# Patient Record
Sex: MALE | Race: WHITE | Employment: OTHER | URBAN - METROPOLITAN AREA
[De-identification: names, ages, dates, MRNs, and addresses within clinical notes are randomized per-mention and may not be internally consistent; named-entity substitution may affect disease eponyms.]

---

## 2017-08-07 ENCOUNTER — OFFICE VISIT (OUTPATIENT)
Dept: URGENT CARE | Age: 62
End: 2017-08-07
Payer: COMMERCIAL

## 2017-08-07 PROCEDURE — G0382 LEV 3 HOSP TYPE B ED VISIT: HCPCS | Performed by: FAMILY MEDICINE

## 2017-11-15 ENCOUNTER — GENERIC CONVERSION - ENCOUNTER (OUTPATIENT)
Dept: OTHER | Facility: OTHER | Age: 62
End: 2017-11-15

## 2018-01-12 NOTE — RESULT NOTES
Message   Abnormal labs, diabetes     Verified Results  (1) COMPREHENSIVE METABOLIC PANEL [id number] 51:39AL Jewell Pitts     Test Name Result Flag Reference   GLUCOSE 237 mg/dL H 65-99   Fasting reference interval   UREA NITROGEN (BUN) 21 mg/dL  7-25   CREATININE 0 81 mg/dL  0 70-1 33   For patients >52years of age, the reference limit  for Creatinine is approximately 13% higher for people  identified as -American  eGFR NON-AFR  AMERICAN 98 mL/min/1 73m2  > OR = 60   eGFR AFRICAN AMERICAN 114 mL/min/1 73m2  > OR = 60   BUN/CREATININE RATIO   0-22   NOT APPLICABLE (calc)   SODIUM 136 mmol/L  135-146   POTASSIUM 4 2 mmol/L  3 5-5 3   CHLORIDE 102 mmol/L     CARBON DIOXIDE 24 mmol/L  19-30   CALCIUM 9 2 mg/dL  8 6-10 3   PROTEIN, TOTAL 6 8 g/dL  6 1-8 1   ALBUMIN 4 3 g/dL  3 6-5 1   GLOBULIN 2 5 g/dL (calc)  1 9-3 7   ALBUMIN/GLOBULIN RATIO 1 7 (calc)  1 0-2 5   BILIRUBIN, TOTAL 1 0 mg/dL  0 2-1 2   ALKALINE PHOSPHATASE 51 U/L     AST 36 U/L H 10-35   ALT 31 U/L  9-46     (1) LIPID PANEL, FASTING [id number] 07:41AM Shirley Dwyer     Test Name Result Flag Reference   CHOLESTEROL, TOTAL 178 mg/dL  125-200   HDL CHOLESTEROL 37 mg/dL L > OR = 40   TRIGLICERIDES 369 mg/dL H <150   LDL-CHOLESTEROL 96 mg/dL (calc)  <130   Desirable range <100 mg/dL for patients with CHD or  diabetes and <70 mg/dL for diabetic patients with  known heart disease  CHOL/HDLC RATIO 4 8 (calc)  < OR = 5 0   NON HDL CHOLESTEROL 141 mg/dL (calc)     Target for non-HDL cholesterol is 30 mg/dL higher than   LDL cholesterol target       (Q) CBC (H/H, RBC, INDICES, WBC, PLT) [id number] 07:41AM Shirley Dwyer     Test Name Result Flag Reference   WHITE BLOOD CELL COUNT 6 5 Thousand/uL  3 8-10 8   RED BLOOD CELL COUNT 5 16 Million/uL  4 20-5 80   HEMOGLOBIN 16 5 g/dL  13 2-17 1   HEMATOCRIT 49 0 %  38 5-50 0   MCV 95 1 fL  80 0-100 0   MCH 31 9 pg  27 0-33 0   MCHC 33 6 g/dL  32 0-36 0   RDW 12 5 %  11 0-15 0   PLATELET COUNT 015 Thousand/uL  140-400   MPV 8 7 fL  7 5-11 5   WE RECEIVED YOUR HANDWRITTEN TEST ORDER AND  PERFORMED A HEMOGRAM WITH A PLATELET WITHOUT  A DIFFERENTIAL  IF THIS IS NOT WHAT YOU INTENDED  TO ORDER, PLEASE CONTACT YOUR LOCAL CLIENT SERVICE  REPRESENTATIVE IMMEDIATELY SO THAT WE CAN   ADJUST OUR BILLING APPROPRIATELY  YOU MAY ALSO   INQUIRE ABOUT ALTERNATIVE OR ADDITIONAL TESTING            (Q) HEMOGLOBIN A1c [id number] 07:41AM Shirley Dwyer   REPORT COMMENT:  FASTING:YES     Test Name Result Flag Reference   HEMOGLOBIN A1c 8 0 % of total Hgb H <5 7   According to ADA guidelines, hemoglobin A1c <7 0%  represents optimal control in non-pregnant diabetic  patients  Different metrics may apply to specific  patient populations  Standards of Medical Care in    Diabetes Care  2013;36:s11-s66     For the purpose of screening for the presence of  diabetes  <5 7%       Consistent with the absence of diabetes  5 7-6 4%    Consistent with increased risk for diabetes              (prediabetes)  >or=6 5%    Consistent with diabetes     This assay result is consistent with diabetes  mellitus  Currently, no consensus exists for use of hemoglobin  A1c for diagnosis of diabetes for children

## 2018-01-18 NOTE — PROGRESS NOTES
Assessment    1  Contact dermatitis due to other agent, unspecified contact dermatitis type (826 85)   (L25 8)    Plan  Contact dermatitis due to other agent, unspecified contact dermatitis type    · Triamcinolone Acetonide 0 5 % External Cream; APPLY SPARINGLY TO AFFECTED  AREA(S) 2 TO 3 TIMES DAILY    Discussion/Summary  Discussion Summary:   1  Triamcinolone cream 2-3 x per day for 7 days  2  keep right knee clean and dry  3  follow up with PCP in 5 days  Follow Up Instructions: Follow Up with your Primary Care Provider in 5 days  If your symptoms worsen, go to the nearest [place] [address] Emergency Department  Chief Complaint    1  Rash  Chief Complaint Free Text Note Form: Has had rash above L knee x 2 weeks  No pain, itch and denies knee injury  L knee sl swollen  Using OTC hydrocortisone cream       History of Present Illness  HPI: Right knee rash for 2 weeks  No itching or swelling   Hospital Based Practices Required Assessment:   Pain Assessment   the patient states they do not have pain  (on a scale of 0 to 10, the patient rates the pain at 0 )   Abuse And Domestic Violence Screen    Yes, the patient is safe at home  The patient states no one is hurting them  Depression And Suicide Screen  No, the patient has not had thoughts of hurting themself  No, the patient has not felt depressed in the past 7 days  Prefered Language is  [place]  Primary Language is  English  Readiness To Learn: Receptive  Barriers To Learning: none  Preferred Learning: written and verbal   Education Completed: disease/condition, medications and treatment/procedure   Teaching Method: verbal and written   Person Taught: patient   Evaluation Of Learning: verbalized/demonstrated understanding   Rash:   Catarino Alexander presents with complaints of mild right leg rash     Associated symptoms include skin bumps and skin redness, but no skin blistering, no cracking, no crusting, no draining, no skin dryness, no skin oiliness, no pain, no pruritus, no pustules and no purulent drainage  Review of Systems  Focused-Male:   Constitutional: no fever or chills, feels well, no tiredness, no recent weight loss or weight gain  ENT: no complaints of earache, no loss of hearing, no nosebleeds or nasal discharge, no sore throat or hoarseness  Cardiovascular: no complaints of slow or fast heart rate, no chest pain, no palpitations, no leg claudication or lower extremity edema  Respiratory: no complaints of shortness of breath, no wheezing or cough, no dyspnea on exertion, no orthopnea or PND  Gastrointestinal: no complaints of abdominal pain, no constipation, no nausea or vomiting, no diarrhea or bloody stools  ROS Reviewed:   ROS reviewed  Active Problems    1  Colon cancer screening (V76 51) (Z12 11)   2  Depression with anxiety (300 4) (F41 8)   3  Diabetes mellitus type 2, uncontrolled (250 02) (E11 65)   4  Diabetes mellitus with neurological manifestation (250 60) (E11 49)   5  DMII (diabetes mellitus, type 2) (250 00) (E11 9)   6  Enthesopathy Of The Left Knee (726 60)   7  Hypertension (401 9) (I10)   8  Insomnia (780 52) (G47 00)   9  Nail abnormalities (703 9) (L60 9)   10  Neuropathy of foot (355 8) (G57 90)   11  Nocturia (788 43) (R35 1)    Past Medical History    1  History of _   2  History of _   3  History of Acute upper respiratory infection (465 9) (J06 9)   4  History of Acute upper respiratory infection (465 9) (J06 9)   5  History of Bronchitis, asthmatic (493 90) (J45 909)   6  History of Callus (700) (L84)   7  History of Contact dermatitis due to plant (692 6) (L25 5)   8  Generalized anxiety disorder (300 02) (F41 1)   9  History of acute conjunctivitis (V12 49) (Z86 69)   10  History of essential hypertension (V12 59) (Z86 79)   11  History of insomnia (V13 89) (Z87 898)   12  History of obesity (V13 89) (Z86 39)   13  History of tinea corporis (V12 09) (Z86 19)   14   History of urinary frequency (V13 09) (Z87 898)   15  History of Hordeolum externum, unspecified laterality (373 11) (H00 019)   16  History of Injury Due To Undetermined Intent (E988 9)   17  History of Left knee pain (719 46) (M25 562)   18  History of Limb pain (729 5) (M79 609)   19  History of Limb pain (729 5) (M79 609)   20  History of Numbness and tingling of foot (782 0) (R20 2)   21  History of Old Disruption Of Knee Ligaments (717 85)   22  History of Pain of right heel (729 5) (M79 671)   23  History of Skin Abscess Of Hip (682 6)   24  Type 2 diabetes mellitus (250 00) (E11 9)  Active Problems And Past Medical History Reviewed: The active problems and past medical history were reviewed and updated today  Family History  Mother    1  No pertinent family history  Father    2  Family history of pancreatic cancer (V16 0) (Z80 0)  Sister    3  Family history of lung cancer (V16 1) (Z80 1)  Grandparent    4  Family history of colon cancer (V16 0) (Z80 0)  Family History Reviewed: The family history was reviewed and updated today  Social History    · Denied: History of Drug use   · Marital History - Currently    · Never A Smoker   · Occupation:   · Social alcohol use (Z78 9)  Social History Reviewed: The social history was reviewed and updated today  The social history was reviewed and is unchanged  Surgical History  Surgical History Reviewed: The surgical history was reviewed and updated today  Current Meds   1  Accu-Chek Comfort Curve STRP; Therapy: (Recorded:81Nna7668) to Recorded   2  Aspirin 81 MG TABS; TAKE 1 TABLET DAILY; Therapy: 99QML5653 to Recorded   3  FreeStyle Lancets Miscellaneous; to be used as directed; Therapy: 83RDU2766 to  Requested for: 59XDI9577 Recorded   4  FreeStyle Lite Test In Vitro Strip; TEST BLOOD SUGAR ONCE DAILY AS DIRECTED; Therapy: 99SME7389 to (Evaluate:51Fhm1276)  Requested for: 57Gew0263; Last   Rx:93Aqw4874 Ordered   5   FreeStyle Lite Test In Vitro Strip; USE 1 STRIP Twice daily; Therapy: 96VYI2928 to (Last RX:62ZNF4381)  Requested for: 27PJS5786 Ordered   6  FreeStyle Test In Vitro Strip; to be sued as directed to check diabetes; Therapy: 30KXB5371 to  Requested for: 23SMZ4236 Recorded   7  Metanx 3-90 314-2-35 MG Oral Capsule; 1 po daily; Therapy: 38TBM1020 to (Austin Baltazar)  Requested for: 06GGM6338; Last   Rx:66Mxr0740 Ordered   8  MetFORMIN HCl - 1000 MG Oral Tablet; Take 1 tablet twice daily; Therapy: 89SRH6132 to (Arty Electra)  Requested for: 77VSB8398; Last   Rx:29Mar2016 Ordered   9  Ramipril 10 MG Oral Capsule; TAKE 1 CAPSULE ONCE DAILY; Therapy: 78BRR8288 to (Arty Electra)  Requested for: 33JQH8536; Last   Rx:29Mar2016 Ordered   10  Ventolin  (90 Base) MCG/ACT Inhalation Aerosol Solution; INHALE 2 PUFFS    EVERY 4 HOURS AS NEEDED; Therapy: 09GEB9784 to (Last Rx:49Ivr8509)  Requested for: 61Fnf7880 Ordered  Medication List Reviewed: The medication list was reviewed and updated today  Allergies    1  Penicillins    Vitals  Signs   Recorded: 14Wdp9778 05:57PM   Temperature: 98 4 F, Oral  Heart Rate: 98  Pulse Quality: Regular  Respiration: 18  Systolic: 349, RUE, Sitting  Diastolic: 60, RUE, Sitting  Height: 6 ft 3 in  Weight: 257 lb 12 8 oz  BMI Calculated: 32 22  BSA Calculated: 2 44  O2 Saturation: 96  Pain Scale: 0    Physical Exam    Constitutional   General appearance: No acute distress, well appearing and well nourished  Eyes   Conjunctiva and lids: No swelling, erythema, or discharge  Pupils and irises: Equal, round and reactive to light  Ears, Nose, Mouth, and Throat   External inspection of ears and nose: Normal     Pulmonary   Respiratory effort: No increased work of breathing or signs of respiratory distress  Auscultation of lungs: Clear to auscultation  Cardiovascular   Auscultation of heart: Normal rate and rhythm, normal S1 and S2, without murmurs      Musculoskeletal   Gait and station: Normal     Digits and nails: Normal without clubbing or cyanosis  Inspection/palpation of joints, bones, and muscles: Normal     Skin   Examination of the skin for lesions: Abnormal   Red patch(es) in an irregular pattern, in a linear pattern, pt wears right knee brace  Rash appears to be under upper band of brace  Psychiatric   Orientation to person, place and time: Normal        Signatures   Electronically signed by : Neha Varghese;  Aug  7 2017  6:29PM EST                       (Author)    Electronically signed by : EUGENE Bland ; Aug 16 2017  8:16AM EST                       (Review)

## 2018-12-17 ENCOUNTER — OFFICE VISIT (OUTPATIENT)
Dept: URGENT CARE | Facility: CLINIC | Age: 63
End: 2018-12-17
Payer: COMMERCIAL

## 2018-12-17 VITALS
OXYGEN SATURATION: 100 % | HEART RATE: 100 BPM | HEIGHT: 75 IN | TEMPERATURE: 98.8 F | BODY MASS INDEX: 31.41 KG/M2 | SYSTOLIC BLOOD PRESSURE: 170 MMHG | WEIGHT: 252.6 LBS | DIASTOLIC BLOOD PRESSURE: 90 MMHG | RESPIRATION RATE: 18 BRPM

## 2018-12-17 DIAGNOSIS — J02.9 ACUTE PHARYNGITIS, UNSPECIFIED ETIOLOGY: Primary | ICD-10-CM

## 2018-12-17 PROCEDURE — 99213 OFFICE O/P EST LOW 20 MIN: CPT | Performed by: PHYSICIAN ASSISTANT

## 2018-12-17 PROCEDURE — 87070 CULTURE OTHR SPECIMN AEROBIC: CPT | Performed by: PHYSICIAN ASSISTANT

## 2018-12-17 NOTE — LETTER
December 17, 2018     Patient: Yareli De La Cruz   YOB: 1955   Date of Visit: 12/17/2018       To Whom it May Concern:    Marilee Case was seen in my clinic on 12/17/2018  He may return to work on 12/18/2018  If you have any questions or concerns, please don't hesitate to call           Sincerely,          Daniel Strickland PA-C        CC: No Recipients

## 2018-12-17 NOTE — PROGRESS NOTES
[facility]        NAME: Emilia Barnes is a 66 y o  male  : 1955    MRN: [id number]  DATE: 2018  TIME: 4:08 PM    Assessment and Plan   Acute pharyngitis, unspecified etiology [J02 9]  1  Acute pharyngitis, unspecified etiology  Throat culture         Patient Instructions     Discussed condition with pt  I suspect he has an acute viral pharyngitis but will send out throat culture to further evaluate and call with results once obtained  In the interim, I rec hydration, rest, soft diet, pain control, and observation  Follow up with PCP in 3-5 days  Proceed to  ER if symptoms worsen  Chief Complaint     Chief Complaint   Patient presents with    Sore Throat     Started with sore throat over WE (exposed to strep 2 weeks ago)  Has b/l ear pressure and occ  congested cough/tickle in throat and hoarse voice   Cold Like Symptoms    Cough         History of Present Illness       Pt presents with 3 day history of ST, odynophagia  Concerned about strep as his daughter had it a few weeks ago  Denies swollen glands, runny nose, nasal congestion, PND, cough, fever, chills, N/V/D  He tried drinking hot tea which made it worse  He has not taken or done anything else for the symptoms  Denies hx of recurrent strep pharyngitis  Denies hx of asthma/allergies  Does not smoke  He does not receive the flu shot  Review of Systems   Review of Systems   Constitutional: Negative  HENT: Positive for ear pain (Left), sore throat and trouble swallowing  Negative for congestion, postnasal drip and rhinorrhea  Respiratory: Negative  Cardiovascular: Negative  Gastrointestinal: Negative  Genitourinary: Negative  Neurological: Negative for headaches  Hematological: Negative for adenopathy           Current Medications       Current Outpatient Prescriptions:     aspirin 81 MG tablet, Take 1 tablet by mouth daily, Disp: , Rfl:     glucose blood (ACCU-CHEK COMFORT CURVE) test strip, by In Vitro route, Disp: , Rfl:     metFORMIN (GLUCOPHAGE) 1000 MG tablet, Take 1 tablet by mouth 2 (two) times a day, Disp: , Rfl:     ramipril (ALTACE) 10 MG capsule, Take 1 capsule by mouth daily, Disp: , Rfl:     Current Allergies     Allergies as of 12/17/2018 - Reviewed 12/17/2018   Allergen Reaction Noted    Sudafed [pseudoephedrine] Other (See Comments) 12/17/2018    Penicillins Rash 01/02/2008            The following portions of the patient's history were reviewed and updated as appropriate: allergies, current medications, past family history, past medical history, past social history, past surgical history and problem list      Past Medical History:   Diagnosis Date    Allergic rhinitis     Diabetes mellitus ([address] )        Past Surgical History:   Procedure Laterality Date    WISDOM TOOTH EXTRACTION         No family history on file  Medications have been verified  Objective   /90 (BP Location: Left arm, Patient Position: Sitting, Cuff Size: Standard)   Pulse 100   Temp 98 8 °F (37 1 °C) (Tympanic)   Resp 18   Ht 6' 3" (1 905 m)   Wt 115 kg (252 lb 9 6 oz)   SpO2 100%   BMI 31 57 kg/m²        Physical Exam     Physical Exam   Constitutional: He is oriented to person, place, and time  He appears well-developed and well-nourished  No distress  HENT:   Right Ear: Hearing and external ear normal    Left Ear: Hearing and external ear normal    Nose: Nose normal    Mouth/Throat: Mucous membranes are normal  Posterior oropharyngeal erythema present  No oropharyngeal exudate  Difficult to visualize TMs as B/L EACs with cerumen  No significant tonsillar hypertrophy  Neck: Neck supple  Cardiovascular: Normal rate, regular rhythm and normal heart sounds  Pulmonary/Chest: Effort normal and breath sounds normal    Lymphadenopathy:     He has no cervical adenopathy  Neurological: He is alert and oriented to person, place, and time     Psychiatric: He has a normal mood and affect  Vitals reviewed

## 2018-12-19 LAB — BACTERIA THROAT CULT: NORMAL

## 2020-06-16 ENCOUNTER — TELEPHONE (OUTPATIENT)
Dept: FAMILY MEDICINE CLINIC | Facility: CLINIC | Age: 65
End: 2020-06-16

## 2020-06-17 ENCOUNTER — OFFICE VISIT (OUTPATIENT)
Dept: FAMILY MEDICINE CLINIC | Facility: CLINIC | Age: 65
End: 2020-06-17
Payer: COMMERCIAL

## 2020-06-17 VITALS
DIASTOLIC BLOOD PRESSURE: 90 MMHG | TEMPERATURE: 97.8 F | HEIGHT: 75 IN | OXYGEN SATURATION: 98 % | HEART RATE: 99 BPM | WEIGHT: 232 LBS | SYSTOLIC BLOOD PRESSURE: 170 MMHG | BODY MASS INDEX: 28.85 KG/M2 | RESPIRATION RATE: 16 BRPM

## 2020-06-17 DIAGNOSIS — Z11.59 NEED FOR HEPATITIS C SCREENING TEST: ICD-10-CM

## 2020-06-17 DIAGNOSIS — Z28.21 TETANUS, DIPHTHERIA, AND ACELLULAR PERTUSSIS (TDAP) VACCINATION DECLINED: ICD-10-CM

## 2020-06-17 DIAGNOSIS — Z00.00 ANNUAL PHYSICAL EXAM: Primary | ICD-10-CM

## 2020-06-17 DIAGNOSIS — Z76.89 ENCOUNTER TO ESTABLISH CARE: ICD-10-CM

## 2020-06-17 DIAGNOSIS — Z12.5 PROSTATE CANCER SCREENING: ICD-10-CM

## 2020-06-17 DIAGNOSIS — E11.42 TYPE 2 DIABETES MELLITUS WITH DIABETIC POLYNEUROPATHY, WITHOUT LONG-TERM CURRENT USE OF INSULIN (HCC): ICD-10-CM

## 2020-06-17 DIAGNOSIS — Z28.21 PNEUMOCOCCAL VACCINATION DECLINED BY PATIENT: ICD-10-CM

## 2020-06-17 DIAGNOSIS — H61.23 BILATERAL IMPACTED CERUMEN: ICD-10-CM

## 2020-06-17 DIAGNOSIS — E78.5 HYPERLIPIDEMIA LDL GOAL <70: ICD-10-CM

## 2020-06-17 DIAGNOSIS — I10 ESSENTIAL HYPERTENSION: ICD-10-CM

## 2020-06-17 DIAGNOSIS — Z12.11 COLON CANCER SCREENING: ICD-10-CM

## 2020-06-17 DIAGNOSIS — E66.3 OVERWEIGHT WITH BODY MASS INDEX (BMI) OF 29 TO 29.9 IN ADULT: ICD-10-CM

## 2020-06-17 PROBLEM — L25.9 CONTACT DERMATITIS: Status: ACTIVE | Noted: 2017-08-07

## 2020-06-17 LAB — SL AMB POCT HEMOGLOBIN AIC: 6.5 (ref ?–6.5)

## 2020-06-17 PROCEDURE — 4010F ACE/ARB THERAPY RXD/TAKEN: CPT | Performed by: FAMILY MEDICINE

## 2020-06-17 PROCEDURE — 3044F HG A1C LEVEL LT 7.0%: CPT | Performed by: FAMILY MEDICINE

## 2020-06-17 PROCEDURE — 3077F SYST BP >= 140 MM HG: CPT | Performed by: FAMILY MEDICINE

## 2020-06-17 PROCEDURE — 1036F TOBACCO NON-USER: CPT | Performed by: FAMILY MEDICINE

## 2020-06-17 PROCEDURE — 99204 OFFICE O/P NEW MOD 45 MIN: CPT | Performed by: FAMILY MEDICINE

## 2020-06-17 PROCEDURE — 3080F DIAST BP >= 90 MM HG: CPT | Performed by: FAMILY MEDICINE

## 2020-06-17 PROCEDURE — 99386 PREV VISIT NEW AGE 40-64: CPT | Performed by: FAMILY MEDICINE

## 2020-06-17 PROCEDURE — 3008F BODY MASS INDEX DOCD: CPT | Performed by: FAMILY MEDICINE

## 2020-06-17 PROCEDURE — 83036 HEMOGLOBIN GLYCOSYLATED A1C: CPT | Performed by: FAMILY MEDICINE

## 2020-06-17 RX ORDER — LISINOPRIL 10 MG/1
10 TABLET ORAL DAILY
Qty: 30 TABLET | Refills: 0 | Status: SHIPPED | OUTPATIENT
Start: 2020-06-17 | End: 2020-07-01 | Stop reason: SDUPTHER

## 2020-06-17 RX ORDER — GABAPENTIN 100 MG/1
100 CAPSULE ORAL 3 TIMES DAILY
Qty: 90 CAPSULE | Refills: 0 | Status: SHIPPED | OUTPATIENT
Start: 2020-06-17 | End: 2020-07-08

## 2020-06-26 LAB
ALBUMIN SERPL-MCNC: 4.5 G/DL (ref 3.6–5.1)
ALBUMIN/CREAT UR: 81 MCG/MG CREAT
ALBUMIN/GLOB SERPL: 1.8 (CALC) (ref 1–2.5)
ALP SERPL-CCNC: 49 U/L (ref 35–144)
ALT SERPL-CCNC: 26 U/L (ref 9–46)
AST SERPL-CCNC: 30 U/L (ref 10–35)
BASOPHILS # BLD AUTO: 41 CELLS/UL (ref 0–200)
BASOPHILS NFR BLD AUTO: 0.7 %
BILIRUB SERPL-MCNC: 1 MG/DL (ref 0.2–1.2)
BUN SERPL-MCNC: 18 MG/DL (ref 7–25)
BUN/CREAT SERPL: ABNORMAL (CALC) (ref 6–22)
CALCIUM SERPL-MCNC: 9.4 MG/DL (ref 8.6–10.3)
CHLORIDE SERPL-SCNC: 102 MMOL/L (ref 98–110)
CHOLEST SERPL-MCNC: 165 MG/DL
CHOLEST/HDLC SERPL: 3.2 (CALC)
CO2 SERPL-SCNC: 24 MMOL/L (ref 20–32)
CREAT SERPL-MCNC: 0.84 MG/DL (ref 0.7–1.25)
CREAT UR-MCNC: 168 MG/DL (ref 20–320)
EOSINOPHIL # BLD AUTO: 118 CELLS/UL (ref 15–500)
EOSINOPHIL NFR BLD AUTO: 2 %
ERYTHROCYTE [DISTWIDTH] IN BLOOD BY AUTOMATED COUNT: 12.5 % (ref 11–15)
GLOBULIN SER CALC-MCNC: 2.5 G/DL (CALC) (ref 1.9–3.7)
GLUCOSE SERPL-MCNC: 155 MG/DL (ref 65–99)
HCT VFR BLD AUTO: 47.1 % (ref 38.5–50)
HCV AB S/CO SERPL IA: 0.01
HCV AB SERPL QL IA: NORMAL
HDLC SERPL-MCNC: 51 MG/DL
HGB BLD-MCNC: 16.4 G/DL (ref 13.2–17.1)
LDLC SERPL CALC-MCNC: 97 MG/DL (CALC)
LYMPHOCYTES # BLD AUTO: 1800 CELLS/UL (ref 850–3900)
LYMPHOCYTES NFR BLD AUTO: 30.5 %
MCH RBC QN AUTO: 33.6 PG (ref 27–33)
MCHC RBC AUTO-ENTMCNC: 34.8 G/DL (ref 32–36)
MCV RBC AUTO: 96.5 FL (ref 80–100)
MICROALBUMIN UR-MCNC: 13.6 MG/DL
MONOCYTES # BLD AUTO: 584 CELLS/UL (ref 200–950)
MONOCYTES NFR BLD AUTO: 9.9 %
NEUTROPHILS # BLD AUTO: 3357 CELLS/UL (ref 1500–7800)
NEUTROPHILS NFR BLD AUTO: 56.9 %
NONHDLC SERPL-MCNC: 114 MG/DL (CALC)
PLATELET # BLD AUTO: 219 THOUSAND/UL (ref 140–400)
PMV BLD REES-ECKER: 10.1 FL (ref 7.5–12.5)
POTASSIUM SERPL-SCNC: 4.2 MMOL/L (ref 3.5–5.3)
PROT SERPL-MCNC: 7 G/DL (ref 6.1–8.1)
PSA SERPL-MCNC: 4 NG/ML
RBC # BLD AUTO: 4.88 MILLION/UL (ref 4.2–5.8)
SL AMB EGFR AFRICAN AMERICAN: 109 ML/MIN/1.73M2
SL AMB EGFR NON AFRICAN AMERICAN: 94 ML/MIN/1.73M2
SODIUM SERPL-SCNC: 137 MMOL/L (ref 135–146)
TRIGL SERPL-MCNC: 84 MG/DL
WBC # BLD AUTO: 5.9 THOUSAND/UL (ref 3.8–10.8)

## 2020-07-01 ENCOUNTER — OFFICE VISIT (OUTPATIENT)
Dept: FAMILY MEDICINE CLINIC | Facility: CLINIC | Age: 65
End: 2020-07-01
Payer: COMMERCIAL

## 2020-07-01 VITALS
BODY MASS INDEX: 27.73 KG/M2 | WEIGHT: 223 LBS | OXYGEN SATURATION: 96 % | DIASTOLIC BLOOD PRESSURE: 90 MMHG | HEART RATE: 101 BPM | HEIGHT: 75 IN | SYSTOLIC BLOOD PRESSURE: 150 MMHG

## 2020-07-01 DIAGNOSIS — E78.5 HYPERLIPIDEMIA LDL GOAL <70: ICD-10-CM

## 2020-07-01 DIAGNOSIS — I10 ESSENTIAL HYPERTENSION: Primary | ICD-10-CM

## 2020-07-01 DIAGNOSIS — R80.9 MICROALBUMINURIA: ICD-10-CM

## 2020-07-01 DIAGNOSIS — Z12.5 PROSTATE CANCER SCREENING: ICD-10-CM

## 2020-07-01 DIAGNOSIS — E11.42 TYPE 2 DIABETES MELLITUS WITH DIABETIC POLYNEUROPATHY, WITHOUT LONG-TERM CURRENT USE OF INSULIN (HCC): ICD-10-CM

## 2020-07-01 PROCEDURE — 4010F ACE/ARB THERAPY RXD/TAKEN: CPT | Performed by: FAMILY MEDICINE

## 2020-07-01 PROCEDURE — 3066F NEPHROPATHY DOC TX: CPT | Performed by: FAMILY MEDICINE

## 2020-07-01 PROCEDURE — 3077F SYST BP >= 140 MM HG: CPT | Performed by: FAMILY MEDICINE

## 2020-07-01 PROCEDURE — 1036F TOBACCO NON-USER: CPT | Performed by: FAMILY MEDICINE

## 2020-07-01 PROCEDURE — 3008F BODY MASS INDEX DOCD: CPT | Performed by: FAMILY MEDICINE

## 2020-07-01 PROCEDURE — 3080F DIAST BP >= 90 MM HG: CPT | Performed by: FAMILY MEDICINE

## 2020-07-01 PROCEDURE — 3044F HG A1C LEVEL LT 7.0%: CPT | Performed by: FAMILY MEDICINE

## 2020-07-01 PROCEDURE — 99214 OFFICE O/P EST MOD 30 MIN: CPT | Performed by: FAMILY MEDICINE

## 2020-07-01 RX ORDER — LISINOPRIL 20 MG/1
20 TABLET ORAL DAILY
Qty: 30 TABLET | Refills: 2 | Status: SHIPPED | OUTPATIENT
Start: 2020-07-01 | End: 2020-08-05 | Stop reason: SDUPTHER

## 2020-07-01 RX ORDER — ROSUVASTATIN CALCIUM 5 MG/1
5 TABLET, COATED ORAL DAILY
Qty: 30 TABLET | Refills: 2 | Status: SHIPPED | OUTPATIENT
Start: 2020-07-01 | End: 2020-08-05 | Stop reason: SDUPTHER

## 2020-07-01 NOTE — PROGRESS NOTES
Assessment/Plan:   Diagnoses and all orders for this visit:    Essential hypertension  -     lisinopril (ZESTRIL) 20 mg tablet; Take 1 tablet (20 mg total) by mouth daily  - BP in the office 140/62 and 150/90 on my repeat   - was started on ACEI 10mg QD at last OV 2wks ago and tolerating it well   - also (+) microalbuminuria on recent labs   - no FHx of HTN per pt   - not a smoker   - avoid NSAIDs  - no more than 2g of Na/day   - will increase ACEI from 10mg to 20mg QD   - advised to get an arm BP cuff and bring to the office at next [address] [facility] in 1month for f/u - pt aware and agreeable   Microalbuminuria  -     Microalbumin / creatinine urine ratio; Future  - if with persistently microalbuminuria - will refer to Nephro - pt aware and agreeable     Type 2 diabetes mellitus with diabetic polyneuropathy, without long-term current use of insulin (HCC)  -     metFORMIN (GLUCOPHAGE) 500 mg tablet; Take 1 5 tablets (750 mg total) by mouth 2 (two) times a day  -     HEMOGLOBIN A1C W/ EAG ESTIMATION; Future  - A1c (6/17/2020): 6 5  - has intentionally lost 10lbs since last OV 2wks ago   - will decrease to 750mg BID (down from 1000mg BID)   - nml BUN/Crt   - neuropathy well controlled on Gabapentin 100mg TID - cont for now  - in the process of scheduling with Optho and Podiatry   - foot exam done in the office on 6/17/2020     Hyperlipidemia LDL goal <70  -     rosuvastatin (CRESTOR) 5 mg tablet; Take 1 tablet (5 mg total) by mouth daily  -     Lipid panel;  Future  - Lipids (6/25/2020): , TG 84, HDL 51, LDL 97   - will start on statin 5mg QHS and repeat labs in 3months   - 10yr ASCVD risk will decrease with addition of statin and better HTN control   - The 10-year ASCVD risk score (Stephanie Piper et al , 2013) is: 23 6%    Values used to calculate the score:      Age: 60 years      Sex: Male      Is Non- : No      Diabetic: Yes      Tobacco smoker: No      Systolic Blood Pressure: 487 mmHg      Is BP treated: Yes      HDL Cholesterol: 51 mg/dL      Total Cholesterol: 165 mg/dL    Overweight with body mass index (BMI) of 27 to 27 9 in adult  - BMI 27 9 <-- 29   - pt has intentionally lost 10lbs since last OV     Prostate cancer screening  -     PSA, Total Screen; Future  - if persistently elevated - will refer to Uro - pt aware and agreeable         Subjective:    Patient ID: Ebony Baldwin is a 60 y o  male    65yo M presents to the office for f/u of multiple medical conditions   1) HTN  - BP in the office 140/62 and 150/90 on my repeat   - was started on ACEI 10mg QD at last OV 2wks ago and tolerating it well   - denies F/C/N/V/HA/visual changes/CP/palpitations/SOB/wheezing/cough/abd pain/D/numbness or tingling in b/l UE+LE/LE edema or calf tenderness  - (+) facial flushing   - no FHx of HTN   - not a smoker   - used to be on Ramipril 10mg QD in the past   - diet/exercise: "trying to eat few starches", does not currently exercise - has lost 10lbs in 2wks   - social: denies tob/illicits, 4-0EDPYFF QHS  2) DM2 with polyneuropathy and microalbuminuria   - nml BUN/Crt,    - (+) microalbuminuria   - A1c: 6 5 (6/17/2020)   - currently on Metformin 1000mg BID   - (+) polyneuropathy but well controlled on Gabapentin 100mg TID   - of note, has intentionally lost 10lbs in 2wks   - in the process of trying to schedule with Optho and Podiatry - was referred at last OV   3) HL   - Lipids (6/25/2020): , TG 84, HDL 51, LDL 97   4) HM   - PSA exactly at 4   - denies FHx of Prostate Ca but strong FHx of various cancers          The following portions of the patient's history were reviewed and updated as appropriate: allergies, current medications, past family history, past medical history, past social history, past surgical history and problem list     Review of Systems  as per HPI    Objective:  /90 (BP Location: Left arm, Patient Position: Sitting, Cuff Size: Standard)   Pulse 101   Ht 6' 3" (1 905 m)   Wt 101 kg (223 lb)   SpO2 96%   BMI 27 87 kg/m²    Physical Exam   Constitutional: He is oriented to person, place, and time  He appears well-developed and well-nourished  No distress  HENT:   Head: Normocephalic and atraumatic  Right Ear: External ear normal    Left Ear: External ear normal    Eyes: Conjunctivae and EOM are normal  Right eye exhibits no discharge  Left eye exhibits no discharge  No scleral icterus  Neck: Normal range of motion  Neck supple  Cardiovascular: Normal rate, regular rhythm and normal heart sounds  Exam reveals no gallop and no friction rub  No murmur heard  Pulmonary/Chest: Effort normal and breath sounds normal  No stridor  No respiratory distress  He has no wheezes  He has no rales  Abdominal: Soft  Bowel sounds are normal  He exhibits no distension  There is no tenderness  Musculoskeletal: Normal range of motion  He exhibits no edema, tenderness or deformity  Neurological: He is alert and oriented to person, place, and time  Skin: Skin is warm  He is not diaphoretic  (+) facial flushing    Psychiatric: He has a normal mood and affect  His behavior is normal    Vitals reviewed  BMI Counseling: Body mass index is 27 87 kg/m²  The BMI is above normal  Nutrition recommendations include reducing portion sizes, consuming healthier snacks, moderation in carbohydrate intake and reducing intake of cholesterol  Exercise recommendations include moderate aerobic physical activity for 150 minutes/week and exercising 3-5 times per week

## 2020-07-02 DIAGNOSIS — E11.42 TYPE 2 DIABETES MELLITUS WITH DIABETIC POLYNEUROPATHY, WITHOUT LONG-TERM CURRENT USE OF INSULIN (HCC): ICD-10-CM

## 2020-07-06 DIAGNOSIS — E11.42 TYPE 2 DIABETES MELLITUS WITH DIABETIC POLYNEUROPATHY, WITHOUT LONG-TERM CURRENT USE OF INSULIN (HCC): ICD-10-CM

## 2020-07-08 RX ORDER — GABAPENTIN 100 MG/1
100 CAPSULE ORAL 3 TIMES DAILY
Qty: 90 CAPSULE | Refills: 0 | OUTPATIENT
Start: 2020-07-08

## 2020-07-08 RX ORDER — GABAPENTIN 100 MG/1
CAPSULE ORAL
Qty: 90 CAPSULE | Refills: 0 | Status: SHIPPED | OUTPATIENT
Start: 2020-07-08 | End: 2020-08-05 | Stop reason: SDUPTHER

## 2020-07-11 DIAGNOSIS — I10 ESSENTIAL HYPERTENSION: ICD-10-CM

## 2020-07-14 DIAGNOSIS — E11.42 TYPE 2 DIABETES MELLITUS WITH DIABETIC POLYNEUROPATHY, WITHOUT LONG-TERM CURRENT USE OF INSULIN (HCC): ICD-10-CM

## 2020-07-15 RX ORDER — LISINOPRIL 10 MG/1
TABLET ORAL
Qty: 30 TABLET | Refills: 0 | OUTPATIENT
Start: 2020-07-15

## 2020-07-15 RX ORDER — GABAPENTIN 100 MG/1
100 CAPSULE ORAL 3 TIMES DAILY
Qty: 90 CAPSULE | Refills: 0 | OUTPATIENT
Start: 2020-07-15

## 2020-07-30 LAB
ALBUMIN/CREAT UR: 21 MCG/MG CREAT
CREAT UR-MCNC: 57 MG/DL (ref 20–320)
MICROALBUMIN UR-MCNC: 1.2 MG/DL
PSA SERPL-MCNC: 3.7 NG/ML

## 2020-07-30 PROCEDURE — 3061F NEG MICROALBUMINURIA REV: CPT | Performed by: FAMILY MEDICINE

## 2020-08-05 ENCOUNTER — OFFICE VISIT (OUTPATIENT)
Dept: FAMILY MEDICINE CLINIC | Facility: CLINIC | Age: 65
End: 2020-08-05
Payer: COMMERCIAL

## 2020-08-05 VITALS
DIASTOLIC BLOOD PRESSURE: 78 MMHG | BODY MASS INDEX: 26.73 KG/M2 | WEIGHT: 215 LBS | OXYGEN SATURATION: 98 % | RESPIRATION RATE: 16 BRPM | HEIGHT: 75 IN | TEMPERATURE: 99.3 F | SYSTOLIC BLOOD PRESSURE: 128 MMHG | HEART RATE: 96 BPM

## 2020-08-05 DIAGNOSIS — I10 ESSENTIAL HYPERTENSION: Primary | ICD-10-CM

## 2020-08-05 DIAGNOSIS — R97.20 ELEVATED PSA: ICD-10-CM

## 2020-08-05 DIAGNOSIS — E11.42 TYPE 2 DIABETES MELLITUS WITH DIABETIC POLYNEUROPATHY, WITHOUT LONG-TERM CURRENT USE OF INSULIN (HCC): ICD-10-CM

## 2020-08-05 DIAGNOSIS — E78.5 HYPERLIPIDEMIA LDL GOAL <70: ICD-10-CM

## 2020-08-05 PROCEDURE — 1036F TOBACCO NON-USER: CPT | Performed by: FAMILY MEDICINE

## 2020-08-05 PROCEDURE — 3044F HG A1C LEVEL LT 7.0%: CPT | Performed by: FAMILY MEDICINE

## 2020-08-05 PROCEDURE — 99214 OFFICE O/P EST MOD 30 MIN: CPT | Performed by: FAMILY MEDICINE

## 2020-08-05 PROCEDURE — 3066F NEPHROPATHY DOC TX: CPT | Performed by: FAMILY MEDICINE

## 2020-08-05 PROCEDURE — 3008F BODY MASS INDEX DOCD: CPT | Performed by: FAMILY MEDICINE

## 2020-08-05 PROCEDURE — 4010F ACE/ARB THERAPY RXD/TAKEN: CPT | Performed by: FAMILY MEDICINE

## 2020-08-05 PROCEDURE — 3074F SYST BP LT 130 MM HG: CPT | Performed by: FAMILY MEDICINE

## 2020-08-05 PROCEDURE — 3078F DIAST BP <80 MM HG: CPT | Performed by: FAMILY MEDICINE

## 2020-08-05 RX ORDER — LISINOPRIL 20 MG/1
20 TABLET ORAL DAILY
Qty: 90 TABLET | Refills: 0 | Status: SHIPPED | OUTPATIENT
Start: 2020-08-05 | End: 2020-10-28

## 2020-08-05 RX ORDER — ROSUVASTATIN CALCIUM 5 MG/1
5 TABLET, COATED ORAL DAILY
Qty: 90 TABLET | Refills: 0 | Status: SHIPPED | OUTPATIENT
Start: 2020-08-05 | End: 2020-10-29 | Stop reason: SDUPTHER

## 2020-08-05 RX ORDER — POLYETHYLENE GLYCOL-3350, SODIUM CHLORIDE, POTASSIUM CHLORIDE AND SODIUM BICARBONATE 420; 11.2; 5.72; 1.48 G/438.4G; G/438.4G; G/438.4G; G/438.4G
POWDER, FOR SOLUTION ORAL
COMMUNITY
Start: 2020-07-20 | End: 2020-08-05 | Stop reason: ALTCHOICE

## 2020-08-05 RX ORDER — BISACODYL 5 MG
TABLET, DELAYED RELEASE (ENTERIC COATED) ORAL
COMMUNITY
Start: 2020-07-20 | End: 2020-08-05 | Stop reason: ALTCHOICE

## 2020-08-05 RX ORDER — GABAPENTIN 100 MG/1
100 CAPSULE ORAL 3 TIMES DAILY
Qty: 270 CAPSULE | Refills: 0 | Status: SHIPPED | OUTPATIENT
Start: 2020-08-05 | End: 2021-01-13 | Stop reason: SDUPTHER

## 2020-08-05 NOTE — PROGRESS NOTES
Assessment/Plan:   Diagnoses and all orders for this visit:    Essential hypertension  -     lisinopril (ZESTRIL) 20 mg tablet; Take 1 tablet (20 mg total) by mouth daily  - BP in the office 124/80 and on my repeat 128/78  - does have his BP cuff with him - range at home: 120s/70s  - BP with pt's cuff in office = 134/81   - ACEI was increased from 10 to 20mg QD at last OV on 7/1/2020 and pt tolerating it well - cont current regimen   - discussed diet and exercise - pt has lost an additional 8lbs since last OV (for a total of 17lbs since 6/17/2020)   - no FHx of HTN   - not a smoker   - has cut back on his EtOH intake   - avoid NSAIDs  - no more than 2g of Na/day   - microalbuminuria resolved     Hyperlipidemia LDL goal <70  -     rosuvastatin (CRESTOR) 5 mg tablet; Take 1 tablet (5 mg total) by mouth daily  - tolerating statin well - denies myalgias   - Lipids (6/25/2020): , TG 84, HDL 51, LDL 97   - risk score decreased with better BP control   - has script for repeat lipids  - f/u in 10/2020   - The 10-year ASCVD risk score (Johana Fisher et al , 2013) is: 18 3%    Values used to calculate the score:      Age: 60 years      Sex: Male      Is Non- : No      Diabetic: Yes      Tobacco smoker: No      Systolic Blood Pressure: 556 mmHg      Is BP treated: Yes      HDL Cholesterol: 51 mg/dL      Total Cholesterol: 165 mg/dL    Type 2 diabetes mellitus with diabetic polyneuropathy, without long-term current use of insulin (HCC)  -     gabapentin (NEURONTIN) 100 mg capsule; Take 1 capsule (100 mg total) by mouth 3 (three) times a day    Elevated PSA  -     Ambulatory referral to Urology; Future    Other orders  -     Cancel: Ambulatory referral for colonoscopy - pt has Cscope scheduled for next week with Dr Brown         Subjective:    Patient ID: Jaguar Hansen is a 60 y o  male    67yo M presents to the office for f/u of HTN   - BP in the office 124/80 and on my repeat 128/78  - does have his BP cuff with him - range at home: 120s/70s  - BP with pt's cuff in office = 134/81   - ACEI was increased from 10 to 20mg QD at last OV and pt tolerating it well  - has been exercising - walking QD   - has lost an additional 8lbs since last OV (for a total of 17lbs since 6/17/2020)   - (+) facial flushing   - no FHx of HTN   - not a smoker   - social: denies tob/illicits, 1 drink/week   - denies F/C/N/V/HA/visual changes/CP/palpitations/SOB/wheezing/cough/abd pain/D/numbness or tingling in b/l UE+LE/LE edema or calf tenderness/myalgias   - nml urine microalbumin on repeat labs  - PSA elevated at 3 7 <-- 4 0   - denies FHx of Prostate Ca but strong FHx of various cancers   - has Cscope scheduled next week with Dr Katie Dobbins      The following portions of the patient's history were reviewed and updated as appropriate: allergies, current medications, past family history, past medical history, past social history, past surgical history and problem list     Review of Systems  as per HPI    Objective:  /78 (BP Location: Left arm, Patient Position: Sitting, Cuff Size: Standard)   Pulse 96   Temp 99 3 °F (37 4 °C) (Tympanic)   Resp 16   Ht 6' 3" (1 905 m)   Wt 97 5 kg (215 lb)   SpO2 98%   BMI 26 87 kg/m²    Physical Exam   Constitutional: He is oriented to person, place, and time  Non-toxic appearance  He does not appear ill  No distress  HENT:   Head: Normocephalic and atraumatic  Right Ear: External ear normal    Left Ear: External ear normal    Eyes: Conjunctivae are normal  Right eye exhibits no discharge  Left eye exhibits no discharge  No scleral icterus  Neck: Normal range of motion  Cardiovascular: Normal rate, regular rhythm and normal heart sounds  Exam reveals no gallop and no friction rub  No murmur heard  Pulmonary/Chest: Effort normal and breath sounds normal  No stridor  No respiratory distress  He has no wheezes  He has no rhonchi  He has no rales  He exhibits no tenderness  Abdominal: Soft  Normal appearance and bowel sounds are normal    Musculoskeletal: Normal range of motion  General: No swelling, tenderness, deformity or signs of injury  Right lower leg: No edema  Left lower leg: No edema  Neurological: He is alert and oriented to person, place, and time  Skin: Skin is warm  He is not diaphoretic  (+) facial flushing    Psychiatric: His behavior is normal  Mood, judgment and thought content normal    Vitals reviewed  BMI Counseling: Body mass index is 26 87 kg/m²  The BMI is above normal  Nutrition recommendations include reducing portion sizes and decreasing overall calorie intake  Exercise recommendations include moderate aerobic physical activity for 150 minutes/week, exercising 3-5 times per week and joining a gym

## 2020-09-17 ENCOUNTER — OFFICE VISIT (OUTPATIENT)
Dept: FAMILY MEDICINE CLINIC | Facility: CLINIC | Age: 65
End: 2020-09-17
Payer: COMMERCIAL

## 2020-09-17 VITALS
BODY MASS INDEX: 27.85 KG/M2 | HEART RATE: 102 BPM | DIASTOLIC BLOOD PRESSURE: 72 MMHG | SYSTOLIC BLOOD PRESSURE: 150 MMHG | TEMPERATURE: 98.7 F | OXYGEN SATURATION: 98 % | WEIGHT: 224 LBS | HEIGHT: 75 IN | RESPIRATION RATE: 18 BRPM

## 2020-09-17 DIAGNOSIS — L97.511 SKIN ULCER OF TOE OF RIGHT FOOT, LIMITED TO BREAKDOWN OF SKIN (HCC): Primary | ICD-10-CM

## 2020-09-17 DIAGNOSIS — I10 ESSENTIAL HYPERTENSION: ICD-10-CM

## 2020-09-17 PROCEDURE — 99214 OFFICE O/P EST MOD 30 MIN: CPT | Performed by: FAMILY MEDICINE

## 2020-09-17 PROCEDURE — 3078F DIAST BP <80 MM HG: CPT | Performed by: FAMILY MEDICINE

## 2020-09-17 RX ORDER — CEPHALEXIN 500 MG/1
500 CAPSULE ORAL EVERY 12 HOURS SCHEDULED
Qty: 14 CAPSULE | Refills: 0 | Status: SHIPPED | OUTPATIENT
Start: 2020-09-17 | End: 2020-09-24

## 2020-09-17 NOTE — ASSESSMENT & PLAN NOTE
Lab Results   Component Value Date    HGBA1C 6 5 06/17/2020     A1c well controlled, continue management per PCP

## 2020-09-17 NOTE — PROGRESS NOTES
Subjective:      Patient ID: Alistair Cabezas is a 63 y o  male  Leg Pain    The incident occurred 5 to 7 days ago  The incident occurred at home  Injury mechanism: ruptured blister  Pain location: Right great toe  The quality of the pain is described as aching and burning  The pain is at a severity of 5/10  The pain is moderate  The symptoms are aggravated by weight bearing  He has tried rest for the symptoms  The treatment provided no relief  Patient states that recently he has started walking, developed a blister on the right great toe, which min she ruptured  Patient is experiencing pain and discomfort in the right great toe  Patient has history of type 2 diabetes with polyneuropathy, recent A1c 6 5  Patient has been advised to establish with podiatrist but was unable to do so  Patient does not monitor his blood sugar at home  His pressure elevated today  Patient has history of hypertension, on lisinopril 20 milligram   Patient states blood pressure is elevated because of pain  He has an to his PCP in 2 3 weeks      Past Medical History:   Diagnosis Date    Allergic rhinitis     Anxiety disorder     Last assessed 2/7/2006     Carpal tunnel syndrome     Diabetes mellitus ([address] )     Diabetes mellitus with neurological manifestation ([address] ) 04/06/2012    Last assessed 3/29/2016     Diabetes type 2, uncontrolled ([address] )     Last assessed 4/6/2016     DM II (diabetes mellitus, type II), controlled ([address] )     Last assessed 10/20/2014     Essential hypertension     Last assesssed 2/7/2006     Insomnia     Last assessed 1/13/2011     Numbness and tingling of foot     Resolved 3/29/2016     Obesity     Last assessed 2/7/2006     Old disruption of knee ligament     Last assessed 3/26/2008     Tarsal tunnel syndrome     Urinary frequency     Resolved 3/29/2016        Family History   Problem Relation Age of Onset    Heart attack Mother 79    Valvular heart disease Mother     Pancreatic cancer Father 76    Diabetes Father     Lung cancer Sister 46        not a smoker    Cancer Brother         bile duct     Colon cancer Maternal Grandmother 58    Diabetes Maternal Grandmother     Pancreatic cancer Paternal Aunt     Pancreatic cancer Paternal Uncle     Hypertension Neg Hx     Prostate cancer Neg Hx        Past Surgical History:   Procedure Laterality Date    WISDOM TOOTH EXTRACTION          reports that he has never smoked  He has never used smokeless tobacco  He reports current alcohol use of about 3 0 standard drinks of alcohol per week  He reports that he does not use drugs  Current Outpatient Medications:     aspirin 81 MG tablet, Take 1 tablet by mouth daily, Disp: , Rfl:     gabapentin (NEURONTIN) 100 mg capsule, Take 1 capsule (100 mg total) by mouth 3 (three) times a day, Disp: 270 capsule, Rfl: 0    glucose blood (ACCU-CHEK COMFORT CURVE) test strip, by In Vitro route, Disp: , Rfl:     lisinopril (ZESTRIL) 20 mg tablet, Take 1 tablet (20 mg total) by mouth daily, Disp: 90 tablet, Rfl: 0    metFORMIN (GLUCOPHAGE) 500 mg tablet, Take 1 5 tablets (750 mg total) by mouth 2 (two) times a day, Disp: 270 tablet, Rfl: 0    rosuvastatin (CRESTOR) 5 mg tablet, Take 1 tablet (5 mg total) by mouth daily, Disp: 90 tablet, Rfl: 0    The following portions of the patient's history were reviewed and updated as appropriate: allergies, current medications, past family history, past medical history, past social history, past surgical history and problem list     Review of Systems   Constitutional: Negative  Respiratory: Negative  Negative for shortness of breath  Cardiovascular: Negative  Negative for chest pain and palpitations  Gastrointestinal: Negative  Endocrine: Negative  Genitourinary: Negative  Musculoskeletal: Negative  Negative for myalgias  Rt great toe pain   Allergic/Immunologic: Negative  Neurological: Negative  Negative for headaches  Psychiatric/Behavioral: Negative  Objective:    /72   Pulse 102   Temp 98 7 °F (37 1 °C)   Resp 18   Ht 6' 3" (1 905 m)   Wt 102 kg (224 lb)   SpO2 98%   BMI 28 00 kg/m²      Physical Exam  Constitutional:       Appearance: He is well-developed  HENT:      Mouth/Throat:      Pharynx: No oropharyngeal exudate  Eyes:      Pupils: Pupils are equal, round, and reactive to light  Neck:      Musculoskeletal: Normal range of motion  Cardiovascular:      Rate and Rhythm: Normal rate and regular rhythm  Pulmonary:      Effort: Pulmonary effort is normal       Breath sounds: Normal breath sounds  Abdominal:      General: Bowel sounds are normal       Palpations: Abdomen is soft  Musculoskeletal: Normal range of motion  Skin:     Comments: Open ulcer on ventral aspect of Rt great toe  Neurological:      Mental Status: He is alert and oriented to person, place, and time  Psychiatric:         Behavior: Behavior normal          Judgment: Judgment normal            No results found for this or any previous visit (from the past 1008 hour(s))  Assessment/Plan:    No problem-specific Assessment & Plan notes found for this encounter  Problem List Items Addressed This Visit        Cardiovascular and Mediastinum    Essential hypertension       Musculoskeletal and Integument    Skin ulcer of toe of right foot, limited to breakdown of skin ([address] ) - Primary     Patient started on oral antibiotic  Advised wound care  Advised to establish with podiatrist ASAP  If unable to establish with podiatrist will need care wound care  Relevant Medications    cephalexin (KEFLEX) 500 mg capsule    Other Relevant Orders    Ambulatory referral to Podiatry        patient's blood pressure is elevated today, likely due to pain  Blood pressure was within normal limits at his most recent appointment on 08/05  Already has a follow-up scheduled with his PCP in 2 to 3 weeks

## 2020-09-17 NOTE — ASSESSMENT & PLAN NOTE
Patient started on oral antibiotic  Advised wound care  Advised to establish with podiatrist ASAP  If unable to establish with podiatrist will need care wound care

## 2020-09-23 DIAGNOSIS — E11.42 TYPE 2 DIABETES MELLITUS WITH DIABETIC POLYNEUROPATHY, WITHOUT LONG-TERM CURRENT USE OF INSULIN (HCC): ICD-10-CM

## 2020-10-01 LAB
CHOLEST SERPL-MCNC: 140 MG/DL
CHOLEST/HDLC SERPL: 2.4 (CALC)
EST. AVERAGE GLUCOSE BLD GHB EST-MCNC: 131 (CALC)
EST. AVERAGE GLUCOSE BLD GHB EST-SCNC: 7.3 (CALC)
HBA1C MFR BLD HPLC: 5.2 %
HBA1C MFR BLD: 6.2 % OF TOTAL HGB
HDLC SERPL-MCNC: 58 MG/DL
LDLC SERPL CALC-MCNC: 68 MG/DL (CALC)
NONHDLC SERPL-MCNC: 82 MG/DL (CALC)
TRIGL SERPL-MCNC: 59 MG/DL

## 2020-10-01 PROCEDURE — 3044F HG A1C LEVEL LT 7.0%: CPT | Performed by: FAMILY MEDICINE

## 2020-10-07 ENCOUNTER — TELEPHONE (OUTPATIENT)
Dept: FAMILY MEDICINE CLINIC | Facility: CLINIC | Age: 65
End: 2020-10-07

## 2020-10-07 ENCOUNTER — OFFICE VISIT (OUTPATIENT)
Dept: FAMILY MEDICINE CLINIC | Facility: CLINIC | Age: 65
End: 2020-10-07
Payer: COMMERCIAL

## 2020-10-07 VITALS
HEIGHT: 75 IN | BODY MASS INDEX: 27.48 KG/M2 | SYSTOLIC BLOOD PRESSURE: 168 MMHG | OXYGEN SATURATION: 98 % | RESPIRATION RATE: 16 BRPM | TEMPERATURE: 98 F | WEIGHT: 221 LBS | DIASTOLIC BLOOD PRESSURE: 80 MMHG | HEART RATE: 107 BPM

## 2020-10-07 DIAGNOSIS — L97.511 SKIN ULCER OF TOE OF RIGHT FOOT, LIMITED TO BREAKDOWN OF SKIN (HCC): ICD-10-CM

## 2020-10-07 DIAGNOSIS — E11.42 TYPE 2 DIABETES MELLITUS WITH DIABETIC POLYNEUROPATHY, WITHOUT LONG-TERM CURRENT USE OF INSULIN (HCC): Primary | ICD-10-CM

## 2020-10-07 DIAGNOSIS — E78.5 HYPERLIPIDEMIA LDL GOAL <70: ICD-10-CM

## 2020-10-07 DIAGNOSIS — I10 ESSENTIAL HYPERTENSION: ICD-10-CM

## 2020-10-07 PROCEDURE — 1036F TOBACCO NON-USER: CPT | Performed by: FAMILY MEDICINE

## 2020-10-07 PROCEDURE — 99214 OFFICE O/P EST MOD 30 MIN: CPT | Performed by: FAMILY MEDICINE

## 2020-10-07 RX ORDER — CEPHALEXIN 500 MG/1
CAPSULE ORAL
COMMUNITY
End: 2020-10-07 | Stop reason: ALTCHOICE

## 2020-10-07 RX ORDER — POLYETHYLENE GLYCOL 3350, SODIUM CHLORIDE, SODIUM BICARBONATE, POTASSIUM CHLORIDE 420; 11.2; 5.72; 1.48 G/4L; G/4L; G/4L; G/4L
POWDER, FOR SOLUTION ORAL
COMMUNITY
End: 2020-10-07 | Stop reason: ALTCHOICE

## 2020-10-07 RX ORDER — CHLORTHALIDONE 25 MG/1
25 TABLET ORAL DAILY
Qty: 90 TABLET | Refills: 0 | Status: SHIPPED | OUTPATIENT
Start: 2020-10-07 | End: 2020-11-20

## 2020-10-07 RX ORDER — CHLORTHALIDONE 25 MG/1
25 TABLET ORAL DAILY
Qty: 30 TABLET | Refills: 0 | Status: SHIPPED | OUTPATIENT
Start: 2020-10-07 | End: 2020-10-30

## 2020-10-27 DIAGNOSIS — I10 ESSENTIAL HYPERTENSION: ICD-10-CM

## 2020-10-28 RX ORDER — LISINOPRIL 20 MG/1
TABLET ORAL
Qty: 90 TABLET | Refills: 0 | Status: SHIPPED | OUTPATIENT
Start: 2020-10-28 | End: 2021-01-13 | Stop reason: SDUPTHER

## 2020-10-29 DIAGNOSIS — E78.5 HYPERLIPIDEMIA LDL GOAL <70: ICD-10-CM

## 2020-10-29 DIAGNOSIS — I10 ESSENTIAL HYPERTENSION: ICD-10-CM

## 2020-10-30 DIAGNOSIS — I10 ESSENTIAL HYPERTENSION: ICD-10-CM

## 2020-10-30 PROCEDURE — 4010F ACE/ARB THERAPY RXD/TAKEN: CPT | Performed by: FAMILY MEDICINE

## 2020-10-30 RX ORDER — CHLORTHALIDONE 25 MG/1
TABLET ORAL
Qty: 30 TABLET | Refills: 0 | Status: SHIPPED | OUTPATIENT
Start: 2020-10-30 | End: 2020-11-20

## 2020-10-30 RX ORDER — LISINOPRIL 20 MG/1
20 TABLET ORAL DAILY
Qty: 90 TABLET | Refills: 0 | OUTPATIENT
Start: 2020-10-30

## 2020-10-30 RX ORDER — ROSUVASTATIN CALCIUM 5 MG/1
5 TABLET, COATED ORAL DAILY
Qty: 90 TABLET | Refills: 0 | Status: SHIPPED | OUTPATIENT
Start: 2020-10-30 | End: 2020-11-18

## 2020-11-09 ENCOUNTER — TELEPHONE (OUTPATIENT)
Dept: FAMILY MEDICINE CLINIC | Facility: CLINIC | Age: 65
End: 2020-11-09

## 2020-11-12 DIAGNOSIS — E78.5 HYPERLIPIDEMIA LDL GOAL <70: ICD-10-CM

## 2020-11-18 RX ORDER — ROSUVASTATIN CALCIUM 5 MG/1
TABLET, COATED ORAL
Qty: 90 TABLET | Refills: 0 | Status: SHIPPED | OUTPATIENT
Start: 2020-11-18 | End: 2021-01-13

## 2020-11-20 ENCOUNTER — OFFICE VISIT (OUTPATIENT)
Dept: FAMILY MEDICINE CLINIC | Facility: CLINIC | Age: 65
End: 2020-11-20
Payer: COMMERCIAL

## 2020-11-20 VITALS
HEIGHT: 75 IN | DIASTOLIC BLOOD PRESSURE: 80 MMHG | BODY MASS INDEX: 28.85 KG/M2 | RESPIRATION RATE: 16 BRPM | HEART RATE: 98 BPM | WEIGHT: 232 LBS | SYSTOLIC BLOOD PRESSURE: 142 MMHG | OXYGEN SATURATION: 98 %

## 2020-11-20 DIAGNOSIS — L97.511 SKIN ULCER OF TOE OF RIGHT FOOT, LIMITED TO BREAKDOWN OF SKIN (HCC): ICD-10-CM

## 2020-11-20 DIAGNOSIS — E78.5 HYPERLIPIDEMIA LDL GOAL <70: ICD-10-CM

## 2020-11-20 DIAGNOSIS — I10 ESSENTIAL HYPERTENSION: Primary | ICD-10-CM

## 2020-11-20 DIAGNOSIS — E11.42 TYPE 2 DIABETES MELLITUS WITH DIABETIC POLYNEUROPATHY, WITHOUT LONG-TERM CURRENT USE OF INSULIN (HCC): ICD-10-CM

## 2020-11-20 PROCEDURE — 3077F SYST BP >= 140 MM HG: CPT | Performed by: FAMILY MEDICINE

## 2020-11-20 PROCEDURE — 99214 OFFICE O/P EST MOD 30 MIN: CPT | Performed by: FAMILY MEDICINE

## 2020-11-20 PROCEDURE — 3079F DIAST BP 80-89 MM HG: CPT | Performed by: FAMILY MEDICINE

## 2020-11-20 PROCEDURE — 1036F TOBACCO NON-USER: CPT | Performed by: FAMILY MEDICINE

## 2020-11-20 PROCEDURE — 3008F BODY MASS INDEX DOCD: CPT | Performed by: FAMILY MEDICINE

## 2020-11-22 DIAGNOSIS — I10 ESSENTIAL HYPERTENSION: ICD-10-CM

## 2020-11-25 RX ORDER — CHLORTHALIDONE 25 MG/1
TABLET ORAL
Qty: 30 TABLET | Refills: 0 | OUTPATIENT
Start: 2020-11-25

## 2020-12-27 ENCOUNTER — OFFICE VISIT (OUTPATIENT)
Dept: URGENT CARE | Facility: CLINIC | Age: 65
End: 2020-12-27
Payer: COMMERCIAL

## 2020-12-27 VITALS
OXYGEN SATURATION: 99 % | RESPIRATION RATE: 20 BRPM | BODY MASS INDEX: 28.47 KG/M2 | WEIGHT: 229 LBS | HEART RATE: 120 BPM | DIASTOLIC BLOOD PRESSURE: 78 MMHG | SYSTOLIC BLOOD PRESSURE: 155 MMHG | TEMPERATURE: 96.9 F | HEIGHT: 75 IN

## 2020-12-27 DIAGNOSIS — K52.9 NONINFECTIOUS GASTROENTERITIS, UNSPECIFIED TYPE: Primary | ICD-10-CM

## 2020-12-27 LAB
GLUCOSE SERPL-MCNC: 166 MG/DL (ref 65–140)
SL AMB POCT GLUCOSE BLD: 166

## 2020-12-27 PROCEDURE — G0382 LEV 3 HOSP TYPE B ED VISIT: HCPCS | Performed by: NURSE PRACTITIONER

## 2020-12-27 PROCEDURE — 82948 REAGENT STRIP/BLOOD GLUCOSE: CPT | Performed by: NURSE PRACTITIONER

## 2020-12-27 RX ORDER — ONDANSETRON 4 MG/1
4 TABLET, ORALLY DISINTEGRATING ORAL EVERY 6 HOURS PRN
Qty: 20 TABLET | Refills: 0 | Status: SHIPPED | OUTPATIENT
Start: 2020-12-27 | End: 2021-06-23 | Stop reason: ALTCHOICE

## 2020-12-28 ENCOUNTER — TELEMEDICINE (OUTPATIENT)
Dept: FAMILY MEDICINE CLINIC | Facility: CLINIC | Age: 65
End: 2020-12-28
Payer: COMMERCIAL

## 2020-12-28 VITALS
BODY MASS INDEX: 28.47 KG/M2 | HEIGHT: 75 IN | TEMPERATURE: 97 F | DIASTOLIC BLOOD PRESSURE: 68 MMHG | SYSTOLIC BLOOD PRESSURE: 155 MMHG | WEIGHT: 229 LBS

## 2020-12-28 DIAGNOSIS — R11.2 NON-INTRACTABLE VOMITING WITH NAUSEA, UNSPECIFIED VOMITING TYPE: Primary | ICD-10-CM

## 2020-12-28 DIAGNOSIS — E11.40 TYPE 2 DIABETES MELLITUS WITH DIABETIC NEUROPATHY, WITHOUT LONG-TERM CURRENT USE OF INSULIN (HCC): ICD-10-CM

## 2020-12-28 PROBLEM — R11.10 NON-INTRACTABLE VOMITING: Status: ACTIVE | Noted: 2020-12-28

## 2020-12-28 PROCEDURE — 3077F SYST BP >= 140 MM HG: CPT | Performed by: FAMILY MEDICINE

## 2020-12-28 PROCEDURE — 3008F BODY MASS INDEX DOCD: CPT | Performed by: FAMILY MEDICINE

## 2020-12-28 PROCEDURE — 99213 OFFICE O/P EST LOW 20 MIN: CPT | Performed by: FAMILY MEDICINE

## 2020-12-28 PROCEDURE — 1036F TOBACCO NON-USER: CPT | Performed by: FAMILY MEDICINE

## 2020-12-28 PROCEDURE — 3078F DIAST BP <80 MM HG: CPT | Performed by: FAMILY MEDICINE

## 2020-12-29 ENCOUNTER — TELEPHONE (OUTPATIENT)
Dept: FAMILY MEDICINE CLINIC | Facility: CLINIC | Age: 65
End: 2020-12-29

## 2021-01-10 DIAGNOSIS — E78.5 HYPERLIPIDEMIA LDL GOAL <70: ICD-10-CM

## 2021-01-13 ENCOUNTER — TELEMEDICINE (OUTPATIENT)
Dept: FAMILY MEDICINE CLINIC | Facility: CLINIC | Age: 66
End: 2021-01-13
Payer: COMMERCIAL

## 2021-01-13 VITALS
WEIGHT: 225 LBS | BODY MASS INDEX: 27.98 KG/M2 | DIASTOLIC BLOOD PRESSURE: 70 MMHG | HEIGHT: 75 IN | SYSTOLIC BLOOD PRESSURE: 134 MMHG

## 2021-01-13 DIAGNOSIS — I10 ESSENTIAL HYPERTENSION: ICD-10-CM

## 2021-01-13 DIAGNOSIS — Z13.0 SCREENING FOR DEFICIENCY ANEMIA: ICD-10-CM

## 2021-01-13 DIAGNOSIS — Z12.5 PROSTATE CANCER SCREENING: ICD-10-CM

## 2021-01-13 DIAGNOSIS — E11.42 TYPE 2 DIABETES MELLITUS WITH DIABETIC POLYNEUROPATHY, WITHOUT LONG-TERM CURRENT USE OF INSULIN (HCC): Primary | ICD-10-CM

## 2021-01-13 DIAGNOSIS — E78.5 HYPERLIPIDEMIA LDL GOAL <70: ICD-10-CM

## 2021-01-13 LAB
LEFT EYE DIABETIC RETINOPATHY: NORMAL
RIGHT EYE DIABETIC RETINOPATHY: NORMAL

## 2021-01-13 PROCEDURE — 99214 OFFICE O/P EST MOD 30 MIN: CPT | Performed by: FAMILY MEDICINE

## 2021-01-13 PROCEDURE — 3008F BODY MASS INDEX DOCD: CPT | Performed by: FAMILY MEDICINE

## 2021-01-13 PROCEDURE — 3075F SYST BP GE 130 - 139MM HG: CPT | Performed by: FAMILY MEDICINE

## 2021-01-13 PROCEDURE — 3078F DIAST BP <80 MM HG: CPT | Performed by: FAMILY MEDICINE

## 2021-01-13 PROCEDURE — 4010F ACE/ARB THERAPY RXD/TAKEN: CPT | Performed by: FAMILY MEDICINE

## 2021-01-13 PROCEDURE — 1036F TOBACCO NON-USER: CPT | Performed by: FAMILY MEDICINE

## 2021-01-13 RX ORDER — AMMONIUM LACTATE 12 G/100G
LOTION TOPICAL DAILY
COMMUNITY
End: 2021-05-24

## 2021-01-13 RX ORDER — LISINOPRIL 20 MG/1
20 TABLET ORAL DAILY
Qty: 90 TABLET | Refills: 1 | Status: SHIPPED | OUTPATIENT
Start: 2021-01-13 | End: 2021-06-25

## 2021-01-13 RX ORDER — GABAPENTIN 100 MG/1
100 CAPSULE ORAL 3 TIMES DAILY
Qty: 270 CAPSULE | Refills: 1 | Status: SHIPPED | OUTPATIENT
Start: 2021-01-13 | End: 2021-06-23 | Stop reason: SDUPTHER

## 2021-01-13 RX ORDER — ROSUVASTATIN CALCIUM 5 MG/1
TABLET, COATED ORAL
Qty: 90 TABLET | Refills: 1 | Status: SHIPPED | OUTPATIENT
Start: 2021-01-13 | End: 2021-07-14

## 2021-01-13 NOTE — PROGRESS NOTES
Virtual Regular Visit    Assessment/Plan:  Problem List Items Addressed This Visit        Endocrine    Diabetes mellitus with neurological manifestation ([facility] ) - Primary    Relevant Medications    gabapentin (NEURONTIN) 100 mg capsule    metFORMIN (GLUCOPHAGE) 500 mg tablet    Other Relevant Orders    HEMOGLOBIN A1C W/ EAG ESTIMATION    HEMOGLOBIN A1C W/ EAG ESTIMATION  - last A1c (10/2020): 6 2  - pt does not check his sugars  - advised to get repeat A1c done next week - will mail script and touch base over the phone re results - if within range, then RTO in 5months for annual exam - pt aware and agreeable   - for now, cont current regimen: Metformin 750mg BID        Cardiovascular and Mediastinum    Essential hypertension    Relevant Medications    lisinopril (ZESTRIL) 20 mg tablet    Other Relevant Orders    Microalbumin / creatinine urine ratio    Comprehensive metabolic panel  - BP range: 130s/70s  - had one isolated reading 147/82   - currently on ACEI 20mg QD and tolerating it well   - no FHx of HTN   - not a smoker   - has cut back on his EtOH intake   - avoid NSAIDs  - no more than 2g of Na/day   - microalbuminuria resolved        Other    Hyperlipidemia LDL goal <70    Relevant Orders    Lipid panel  - cont Crestor 5mg QHS   - LDL 68      Other Visit Diagnoses     Prostate cancer screening        Relevant Orders    PSA, Total Screen    Screening for deficiency anemia        Relevant Orders    CBC and differential           Reason for visit is f/u   Chief Complaint   Patient presents with    Follow-up     2 month follow up    Virtual Regular Visit        Encounter provider Thad Olszewski, DO  Provider located at [address] [place] [id number]      Recent Visits  No visits were found meeting these conditions     Showing recent visits within past 7 days and meeting all other requirements     Today's Visits  Date Type Provider Dept   01/13/21 Telemedicine Amira Vee DO [facility]   Showing today's visits and meeting all other requirements     Future Appointments  No visits were found meeting these conditions  Showing future appointments within next 150 days and meeting all other requirements        The patient was identified by name and date of birth  Boyd Thacker was informed that this is a telemedicine visit and that the visit is being conducted through [facility] and patient was informed that this is a secure, HIPAA-compliant platform  He agrees to proceed     My office door was closed  No one else was in the room  He acknowledged consent and understanding of privacy and security of the video platform  The patient has agreed to participate and understands they can discontinue the visit at any time  Patient is aware this is a billable service  Subjective  Boyd Thacker is a 63 y o  male who presents virtually for f/u      HPI   - checks his pressures sporadically (cuff was calibrated in the office) - BP range: 130s/70s  - had one isolated reading 147/82   - currently on ACEI 20mg QD and tolerating it well   - denies F/C/N/V/HA/change in vision/CP/palpitations/SOB/wheezing/abd pain/D/LE edema   - does not check his sugars   - last A1c (10/1/2020): 6 2 in the office   - LDL 68  - "I have not been eating bad stuff"  - follows with Podiatry - foot ulcer has healed up   - needs RFs     Past Medical History:   Diagnosis Date    Allergic rhinitis     Anxiety disorder     Last assessed 2/7/2006     Carpal tunnel syndrome     Diabetes mellitus ([address] )     Diabetes mellitus with neurological manifestation ([address] ) 04/06/2012    Last assessed 3/29/2016     Diabetes type 2, uncontrolled ([address] )     Last assessed 4/6/2016     DM II (diabetes mellitus, type II), controlled ([address] )     Last assessed 10/20/2014     Essential hypertension     Last assesssed 2/7/2006     Insomnia     Last assessed 1/13/2011     Numbness and tingling of foot Resolved 3/29/2016     Obesity     Last assessed 2/7/2006     Old disruption of knee ligament     Last assessed 3/26/2008     Tarsal tunnel syndrome     Urinary frequency     Resolved 3/29/2016        Past Surgical History:   Procedure Laterality Date    WISDOM TOOTH EXTRACTION         Current Outpatient Medications   Medication Sig Dispense Refill    ammonium lactate (LAC-HYDRIN) 12 % lotion Daily      aspirin 81 MG tablet Take 1 tablet by mouth daily      gabapentin (NEURONTIN) 100 mg capsule Take 1 capsule (100 mg total) by mouth 3 (three) times a day 270 capsule 1    glucose blood (ACCU-CHEK COMFORT CURVE) test strip by In Vitro route      lisinopril (ZESTRIL) 20 mg tablet Take 1 tablet (20 mg total) by mouth daily 90 tablet 1    metFORMIN (GLUCOPHAGE) 500 mg tablet Take 1 5 tablets (750 mg total) by mouth 2 (two) times a day 270 tablet 1    ondansetron (ZOFRAN-ODT) 4 mg disintegrating tablet Take 1 tablet (4 mg total) by mouth every 6 (six) hours as needed for nausea or vomiting 20 tablet 0    rosuvastatin (CRESTOR) 5 mg tablet TAKE 1 TABLET DAILY 90 tablet 1     No current facility-administered medications for this visit  Allergies   Allergen Reactions    Sudafed [Pseudoephedrine] Other (See Comments)     hyperactivity       Review of Systems as per HPI     Video Exam  Vitals:    01/13/21 0844   Weight: 102 kg (225 lb)   Height: 6' 3" (1 905 m)       Physical Exam   General: AAOx3, NAD  HEENT: NC/AT, wears glasses, EOMI, clear conjunctiva, nml external ear and nose   Cardio: deferred  Pulm: no acute respiratory distress, able to talk in complete sentences w/o getting short of breath   Abd: deferred   Psych: nml mood/affect/behavior     I spent 20 minutes directly with the patient during this visit      VIRTUAL VISIT [id number] Matthew Lee acknowledges that he has consented to an online visit or consultation   He understands that the online visit is based solely on information provided by him, and that, in the absence of a face-to-face physical evaluation by the physician, the diagnosis he receives is both limited and provisional in terms of accuracy and completeness  This is not intended to replace a full medical face-to-face evaluation by the physician  Hunter Squires understands and accepts these terms

## 2021-01-22 ENCOUNTER — TELEPHONE (OUTPATIENT)
Dept: FAMILY MEDICINE CLINIC | Facility: CLINIC | Age: 66
End: 2021-01-22

## 2021-01-22 LAB
EST. AVERAGE GLUCOSE BLD GHB EST-MCNC: 134 (CALC)
EST. AVERAGE GLUCOSE BLD GHB EST-SCNC: 7.4 (CALC)
HBA1C MFR BLD: 6.3 % OF TOTAL HGB

## 2021-01-22 PROCEDURE — 3044F HG A1C LEVEL LT 7.0%: CPT | Performed by: FAMILY MEDICINE

## 2021-01-22 NOTE — TELEPHONE ENCOUNTER
----- Message from Anjali Resendiz DO sent at 1/22/2021  2:07 PM EST -----  A1c = 6 3 - cont current regimen   RTO after 6/17/2021 for annual exam and routine f/u - please call to schedule

## 2021-03-10 DIAGNOSIS — Z23 ENCOUNTER FOR IMMUNIZATION: ICD-10-CM

## 2021-05-23 ENCOUNTER — HOSPITAL ENCOUNTER (OUTPATIENT)
Facility: HOSPITAL | Age: 66
Setting detail: OBSERVATION
Discharge: HOME | End: 2021-05-24
Attending: EMERGENCY MEDICINE | Admitting: GENERAL PRACTICE
Payer: COMMERCIAL

## 2021-05-23 DIAGNOSIS — N39.0 UTI (URINARY TRACT INFECTION): Primary | ICD-10-CM

## 2021-05-23 DIAGNOSIS — R39.9 LOWER URINARY TRACT SYMPTOMS (LUTS): ICD-10-CM

## 2021-05-23 LAB
ALBUMIN SERPL BCP-MCNC: 3.9 G/DL (ref 3.5–5)
ALP SERPL-CCNC: 71 U/L (ref 46–116)
ALT SERPL W P-5'-P-CCNC: 24 U/L (ref 12–78)
ANION GAP SERPL CALCULATED.3IONS-SCNC: 12 MMOL/L (ref 4–13)
APTT PPP: 26 SECONDS (ref 23–37)
AST SERPL W P-5'-P-CCNC: 26 U/L (ref 5–45)
BASOPHILS # BLD AUTO: 0.03 THOUSANDS/ΜL (ref 0–0.1)
BASOPHILS NFR BLD AUTO: 0 % (ref 0–1)
BILIRUB SERPL-MCNC: 0.57 MG/DL (ref 0.2–1)
BILIRUB UR QL STRIP: NEGATIVE
BUN SERPL-MCNC: 20 MG/DL (ref 5–25)
CALCIUM SERPL-MCNC: 8.9 MG/DL (ref 8.3–10.1)
CHLORIDE SERPL-SCNC: 103 MMOL/L (ref 100–108)
CLARITY UR: ABNORMAL
CO2 SERPL-SCNC: 23 MMOL/L (ref 21–32)
COLOR UR: ABNORMAL
CREAT SERPL-MCNC: 1.04 MG/DL (ref 0.6–1.3)
EOSINOPHIL # BLD AUTO: 0.1 THOUSAND/ΜL (ref 0–0.61)
EOSINOPHIL NFR BLD AUTO: 1 % (ref 0–6)
ERYTHROCYTE [DISTWIDTH] IN BLOOD BY AUTOMATED COUNT: 12.1 % (ref 11.6–15.1)
GFR SERPL CREATININE-BSD FRML MDRD: 76 ML/MIN/1.73SQ M
GLUCOSE SERPL-MCNC: 243 MG/DL (ref 65–140)
GLUCOSE UR STRIP-MCNC: ABNORMAL MG/DL
HCT VFR BLD AUTO: 42.3 % (ref 36.5–49.3)
HGB BLD-MCNC: 15.1 G/DL (ref 12–17)
HGB UR QL STRIP.AUTO: ABNORMAL
IMM GRANULOCYTES # BLD AUTO: 0.06 THOUSAND/UL (ref 0–0.2)
IMM GRANULOCYTES NFR BLD AUTO: 1 % (ref 0–2)
INR PPP: 1.08 (ref 0.84–1.19)
KETONES UR STRIP-MCNC: ABNORMAL MG/DL
LACTATE SERPL-SCNC: 2.1 MMOL/L (ref 0.5–2)
LEUKOCYTE ESTERASE UR QL STRIP: NEGATIVE
LYMPHOCYTES # BLD AUTO: 2.16 THOUSANDS/ΜL (ref 0.6–4.47)
LYMPHOCYTES NFR BLD AUTO: 25 % (ref 14–44)
MCH RBC QN AUTO: 32.9 PG (ref 26.8–34.3)
MCHC RBC AUTO-ENTMCNC: 35.7 G/DL (ref 31.4–37.4)
MCV RBC AUTO: 92 FL (ref 82–98)
MONOCYTES # BLD AUTO: 0.78 THOUSAND/ΜL (ref 0.17–1.22)
MONOCYTES NFR BLD AUTO: 9 % (ref 4–12)
NEUTROPHILS # BLD AUTO: 5.48 THOUSANDS/ΜL (ref 1.85–7.62)
NEUTS SEG NFR BLD AUTO: 64 % (ref 43–75)
NITRITE UR QL STRIP: POSITIVE
NRBC BLD AUTO-RTO: 0 /100 WBCS
NT-PROBNP SERPL-MCNC: 49 PG/ML
PH UR STRIP.AUTO: 6 [PH]
PLATELET # BLD AUTO: 186 THOUSANDS/UL (ref 149–390)
PMV BLD AUTO: 9.7 FL (ref 8.9–12.7)
POTASSIUM SERPL-SCNC: 4.2 MMOL/L (ref 3.5–5.3)
PROT SERPL-MCNC: 7.3 G/DL (ref 6.4–8.2)
PROT UR STRIP-MCNC: >=300 MG/DL
PROTHROMBIN TIME: 14.1 SECONDS (ref 11.6–14.5)
RBC # BLD AUTO: 4.59 MILLION/UL (ref 3.88–5.62)
SARS-COV-2 RNA RESP QL NAA+PROBE: NEGATIVE
SODIUM SERPL-SCNC: 138 MMOL/L (ref 136–145)
SP GR UR STRIP.AUTO: 1.02 (ref 1–1.03)
TROPONIN I SERPL-MCNC: <0.02 NG/ML
UROBILINOGEN UR QL STRIP.AUTO: 0.2 E.U./DL
WBC # BLD AUTO: 8.61 THOUSAND/UL (ref 4.31–10.16)

## 2021-05-23 PROCEDURE — 84484 ASSAY OF TROPONIN QUANT: CPT | Performed by: EMERGENCY MEDICINE

## 2021-05-23 PROCEDURE — 83880 ASSAY OF NATRIURETIC PEPTIDE: CPT | Performed by: EMERGENCY MEDICINE

## 2021-05-23 PROCEDURE — U0005 INFEC AGEN DETEC AMPLI PROBE: HCPCS | Performed by: EMERGENCY MEDICINE

## 2021-05-23 PROCEDURE — 87040 BLOOD CULTURE FOR BACTERIA: CPT | Performed by: EMERGENCY MEDICINE

## 2021-05-23 PROCEDURE — 85730 THROMBOPLASTIN TIME PARTIAL: CPT | Performed by: EMERGENCY MEDICINE

## 2021-05-23 PROCEDURE — 83605 ASSAY OF LACTIC ACID: CPT | Performed by: EMERGENCY MEDICINE

## 2021-05-23 PROCEDURE — U0003 INFECTIOUS AGENT DETECTION BY NUCLEIC ACID (DNA OR RNA); SEVERE ACUTE RESPIRATORY SYNDROME CORONAVIRUS 2 (SARS-COV-2) (CORONAVIRUS DISEASE [COVID-19]), AMPLIFIED PROBE TECHNIQUE, MAKING USE OF HIGH THROUGHPUT TECHNOLOGIES AS DESCRIBED BY CMS-2020-01-R: HCPCS | Performed by: EMERGENCY MEDICINE

## 2021-05-23 PROCEDURE — 80053 COMPREHEN METABOLIC PANEL: CPT | Performed by: EMERGENCY MEDICINE

## 2021-05-23 PROCEDURE — 81001 URINALYSIS AUTO W/SCOPE: CPT | Performed by: EMERGENCY MEDICINE

## 2021-05-23 PROCEDURE — 93005 ELECTROCARDIOGRAM TRACING: CPT

## 2021-05-23 PROCEDURE — 85025 COMPLETE CBC W/AUTO DIFF WBC: CPT | Performed by: EMERGENCY MEDICINE

## 2021-05-23 PROCEDURE — 85610 PROTHROMBIN TIME: CPT | Performed by: EMERGENCY MEDICINE

## 2021-05-23 PROCEDURE — 96361 HYDRATE IV INFUSION ADD-ON: CPT

## 2021-05-23 PROCEDURE — 87086 URINE CULTURE/COLONY COUNT: CPT | Performed by: EMERGENCY MEDICINE

## 2021-05-23 PROCEDURE — 36415 COLL VENOUS BLD VENIPUNCTURE: CPT | Performed by: EMERGENCY MEDICINE

## 2021-05-23 PROCEDURE — 96365 THER/PROPH/DIAG IV INF INIT: CPT

## 2021-05-23 PROCEDURE — 99285 EMERGENCY DEPT VISIT HI MDM: CPT | Performed by: EMERGENCY MEDICINE

## 2021-05-23 PROCEDURE — 99284 EMERGENCY DEPT VISIT MOD MDM: CPT

## 2021-05-23 RX ADMIN — CEFEPIME HYDROCHLORIDE 2000 MG: 2 INJECTION, POWDER, FOR SOLUTION INTRAVENOUS at 22:50

## 2021-05-23 RX ADMIN — SODIUM CHLORIDE 1000 ML: 0.9 INJECTION, SOLUTION INTRAVENOUS at 22:51

## 2021-05-24 VITALS
SYSTOLIC BLOOD PRESSURE: 169 MMHG | OXYGEN SATURATION: 99 % | DIASTOLIC BLOOD PRESSURE: 95 MMHG | HEART RATE: 94 BPM | BODY MASS INDEX: 30.62 KG/M2 | TEMPERATURE: 97.9 F | HEIGHT: 75 IN | RESPIRATION RATE: 15 BRPM | WEIGHT: 246.25 LBS

## 2021-05-24 PROBLEM — N30.01 ACUTE CYSTITIS WITH HEMATURIA: Status: ACTIVE | Noted: 2021-05-24

## 2021-05-24 PROBLEM — R79.89 ELEVATED LACTIC ACID LEVEL: Status: ACTIVE | Noted: 2021-05-24

## 2021-05-24 PROBLEM — R35.0 URINARY FREQUENCY: Status: ACTIVE | Noted: 2021-05-24

## 2021-05-24 LAB
ATRIAL RATE: 119 BPM
BACTERIA UR QL AUTO: ABNORMAL /HPF
GLUCOSE SERPL-MCNC: 199 MG/DL (ref 65–140)
GLUCOSE SERPL-MCNC: 213 MG/DL (ref 65–140)
GLUCOSE SERPL-MCNC: 252 MG/DL (ref 65–140)
LACTATE SERPL-SCNC: 1.7 MMOL/L (ref 0.5–2)
NON-SQ EPI CELLS URNS QL MICRO: ABNORMAL /HPF
P AXIS: 66 DEGREES
PR INTERVAL: 154 MS
QRS AXIS: -57 DEGREES
QRSD INTERVAL: 100 MS
QT INTERVAL: 312 MS
QTC INTERVAL: 438 MS
RBC #/AREA URNS AUTO: ABNORMAL /HPF
T WAVE AXIS: 66 DEGREES
VENTRICULAR RATE: 119 BPM
WBC #/AREA URNS AUTO: ABNORMAL /HPF

## 2021-05-24 PROCEDURE — 36415 COLL VENOUS BLD VENIPUNCTURE: CPT | Performed by: EMERGENCY MEDICINE

## 2021-05-24 PROCEDURE — 99217 PR OBSERVATION CARE DISCHARGE MANAGEMENT: CPT | Performed by: HOSPITALIST

## 2021-05-24 PROCEDURE — 99220 PR INITIAL OBSERVATION CARE/DAY 70 MINUTES: CPT | Performed by: PHYSICIAN ASSISTANT

## 2021-05-24 PROCEDURE — 83605 ASSAY OF LACTIC ACID: CPT | Performed by: EMERGENCY MEDICINE

## 2021-05-24 PROCEDURE — 82948 REAGENT STRIP/BLOOD GLUCOSE: CPT

## 2021-05-24 PROCEDURE — 93010 ELECTROCARDIOGRAM REPORT: CPT | Performed by: INTERNAL MEDICINE

## 2021-05-24 RX ORDER — GABAPENTIN 100 MG/1
100 CAPSULE ORAL 3 TIMES DAILY
Status: DISCONTINUED | OUTPATIENT
Start: 2021-05-24 | End: 2021-05-24 | Stop reason: HOSPADM

## 2021-05-24 RX ORDER — PRAVASTATIN SODIUM 40 MG
40 TABLET ORAL
Status: DISCONTINUED | OUTPATIENT
Start: 2021-05-24 | End: 2021-05-24 | Stop reason: HOSPADM

## 2021-05-24 RX ORDER — LISINOPRIL 20 MG/1
20 TABLET ORAL DAILY
Status: DISCONTINUED | OUTPATIENT
Start: 2021-05-24 | End: 2021-05-24 | Stop reason: HOSPADM

## 2021-05-24 RX ORDER — CEFDINIR 300 MG/1
300 CAPSULE ORAL EVERY 12 HOURS SCHEDULED
Qty: 12 CAPSULE | Refills: 0 | Status: SHIPPED | OUTPATIENT
Start: 2021-05-25 | End: 2021-05-31

## 2021-05-24 RX ADMIN — INSULIN LISPRO 2 UNITS: 100 INJECTION, SOLUTION INTRAVENOUS; SUBCUTANEOUS at 11:38

## 2021-05-24 RX ADMIN — INSULIN LISPRO 3 UNITS: 100 INJECTION, SOLUTION INTRAVENOUS; SUBCUTANEOUS at 09:18

## 2021-05-24 RX ADMIN — LISINOPRIL 20 MG: 20 TABLET ORAL at 09:17

## 2021-05-24 RX ADMIN — METFORMIN HYDROCHLORIDE 750 MG: 500 TABLET ORAL at 09:17

## 2021-05-24 RX ADMIN — CEFTRIAXONE SODIUM 1000 MG: 10 INJECTION, POWDER, FOR SOLUTION INTRAVENOUS at 11:31

## 2021-05-24 RX ADMIN — GABAPENTIN 100 MG: 100 CAPSULE ORAL at 09:17

## 2021-05-24 NOTE — H&P
[facility]  H&P- Isauro Anchors 1955, 63 y o  male MRN: [id number]  Unit/Bed#: ED 24 Encounter: [id number]  Primary Care Provider: Rafael Berman DO   Date and time admitted to hospital: 5/23/2021  9:46 PM    * Acute cystitis with hematuria  Assessment & Plan  · Presented with hematuria, dysuria and urinary frequency  · Urine: nitrite positive with innumerable WBCs  · Received 1 dose of IV cefepime in the ED  · Continue IV antibiotics with ceftriaxone  · Follow-up on urine culture  · Monitor urine output  · Patient tachycardic on presentation but does not meet sepsis criteria  Follow-up on blood cultures  Elevated lactic acid level  Assessment & Plan  · Initial lactic acid mildly at 2 1   · Received 1L bolus of NS in the ED with repeat lactic acid of 1 7  · Does not meet SIRS/ sepsis criteria as he is afebrile and without leukocytosis or tachypnea  · Antibiotics as per above  Hyperlipidemia LDL goal <70  Assessment & Plan  · Continue statin  Essential hypertension  Assessment & Plan  · BP elevated on presentation with most recent SBP improved to 134  · Continue to monitor  · Continue lisinopril  Diabetes mellitus with neurological manifestation [facility])  Assessment & Plan  Lab Results   Component Value Date    HGBA1C 6 3 (H) 01/21/2021       No results for input(s): POCGLU in the last 72 hours  Blood Sugar Average: Last 72 hrs:       · Hold metformin while inpatient  · Monitor accu-checks AC and HS  · SSI for coverage  · Hypoglycemia protocol  · Continue gabapentin  VTE Prophylaxis: Pharmacologic VTE Prophylaxis contraindicated due to hematuria  / sequential compression device   Code Status: level 1- full code  POLST: POLST form is not discussed and not completed at this time  Anticipated Length of Stay:  Patient will be admitted on an Observation basis with an anticipated length of stay of less than 2 midnights     Justification for Hospital Stay: UTI    Total Time for Visit, including Counseling / Coordination of Care: 70 minutes  Greater than 50% of this total time spent on direct patient counseling and coordination of care  Chief Complaint:   urinary frequency, dysuria and hematuria    History of Present Illness:    Yareli De La Cruz is a 63 y o  male with a history of DM with neuropathy, HTN and HLD who presents with urinary frequency, dysuria and hematuria  Patient reports that about 2 nights ago he had urinary frequency and some dysuria overnight  He reports that he was otherwise in his usual state of health until last evening around 6pm when he started having the dysuria and urinary frequnecy again as well as hematuria which prompted him to come to the hospital for evaluation  He denies fevers/ chills, abdominal pain, n/v/d, back pain or lightheadedness/ dizziness  He reports having similar symptoms when he had a prior UTIs at the age of 23  Review of Systems:    Review of Systems   Constitutional: Negative for appetite change, chills and fever  Respiratory: Negative for shortness of breath  Cardiovascular: Negative for chest pain  Gastrointestinal: Negative for abdominal pain, nausea and vomiting  Genitourinary: Positive for dysuria, frequency and hematuria  Neurological: Negative for dizziness  All other systems reviewed and are negative        Past Medical and Surgical History:     Past Medical History:   Diagnosis Date    Allergic rhinitis     Anxiety disorder     Last assessed 2/7/2006     Carpal tunnel syndrome     Diabetes mellitus [facility] )     Diabetes mellitus with neurological manifestation ([facility] ) 04/06/2012    Last assessed 3/29/2016     Diabetes type 2, uncontrolled ([facility] )     Last assessed 4/6/2016     DM II (diabetes mellitus, type II), controlled ([facility] [address] )     Last assessed 10/20/2014     Essential hypertension     Last assesssed 2/7/2006     Insomnia     Last assessed 1/13/2011     Numbness and tingling of foot     Resolved 3/29/2016     Obesity     Last assessed 2/7/2006     Old disruption of knee ligament     Last assessed 3/26/2008     Tarsal tunnel syndrome     Urinary frequency     Resolved 3/29/2016        Past Surgical History:   Procedure Laterality Date    WISDOM TOOTH EXTRACTION         Meds/Allergies:    Prior to Admission medications    Medication Sig Start Date End Date Taking? Authorizing Provider   aspirin 81 MG tablet Take 1 tablet by mouth daily 1/7/13  Yes Historical Provider, MD   gabapentin (NEURONTIN) 100 mg capsule Take 1 capsule (100 mg total) by mouth 3 (three) times a day 1/13/21  Yes Amira Vee DO   lisinopril (ZESTRIL) 20 mg tablet Take 1 tablet (20 mg total) by mouth daily 1/13/21  Yes Amira Vee DO   metFORMIN (GLUCOPHAGE) 500 mg tablet Take 1 5 tablets (750 mg total) by mouth 2 (two) times a day 1/13/21  Yes Amira Vee DO   Misc Natural Products (Lutein 20) CAPS Take 20 capsules by mouth daily Eye health   Yes Historical Provider, MD   rosuvastatin (CRESTOR) 5 mg tablet TAKE 1 TABLET DAILY 1/13/21  Yes Amira Vee DO   glucose blood (ACCU-CHEK COMFORT CURVE) test strip by In Vitro route    Historical Provider, MD   ondansetron (ZOFRAN-ODT) 4 mg disintegrating tablet Take 1 tablet (4 mg total) by mouth every 6 (six) hours as needed for nausea or vomiting  Patient not taking: Reported on 5/24/2021 12/27/20   MIKE Sousa   ammonium lactate (LAC-HYDRIN) 12 % lotion Daily  5/24/21  Historical Provider, MD     I have reviewed home medications with a medical source (PCP, Pharmacy, other)  Allergies:    Allergies   Allergen Reactions    Sudafed [Pseudoephedrine] Other (See Comments)     hyperactivity       Social History:     Marital Status: /Civil Union   Patient Pre-hospital Living Situation: home  Patient Pre-hospital Level of Mobility: independent  Patient Pre-hospital Diet Restrictions: low carb  Substance Use History:   Social History Substance and Sexual Activity   Alcohol Use Yes    Alcohol/week: 3 0 standard drinks    Types: 2 Cans of beer, 1 Standard drinks or equivalent per week    Frequency: 4 or more times a week    Drinks per session: 3 or 4     Social History     Tobacco Use   Smoking Status Never Smoker   Smokeless Tobacco Never Used     Social History     Substance and Sexual Activity   Drug Use Never       Family History:    Family History   Problem Relation Age of Onset    Heart attack Mother 79    Valvular heart disease Mother     Pancreatic cancer Father 76    Diabetes Father     Lung cancer Sister 46        not a smoker    Cancer Brother         bile duct     Colon cancer Maternal Grandmother 58    Diabetes Maternal Grandmother     Pancreatic cancer Paternal Aunt     Pancreatic cancer Paternal Uncle     Hypertension Neg Hx     Prostate cancer Neg Hx        Physical Exam:     Vitals:   Blood Pressure: 134/71 (05/24/21 0126)  Pulse: 94 (05/24/21 0126)  Temperature: 98 1 °F (36 7 °C) (05/23/21 2154)  Temp Source: Oral (05/23/21 2154)  Respirations: 16 (05/24/21 0126)  Height: 6' 3" (190 5 cm) (05/23/21 2150)  Weight - Scale: 111 kg (244 lb 11 4 oz) (05/23/21 2150)  SpO2: 95 % (05/24/21 0126)    Physical Exam  Vitals signs and nursing note reviewed  Constitutional:       General: He is not in acute distress  Appearance: He is not ill-appearing or diaphoretic  HENT:      Head: Normocephalic and atraumatic  Eyes:      Conjunctiva/sclera: Conjunctivae normal    Cardiovascular:      Rate and Rhythm: Normal rate and regular rhythm  Pulmonary:      Effort: Pulmonary effort is normal  No respiratory distress  Breath sounds: Normal breath sounds  Abdominal:      General: Bowel sounds are normal       Palpations: Abdomen is soft  Tenderness: There is no abdominal tenderness  Musculoskeletal:      Right lower leg: No edema  Left lower leg: No edema  Skin:     General: Skin is warm and dry  Coloration: Skin is not pale  Neurological:      Mental Status: He is alert and oriented to person, place, and time  Psychiatric:         Mood and Affect: Mood normal          Behavior: Behavior normal            Additional Data:     Lab Results: I have personally reviewed pertinent reports  Results from last 7 days   Lab Units 05/23/21  2234   WBC Thousand/uL 8 61   HEMOGLOBIN g/dL 15 1   HEMATOCRIT % 42 3   PLATELETS Thousands/uL 186   NEUTROS PCT % 64   LYMPHS PCT % 25   MONOS PCT % 9   EOS PCT % 1     Results from last 7 days   Lab Units 05/23/21  2234   SODIUM mmol/L 138   POTASSIUM mmol/L 4 2   CHLORIDE mmol/L 103   CO2 mmol/L 23   BUN mg/dL 20   CREATININE mg/dL 1 04   ANION GAP mmol/L 12   CALCIUM mg/dL 8 9   ALBUMIN g/dL 3 9   TOTAL BILIRUBIN mg/dL 0 57   ALK PHOS U/L 71   ALT U/L 24   AST U/L 26   GLUCOSE RANDOM mg/dL 243*     Results from last 7 days   Lab Units 05/23/21  2234   INR  1 08             Results from last 7 days   Lab Units 05/24/21  0147 05/23/21  2234   LACTIC ACID mmol/L 1 7 2 1*       Imaging: n/a    No orders to display     Allscripts / Epic Records Reviewed: Yes     ** Please Note: This note has been constructed using a voice recognition system   ** CALF PAIN/chronic calf pain, LLE swelling

## 2021-05-24 NOTE — ASSESSMENT & PLAN NOTE
· Presented with hematuria, dysuria and urinary frequency  · Urine: nitrite positive with innumerable WBCs    · Received 1 day of IV abx   · With rapid improvement in symptoms, appears stable and appropriate for dc  · Will continue oral omnicef 300mg BID to complete a 7d course  · Referral placed to urology as pt's reported some LUTS

## 2021-05-24 NOTE — ED PROVIDER NOTES
History  Chief Complaint   Patient presents with    Urinary Frequency     Pt c/o urinary frequency and burning with hematuria since today  Pt has hx of bladder infections, states these are similar symptoms  HPI     Patient is a 63 yom who presents with hematuria/fatigue  No vomiting or diarrhea  Hx diabetes  Hr initially in the 120s      + dysuria  No abdominal pain only suprapubic discomfort  Notes a history of UTI in the past      MDM 63 yom, given initial tachycardia in the 120s, age, diabetes and elevated lactic acid will admit for complex uti  Prior to Admission Medications   Prescriptions Last Dose Informant Patient Reported? Taking?    Misc Natural Products (Lutein 20) CAPS 5/23/2021 at Unknown time Self Yes Yes   Sig: Take 20 capsules by mouth daily Eye health   aspirin 81 MG tablet 5/23/2021 at Unknown time  Yes Yes   Sig: Take 1 tablet by mouth daily   gabapentin (NEURONTIN) 100 mg capsule 5/23/2021 at Unknown time  No Yes   Sig: Take 1 capsule (100 mg total) by mouth 3 (three) times a day   glucose blood (ACCU-CHEK COMFORT CURVE) test strip Not Taking at Unknown time  Yes No   Sig: by In Vitro route   lisinopril (ZESTRIL) 20 mg tablet 5/23/2021 at Unknown time  No Yes   Sig: Take 1 tablet (20 mg total) by mouth daily   metFORMIN (GLUCOPHAGE) 500 mg tablet 5/23/2021 at Unknown time  No Yes   Sig: Take 1 5 tablets (750 mg total) by mouth 2 (two) times a day   ondansetron (ZOFRAN-ODT) 4 mg disintegrating tablet Not Taking at Unknown time  No No   Sig: Take 1 tablet (4 mg total) by mouth every 6 (six) hours as needed for nausea or vomiting   Patient not taking: Reported on 5/24/2021   rosuvastatin (CRESTOR) 5 mg tablet 5/23/2021 at Unknown time  No Yes   Sig: TAKE 1 TABLET DAILY      Facility-Administered Medications: None       Past Medical History:   Diagnosis Date    Allergic rhinitis     Anxiety disorder     Last assessed 2/7/2006     Carpal tunnel syndrome     Diabetes mellitus ([facility] )     Diabetes mellitus with neurological manifestation ([place] [address] ) 04/06/2012    Last assessed 3/29/2016     Diabetes type 2, uncontrolled ([place] [address] )     Last assessed 4/6/2016     DM II (diabetes mellitus, type II), controlled ([place] [address] )     Last assessed 10/20/2014     Essential hypertension     Last assesssed 2/7/2006     Insomnia     Last assessed 1/13/2011     Numbness and tingling of foot     Resolved 3/29/2016     Obesity     Last assessed 2/7/2006     Old disruption of knee ligament     Last assessed 3/26/2008     Tarsal tunnel syndrome     Urinary frequency     Resolved 3/29/2016        Past Surgical History:   Procedure Laterality Date    WISDOM TOOTH EXTRACTION         Family History   Problem Relation Age of Onset    Heart attack Mother 79    Valvular heart disease Mother     Pancreatic cancer Father 76    Diabetes Father     Lung cancer Sister 46        not a smoker    Cancer Brother         bile duct     Colon cancer Maternal Grandmother 58    Diabetes Maternal Grandmother     Pancreatic cancer Paternal Aunt     Pancreatic cancer Paternal Uncle     Hypertension Neg Hx     Prostate cancer Neg Hx      I have reviewed and agree with the history as documented  E-Cigarette/Vaping     E-Cigarette/Vaping Substances     Social History     Tobacco Use    Smoking status: Never Smoker    Smokeless tobacco: Never Used   Substance Use Topics    Alcohol use: Yes     Alcohol/week: 3 0 standard drinks     Types: 2 Cans of beer, 1 Standard drinks or equivalent per week     Frequency: 4 or more times a week     Drinks per session: 3 or 4    Drug use: Never       Review of Systems   Genitourinary: Positive for dysuria and hematuria  All other systems reviewed and are negative  Physical Exam  Physical Exam  Vitals signs and nursing note reviewed  Constitutional:       Appearance: He is well-developed  He is not diaphoretic  HENT:      Head: Normocephalic and atraumatic        Right Ear: External ear normal       Left Ear: External ear normal       Nose: No congestion  Eyes:      General:         Right eye: No discharge  Left eye: No discharge  Extraocular Movements: Extraocular movements intact  Neck:      Musculoskeletal: Normal range of motion and neck supple  Cardiovascular:      Rate and Rhythm: Normal rate and regular rhythm  Heart sounds: Normal heart sounds  No murmur  Pulmonary:      Effort: Pulmonary effort is normal  No respiratory distress  Breath sounds: Normal breath sounds  No wheezing  Abdominal:      General: There is no distension  Palpations: Abdomen is soft  Tenderness: There is no abdominal tenderness  Musculoskeletal:         General: No tenderness or signs of injury  Skin:     General: Skin is warm and dry  Findings: No erythema  Neurological:      General: No focal deficit present  Mental Status: He is alert and oriented to person, place, and time  Motor: No weakness     Psychiatric:         Mood and Affect: Mood normal          Behavior: Behavior normal          Vital Signs  ED Triage Vitals   Temperature Pulse Respirations Blood Pressure SpO2   05/23/21 2154 05/23/21 2150 05/23/21 2150 05/23/21 2150 05/23/21 2150   98 1 °F (36 7 °C) (!) 121 18 (!) 185/97 98 %      Temp Source Heart Rate Source Patient Position - Orthostatic VS BP Location FiO2 (%)   05/23/21 2154 05/23/21 2150 05/23/21 2150 05/23/21 2150 --   Oral Monitor Lying Right arm       Pain Score       --                  Vitals:    05/23/21 2150 05/23/21 2322 05/24/21 0030 05/24/21 0126   BP: (!) 185/97 156/81 141/67 134/71   Pulse: (!) 121 (!) 112 102 94   Patient Position - Orthostatic VS: Lying Lying Lying Lying         Visual Acuity      ED Medications  Medications   sodium chloride 0 9 % bolus 1,000 mL (0 mL Intravenous Stopped 5/23/21 2351)   cefepime (MAXIPIME) 2 g/50 mL dextrose IVPB (0 mg Intravenous Stopped 5/23/21 2320)       Diagnostic Studies  Results Reviewed     Procedure Component Value Units Date/Time    Lactic acid 2 Hours [[id number]]  (Normal) Collected: 05/24/21 0147    Lab Status: Final result Specimen: Blood from Arm, Left Updated: 05/24/21 0210     LACTIC ACID 1 7 mmol/L     Narrative:      Result may be elevated if tourniquet was used during collection  Urine Microscopic [[id number]]  (Abnormal) Collected: 05/23/21 2244    Lab Status: Final result Specimen: Urine, Clean Catch Updated: 05/24/21 0016     RBC, UA Innumerable /hpf      WBC, UA       Field obscured, unable to enumerate     /hpf     Epithelial Cells       Field obscured, unable to enumerate     /hpf     Bacteria, UA       Field obscured, unable to enumerate     /hpf    Urine culture [[id number]] Collected: 05/23/21 2244    Lab Status: In process Specimen: Urine, Clean Catch Updated: 05/24/21 0016    Novel Coronavirus (Covid-19),PCR [facility] - 2 Hour Stat [[id number]]  (Normal) Collected: 05/23/21 2234    Lab Status: Final result Specimen: Nares from Nasopharyngeal Swab Updated: 05/23/21 2342     SARS-CoV-2 Negative    Narrative: The specimen collection materials, transport medium, and/or testing methodology utilized in the production of these test results have been proven to be reliable in a limited validation with an abbreviated program under the Emergency Utilization Authorization provided by the FDA  Testing reported as "Presumptive positive" will be confirmed with secondary testing to ensure result accuracy  Clinical caution and judgement should be used with the interpretation of these results with consideration of the clinical impression and other laboratory testing  Testing reported as "Positive" or "Negative" has been proven to be accurate according to standard laboratory validation requirements  All testing is performed with control materials showing appropriate reactivity at standard intervals        Lactic acid [[id number]]  (Abnormal) Collected: 05/23/21 2234 Lab Status: Final result Specimen: Blood from Arm, Left Updated: 05/23/21 2338     LACTIC ACID 2 1 mmol/L     Narrative:      Result may be elevated if tourniquet was used during collection      NT-BNP PRO [[id number]]  (Normal) Collected: 05/23/21 2234    Lab Status: Final result Specimen: Blood from Arm, Left Updated: 05/23/21 2317     NT-proBNP 49 pg/mL     Comprehensive metabolic panel [[id number]]  (Abnormal) Collected: 05/23/21 2234    Lab Status: Final result Specimen: Blood from Arm, Left Updated: 05/23/21 2311     Sodium 138 mmol/L      Potassium 4 2 mmol/L      Chloride 103 mmol/L      CO2 23 mmol/L      ANION GAP 12 mmol/L      BUN 20 mg/dL      Creatinine 1 04 mg/dL      Glucose 243 mg/dL      Calcium 8 9 mg/dL      AST 26 U/L      ALT 24 U/L      Alkaline Phosphatase 71 U/L      Total Protein 7 3 g/dL      Albumin 3 9 g/dL      Total Bilirubin 0 57 mg/dL      eGFR 76 ml/min/1 73sq m     Narrative:      [place] guidelines for Chronic Kidney Disease (CKD):     Stage 1 with normal or high GFR (GFR > 90 mL/min/1 73 square meters)    Stage 2 Mild CKD (GFR = 60-89 mL/min/1 73 square meters)    Stage 3A Moderate CKD (GFR = 45-59 mL/min/1 73 square meters)    Stage 3B Moderate CKD (GFR = 30-44 mL/min/1 73 square meters)    Stage 4 Severe CKD (GFR = 15-29 mL/min/1 73 square meters)    Stage 5 End Stage CKD (GFR <15 mL/min/1 73 square meters)  Note: GFR calculation is accurate only with a steady state creatinine    Troponin I [[id number]]  (Normal) Collected: 05/23/21 2234    Lab Status: Final result Specimen: Blood from Arm, Left Updated: 05/23/21 2311     Troponin I <0 02 ng/mL     UA w Reflex to Microscopic w Reflex to Culture [[id number]]  (Abnormal) Collected: 05/23/21 2244    Lab Status: Final result Specimen: Urine, Clean Catch Updated: 05/23/21 2309     Color, UA Red     Clarity, UA Turbid     Specific Gravity, UA 1 025     pH, UA 6 0     Leukocytes, UA Negative     Nitrite, UA Positive     Protein, UA >=300 mg/dl      Glucose,  (1/2%) mg/dl      Ketones, UA Trace mg/dl      Urobilinogen, UA 0 2 E U /dl      Bilirubin, UA Negative     Blood, UA Large    Protime-INR [[id number]]  (Normal) Collected: 05/23/21 2234    Lab Status: Final result Specimen: Blood from Arm, Left Updated: 05/23/21 2306     Protime 14 1 seconds      INR 1 08    APTT [[id number]]  (Normal) Collected: 05/23/21 2234    Lab Status: Final result Specimen: Blood from Arm, Left Updated: 05/23/21 2306     PTT 26 seconds     CBC and differential [[id number]] Collected: 05/23/21 2234    Lab Status: Final result Specimen: Blood from Arm, Left Updated: 05/23/21 2254     WBC 8 61 Thousand/uL      RBC 4 59 Million/uL      Hemoglobin 15 1 g/dL      Hematocrit 42 3 %      MCV 92 fL      MCH 32 9 pg      MCHC 35 7 g/dL      RDW 12 1 %      MPV 9 7 fL      Platelets 704 Thousands/uL      nRBC 0 /100 WBCs      Neutrophils Relative 64 %      Immat GRANS % 1 %      Lymphocytes Relative 25 %      Monocytes Relative 9 %      Eosinophils Relative 1 %      Basophils Relative 0 %      Neutrophils Absolute 5 48 Thousands/µL      Immature Grans Absolute 0 06 Thousand/uL      Lymphocytes Absolute 2 16 Thousands/µL      Monocytes Absolute 0 78 Thousand/µL      Eosinophils Absolute 0 10 Thousand/µL      Basophils Absolute 0 03 Thousands/µL     Blood culture #1 [[id number]] Collected: 05/23/21 2242    Lab Status: In process Specimen: Blood from Arm, Right Updated: 05/23/21 2248    Blood culture #2 [[id number]] Collected: 05/23/21 2234    Lab Status:  In process Specimen: Blood from Arm, Left Updated: 05/23/21 2248                 No orders to display              Procedures  Procedures         ED Course                                           MDM    Disposition  Final diagnoses:   UTI (urinary tract infection)     Time reflects when diagnosis was documented in both MDM as applicable and the Disposition within this note     Time User Action Codes Description Comment    5/24/2021  1:49 AM Tejinder Kessler Add [N39 0] UTI (urinary tract infection)       ED Disposition     ED Disposition Condition Date/Time Comment    Admit Stable Mon May 24, 2021  1:49 AM Case was discussed with slim ap and the patient's admission status was agreed to be Admission Status: observation status to the service of Dr Jose Liz   Follow-up Information    None         Patient's Medications   Discharge Prescriptions    No medications on file     No discharge procedures on file      PDMP Review     None          ED Provider  Electronically Signed by           Hina Rodrigues MD  05/24/21 0174

## 2021-05-24 NOTE — ASSESSMENT & PLAN NOTE
· Presented with hematuria, dysuria and urinary frequency  · Urine: nitrite positive with innumerable WBCs  · Received 1 dose of IV cefepime in the ED  · Continue IV antibiotics with ceftriaxone  · Follow-up on urine culture  · Monitor urine output  · Patient tachycardic on presentation but does not meet sepsis criteria  Follow-up on blood cultures

## 2021-05-24 NOTE — ASSESSMENT & PLAN NOTE
· Initial lactic acid mildly at 2 1   · Received 1L bolus of NS in the ED with repeat lactic acid of 1 7  · Does not meet SIRS/ sepsis criteria as he is afebrile and without leukocytosis or tachypnea  · Antibiotics as per above

## 2021-05-24 NOTE — ASSESSMENT & PLAN NOTE
Lab Results   Component Value Date    HGBA1C 6 3 (H) 01/21/2021       Recent Labs     05/24/21  0659 05/24/21  1125   POCGLU 252* 213*       Blood Sugar Average: Last 72 hrs:  (P) 232 5     · Hold metformin while inpatient  · Monitor accu-checks AC and HS  · SSI for coverage  · Hypoglycemia protocol  · Continue gabapentin

## 2021-05-24 NOTE — DISCHARGE INSTR - AVS FIRST PAGE
Dear Dustin Osborn,     It was our pleasure to care for you here at [facility]  It is our hope that we were always able to exceed the expected standards for your care during your stay  You were hospitalized due to urinary tract infection  You were cared for on the 4th floor under the service of Cristiane Moran MD with the [address] [place] [address] [facility] who covers for your primary care physician (PCP), Sukhi Luque, DO, while you were hospitalized  If you have any questions or concerns related to this hospitalization, you may contact us at [phone]  For follow up as well as medication refills, we recommend that you follow up with your primary care physician  A registered nurse will reach out to you by phone within a few days after your discharge to answer any additional questions that you may have after going home  However, at this time we provide for you here, the most important instructions / recommendations at discharge:     · Notable Medication Adjustments -   · omnicef 300mg BID   · Testing Required after Discharge -   · None   · Important follow up information -   · A referral was made to the urology department    · Other Instructions -   · none  · Please review this entire after visit summary as additional general instructions including medication list, appointments, activity, diet, any pertinent wound care, and other additional recommendations from your care team that may be provided for you        Sincerely,     Cristiane Moran MD

## 2021-05-24 NOTE — ASSESSMENT & PLAN NOTE
· BP elevated on presentation with most recent SBP improved to 134  · Continue to monitor  · Continue lisinopril

## 2021-05-24 NOTE — ASSESSMENT & PLAN NOTE
Lab Results   Component Value Date    HGBA1C 6 3 (H) 01/21/2021       No results for input(s): POCGLU in the last 72 hours  Blood Sugar Average: Last 72 hrs:       · Hold metformin while inpatient  · Monitor accu-checks AC and HS  · SSI for coverage  · Hypoglycemia protocol  · Continue gabapentin

## 2021-05-24 NOTE — DISCHARGE SUMMARY
[facility]  Discharge- Naina [place] 1955, 63 y o  male MRN: [id number]  Unit/Bed#: W -01 Encounter: [id number]  Primary Care Provider: Bharat Barragan DO   Date and time admitted to hospital: 5/23/2021  9:46 PM    * Acute cystitis with hematuria  Assessment & Plan  · Presented with hematuria, dysuria and urinary frequency  · Urine: nitrite positive with innumerable WBCs  · Received 1 day of IV abx   · With rapid improvement in symptoms, appears stable and appropriate for dc  · Will continue oral omnicef 300mg BID to complete a 7d course  · Referral placed to urology as pt's reported some LUTS     Essential hypertension  Assessment & Plan  · BP elevated on presentation with most recent SBP improved to 134  · Continue to monitor  · Continue lisinopril  Elevated lactic acid level  Assessment & Plan  · Initial lactic acid mildly at 2 1   · Received 1L bolus of NS in the ED with repeat lactic acid of 1 7  · Does not meet SIRS/ sepsis criteria as he is afebrile and without leukocytosis or tachypnea  · Antibiotics as per above  Diabetes mellitus with neurological manifestation [facility])  Assessment & Plan  Lab Results   Component Value Date    HGBA1C 6 3 (H) 01/21/2021       Recent Labs     05/24/21  0659 05/24/21  1125   POCGLU 252* 213*       Blood Sugar Average: Last 72 hrs:  (P) 232 5     · Hold metformin while inpatient  · Monitor accu-checks AC and HS  · SSI for coverage  · Hypoglycemia protocol  · Continue gabapentin  Hyperlipidemia LDL goal <70  Assessment & Plan  · Continue statin        Discharging Physician / Practitioner: Gualberto Kim MD  PCP: Bharat Barragan DO  Admission Date:   Admission Orders (From admission, onward)     Ordered        05/24/21 0149  Place in Observation  Once                   Discharge Date: 05/24/21    Resolved Problems  Date Reviewed: 5/24/2021    None          Consultations During Hospital Stay:  · None     Procedures Performed:   · None     Significant Findings / Test Results:     Results for Orly Fermin (MRN [id number]) as of 5/24/2021 14:56   Ref  Range 5/23/2021 22:44   Color, UA Unknown Red   Clarity, UA Unknown Turbid   SL AMB SPECIFIC GRAVITY_URINE Latest Ref Range: 1 003 - 1 030  1 025   Glucose, UA Latest Ref Range: Negative mg/dl 500 (1/2%) (A)   Ketones, UA Latest Ref Range: Negative mg/dl Trace (A)   Blood, UA Latest Ref Range: Negative  Large (A)   Nitrite, UA Latest Ref Range: Negative  Positive (A)   Leukocytes, UA Latest Ref Range: Negative  Negative   pH, UA Latest Ref Range: 4 5, 5 0, 5 5, 6 0, 6 5, 7 0, 7 5, 8 0  6 0   POCT URINE PROTEIN Latest Ref Range: Negative mg/dl >=300 (A)   Bilirubin, UA Latest Ref Range: Negative  Negative   SL AMB POCT UROBILINOGEN Latest Ref Range: 0 2, 1 0 E U /dl E U /dl 0 2   RBC, UA Latest Ref Range: None Seen, 0-1, 1-2, 2-4, 0-5 /hpf Innumerable (A)   WBC, UA Latest Ref Range: None Seen, 0-1, 1-2, 0-5, 2-4 /hpf Field obscured, unable to enumerate (A)   Bacteria, UA Latest Ref Range: None Seen, Occasional /hpf Field obscured, unable to enumerate (A)       Incidental Findings:   · None      Test Results Pending at Discharge (will require follow up):   · Ucx      Outpatient Tests Requested:  · None     Complications:  None     Reason for Admission: dysuria and hematuria     Hospital Course:     Alistair Cabezas is a 63 y o  male patient history of type 2 diabetes who originally presented to the hospital on 5/23/2021 due to an episode of dysuria with hematuria  Mild lactic on admission, but otherwise no SIRS criteria  Patient noted some difficulty urinating in the days prior to admission  No f/c  After receiving IVF and IV abx, pt's symptoms rapidly improved  Given this improvement and lack of other concerning symptoms, pt was discharged with plan to complete 6 additional days of oral abx (omnicef)  Pt agreeable to plan    Will refer to urology on dc given report of new LUTS      Please see above list of diagnoses and related plan for additional information  Condition at Discharge: fair     Discharge Day Visit / Exam:     Subjective:  Feels well  No new complaints  Denies f/c  Vitals: Blood Pressure: 169/95 (05/24/21 0751)  Pulse: 94 (05/24/21 0751)  Temperature: 97 9 °F (36 6 °C) (05/24/21 0751)  Temp Source: Oral (05/24/21 0751)  Respirations: 15 (05/24/21 0751)  Height: 6' 3" (190 5 cm) (05/23/21 2150)  Weight - Scale: 112 kg (246 lb 4 1 oz) (05/24/21 0736)  SpO2: 99 % (05/24/21 0751)  Exam:   Physical Exam  Vitals signs and nursing note reviewed  Constitutional:       General: He is not in acute distress  Appearance: Normal appearance  He is not ill-appearing or toxic-appearing  HENT:      Head: Normocephalic and atraumatic  Cardiovascular:      Rate and Rhythm: Normal rate  Pulmonary:      Effort: Pulmonary effort is normal  No respiratory distress  Breath sounds: No wheezing  Abdominal:      General: Abdomen is flat  There is no distension  Palpations: Abdomen is soft  Tenderness: There is no right CVA tenderness or left CVA tenderness  Skin:     General: Skin is warm and dry  Coloration: Skin is not pale  Findings: No erythema  Neurological:      General: No focal deficit present  Mental Status: He is alert and oriented to person, place, and time  Discharge instructions/Information to patient and family:   See after visit summary for information provided to patient and family  Provisions for Follow-Up Care:  See after visit summary for information related to follow-up care and any pertinent home health orders  Disposition:     Home    Planned Readmission: none      Discharge Statement:  I spent 35 minutes discharging the patient  This time was spent on the day of discharge  I had direct contact with the patient on the day of discharge   Greater than 50% of the total time was spent examining patient, answering all patient questions, arranging and discussing plan of care with patient as well as directly providing post-discharge instructions  Additional time then spent on discharge activities  Discharge Medications:  See after visit summary for reconciled discharge medications provided to patient and family        ** Please Note: This note has been constructed using a voice recognition system **

## 2021-05-24 NOTE — PLAN OF CARE
Problem: Potential for Falls  Goal: Patient will remain free of falls  Description: INTERVENTIONS:  - Assess patient frequently for physical needs  -  Identify cognitive and physical deficits and behaviors that affect risk of falls    -  [place] fall precautions as indicated by assessment   - Educate patient/family on patient safety including physical limitations  - Instruct patient to call for assistance with activity based on assessment  - Modify environment to reduce risk of injury  - Consider OT/PT consult to assist with strengthening/mobility  Outcome: Progressing     Problem: PAIN - ADULT  Goal: Verbalizes/displays adequate comfort level or baseline comfort level  Description: Interventions:  - Encourage patient to monitor pain and request assistance  - Assess pain using appropriate pain scale  - Administer analgesics based on type and severity of pain and evaluate response  - Implement non-pharmacological measures as appropriate and evaluate response  - Consider cultural and social influences on pain and pain management  - Notify physician/advanced practitioner if interventions unsuccessful or patient reports new pain  Outcome: Progressing     Problem: INFECTION - ADULT  Goal: Absence or prevention of progression during hospitalization  Description: INTERVENTIONS:  - Assess and monitor for signs and symptoms of infection  - Monitor lab/diagnostic results  - Monitor all insertion sites, i e  indwelling lines, tubes, and drains  - Monitor endotracheal if appropriate and nasal secretions for changes in amount and color  - Jones appropriate cooling/warming therapies per order  - Administer medications as ordered  - Instruct and encourage patient and family to use good hand hygiene technique  - Identify and instruct in appropriate isolation precautions for identified infection/condition  Outcome: Progressing  Goal: Absence of fever/infection during neutropenic period  Description: INTERVENTIONS:  - Monitor WBC    Outcome: Progressing     Problem: SAFETY ADULT  Goal: Patient will remain free of falls  Description: INTERVENTIONS:  - Assess patient frequently for physical needs  -  Identify cognitive and physical deficits and behaviors that affect risk of falls    -  [place] fall precautions as indicated by assessment   - Educate patient/family on patient safety including physical limitations  - Instruct patient to call for assistance with activity based on assessment  - Modify environment to reduce risk of injury  - Consider OT/PT consult to assist with strengthening/mobility  Outcome: Progressing  Goal: Maintain or return to baseline ADL function  Description: INTERVENTIONS:  -  Assess patient's ability to carry out ADLs; assess patient's baseline for ADL function and identify physical deficits which impact ability to perform ADLs (bathing, care of mouth/teeth, toileting, grooming, dressing, etc )  - Assess/evaluate cause of self-care deficits   - Assess range of motion  - Assess patient's mobility; develop plan if impaired  - Assess patient's need for assistive devices and provide as appropriate  - Encourage maximum independence but intervene and supervise when necessary  - Involve family in performance of ADLs  - Assess for home care needs following discharge   - Consider OT consult to assist with ADL evaluation and planning for discharge  - Provide patient education as appropriate  Outcome: Progressing  Goal: Maintain or return mobility status to optimal level  Description: INTERVENTIONS:  - Assess patient's baseline mobility status (ambulation, transfers, stairs, etc )    - Identify cognitive and physical deficits and behaviors that affect mobility  - Identify mobility aids required to assist with transfers and/or ambulation (gait belt, sit-to-stand, lift, walker, cane, etc )  - [place] fall precautions as indicated by assessment  - Record patient progress and toleration of activity level on Mobility SBAR; progress patient to next Phase/Stage  - Instruct patient to call for assistance with activity based on assessment  - Consider rehabilitation consult to assist with strengthening/weightbearing, etc   Outcome: Progressing     Problem: DISCHARGE PLANNING  Goal: Discharge to home or other facility with appropriate resources  Description: INTERVENTIONS:  - Identify barriers to discharge w/patient and caregiver  - Arrange for needed discharge resources and transportation as appropriate  - Identify discharge learning needs (meds, wound care, etc )  - Arrange for interpretive services to assist at discharge as needed  - Refer to Case Management Department for coordinating discharge planning if the patient needs post-hospital services based on physician/advanced practitioner order or complex needs related to functional status, cognitive ability, or social support system  Outcome: Progressing     Problem: Knowledge Deficit  Goal: Patient/family/caregiver demonstrates understanding of disease process, treatment plan, medications, and discharge instructions  Description: Complete learning assessment and assess knowledge base    Interventions:  - Provide teaching at level of understanding  - Provide teaching via preferred learning methods  Outcome: Progressing     Problem: GENITOURINARY - ADULT  Goal: Maintains or returns to baseline urinary function  Description: INTERVENTIONS:  - Assess urinary function  - Encourage oral fluids to ensure adequate hydration if ordered  - Administer IV fluids as ordered to ensure adequate hydration  - Administer ordered medications as needed  - Offer frequent toileting  - Follow urinary retention protocol if ordered  Outcome: Progressing

## 2021-05-25 ENCOUNTER — TRANSITIONAL CARE MANAGEMENT (OUTPATIENT)
Dept: FAMILY MEDICINE CLINIC | Facility: CLINIC | Age: 66
End: 2021-05-25

## 2021-05-25 LAB — BACTERIA UR CULT: NORMAL

## 2021-05-29 LAB
BACTERIA BLD CULT: NORMAL
BACTERIA BLD CULT: NORMAL

## 2021-06-15 LAB
ALBUMIN SERPL-MCNC: 4.1 G/DL (ref 3.6–5.1)
ALBUMIN/GLOB SERPL: 1.8 (CALC) (ref 1–2.5)
ALP SERPL-CCNC: 48 U/L (ref 35–144)
ALT SERPL-CCNC: 17 U/L (ref 9–46)
AST SERPL-CCNC: 21 U/L (ref 10–35)
BASOPHILS # BLD AUTO: 29 CELLS/UL (ref 0–200)
BASOPHILS NFR BLD AUTO: 0.5 %
BILIRUB SERPL-MCNC: 0.6 MG/DL (ref 0.2–1.2)
BUN SERPL-MCNC: 15 MG/DL (ref 7–25)
BUN/CREAT SERPL: ABNORMAL (CALC) (ref 6–22)
CALCIUM SERPL-MCNC: 9.2 MG/DL (ref 8.6–10.3)
CHLORIDE SERPL-SCNC: 105 MMOL/L (ref 98–110)
CHOLEST SERPL-MCNC: 110 MG/DL
CHOLEST/HDLC SERPL: 2.2 (CALC)
CO2 SERPL-SCNC: 24 MMOL/L (ref 20–32)
CREAT SERPL-MCNC: 0.91 MG/DL (ref 0.7–1.25)
EOSINOPHIL # BLD AUTO: 91 CELLS/UL (ref 15–500)
EOSINOPHIL NFR BLD AUTO: 1.6 %
ERYTHROCYTE [DISTWIDTH] IN BLOOD BY AUTOMATED COUNT: 12.4 % (ref 11–15)
EST. AVERAGE GLUCOSE BLD GHB EST-MCNC: 177 (CALC)
EST. AVERAGE GLUCOSE BLD GHB EST-SCNC: 9.8 (CALC)
GLOBULIN SER CALC-MCNC: 2.3 G/DL (CALC) (ref 1.9–3.7)
GLUCOSE SERPL-MCNC: 226 MG/DL (ref 65–99)
HBA1C MFR BLD: 7.8 % OF TOTAL HGB
HCT VFR BLD AUTO: 42.5 % (ref 38.5–50)
HDLC SERPL-MCNC: 50 MG/DL
HGB BLD-MCNC: 14.7 G/DL (ref 13.2–17.1)
LDLC SERPL CALC-MCNC: 44 MG/DL (CALC)
LYMPHOCYTES # BLD AUTO: 1676 CELLS/UL (ref 850–3900)
LYMPHOCYTES NFR BLD AUTO: 29.4 %
MCH RBC QN AUTO: 32.9 PG (ref 27–33)
MCHC RBC AUTO-ENTMCNC: 34.6 G/DL (ref 32–36)
MCV RBC AUTO: 95.1 FL (ref 80–100)
MONOCYTES # BLD AUTO: 513 CELLS/UL (ref 200–950)
MONOCYTES NFR BLD AUTO: 9 %
NEUTROPHILS # BLD AUTO: 3392 CELLS/UL (ref 1500–7800)
NEUTROPHILS NFR BLD AUTO: 59.5 %
NONHDLC SERPL-MCNC: 60 MG/DL (CALC)
PLATELET # BLD AUTO: 182 THOUSAND/UL (ref 140–400)
PMV BLD REES-ECKER: 10 FL (ref 7.5–12.5)
POTASSIUM SERPL-SCNC: 3.8 MMOL/L (ref 3.5–5.3)
PROT SERPL-MCNC: 6.4 G/DL (ref 6.1–8.1)
PSA SERPL-MCNC: 4.1 NG/ML
RBC # BLD AUTO: 4.47 MILLION/UL (ref 4.2–5.8)
SL AMB EGFR AFRICAN AMERICAN: 104 ML/MIN/1.73M2
SL AMB EGFR NON AFRICAN AMERICAN: 89 ML/MIN/1.73M2
SODIUM SERPL-SCNC: 138 MMOL/L (ref 135–146)
TRIGL SERPL-MCNC: 77 MG/DL
WBC # BLD AUTO: 5.7 THOUSAND/UL (ref 3.8–10.8)

## 2021-06-18 DIAGNOSIS — R97.20 ELEVATED PSA: Primary | ICD-10-CM

## 2021-06-23 ENCOUNTER — TELEPHONE (OUTPATIENT)
Dept: UROLOGY | Facility: AMBULATORY SURGERY CENTER | Age: 66
End: 2021-06-23

## 2021-06-23 ENCOUNTER — OFFICE VISIT (OUTPATIENT)
Dept: FAMILY MEDICINE CLINIC | Facility: CLINIC | Age: 66
End: 2021-06-23
Payer: COMMERCIAL

## 2021-06-23 VITALS
OXYGEN SATURATION: 97 % | BODY MASS INDEX: 30.04 KG/M2 | DIASTOLIC BLOOD PRESSURE: 86 MMHG | HEART RATE: 70 BPM | RESPIRATION RATE: 18 BRPM | SYSTOLIC BLOOD PRESSURE: 130 MMHG | WEIGHT: 241.6 LBS | HEIGHT: 75 IN

## 2021-06-23 DIAGNOSIS — Z28.21 PNEUMOCOCCAL VACCINATION DECLINED BY PATIENT: ICD-10-CM

## 2021-06-23 DIAGNOSIS — Z00.00 ANNUAL PHYSICAL EXAM: Primary | ICD-10-CM

## 2021-06-23 DIAGNOSIS — Z28.21 TETANUS, DIPHTHERIA, AND ACELLULAR PERTUSSIS (TDAP) VACCINATION DECLINED: ICD-10-CM

## 2021-06-23 DIAGNOSIS — E78.5 HYPERLIPIDEMIA LDL GOAL <70: ICD-10-CM

## 2021-06-23 DIAGNOSIS — R97.20 ELEVATED PSA: ICD-10-CM

## 2021-06-23 DIAGNOSIS — I10 ESSENTIAL HYPERTENSION: ICD-10-CM

## 2021-06-23 DIAGNOSIS — E11.42 TYPE 2 DIABETES MELLITUS WITH DIABETIC POLYNEUROPATHY, WITHOUT LONG-TERM CURRENT USE OF INSULIN (HCC): ICD-10-CM

## 2021-06-23 DIAGNOSIS — E66.09 CLASS 1 OBESITY DUE TO EXCESS CALORIES WITH SERIOUS COMORBIDITY AND BODY MASS INDEX (BMI) OF 30.0 TO 30.9 IN ADULT: ICD-10-CM

## 2021-06-23 PROCEDURE — 1036F TOBACCO NON-USER: CPT | Performed by: FAMILY MEDICINE

## 2021-06-23 PROCEDURE — 99214 OFFICE O/P EST MOD 30 MIN: CPT | Performed by: FAMILY MEDICINE

## 2021-06-23 PROCEDURE — 1111F DSCHRG MED/CURRENT MED MERGE: CPT | Performed by: FAMILY MEDICINE

## 2021-06-23 PROCEDURE — 3725F SCREEN DEPRESSION PERFORMED: CPT | Performed by: FAMILY MEDICINE

## 2021-06-23 PROCEDURE — 3079F DIAST BP 80-89 MM HG: CPT | Performed by: FAMILY MEDICINE

## 2021-06-23 PROCEDURE — 3008F BODY MASS INDEX DOCD: CPT | Performed by: FAMILY MEDICINE

## 2021-06-23 PROCEDURE — 99396 PREV VISIT EST AGE 40-64: CPT | Performed by: FAMILY MEDICINE

## 2021-06-23 PROCEDURE — 3075F SYST BP GE 130 - 139MM HG: CPT | Performed by: FAMILY MEDICINE

## 2021-06-23 RX ORDER — GABAPENTIN 100 MG/1
200 CAPSULE ORAL 3 TIMES DAILY
Qty: 180 CAPSULE | Refills: 0 | Status: SHIPPED | OUTPATIENT
Start: 2021-06-23 | End: 2021-09-17 | Stop reason: SDUPTHER

## 2021-06-23 NOTE — PATIENT INSTRUCTIONS

## 2021-06-23 NOTE — TELEPHONE ENCOUNTER
Please Triage - [address]or [address] Patient-     What is the reason for the patients appointment? Elevated PSA 4 1       Imaging/Lab Results: labs (6/15)      Do we accept the patient's insurance or is the patient Self-Pay?   Provider & Plan: Sabina Kehr [employer]   Member ID#: [id number]     Has the patient had any previous urologist(s)? no       Have patient records been requested? epic       Has the patient had any outside testing done? epic      Does the patient have a personal history of cancer? no      Patient can be reached at :[phone]

## 2021-06-23 NOTE — PROGRESS NOTES
Assessment/Plan:   Diagnoses and all orders for this visit:    Annual physical exam  - reviewed PMHx, PSHx, meds, allergies, FHx, Soc Hx and Sexual Hx   - reviewed labs from 6/15/2021 - see below   - per pt, had a repeat Cscope within the past year at Dr Rody Yeboah office - records pending   - noted to have elevated PSA and was referred to Uro for eval and treatment - in the process of scheduling   - discussed diet and encouraged exercise - see below  - UTD with Optho - last exam was 1/13/2021   - overdue for Dental   - declined STD screening in the office today   - UTD with COVID IMMs - received both [address] at [facility]  - declined Tdap and Pneumovax in the office today   Tetanus, diphtheria, and acellular pertussis (Tdap) vaccination declined  Pneumococcal vaccination declined by patient    Class 1 obesity due to excess calories with serious comorbidity and body mass index (BMI) of 30 0 to 30 9 in adult  - BMI 30 2  - discussed diet and encouraged exercise  - educated that it takes 3500 shivani to lose 1lb  - advised to cut down on carbs, 5 servings of fruits/veggies/day, increase water intake (65-80oz/water/day)   - appropriate weight loss goal for men = 1-2lb/week  - per the Heart Association - 150mins/exercise/week, but to lose and maintain weight 200-300mins/exercise/week   - pt declined referral to [facility] at [facility]      Type 2 diabetes mellitus with diabetic polyneuropathy, without long-term current use of insulin ([address] )  -     metFORMIN (GLUCOPHAGE) 1000 MG tablet; Take 1 tablet (1,000 mg total) by mouth 2 (two) times a day  -     HEMOGLOBIN A1C W/ EAG ESTIMATION; Future  -     gabapentin (NEURONTIN) 100 mg capsule;  Take 2 capsules (200 mg total) by mouth 3 (three) times a day  - A1c (6/15/2021): 7 8 <-- 6 2 (1/21/2021)   - nml renal function on CMP 6/15/2021   - pt does not check his sugars  - discussed diet and encouraged exercise  - pt declined referral to Nutritionist - will increase Metformin to 1000mg BID   - has noted no relief with Gabapentin 100mg TID - will increase to 200mg TID and advised to RTO in 1month for f/u - if still no improvement, then to increase to 300mg TID   - UTD with annual Optho   - follows with Podiatry   - UTD with COVID IMMs but declined Pneumovax in the office today     Hyperlipidemia LDL goal <70  - LDL 44   - cont Crestor 5mg QHS  - The ASCVD Risk score (Amy Elise et al , 2013) failed to calculate for the following reasons: The valid total cholesterol range is 130 to 320 mg/dL    Essential hypertension  -     Microalbumin / creatinine urine ratio  - BP in the office 130/86 on my repeat  - currently on ACEI 20mg QD and tolerating it well - cont for now   - no FHx of HTN   - not a smoker   - has gained 20lbs since 1/2021 - discussed diet and encouraged exercise  - advised to cut back on his EtOH intake   - avoid NSAIDs  - no more than 2g of Na/day   - check urine microalbumin ratio  - RTO in 1month for f/u - pt aware and agreeable     Elevated PSA  - noted to have elevated PSA and was referred to Uro for eval and treatment - in the process of scheduling   - no FHx of Prostate Ca     Other orders  -     Cancel: TDAP VACCINE GREATER THAN OR EQUAL TO 8YO IM          Subjective:    Patient ID: Tiara Lee is a 63 y o  male    Tiara Lee is a 63 y o  male who presents to the office for an annual exam and f/u of chronic conditions   1) Annual   - PMHx: DM2 with neuropathy, HTN, HL, CTS/TTS, obesity (BMI 30), h/o colonic polyps and internal hemorrhoids   - allergies: NKDA  - Meds: Metformin 750mg BID, ACEI 20mg QD, Gabapentin 100mg TID, Crestor 5mg QHS, ASA 81mg QD   - PSHx: wisdom tooth extraction   - FHx: M (MI at 68yo, hypotension, valvular heart disease), F (DM, pancreatic ca - dx at 77yo), Sister (lung ca - dx at 62yo), B (bile duct ca - dx at 63yo), denies FHx of HTN/prostate ca   - Immunizations: UTD with COVID vaccines - received both [place] at [facility]; declined Tdap and Pneumovax in the office today    - Hm: Cscope in 2015 (multiple polyps and internal hemorrhoid) with Dr Anthony Toro (due to repeat in 2018) -- per pt, had repeat scope within the past year at Dr Subhash Johnson office   - diet/exercise: currently not exercising, not watching his diet   - social: denies tob/illicits, [id number] Qnight, 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   - sexual Hx: not currently sexually active, declined STD screening   - last vision: wears glasses, last OV was 1/13/2021   - last dental: not in >1yr   2) DM2 with polyneuropathy/HL   - A1c = 7 8 <-- 6 2   - LDL 44   - currently on Metformin 750mg BID, Gabapentin 100mg TID and Crestor 5mg QHS   - does not feel any different with Gabapentin 100mg TID   - diet/exercise: currently not exercising, not watching his diet   - social: denies tob/illicits, [id number] Qnight, 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   3) HTN   - BP in the office 144/82 and on my repeat 130/86   - currently on ACEI 20mg QD and tolerating it well - denies F/C/N/V/HA/visual changes/CP/palpitations/SOB/wheezing/cough/abd pain/D/numbness or tingling in b/l UE+LE/LE edema or calf tenderness  - (+) facial flushing   - no FHx of HTN   - not a smoker       The following portions of the patient's history were reviewed and updated as appropriate: allergies, current medications, past family history, past medical history, past social history, past surgical history and problem list     Review of Systems  as per HPI    Objective:  /86 (BP Location: Left arm, Patient Position: Sitting, Cuff Size: Large)   Pulse 70   Resp 18   Ht 6' 3" (1 905 m)   Wt 110 kg (241 lb 9 6 oz)   SpO2 97%   BMI 30 20 kg/m²    Physical Exam  Vitals reviewed  Constitutional:       General: He is not in acute distress  Appearance: He is obese  He is not ill-appearing, toxic-appearing or diaphoretic  HENT:      Head: Normocephalic and atraumatic        Right Ear: External ear normal       Left Ear: External ear normal    Eyes:      General: No scleral icterus  Right eye: No discharge  Left eye: No discharge  Extraocular Movements: Extraocular movements intact  Conjunctiva/sclera: Conjunctivae normal       Comments: Wears glasses    Cardiovascular:      Rate and Rhythm: Normal rate and regular rhythm  Heart sounds: Normal heart sounds  No murmur heard  No friction rub  No gallop  Pulmonary:      Effort: Pulmonary effort is normal  No respiratory distress  Breath sounds: Normal breath sounds  No stridor  No wheezing, rhonchi or rales  Abdominal:      General: Bowel sounds are normal       Palpations: Abdomen is soft  Musculoskeletal:      Cervical back: Normal range of motion and neck supple  Right lower leg: No edema  Left lower leg: No edema  Skin:     General: Skin is warm  Comments: Flushed face   Neurological:      General: No focal deficit present  Mental Status: He is alert and oriented to person, place, and time  Psychiatric:         Mood and Affect: Mood normal          Behavior: Behavior normal          Thought Content: Thought content normal          Judgment: Judgment normal        BMI Counseling: Body mass index is 30 2 kg/m²  The BMI is above normal  Nutrition recommendations include reducing portion sizes and decreasing overall calorie intake  Exercise recommendations include moderate aerobic physical activity for 150 minutes/week, exercising 3-5 times per week, joining a gym and strength training exercises  Depression Screening Follow-up Plan: Patient's depression screening was positive with a PHQ-2 score of 2  Their PHQ-9 score was 3  Patient assessed for underlying major depression  They have no active suicidal ideations  Brief counseling provided and recommend additional follow-up/re-evaluation next office visit

## 2021-06-23 NOTE — PROGRESS NOTES
Assessment/Plan:   Diagnoses and all orders for this visit:      Class 1 obesity due to excess calories with serious comorbidity and body mass index (BMI) of 30 0 to 30 9 in adult  - BMI 30 2  - has gained 20lbs since 1/2021   - discussed diet and encouraged exercise  - educated that it takes 3500 shivani to lose 1lb  - advised to cut down on carbs, 5 servings of fruits/veggies/day, increase water intake (65-80oz/water/day)   - appropriate weight loss goal for men = 1-2lb/week  - per the Heart Association - 150mins/exercise/week, but to lose and maintain weight 200-300mins/exercise/week   - pt declined referral to [facility]      Type 2 diabetes mellitus with diabetic polyneuropathy, without long-term current use of insulin (HCC)  -     metFORMIN (GLUCOPHAGE) 1000 MG tablet; Take 1 tablet (1,000 mg total) by mouth 2 (two) times a day  -     HEMOGLOBIN A1C W/ EAG ESTIMATION; Future  -     gabapentin (NEURONTIN) 100 mg capsule; Take 2 capsules (200 mg total) by mouth 3 (three) times a day  - A1c (6/15/2021): 7 8 <-- 6 2 (1/21/2021)   - nml renal function on CMP 6/15/2021   - has gained 20lbs since 1/2021   - pt does not check his sugars  - discussed diet and encouraged exercise  - pt declined referral to Nutritionist   - will increase Metformin to 1000mg BID   - has noted no relief with Gabapentin 100mg TID - will increase to 200mg TID and advised to RTO in 1month for f/u - if still no improvement, then to increase to 300mg TID   - UTD with annual Optho   - follows with Podiatry   - UTD with COVID IMMs but declined Pneumovax in the office today     Hyperlipidemia LDL goal <70  - LDL 44   - cont Crestor 5mg QHS  - The ASCVD Risk score (Atul Szymanski et al , 2013) failed to calculate for the following reasons:     The valid total cholesterol range is 130 to 320 mg/dL    Essential hypertension  -     Microalbumin / creatinine urine ratio  - BP in the office 130/86 on my repeat  - currently on ACEI 20mg QD and tolerating it well - cont for now   - no FHx of HTN   - not a smoker   - has gained 20lbs since 1/2021 - discussed diet and encouraged exercise  - advised to cut back on his EtOH intake   - avoid NSAIDs  - no more than 2g of Na/day   - check urine microalbumin ratio  - RTO in 1month for f/u - pt aware and agreeable     Elevated PSA  - noted to have elevated PSA and was referred to Uro for eval and treatment - in the process of scheduling   - no FHx of Prostate Ca     Other orders  -     Cancel: TDAP VACCINE GREATER THAN OR EQUAL TO 6YO IM          Subjective:    Patient ID: Arie Mantilla is a 63 y o  male    Arie Mantilla is a 63 y o  male who presents to the office for an annual exam and f/u of chronic conditions   1) Annual   - PMHx: DM2 with neuropathy, HTN, HL, CTS/TTS, obesity (BMI 30), h/o colonic polyps and internal hemorrhoids   - allergies: NKDA  - Meds: Metformin 750mg BID, ACEI 20mg QD, Gabapentin 100mg TID, Crestor 5mg QHS, ASA 81mg QD   - PSHx: wisdom tooth extraction   - FHx: M (MI at 68yo, hypotension, valvular heart disease), F (DM, pancreatic ca - dx at 75yo), Sister (lung ca - dx at 62yo), B (bile duct ca - dx at 65yo), denies FHx of HTN/prostate ca   - Immunizations: UTD with COVID vaccines - received both [facility] Lowry at [facility]; declined Tdap and Pneumovax in the office today    - Hm: Cscope in 2015 (multiple polyps and internal hemorrhoid) with Dr Aileen Yang (due to repeat in 2018) -- per pt, had repeat scope within the past year at Dr Brisa Johnson office   - diet/exercise: currently not exercising, not watching his diet   - social: denies tob/illicits, 1-1SNLMAR Qnight, 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   - sexual Hx: not currently sexually active, declined STD screening   - last vision: wears glasses, last OV was 1/13/2021   - last dental: not in >1yr   2) DM2 with polyneuropathy/HL   - A1c = 7 8 <-- 6 2   - LDL 44   - currently on Metformin 750mg BID, Gabapentin 100mg TID and Crestor 5mg QHS   - does not feel any different with Gabapentin 100mg TID   - diet/exercise: currently not exercising, not watching his diet   - social: denies tob/illicits, 5-1VIJCVH Qnight, 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   3) HTN   - BP in the office 144/82 and on my repeat 130/86   - currently on ACEI 20mg QD and tolerating it well - denies F/C/N/V/HA/visual changes/CP/palpitations/SOB/wheezing/cough/abd pain/D/numbness or tingling in b/l UE+LE/LE edema or calf tenderness  - (+) facial flushing   - no FHx of HTN   - not a smoker       The following portions of the patient's history were reviewed and updated as appropriate: allergies, current medications, past family history, past medical history, past social history, past surgical history and problem list     Review of Systems  as per HPI    Objective:  /86 (BP Location: Left arm, Patient Position: Sitting, Cuff Size: Large)   Pulse 70   Resp 18   Ht 6' 3" (1 905 m)   Wt 110 kg (241 lb 9 6 oz)   SpO2 97%   BMI 30 20 kg/m²    Physical Exam  Vitals reviewed  Constitutional:       General: He is not in acute distress  Appearance: He is obese  He is not ill-appearing, toxic-appearing or diaphoretic  HENT:      Head: Normocephalic and atraumatic  Right Ear: External ear normal       Left Ear: External ear normal    Eyes:      General: No scleral icterus  Right eye: No discharge  Left eye: No discharge  Extraocular Movements: Extraocular movements intact  Conjunctiva/sclera: Conjunctivae normal       Comments: Wears glasses    Cardiovascular:      Rate and Rhythm: Normal rate and regular rhythm  Heart sounds: Normal heart sounds  No murmur heard  No friction rub  No gallop  Pulmonary:      Effort: Pulmonary effort is normal  No respiratory distress  Breath sounds: Normal breath sounds  No stridor  No wheezing, rhonchi or rales  Abdominal:      General: Bowel sounds are normal       Palpations: Abdomen is soft  Musculoskeletal:      Cervical back: Normal range of motion and neck supple  Right lower leg: No edema  Left lower leg: No edema  Skin:     General: Skin is warm  Comments: Flushed face   Neurological:      General: No focal deficit present  Mental Status: He is alert and oriented to person, place, and time  Psychiatric:         Mood and Affect: Mood normal          Behavior: Behavior normal          Thought Content: Thought content normal          Judgment: Judgment normal        BMI Counseling: Body mass index is 30 2 kg/m²  The BMI is above normal  Nutrition recommendations include reducing portion sizes and decreasing overall calorie intake  Exercise recommendations include moderate aerobic physical activity for 150 minutes/week, exercising 3-5 times per week, joining a gym and strength training exercises  Depression Screening Follow-up Plan: Patient's depression screening was positive with a PHQ-2 score of 2  Their PHQ-9 score was 3  Patient assessed for underlying major depression  They have no active suicidal ideations  Brief counseling provided and recommend additional follow-up/re-evaluation next office visit

## 2021-06-23 NOTE — TELEPHONE ENCOUNTER
Patient referred by PCP for elevated PSA     6/15/2021- PSA 4 1    Previous PSA on 7/29/2020 3 7      Per PCP notes no FHx of prostate cancer    Will route to provider on appropriate new patient appt time frame

## 2021-06-24 NOTE — TELEPHONE ENCOUNTER
Lm for pt to c/b and schedule 4-6 weeks with a repeat diagnostic PSA prior  Have pt call pcp for new psa order

## 2021-06-24 NOTE — TELEPHONE ENCOUNTER
FYI:  Patient returned call, patient stated his last psa test was 6/15/21 and if he get another too soon his insurance will not pay  Patient scheduled with [facility] on 7/31/21, patient can be reached at [phone] for further questions

## 2021-06-25 PROCEDURE — 4010F ACE/ARB THERAPY RXD/TAKEN: CPT | Performed by: FAMILY MEDICINE

## 2021-07-12 DIAGNOSIS — E78.5 HYPERLIPIDEMIA LDL GOAL <70: ICD-10-CM

## 2021-07-14 RX ORDER — ROSUVASTATIN CALCIUM 5 MG/1
TABLET, COATED ORAL
Qty: 90 TABLET | Refills: 3 | Status: SHIPPED | OUTPATIENT
Start: 2021-07-14 | End: 2021-09-17 | Stop reason: SDUPTHER

## 2021-07-21 ENCOUNTER — OFFICE VISIT (OUTPATIENT)
Dept: FAMILY MEDICINE CLINIC | Facility: CLINIC | Age: 66
End: 2021-07-21
Payer: COMMERCIAL

## 2021-07-21 VITALS
WEIGHT: 234 LBS | RESPIRATION RATE: 16 BRPM | SYSTOLIC BLOOD PRESSURE: 124 MMHG | BODY MASS INDEX: 29.09 KG/M2 | OXYGEN SATURATION: 98 % | DIASTOLIC BLOOD PRESSURE: 80 MMHG | HEART RATE: 104 BPM | HEIGHT: 75 IN

## 2021-07-21 DIAGNOSIS — I10 ESSENTIAL HYPERTENSION: ICD-10-CM

## 2021-07-21 DIAGNOSIS — E78.5 HYPERLIPIDEMIA LDL GOAL <70: ICD-10-CM

## 2021-07-21 DIAGNOSIS — R97.20 ELEVATED PSA: ICD-10-CM

## 2021-07-21 DIAGNOSIS — E11.42 TYPE 2 DIABETES MELLITUS WITH DIABETIC POLYNEUROPATHY, WITHOUT LONG-TERM CURRENT USE OF INSULIN (HCC): Primary | ICD-10-CM

## 2021-07-21 DIAGNOSIS — E66.3 OVERWEIGHT (BMI 25.0-29.9): ICD-10-CM

## 2021-07-21 PROCEDURE — 99214 OFFICE O/P EST MOD 30 MIN: CPT | Performed by: FAMILY MEDICINE

## 2021-07-21 RX ORDER — CEFDINIR 300 MG/1
CAPSULE ORAL
COMMUNITY
End: 2021-07-21

## 2021-07-21 NOTE — PROGRESS NOTES
Assessment/Plan:   Diagnoses and all orders for this visit:    Type 2 diabetes mellitus with diabetic polyneuropathy, without long-term current use of insulin (HCC)  - A1c (6/15/2021): 7 8 <-- 6 2 (1/21/2021)   - nml renal function on CMP 6/15/2021   - has lost 5lbs since last OV - lifestyle modifications with diet/exercise   - pt does not check his sugars  - pt declined referral to Nutritionist   - cont Metformin 1000mg BID   - UTD with annual Optho   - follows with Podiatry - last OV was last week   - UTD with COVID IMMs   - RTO in 2months with repeat labs for f/u - pt aware and agreeable     Essential hypertension  - BP in the office 124/80 on my repeat   - currently on ACEI 20mg QD and tolerating it well - cont for now   - no FHx of HTN   - not a smoker   - has lost 5lbs since last OV - lifestyle modifications with diet/exercise   - advised to cut back on his EtOH intake   - avoid NSAIDs  - no more than 2g of Na/day   - check urine microalbumin ratio    Hyperlipidemia LDL goal <70  - LDL 44   - cont Crestor 5mg QHS    Overweight (BMI 25 0-29 9)  - BMI 29 3 <-- 30 2  - has lost 5lbs since last OV - lifestyle modifications with diet/exercise     Elevated PSA  - has an appt with Uro scheduled for 7/30/2021   - no FHx of Prostate Ca     Other orders  -     Discontinue: cefdinir (OMNICEF) 300 mg capsule; cefdinir 300 mg capsule   TAKE 1 CAPSULE (300 MG TOTAL) BY MOUTH EVERY 12 (TWELVE) HOURS FOR 6 DAYS          Subjective:    Patient ID: Eric Pugh is a 63 y o  male    HPI   63yo M presents to the office for f/u of chronic conditions   - A1c (6/15/2021): 7 8 <-- 6 3 -- was advised to increase Metformin to 1000mg BID (from 750mg BID)   - has lost weight since last OV   - started walking more - now with blister on toe - follows with Podiatry Q9wks - last appt was last week  - has watched intake of junk food - eating more tomatoes and cucumbers   - BP in the office 124/80 on my repeat   - has an appt with Uro scheduled for 7/30/2021   - today in the office pt denies F/C/N/V/CP/palpitations/SOB/wheezing/abd pain/D/LE edema       The following portions of the patient's history were reviewed and updated as appropriate: allergies, current medications, past family history, past medical history, past social history, past surgical history and problem list     Review of Systems  as per HPI    Objective:  /80 (BP Location: Left arm, Patient Position: Sitting, Cuff Size: Standard)   Pulse 104   Resp 16   Ht 6' 3" (1 905 m)   Wt 106 kg (234 lb)   SpO2 98%   BMI 29 25 kg/m²    Physical Exam  Constitutional:       General: He is not in acute distress  Appearance: He is obese  He is not ill-appearing, toxic-appearing or diaphoretic  HENT:      Head: Normocephalic and atraumatic  Right Ear: External ear normal       Left Ear: External ear normal    Eyes:      General: No scleral icterus  Right eye: No discharge  Left eye: No discharge  Extraocular Movements: Extraocular movements intact  Conjunctiva/sclera: Conjunctivae normal    Cardiovascular:      Rate and Rhythm: Normal rate and regular rhythm  Heart sounds: Normal heart sounds  No murmur heard  No friction rub  No gallop  Pulmonary:      Effort: Pulmonary effort is normal  No respiratory distress  Breath sounds: Normal breath sounds  No stridor  No wheezing, rhonchi or rales  Abdominal:      General: Bowel sounds are normal       Palpations: Abdomen is soft  Musculoskeletal:         General: Normal range of motion  Cervical back: Normal range of motion and neck supple  Right lower leg: No edema  Left lower leg: No edema  Skin:     Comments: Flushed face   Neurological:      General: No focal deficit present  Mental Status: He is alert and oriented to person, place, and time  Psychiatric:         Mood and Affect: Mood normal          Behavior: Behavior normal          BMI Counseling:  Body mass index is 29 25 kg/m²  The BMI is above normal  Nutrition recommendations include reducing portion sizes and decreasing overall calorie intake  Exercise recommendations include moderate aerobic physical activity for 150 minutes/week, exercising 3-5 times per week, joining a gym and strength training exercises

## 2021-07-30 ENCOUNTER — CONSULT (OUTPATIENT)
Dept: UROLOGY | Facility: CLINIC | Age: 66
End: 2021-07-30
Payer: COMMERCIAL

## 2021-07-30 VITALS
BODY MASS INDEX: 29.47 KG/M2 | HEART RATE: 73 BPM | DIASTOLIC BLOOD PRESSURE: 54 MMHG | SYSTOLIC BLOOD PRESSURE: 118 MMHG | WEIGHT: 237 LBS | HEIGHT: 75 IN

## 2021-07-30 DIAGNOSIS — R97.20 ELEVATED PSA: ICD-10-CM

## 2021-07-30 PROCEDURE — 3008F BODY MASS INDEX DOCD: CPT | Performed by: PHYSICIAN ASSISTANT

## 2021-07-30 PROCEDURE — 99203 OFFICE O/P NEW LOW 30 MIN: CPT | Performed by: PHYSICIAN ASSISTANT

## 2021-07-30 PROCEDURE — 1036F TOBACCO NON-USER: CPT | Performed by: PHYSICIAN ASSISTANT

## 2021-07-30 PROCEDURE — 3074F SYST BP LT 130 MM HG: CPT | Performed by: PHYSICIAN ASSISTANT

## 2021-07-30 PROCEDURE — 3078F DIAST BP <80 MM HG: CPT | Performed by: PHYSICIAN ASSISTANT

## 2021-07-30 NOTE — PROGRESS NOTES
7/30/2021      Chief Complaint   Patient presents with    Elevated PSA     Assessment and Plan    63 y o  male new patient    1  Elevated PSA  - Most recent PSA from 6/15/21 was 4 1  Previously PSA was 3 7 on 7/29/20 and 4 0 on 6/25/20    - ONELIA unremarkable  - Negative family history for prostate cancer    - Discussed options including PSA surveillance vs proceeding with prostate MRI vs prostate biopsy  Patient opts for observation of PSA    - Given slight PSA elevation and stability over the past year I feel this is reasonable, I would recommend repeating PSA in 3-months  If progressive elevation, would recommend proceeding with prostate MRI or biopsy  History of Present Illness  Barbara Nguyen is a 63 y o  male here for new patient evaluation of elevated PSA  Most recent PSA from 06/15/2021 shows slight elevation of 4 1  Previously PSA was 3 7 in July 2020 and 4 0 in June 2020  He denies any family history of prostate cancer or  malignancy  He denies any urinary symptoms  He denies any urologic history of prior  surgical manipulation  He did have a urinary tract infection with hematuria in mid May  This resolved with antibiotics  He denies any history of recurrent urinary tract infections  He denies ever taking medications for his prostate or bladder  Medical history includes type 2 diabetes, hypertension, hyperlipidemia  Review of Systems   Constitutional: Negative for chills and fever  Respiratory: Negative for shortness of breath  Cardiovascular: Negative for chest pain  Gastrointestinal: Negative for abdominal pain, constipation, diarrhea, nausea and vomiting  Genitourinary: Negative for difficulty urinating, dysuria, flank pain, frequency, hematuria and urgency  Neurological: Negative for dizziness                    Past Medical History  Past Medical History:   Diagnosis Date    Allergic rhinitis     Anxiety disorder     Last assessed 2/7/2006     Carpal tunnel syndrome     Diabetes mellitus ([place] [id number] )     Diabetes mellitus with neurological manifestation ([place] [id number] ) 04/06/2012    Last assessed 3/29/2016     Diabetes type 2, uncontrolled ([place] [id number] )     Last assessed 4/6/2016     DM II (diabetes mellitus, type II), controlled ([place] [id number] )     Last assessed 10/20/2014     Essential hypertension     Last assesssed 2/7/2006     Insomnia     Last assessed 1/13/2011     Numbness and tingling of foot     Resolved 3/29/2016     Obesity     Old disruption of knee ligament     Last assessed 3/26/2008     Tarsal tunnel syndrome     Urinary frequency     Resolved 3/29/2016     Visual impairment        Past Social History  Past Surgical History:   Procedure Laterality Date    WISDOM TOOTH EXTRACTION       Social History     Tobacco Use   Smoking Status Never Smoker   Smokeless Tobacco Never Used       Past Family History  Family History   Problem Relation Age of Onset    Heart attack Mother 79    Valvular heart disease Mother     Hypotension Mother     Pancreatic cancer Father 76    Diabetes Father     Lung cancer Sister 64        not a smoker    Cancer Brother         bile duct     Colon cancer Maternal Grandmother 58    Diabetes Maternal Grandmother     Pancreatic cancer Paternal Aunt     Pancreatic cancer Paternal Uncle     Hypertension Neg Hx     Prostate cancer Neg Hx        Past Social history  Social History     Socioeconomic History    Marital status: /Civil Union     Spouse name: Not on file    Number of children: Not on file    Years of education: Not on file    Highest education level: Not on file   Occupational History    Occupation: Research tech    Tobacco Use    Smoking status: Never Smoker    Smokeless tobacco: Never Used   Substance and Sexual Activity    Alcohol use:  Yes     Alcohol/week: 3 0 standard drinks     Types: 2 Cans of beer, 1 Standard drinks or equivalent per week    Drug use: Never    Sexual activity: Not Currently     Comment: declined STD screening in the office today    Other Topics Concern    Not on file   Social History Narrative    Worked at [employer] - retired 3/2021      Social Determinants of Health     Financial Resource Strain:     Difficulty of Paying Living Expenses:    Food Insecurity:     Worried About Running Out of Food in the Last Year:     [address] in the Last Year:    Transportation Needs:     Lack of Transportation (Medical):  Lack of Transportation (Non-Medical):    Physical Activity:     Days of Exercise per Week:     Minutes of Exercise per Session:    Stress:     Feeling of Stress :    Social Connections:     Frequency of Communication with Friends and Family:     Frequency of Social Gatherings with Friends and Family:     Attends Latter day Services:     Active Member of Clubs or Organizations:     Attends Club or Organization Meetings:     Marital Status:    Intimate Partner Violence:     Fear of Current or Ex-Partner:     Emotionally Abused:     Physically Abused:     Sexually Abused:        Current Medications  Current Outpatient Medications   Medication Sig Dispense Refill    aspirin 81 MG tablet Take 1 tablet by mouth daily      gabapentin (NEURONTIN) 100 mg capsule Take 2 capsules (200 mg total) by mouth 3 (three) times a day 180 capsule 0    glucose blood (ACCU-CHEK COMFORT CURVE) test strip by In Vitro route      lisinopril (ZESTRIL) 20 mg tablet TAKE 1 TABLET DAILY 90 tablet 0    metFORMIN (GLUCOPHAGE) 1000 MG tablet Take 1 tablet (1,000 mg total) by mouth 2 (two) times a day 180 tablet 0    Misc Natural Products (Lutein 20) CAPS Take 20 capsules by mouth daily Eye health      rosuvastatin (CRESTOR) 5 mg tablet TAKE 1 TABLET DAILY 90 tablet 3     No current facility-administered medications for this visit         Allergies  Allergies   Allergen Reactions    Sudafed [Pseudoephedrine] Other (See Comments)     hyperactivity         The following portions of the patient's history were reviewed and updated as appropriate: allergies, current medications, past medical history, past social history, past surgical history and problem list       Vitals  Vitals:    07/30/21 1501   BP: 118/54   BP Location: Left arm   Patient Position: Sitting   Cuff Size: Adult   Pulse: 73   Weight: 108 kg (237 lb)   Height: 6' 3" (1 905 m)           Physical Exam  Physical Exam  Constitutional:       Appearance: Normal appearance  HENT:      Head: Normocephalic and atraumatic  Right Ear: External ear normal       Left Ear: External ear normal    Eyes:      General: No scleral icterus  Conjunctiva/sclera: Conjunctivae normal    Cardiovascular:      Pulses: Normal pulses  Pulmonary:      Effort: Pulmonary effort is normal    Genitourinary:     Comments: Prostate approximately 40-45 g without nodules or tenderness  Musculoskeletal:         General: Normal range of motion  Cervical back: Normal range of motion  Skin:     General: Skin is warm and dry  Neurological:      General: No focal deficit present  Mental Status: He is alert and oriented to person, place, and time  Psychiatric:         Mood and Affect: Mood normal          Behavior: Behavior normal          Thought Content:  Thought content normal          Judgment: Judgment normal            Results  No results found for this or any previous visit (from the past 1 hour(s)) ]  Lab Results   Component Value Date    PSA 4 1 (H) 06/15/2021    PSA 3 7 07/29/2020    PSA 4 0 06/25/2020     Lab Results   Component Value Date    CALCIUM 9 2 06/15/2021     03/15/2016    K 3 8 06/15/2021    CO2 24 06/15/2021     06/15/2021    BUN 15 06/15/2021    CREATININE 0 91 06/15/2021     Lab Results   Component Value Date    WBC 5 7 06/15/2021    HGB 14 7 06/15/2021    HCT 42 5 06/15/2021    MCV 95 1 06/15/2021     06/15/2021           Orders  Orders Placed This Encounter   Procedures    PSA Total, Diagnostic     Standing Status:   Future Standing Expiration Date:   7/30/2022       Jaiden Barron PA-C

## 2021-09-11 LAB
ALBUMIN/CREAT UR: 39 MCG/MG CREAT
CREAT UR-MCNC: 116 MG/DL (ref 20–320)
EST. AVERAGE GLUCOSE BLD GHB EST-MCNC: 163 (CALC)
EST. AVERAGE GLUCOSE BLD GHB EST-SCNC: 9 (CALC)
HBA1C MFR BLD: 7.3 % OF TOTAL HGB
MICROALBUMIN UR-MCNC: 4.5 MG/DL

## 2021-09-11 PROCEDURE — 3051F HG A1C>EQUAL 7.0%<8.0%: CPT | Performed by: FAMILY MEDICINE

## 2021-09-11 PROCEDURE — 3061F NEG MICROALBUMINURIA REV: CPT | Performed by: FAMILY MEDICINE

## 2021-09-13 ENCOUNTER — RA CDI HCC (OUTPATIENT)
Dept: OTHER | Facility: HOSPITAL | Age: 66
End: 2021-09-13

## 2021-09-13 NOTE — PROGRESS NOTES
[facility] 75  coding opportunities       Chart reviewed, no opportunity found: CHART REVIEWED, NO OPPORTUNITY FOUND                        Patients insurance company: [facility] [address]  (Medicare Advantage and Commercial)

## 2021-09-17 ENCOUNTER — OFFICE VISIT (OUTPATIENT)
Dept: FAMILY MEDICINE CLINIC | Facility: CLINIC | Age: 66
End: 2021-09-17
Payer: COMMERCIAL

## 2021-09-17 VITALS
BODY MASS INDEX: 29.54 KG/M2 | OXYGEN SATURATION: 98 % | HEART RATE: 105 BPM | HEIGHT: 75 IN | WEIGHT: 237.6 LBS | RESPIRATION RATE: 18 BRPM | SYSTOLIC BLOOD PRESSURE: 124 MMHG | DIASTOLIC BLOOD PRESSURE: 78 MMHG

## 2021-09-17 DIAGNOSIS — I10 ESSENTIAL HYPERTENSION: ICD-10-CM

## 2021-09-17 DIAGNOSIS — Z28.21 PNEUMOCOCCAL VACCINATION DECLINED BY PATIENT: ICD-10-CM

## 2021-09-17 DIAGNOSIS — E11.42 TYPE 2 DIABETES MELLITUS WITH DIABETIC POLYNEUROPATHY, WITHOUT LONG-TERM CURRENT USE OF INSULIN (HCC): Primary | ICD-10-CM

## 2021-09-17 DIAGNOSIS — E66.3 OVERWEIGHT (BMI 25.0-29.9): ICD-10-CM

## 2021-09-17 DIAGNOSIS — Z28.21 INFLUENZA VACCINATION DECLINED BY PATIENT: ICD-10-CM

## 2021-09-17 DIAGNOSIS — E78.5 HYPERLIPIDEMIA LDL GOAL <70: ICD-10-CM

## 2021-09-17 DIAGNOSIS — R80.9 MICROALBUMINURIA: ICD-10-CM

## 2021-09-17 PROCEDURE — 1036F TOBACCO NON-USER: CPT | Performed by: FAMILY MEDICINE

## 2021-09-17 PROCEDURE — 3008F BODY MASS INDEX DOCD: CPT | Performed by: FAMILY MEDICINE

## 2021-09-17 PROCEDURE — 3078F DIAST BP <80 MM HG: CPT | Performed by: FAMILY MEDICINE

## 2021-09-17 PROCEDURE — 3074F SYST BP LT 130 MM HG: CPT | Performed by: FAMILY MEDICINE

## 2021-09-17 PROCEDURE — 3725F SCREEN DEPRESSION PERFORMED: CPT | Performed by: FAMILY MEDICINE

## 2021-09-17 PROCEDURE — 99214 OFFICE O/P EST MOD 30 MIN: CPT | Performed by: FAMILY MEDICINE

## 2021-09-17 PROCEDURE — 3066F NEPHROPATHY DOC TX: CPT | Performed by: FAMILY MEDICINE

## 2021-09-17 PROCEDURE — 4010F ACE/ARB THERAPY RXD/TAKEN: CPT | Performed by: FAMILY MEDICINE

## 2021-09-17 RX ORDER — LISINOPRIL 20 MG/1
20 TABLET ORAL DAILY
Qty: 90 TABLET | Refills: 0 | Status: SHIPPED | OUTPATIENT
Start: 2021-09-17 | End: 2021-12-16

## 2021-09-17 RX ORDER — ROSUVASTATIN CALCIUM 5 MG/1
5 TABLET, COATED ORAL DAILY
Qty: 90 TABLET | Refills: 3 | Status: SHIPPED | OUTPATIENT
Start: 2021-09-17 | End: 2021-12-22 | Stop reason: SDUPTHER

## 2021-09-17 RX ORDER — GABAPENTIN 100 MG/1
200 CAPSULE ORAL 3 TIMES DAILY
Qty: 180 CAPSULE | Refills: 2 | Status: SHIPPED | OUTPATIENT
Start: 2021-09-17 | End: 2021-09-22 | Stop reason: SDUPTHER

## 2021-09-17 NOTE — PROGRESS NOTES
Assessment/Plan:   Diagnoses and all orders for this visit:    Type 2 diabetes mellitus with diabetic polyneuropathy, without long-term current use of insulin (HCC)  -     HEMOGLOBIN A1C W/ EAG ESTIMATION; Future  -     metFORMIN (GLUCOPHAGE) 1000 MG tablet; Take 1 tablet (1,000 mg total) by mouth 2 (two) times a day with meals  -     gabapentin (NEURONTIN) 100 mg capsule; Take 2 capsules (200 mg total) by mouth 3 (three) times a day  - A1c (9/10/2021): 7 3 <-- 7 8 <-- 6 3  - (+) microalbuminuria   - nml renal function on [facility] 6/15/2021   - has been taking Metformin 1000mg BID - cont for now  - has been exercising more and putting on weight   - pt declined referral to Nutritionist   - follows with Podiatry Q9wks - last appt was yesterday   - UTD with annual Optho   - UTD with COVID IMMs   - declined Pneumovax and Flu vaccine in the office today   - RTO in 3months, with labs, for f/u - pt aware and agreeable   Microalbuminuria  -     Microalbumin / creatinine urine ratio; Future    Essential hypertension  -     lisinopril (ZESTRIL) 20 mg tablet; Take 1 tablet (20 mg total) by mouth daily  - BP in the office 124/78 on my repeat   - currently on ACEI 20mg QD and tolerating it well - cont for now   - no FHx of HTN   - not a smoker   - advised to cut back on his EtOH intake   - avoid NSAIDs  - no more than 2g of Na/day   - recheck urine microalbumin ratio    Hyperlipidemia LDL goal <70  -     rosuvastatin (CRESTOR) 5 mg tablet; Take 1 tablet (5 mg total) by mouth daily  - cont Crestor 5mg QHS   - The ASCVD Risk score (Kwadwo Wells et al , 2013) failed to calculate for the following reasons: The valid total cholesterol range is 130 to 320 mg/dL    Overweight (BMI 25 0-29 9)  - BMI 29 7  - lifestyle modifications with diet/exercise     Pneumococcal vaccination declined by patient  Influenza vaccination declined by patient        Subjective:    Patient ID: Hannah Haque is a 61 y o  male    HPI  58yo M presents to the office for f/u of chronic conditions   - A1c (9/10/2021): 7 3 <-- 7 8 <-- 6 3  - (+) microalbuminuria   - has been taking Metformin 1000mg BID   - has been exercising more and putting on weight   - follows with Podiatry Q9wks - last appt was yesterday   - saw Jacy Theodore (7/30/2021): continued observation   - BP in the office 124/78 on my repeat   - today in the office pt denies F/C/N/V/CP/palpitations/SOB/wheezing/abd pain/D/LE edema   - needs RF  - declined Pneumovax and Flu vaccine       The following portions of the patient's history were reviewed and updated as appropriate: allergies, current medications, past family history, past medical history, past social history, past surgical history and problem list     Review of Systems  as per HPI    Objective:  /78 (BP Location: Left arm, Patient Position: Sitting, Cuff Size: Large)   Pulse 105   Resp 18   Ht 6' 3" (1 905 m)   Wt 108 kg (237 lb 9 6 oz)   SpO2 98%   BMI 29 70 kg/m²    Physical Exam  Constitutional:       General: He is not in acute distress  Appearance: He is not ill-appearing, toxic-appearing or diaphoretic  HENT:      Head: Normocephalic and atraumatic  Right Ear: External ear normal       Left Ear: External ear normal    Eyes:      General: No scleral icterus  Right eye: No discharge  Left eye: No discharge  Extraocular Movements: Extraocular movements intact  Conjunctiva/sclera: Conjunctivae normal    Cardiovascular:      Rate and Rhythm: Normal rate and regular rhythm  Heart sounds: Normal heart sounds  No murmur heard  No friction rub  No gallop  Pulmonary:      Effort: Pulmonary effort is normal  No respiratory distress  Breath sounds: Normal breath sounds  No stridor  No wheezing, rhonchi or rales  Abdominal:      General: Bowel sounds are normal  There is no distension  Palpations: Abdomen is soft  Tenderness: There is no abdominal tenderness     Musculoskeletal: General: No swelling, tenderness, deformity or signs of injury  Normal range of motion  Cervical back: Normal range of motion and neck supple  Right lower leg: No edema  Left lower leg: No edema  Skin:     General: Skin is warm  Neurological:      General: No focal deficit present  Mental Status: He is alert and oriented to person, place, and time  Psychiatric:         Mood and Affect: Mood normal          Behavior: Behavior normal          BMI Counseling: Body mass index is 29 7 kg/m²  The BMI is above normal  Nutrition recommendations include reducing portion sizes and decreasing overall calorie intake  Exercise recommendations include moderate aerobic physical activity for 150 minutes/week, exercising 3-5 times per week, joining a gym and strength training exercises

## 2021-09-22 DIAGNOSIS — E11.42 TYPE 2 DIABETES MELLITUS WITH DIABETIC POLYNEUROPATHY, WITHOUT LONG-TERM CURRENT USE OF INSULIN (HCC): ICD-10-CM

## 2021-09-22 RX ORDER — GABAPENTIN 100 MG/1
200 CAPSULE ORAL 3 TIMES DAILY
Qty: 540 CAPSULE | Refills: 2 | Status: SHIPPED | OUTPATIENT
Start: 2021-09-22 | End: 2022-01-26 | Stop reason: SDUPTHER

## 2021-10-26 LAB — PSA SERPL-MCNC: 6.6 NG/ML

## 2021-11-01 ENCOUNTER — OFFICE VISIT (OUTPATIENT)
Dept: UROLOGY | Facility: CLINIC | Age: 66
End: 2021-11-01
Payer: COMMERCIAL

## 2021-11-01 VITALS
DIASTOLIC BLOOD PRESSURE: 82 MMHG | SYSTOLIC BLOOD PRESSURE: 148 MMHG | BODY MASS INDEX: 30.09 KG/M2 | HEIGHT: 75 IN | WEIGHT: 242 LBS

## 2021-11-01 DIAGNOSIS — R97.20 ELEVATED PSA: Primary | ICD-10-CM

## 2021-11-01 PROCEDURE — 99213 OFFICE O/P EST LOW 20 MIN: CPT | Performed by: PHYSICIAN ASSISTANT

## 2021-11-01 RX ORDER — CIPROFLOXACIN 500 MG/1
500 TABLET, FILM COATED ORAL EVERY 12 HOURS SCHEDULED
Qty: 2 TABLET | Refills: 0 | Status: SHIPPED | OUTPATIENT
Start: 2021-11-01 | End: 2021-11-03 | Stop reason: SDUPTHER

## 2021-11-03 ENCOUNTER — TELEPHONE (OUTPATIENT)
Dept: UROLOGY | Facility: CLINIC | Age: 66
End: 2021-11-03

## 2021-11-03 DIAGNOSIS — R97.20 ELEVATED PSA: ICD-10-CM

## 2021-11-03 RX ORDER — CIPROFLOXACIN 500 MG/1
500 TABLET, FILM COATED ORAL EVERY 12 HOURS SCHEDULED
Qty: 2 TABLET | Refills: 0 | Status: SHIPPED | OUTPATIENT
Start: 2021-11-03 | End: 2021-11-04

## 2021-11-10 ENCOUNTER — PROCEDURE VISIT (OUTPATIENT)
Dept: UROLOGY | Facility: CLINIC | Age: 66
End: 2021-11-10
Payer: COMMERCIAL

## 2021-11-10 VITALS
DIASTOLIC BLOOD PRESSURE: 80 MMHG | BODY MASS INDEX: 29.59 KG/M2 | WEIGHT: 238 LBS | SYSTOLIC BLOOD PRESSURE: 152 MMHG | HEIGHT: 75 IN

## 2021-11-10 DIAGNOSIS — R97.20 ELEVATED PSA: Primary | ICD-10-CM

## 2021-11-10 PROCEDURE — 76942 ECHO GUIDE FOR BIOPSY: CPT | Performed by: UROLOGY

## 2021-11-10 PROCEDURE — 88344 IMHCHEM/IMCYTCHM EA MLT ANTB: CPT | Performed by: PATHOLOGY

## 2021-11-10 PROCEDURE — G0416 PROSTATE BIOPSY, ANY MTHD: HCPCS | Performed by: PATHOLOGY

## 2021-11-10 PROCEDURE — 96372 THER/PROPH/DIAG INJ SC/IM: CPT

## 2021-11-10 PROCEDURE — 55700 PR BIOPSY OF PROSTATE,NEEDLE/PUNCH: CPT | Performed by: UROLOGY

## 2021-11-10 RX ORDER — CEFTRIAXONE 1 G/1
1000 INJECTION, POWDER, FOR SOLUTION INTRAMUSCULAR; INTRAVENOUS ONCE
Status: COMPLETED | OUTPATIENT
Start: 2021-11-10 | End: 2021-11-10

## 2021-11-10 RX ADMIN — CEFTRIAXONE 1000 MG: 1 INJECTION, POWDER, FOR SOLUTION INTRAMUSCULAR; INTRAVENOUS at 13:47

## 2021-11-29 ENCOUNTER — OFFICE VISIT (OUTPATIENT)
Dept: UROLOGY | Facility: CLINIC | Age: 66
End: 2021-11-29
Payer: COMMERCIAL

## 2021-11-29 VITALS — BODY MASS INDEX: 29.22 KG/M2 | WEIGHT: 235 LBS | HEIGHT: 75 IN | HEART RATE: 102 BPM

## 2021-11-29 DIAGNOSIS — C61 PROSTATE CANCER (HCC): Primary | ICD-10-CM

## 2021-11-29 PROCEDURE — 99214 OFFICE O/P EST MOD 30 MIN: CPT | Performed by: UROLOGY

## 2021-11-29 PROCEDURE — 1036F TOBACCO NON-USER: CPT | Performed by: UROLOGY

## 2021-11-29 PROCEDURE — 3008F BODY MASS INDEX DOCD: CPT | Performed by: UROLOGY

## 2021-12-17 LAB
ALBUMIN/CREAT UR: 138 MCG/MG CREAT
CREAT UR-MCNC: 103 MG/DL (ref 20–320)
EST. AVERAGE GLUCOSE BLD GHB EST-MCNC: 160 MG/DL
EST. AVERAGE GLUCOSE BLD GHB EST-SCNC: 8.9 MMOL/L
HBA1C MFR BLD: 7.2 % OF TOTAL HGB
MICROALBUMIN UR-MCNC: 14.2 MG/DL

## 2021-12-17 PROCEDURE — 3051F HG A1C>EQUAL 7.0%<8.0%: CPT | Performed by: FAMILY MEDICINE

## 2021-12-17 PROCEDURE — 3060F POS MICROALBUMINURIA REV: CPT | Performed by: FAMILY MEDICINE

## 2021-12-22 ENCOUNTER — OFFICE VISIT (OUTPATIENT)
Dept: FAMILY MEDICINE CLINIC | Facility: CLINIC | Age: 66
End: 2021-12-22
Payer: COMMERCIAL

## 2021-12-22 VITALS
WEIGHT: 243 LBS | DIASTOLIC BLOOD PRESSURE: 80 MMHG | RESPIRATION RATE: 16 BRPM | HEIGHT: 75 IN | BODY MASS INDEX: 30.21 KG/M2 | SYSTOLIC BLOOD PRESSURE: 146 MMHG | HEART RATE: 101 BPM | OXYGEN SATURATION: 99 %

## 2021-12-22 DIAGNOSIS — R80.9 MICROALBUMINURIA: ICD-10-CM

## 2021-12-22 DIAGNOSIS — Z28.21 PNEUMOCOCCAL VACCINATION DECLINED BY PATIENT: ICD-10-CM

## 2021-12-22 DIAGNOSIS — I10 ELEVATED BLOOD PRESSURE READING IN OFFICE WITH DIAGNOSIS OF HYPERTENSION: Primary | ICD-10-CM

## 2021-12-22 DIAGNOSIS — C61 PROSTATE CANCER (HCC): ICD-10-CM

## 2021-12-22 DIAGNOSIS — Z28.21 INFLUENZA VACCINATION DECLINED BY PATIENT: ICD-10-CM

## 2021-12-22 DIAGNOSIS — Z13.29 SCREENING FOR THYROID DISORDER: ICD-10-CM

## 2021-12-22 DIAGNOSIS — E11.42 TYPE 2 DIABETES MELLITUS WITH DIABETIC POLYNEUROPATHY, WITHOUT LONG-TERM CURRENT USE OF INSULIN (HCC): ICD-10-CM

## 2021-12-22 DIAGNOSIS — Z13.0 SCREENING FOR DEFICIENCY ANEMIA: ICD-10-CM

## 2021-12-22 DIAGNOSIS — E78.5 HYPERLIPIDEMIA LDL GOAL <70: ICD-10-CM

## 2021-12-22 PROCEDURE — 1036F TOBACCO NON-USER: CPT | Performed by: FAMILY MEDICINE

## 2021-12-22 PROCEDURE — 3008F BODY MASS INDEX DOCD: CPT | Performed by: FAMILY MEDICINE

## 2021-12-22 PROCEDURE — 3066F NEPHROPATHY DOC TX: CPT | Performed by: FAMILY MEDICINE

## 2021-12-22 PROCEDURE — 99214 OFFICE O/P EST MOD 30 MIN: CPT | Performed by: FAMILY MEDICINE

## 2021-12-22 PROCEDURE — 4010F ACE/ARB THERAPY RXD/TAKEN: CPT | Performed by: FAMILY MEDICINE

## 2021-12-22 PROCEDURE — 3079F DIAST BP 80-89 MM HG: CPT | Performed by: FAMILY MEDICINE

## 2021-12-22 PROCEDURE — 3077F SYST BP >= 140 MM HG: CPT | Performed by: FAMILY MEDICINE

## 2021-12-22 PROCEDURE — 3725F SCREEN DEPRESSION PERFORMED: CPT | Performed by: FAMILY MEDICINE

## 2021-12-22 RX ORDER — LISINOPRIL 20 MG/1
20 TABLET ORAL DAILY
Qty: 90 TABLET | Refills: 1 | Status: SHIPPED | OUTPATIENT
Start: 2021-12-22

## 2021-12-22 RX ORDER — CHLORTHALIDONE 25 MG/1
12.5 TABLET ORAL DAILY
Qty: 30 TABLET | Refills: 0 | Status: SHIPPED | OUTPATIENT
Start: 2021-12-22 | End: 2022-01-13

## 2021-12-22 RX ORDER — ROSUVASTATIN CALCIUM 5 MG/1
5 TABLET, COATED ORAL DAILY
Qty: 90 TABLET | Refills: 3 | Status: SHIPPED | OUTPATIENT
Start: 2021-12-22

## 2022-01-11 ENCOUNTER — TELEPHONE (OUTPATIENT)
Dept: NEPHROLOGY | Facility: CLINIC | Age: 67
End: 2022-01-11

## 2022-01-13 DIAGNOSIS — I10 ELEVATED BLOOD PRESSURE READING IN OFFICE WITH DIAGNOSIS OF HYPERTENSION: ICD-10-CM

## 2022-01-13 RX ORDER — CHLORTHALIDONE 25 MG/1
TABLET ORAL
Qty: 15 TABLET | Refills: 1 | Status: SHIPPED | OUTPATIENT
Start: 2022-01-13 | End: 2022-01-26 | Stop reason: SDUPTHER

## 2022-01-21 LAB
ALBUMIN/CREAT UR: 20 MCG/MG CREAT
BUN SERPL-MCNC: 26 MG/DL (ref 7–25)
BUN/CREAT SERPL: 20 (CALC) (ref 6–22)
CALCIUM SERPL-MCNC: 8.9 MG/DL (ref 8.6–10.3)
CHLORIDE SERPL-SCNC: 101 MMOL/L (ref 98–110)
CO2 SERPL-SCNC: 27 MMOL/L (ref 20–32)
CREAT SERPL-MCNC: 1.28 MG/DL (ref 0.7–1.25)
CREAT UR-MCNC: 105 MG/DL (ref 20–320)
GLUCOSE SERPL-MCNC: 146 MG/DL (ref 65–99)
MICROALBUMIN UR-MCNC: 2.1 MG/DL
POTASSIUM SERPL-SCNC: 4.5 MMOL/L (ref 3.5–5.3)
SL AMB EGFR AFRICAN AMERICAN: 68 ML/MIN/1.73M2
SL AMB EGFR NON AFRICAN AMERICAN: 59 ML/MIN/1.73M2
SODIUM SERPL-SCNC: 136 MMOL/L (ref 135–146)

## 2022-01-21 PROCEDURE — 3061F NEG MICROALBUMINURIA REV: CPT | Performed by: STUDENT IN AN ORGANIZED HEALTH CARE EDUCATION/TRAINING PROGRAM

## 2022-01-26 ENCOUNTER — CONSULT (OUTPATIENT)
Dept: NEPHROLOGY | Facility: CLINIC | Age: 67
End: 2022-01-26
Payer: COMMERCIAL

## 2022-01-26 ENCOUNTER — OFFICE VISIT (OUTPATIENT)
Dept: FAMILY MEDICINE CLINIC | Facility: CLINIC | Age: 67
End: 2022-01-26
Payer: COMMERCIAL

## 2022-01-26 VITALS
SYSTOLIC BLOOD PRESSURE: 128 MMHG | WEIGHT: 237 LBS | HEIGHT: 75 IN | HEART RATE: 102 BPM | DIASTOLIC BLOOD PRESSURE: 70 MMHG | OXYGEN SATURATION: 98 % | BODY MASS INDEX: 29.47 KG/M2 | RESPIRATION RATE: 16 BRPM

## 2022-01-26 VITALS
HEIGHT: 75 IN | DIASTOLIC BLOOD PRESSURE: 80 MMHG | WEIGHT: 236 LBS | BODY MASS INDEX: 29.34 KG/M2 | SYSTOLIC BLOOD PRESSURE: 130 MMHG

## 2022-01-26 DIAGNOSIS — R80.9 MICROALBUMINURIA: ICD-10-CM

## 2022-01-26 DIAGNOSIS — D22.9 MULTIPLE ATYPICAL NEVI: ICD-10-CM

## 2022-01-26 DIAGNOSIS — Z28.21 INFLUENZA VACCINATION DECLINED BY PATIENT: ICD-10-CM

## 2022-01-26 DIAGNOSIS — I10 PRIMARY HYPERTENSION: Primary | ICD-10-CM

## 2022-01-26 DIAGNOSIS — C61 PROSTATE CANCER (HCC): ICD-10-CM

## 2022-01-26 DIAGNOSIS — Z28.21 PNEUMOCOCCAL VACCINATION DECLINED BY PATIENT: ICD-10-CM

## 2022-01-26 DIAGNOSIS — E78.5 HYPERLIPIDEMIA LDL GOAL <70: ICD-10-CM

## 2022-01-26 DIAGNOSIS — E66.3 OVERWEIGHT (BMI 25.0-29.9): ICD-10-CM

## 2022-01-26 DIAGNOSIS — E11.42 TYPE 2 DIABETES MELLITUS WITH DIABETIC POLYNEUROPATHY, WITHOUT LONG-TERM CURRENT USE OF INSULIN (HCC): ICD-10-CM

## 2022-01-26 PROCEDURE — 3075F SYST BP GE 130 - 139MM HG: CPT | Performed by: STUDENT IN AN ORGANIZED HEALTH CARE EDUCATION/TRAINING PROGRAM

## 2022-01-26 PROCEDURE — 3079F DIAST BP 80-89 MM HG: CPT | Performed by: STUDENT IN AN ORGANIZED HEALTH CARE EDUCATION/TRAINING PROGRAM

## 2022-01-26 PROCEDURE — 99244 OFF/OP CNSLTJ NEW/EST MOD 40: CPT | Performed by: STUDENT IN AN ORGANIZED HEALTH CARE EDUCATION/TRAINING PROGRAM

## 2022-01-26 PROCEDURE — 3066F NEPHROPATHY DOC TX: CPT | Performed by: STUDENT IN AN ORGANIZED HEALTH CARE EDUCATION/TRAINING PROGRAM

## 2022-01-26 PROCEDURE — 1036F TOBACCO NON-USER: CPT | Performed by: STUDENT IN AN ORGANIZED HEALTH CARE EDUCATION/TRAINING PROGRAM

## 2022-01-26 PROCEDURE — 3008F BODY MASS INDEX DOCD: CPT | Performed by: STUDENT IN AN ORGANIZED HEALTH CARE EDUCATION/TRAINING PROGRAM

## 2022-01-26 PROCEDURE — 99214 OFFICE O/P EST MOD 30 MIN: CPT | Performed by: FAMILY MEDICINE

## 2022-01-26 RX ORDER — CHLORTHALIDONE 25 MG/1
12.5 TABLET ORAL DAILY
Qty: 45 TABLET | Refills: 0 | Status: SHIPPED | OUTPATIENT
Start: 2022-01-26 | End: 2022-04-14

## 2022-01-26 RX ORDER — GABAPENTIN 100 MG/1
200 CAPSULE ORAL 3 TIMES DAILY
Qty: 540 CAPSULE | Refills: 0 | Status: SHIPPED | OUTPATIENT
Start: 2022-01-26

## 2022-01-26 NOTE — PATIENT INSTRUCTIONS
Thank you for coming to your visit today  As we discussed you kidney function is above your baseline levels, your creatinine is 1 2mg/dL  Your electrolytes are normal  Please follow the recommendations below        Recommend low sodium (salt) food     Avoid nonsteroidal anti-inflammatory drugs such as Naprosyn, ibuprofen, Aleve, Advil, Celebrex, Meloxicam (Mobic) etc   You can use Tylenol as needed if you do not have any liver condition to be concerned about     Try to avoid medications such as pantoprazole or  Protonix/Nexium or Esomeprazole)/Prilosec or omeprazole/Prevacid or lansoprazole/AcipHex or Rabeprazole  If you are able to, use Pepcid as this is safer for your kidneys       Try to exercise at least 30 minutes 3 days a week to begin with with an ultimate goal of 5 days a week for at least 30 minutes    Please repeat your labs in 2-3 weeks     Next Visit in 4 months with results   If you need to see us earlier we can change the appointment for you      Marisa Brooks MD  Nephrology Attending

## 2022-01-26 NOTE — PROGRESS NOTES
NEPHROLOGY OUTPATIENT CONSULTATION   Ryley Kumari 61 y o  male MRN: [id number]  Date: 1/26/2022  Reason for consultation:   Chief Complaint   Patient presents with    Consult       ASSESSMENT AND PLAN:  60 yo man with PMH of DM with albuminuria, HTN, HLD, obesity  Patient presents for initial consultation for albuminuria  PLAN:    #Non-Oliguric KDIGO MACI stage 1     Etiology: likely secondary to hemodynamic changes in the settings of new start of chlorthalidone    Baseline creatinine: 0 9-1mg/dL    Current creatinine:1 28mg/dL    Peak creatinine:will repeat BMP   UA: microhematuria, proteinuria, no WBC    Unable to give urine for sediment    Renal imaging :ordered:   Avoid nephrotoxic agents such as NSAIDs   Repeat labs in 2 weeks if bacl to baseline will not make changes in medication if remains elevate would dc diuretics and add Nifedipine for BP control     #Albuminuria  · UACr 39>138>20  · 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   · On Lisinopril 20mg   · Will consider SGLT2i if no improvement       #Acid-base Disorder   serum HCO3 [id number]    Metabolic alkalosis likely secondary to vascular contraction in the settings of diuretics and intense exercise    Will continue diuretics for now    #Volume status/hypertension:   Volume:euvolemic    Blood pressure: 138/80mmhg  Normotensive, goal <140/90mmHg    Recommend:   Chlorthalidone started on 12/14    #Anemia:   Current hemoglobin:15 1mg/dL   At goal    No indication of SANJIV at this time      #DM  · hbA1c 7 2   · Advised to maintain a good DM control to prevent progression of kidney disease    HISTORY OF PRESENT ILLNESS:  Ryley Kumari is a 61 y o  male with PMH of DM with albuminuria, HTN, HLD, obesity  Patient presents for initial consultation for albuminuria  Patient feels well, he started doing intense exercise and he is concern of becoming dehydrates  Denies SOB, no CP, no recent hospitalizations       REVIEW OF SYSTEMS:    More than 10 point review of systems were obtained and discussed in length with the patient  Complete review of systems were negative / unremarkable except mentioned in the HPI section  Review of Systems - Psychological ROS: negative  Ophthalmic ROS: negative  ENT ROS: negative  Hematological and Lymphatic ROS: negative  Endocrine ROS: negative  Respiratory ROS: no cough, shortness of breath, or wheezing  Cardiovascular ROS: no chest pain or dyspnea on exertion  Gastrointestinal ROS: no abdominal pain, change in bowel habits, or black or bloody stools  Genito-Urinary ROS: no dysuria, trouble voiding, or hematuria  Musculoskeletal ROS: negative  Neurological ROS: no TIA or stroke symptoms  Dermatological ROS: negative     PHYSICAL EXAM:  Vitals:    01/26/22 1252   BP: 130/80   Weight: 107 kg (236 lb)   Height: 6' 3" (1 905 m)     Body mass index is 29 5 kg/m²      Physical Exam   General:  no acute distress at this time  Skin:  No acute rash  Eyes:  No scleral icterus and noninjected  Neck:  Supple, no jugular venous distention, no carotid bruits  Chest:  Clear to auscultation percussion, good respiratory effort, no use of accessory respiratory muscles  CVS:  Regular rate and rhythm without a murmur rub   Abdomen:  soft and nontender normal  Extremities:  No LE edema   Neuro:  No gross focality  Psych:  Alert , coopertive      PAST MEDICAL HISTORY:  Past Medical History:   Diagnosis Date    Allergic rhinitis     Anxiety disorder     Last assessed 2/7/2006     Carpal tunnel syndrome     Diabetes mellitus ([facility] [address] )     Diabetes mellitus with neurological manifestation ([facility] [address] ) 04/06/2012    Last assessed 3/29/2016     Diabetes type 2, uncontrolled ([address] )     Last assessed 4/6/2016     DM II (diabetes mellitus, type II), controlled ([facility] [address] )     Last assessed 10/20/2014     Essential hypertension     Last assesssed 2/7/2006     Insomnia     Last assessed 1/13/2011     Numbness and tingling of foot Resolved 3/29/2016     Obesity     Old disruption of knee ligament     Last assessed 3/26/2008     Tarsal tunnel syndrome     Urinary frequency     Resolved 3/29/2016     Visual impairment        PAST SURGICAL HISTORY:  Past Surgical History:   Procedure Laterality Date    WISDOM TOOTH EXTRACTION         ALLERGIES:  Allergies   Allergen Reactions    Sudafed [Pseudoephedrine] Other (See Comments)     hyperactivity       SOCIAL HISTORY:  Social History     Substance and Sexual Activity   Alcohol Use Yes    Alcohol/week: 3 0 standard drinks    Types: 2 Cans of beer, 1 Standard drinks or equivalent per week     Social History     Substance and Sexual Activity   Drug Use Never     Social History     Tobacco Use   Smoking Status Never Smoker   Smokeless Tobacco Never Used       FAMILY HISTORY:  Family History   Problem Relation Age of Onset    Heart attack Mother 79    Valvular heart disease Mother     Hypotension Mother     Pancreatic cancer Father 76    Diabetes Father     Lung cancer Sister 64        not a smoker    Cancer Brother         bile duct     Colon cancer Maternal Grandmother 58    Diabetes Maternal Grandmother     Pancreatic cancer Paternal Aunt     Pancreatic cancer Paternal Uncle     Hypertension Neg Hx     Prostate cancer Neg Hx        MEDICATIONS:    Current Outpatient Medications:     aspirin 81 MG tablet, Take 1 tablet by mouth daily, Disp: , Rfl:     chlorthalidone 25 mg tablet, Take 0 5 tablets (12 5 mg total) by mouth daily, Disp: 45 tablet, Rfl: 0    gabapentin (NEURONTIN) 100 mg capsule, Take 2 capsules (200 mg total) by mouth 3 (three) times a day, Disp: 540 capsule, Rfl: 0    lisinopril (ZESTRIL) 20 mg tablet, Take 1 tablet (20 mg total) by mouth daily, Disp: 90 tablet, Rfl: 1    metFORMIN (GLUCOPHAGE) 1000 MG tablet, Take 1 tablet (1,000 mg total) by mouth 2 (two) times a day with meals, Disp: 180 tablet, Rfl: 1    Misc Natural Products (Lutein 20) CAPS, Take 20 capsules by mouth daily Eye health, Disp: , Rfl:     rosuvastatin (CRESTOR) 5 mg tablet, Take 1 tablet (5 mg total) by mouth daily, Disp: 90 tablet, Rfl: 3    bisacodyl (FLEET) 10 MG/30ML ENEM, Insert 30 mL (10 mg total) into the rectum once for 1 dose, Disp: 30 mL, Rfl: 0    glucose blood (ACCU-CHEK COMFORT CURVE) test strip, by In Vitro route, Disp: , Rfl:     Lab Results:   Results for orders placed or performed in visit on 90/46/33   Basic metabolic panel   Result Value Ref Range    Glucose, Random 146 (H) 65 - 99 mg/dL    BUN 26 (H) 7 - 25 mg/dL    Creatinine 1 28 (H) 0 70 - 1 25 mg/dL    eGFR Non  59 (L) > OR = 60 mL/min/1 73m2    eGFR  68 > OR = 60 mL/min/1 73m2    SL AMB BUN/CREATININE RATIO 20 6 - 22 (calc)    Sodium 136 135 - 146 mmol/L    Potassium 4 5 3 5 - 5 3 mmol/L    Chloride 101 98 - 110 mmol/L    CO2 27 20 - 32 mmol/L    Calcium 8 9 8 6 - 10 3 mg/dL   Microalbumin, Random Urine (W/Creatinine)   Result Value Ref Range    Creatinine, Urine 105 20 - 320 mg/dL    Microalbum  ,U,Random 2 1 See Note: mg/dL    Microalb/Creat Ratio 20 <30 mcg/mg creat       Portions of the record may have been created with voice recognition software  Occasional wrong word or "sound a like" substitutions may have occurred due to the inherent limitations of voice recognition software  Read the chart carefully and recognize, using context, where substitutions have occurred

## 2022-01-26 NOTE — PROGRESS NOTES
Assessment/Plan:   Diagnoses and all orders for this visit:    Primary hypertension  -     chlorthalidone 25 mg tablet; Take 0 5 tablets (12 5 mg total) by mouth daily  - BP in the office on my repeat = 128/70   - pt has been checking at home - 128-138/72-78  - nml urine microalbumin on repeat labs  - repeat BMP with slight bump in BUN and Crt   - cont current regimen: ACEI 20mg QD and Chlorthalidone 12 5mg QD   - has an appt with Nephro later this afternoon  - RTO in 3-6months for f/u - pt aware and agreeable   Microalbuminuria    Type 2 diabetes mellitus with diabetic polyneuropathy, without long-term current use of insulin (HCC)  -     gabapentin (NEURONTIN) 100 mg capsule; Take 2 capsules (200 mg total) by mouth 3 (three) times a day  - A1c stable at 7 2 (12/17/2021)   - cont current regimen  - follows Q9wks with Podiatry - [facility] - Dr Fab Johnson - has an appt scheduled for 1/27/2022  - discussed diet and exercise  - UTD with COVID IMMs and Booster  - declined Pneumovax and Flu vaccine   - RTO in 6months with labs for f/u and annual - pt aware and agreeable     Hyperlipidemia LDL goal <70  - LDL (6/2021): 44  - cont current regimen     Pneumococcal vaccination declined by patient  Influenza vaccination declined by patient    Prostate cancer ([facility] [address] )  - was seen by [facility] for elevated PSA and recently d/w Prostate Ca on 11/10/2021   - per [facility] note from 11/29/2021: "We had a productive discussion today regarding the patient's biopsy results which are positive for NCCN criteria very low risk prostate cancer  He will return in 4 months with advanced practitioner team for rectal exam and a PSA prior to the visit  4 months after that he will have his 2nd office visit of the year with an additional PSA and a multiparametric MRI    at that visit we will plan his subsequent biopsy   If the MRI is positive for radiographically detectable disease we will plan for a fusion approach "     Overweight (BMI 25 0-29  9)    Multiple atypical nevi  -     Ambulatory Referral to Dermatology; Future          Subjective:    Patient ID: Joy Nieves is a 61 y o  male  HPI   58yo M presents to the office for f/u   - has an appt scheduled with Nephro later this afternoon  - nml urine microalbumin on repeat  - repeat BMP with slight bump in BUN and Crt   - follows with Podiatry Q9wks ([facility] - Dr Reynaldo Sanchez) - has an appt scheduled for 1/27/2022  - has lost weight since last OV   - declined Pneumovax and Flu vaccine   - UTD with COVID IMMs and Booster   - BP in the office on my repeat 128/70   - at home = 128-138/72-78  - today in the office pt denies F/C/N/V/CP/palpitations/SOB/wheezing/cough/abd pain/D/LE edema       The following portions of the patient's history were reviewed and updated as appropriate: allergies, current medications, past family history, past medical history, past social history, past surgical history and problem list     Review of Systems  as per HPI    Objective:  /70 (BP Location: Left arm, Patient Position: Sitting, Cuff Size: Standard)   Pulse 102   Resp 16   Ht 6' 3" (1 905 m)   Wt 108 kg (237 lb)   SpO2 98%   BMI 29 62 kg/m²    Physical Exam  Vitals reviewed  Constitutional:       General: He is not in acute distress  Appearance: Normal appearance  He is not ill-appearing, toxic-appearing or diaphoretic  HENT:      Head: Normocephalic and atraumatic  Right Ear: External ear normal       Left Ear: External ear normal    Eyes:      General: No scleral icterus  Right eye: No discharge  Left eye: No discharge  Extraocular Movements: Extraocular movements intact  Conjunctiva/sclera: Conjunctivae normal    Cardiovascular:      Rate and Rhythm: Normal rate and regular rhythm  Heart sounds: Normal heart sounds  No murmur heard  No friction rub  No gallop  Pulmonary:      Effort: Pulmonary effort is normal  No respiratory distress  Breath sounds: Normal breath sounds  No stridor  No wheezing, rhonchi or rales  Abdominal:      General: Bowel sounds are normal       Palpations: Abdomen is soft  Musculoskeletal:         General: Normal range of motion  Cervical back: Normal range of motion and neck supple  Right lower leg: No edema  Left lower leg: No edema  Skin:     General: Skin is warm  Comments: Multiple seborrheic keratosis noted on pt's back    Neurological:      General: No focal deficit present  Mental Status: He is alert and oriented to person, place, and time  Psychiatric:         Mood and Affect: Mood normal          Behavior: Behavior normal        BMI Counseling: Body mass index is 29 62 kg/m²  The BMI is above normal  Nutrition recommendations include decreasing overall calorie intake  Exercise recommendations include exercising 3-5 times per week and strength training exercises

## 2022-02-17 LAB
APPEARANCE UR: CLEAR
BACTERIA UR QL AUTO: ABNORMAL /HPF
BILIRUB UR QL STRIP: NEGATIVE
BUN SERPL-MCNC: 24 MG/DL (ref 7–25)
BUN/CREAT SERPL: ABNORMAL (CALC) (ref 6–22)
CALCIUM SERPL-MCNC: 9.5 MG/DL (ref 8.6–10.3)
CHLORIDE SERPL-SCNC: 101 MMOL/L (ref 98–110)
CO2 SERPL-SCNC: 26 MMOL/L (ref 20–32)
COLOR UR: YELLOW
CREAT SERPL-MCNC: 1 MG/DL (ref 0.7–1.25)
CREAT UR-MCNC: 116 MG/DL (ref 20–320)
GLUCOSE SERPL-MCNC: 205 MG/DL (ref 65–99)
GLUCOSE UR QL STRIP: ABNORMAL
HGB UR QL STRIP: NEGATIVE
HYALINE CASTS #/AREA URNS LPF: ABNORMAL /LPF
KETONES UR QL STRIP: NEGATIVE
LEUKOCYTE ESTERASE UR QL STRIP: NEGATIVE
NITRITE UR QL STRIP: NEGATIVE
PH UR STRIP: 6 [PH] (ref 5–8)
POTASSIUM SERPL-SCNC: 4.2 MMOL/L (ref 3.5–5.3)
PROT UR QL STRIP: ABNORMAL
PROT UR-MCNC: 32 MG/DL (ref 5–25)
PROT/CREAT UR: 0.28 MG/MG CREAT (ref 0.02–0.13)
PROT/CREAT UR: 276 MG/G CREAT (ref 22–128)
RBC #/AREA URNS HPF: ABNORMAL /HPF
SL AMB EGFR AFRICAN AMERICAN: 92 ML/MIN/1.73M2
SL AMB EGFR NON AFRICAN AMERICAN: 79 ML/MIN/1.73M2
SODIUM SERPL-SCNC: 135 MMOL/L (ref 135–146)
SP GR UR STRIP: 1.02 (ref 1–1.03)
SQUAMOUS #/AREA URNS HPF: ABNORMAL /HPF
WBC #/AREA URNS HPF: ABNORMAL /HPF

## 2022-02-17 PROCEDURE — 3060F POS MICROALBUMINURIA REV: CPT | Performed by: FAMILY MEDICINE

## 2022-02-25 ENCOUNTER — APPOINTMENT (OUTPATIENT)
Dept: LAB | Facility: IMAGING CENTER | Age: 67
End: 2022-02-25
Payer: COMMERCIAL

## 2022-02-25 DIAGNOSIS — L03.031 CELLULITIS OF FIFTH TOE OF RIGHT FOOT: ICD-10-CM

## 2022-02-25 PROCEDURE — 87070 CULTURE OTHR SPECIMN AEROBIC: CPT

## 2022-02-25 PROCEDURE — 87205 SMEAR GRAM STAIN: CPT

## 2022-02-25 PROCEDURE — 87186 SC STD MICRODIL/AGAR DIL: CPT

## 2022-02-27 LAB
BACTERIA WND AEROBE CULT: ABNORMAL
GRAM STN SPEC: ABNORMAL
GRAM STN SPEC: ABNORMAL

## 2022-03-02 ENCOUNTER — TELEPHONE (OUTPATIENT)
Dept: OTHER | Facility: HOSPITAL | Age: 67
End: 2022-03-02

## 2022-03-02 NOTE — TELEPHONE ENCOUNTER
I called patient to discuss most recent labs results  I could not reach patient and left a voicemail, also sent a message through [address]    Kidney function back to baseline      Bogdan Mahmood MD  Nephrology Attending

## 2022-03-23 LAB — PSA SERPL-MCNC: 6.7 NG/ML

## 2022-03-29 ENCOUNTER — OFFICE VISIT (OUTPATIENT)
Dept: UROLOGY | Facility: CLINIC | Age: 67
End: 2022-03-29
Payer: COMMERCIAL

## 2022-03-29 VITALS
BODY MASS INDEX: 28.6 KG/M2 | HEART RATE: 98 BPM | HEIGHT: 75 IN | WEIGHT: 230 LBS | SYSTOLIC BLOOD PRESSURE: 124 MMHG | DIASTOLIC BLOOD PRESSURE: 80 MMHG

## 2022-03-29 DIAGNOSIS — C61 PROSTATE CANCER (HCC): Primary | ICD-10-CM

## 2022-03-29 PROCEDURE — 3008F BODY MASS INDEX DOCD: CPT | Performed by: PHYSICIAN ASSISTANT

## 2022-03-29 PROCEDURE — 1036F TOBACCO NON-USER: CPT | Performed by: PHYSICIAN ASSISTANT

## 2022-03-29 PROCEDURE — 3074F SYST BP LT 130 MM HG: CPT | Performed by: PHYSICIAN ASSISTANT

## 2022-03-29 PROCEDURE — 3079F DIAST BP 80-89 MM HG: CPT | Performed by: PHYSICIAN ASSISTANT

## 2022-03-29 PROCEDURE — 99213 OFFICE O/P EST LOW 20 MIN: CPT | Performed by: PHYSICIAN ASSISTANT

## 2022-03-29 NOTE — PROGRESS NOTES
1  Prostate cancer ([facility] [address] )  PSA Total, Diagnostic         Assessment and plan:       1  White 6 prostate cancer on active surveillance  -cT1c, Charu 3+3=6, 1 of 12 cores, 15 % core volume  -pretreatment PSA:  6 6  - date of diagnosis: November 10/2021  - prostate volume on ultrasound: 68 g  - PSA is stable with normal ONELIA  - f/u 4 months for next PSA  Will then need to be scheduled for PSA, BMP, mpMRI and f/u biopsy    2  Extensive familial history of fatal pancreatic cancer      Lamine Reich PA-C      Chief Complaint     Active surveillance    History of Present Illness     Andrew Fraser is a 61 y o  male returns in follow-up  This is a very pleasant 68-year-old male with an extensive familial history of fatal pancreatic cancer  He was found to have a progressive rise in his PSA to 6 6 from a baseline of 3 7 over the past 1 year  Prostate biopsy revealed GL 6 in 1/12 cores  On active surveillance  PSA 6 7 (3/23/22), 6 6 (10/26/21), 4 1 (6/15/21), 3 7 (7/29/2020)  Review of Systems     Review of Systems   Constitutional: Negative for activity change and fatigue  HENT: Negative for congestion  Eyes: Negative for visual disturbance  Respiratory: Negative for shortness of breath and wheezing  Cardiovascular: Negative for chest pain and leg swelling  Gastrointestinal: Negative for abdominal pain  Genitourinary: Negative for dysuria, flank pain, hematuria and urgency  Musculoskeletal: Negative for back pain  Allergic/Immunologic: Negative for immunocompromised state  Neurological: Negative for dizziness and numbness  Psychiatric/Behavioral: Negative for dysphoric mood  All other systems reviewed and are negative  Allergies     Allergies   Allergen Reactions    Sudafed [Pseudoephedrine] Other (See Comments)     hyperactivity       Physical Exam     Physical Exam  Constitutional:       General: He is not in acute distress       Appearance: He is well-developed  HENT:      Head: Normocephalic and atraumatic  Cardiovascular:      Comments: Negative lower extremity edema  Pulmonary:      Effort: Pulmonary effort is normal       Breath sounds: Normal breath sounds  Abdominal:      Palpations: Abdomen is soft  Genitourinary:     Comments: Good rectal tone  Prostate 50g smooth, symmetric, no palpable nodules  Musculoskeletal:         General: Normal range of motion  Cervical back: Normal range of motion  Skin:     General: Skin is warm  Neurological:      Mental Status: He is alert and oriented to person, place, and time     Psychiatric:         Behavior: Behavior normal              Vital Signs  Vitals:    03/29/22 1303   BP: 124/80   Pulse: 98   Weight: 104 kg (230 lb)   Height: 6' 3" (1 905 m)         Current Medications       Current Outpatient Medications:     aspirin 81 MG tablet, Take 1 tablet by mouth daily, Disp: , Rfl:     chlorthalidone 25 mg tablet, Take 0 5 tablets (12 5 mg total) by mouth daily, Disp: 45 tablet, Rfl: 0    gabapentin (NEURONTIN) 100 mg capsule, Take 2 capsules (200 mg total) by mouth 3 (three) times a day, Disp: 540 capsule, Rfl: 0    glucose blood (ACCU-CHEK COMFORT CURVE) test strip, by In Vitro route, Disp: , Rfl:     lisinopril (ZESTRIL) 20 mg tablet, Take 1 tablet (20 mg total) by mouth daily, Disp: 90 tablet, Rfl: 1    metFORMIN (GLUCOPHAGE) 1000 MG tablet, Take 1 tablet (1,000 mg total) by mouth 2 (two) times a day with meals, Disp: 180 tablet, Rfl: 1    Misc Natural Products (Lutein 20) CAPS, Take 20 capsules by mouth daily Eye health, Disp: , Rfl:     rosuvastatin (CRESTOR) 5 mg tablet, Take 1 tablet (5 mg total) by mouth daily, Disp: 90 tablet, Rfl: 3    bisacodyl (FLEET) 10 MG/30ML ENEM, Insert 30 mL (10 mg total) into the rectum once for 1 dose, Disp: 30 mL, Rfl: 0      Active Problems     Patient Active Problem List   Diagnosis    Contact dermatitis    Depression with anxiety    [facility]    Diabetic polyneuropathy ([facility] )    Enthesopathy of knee    Essential hypertension    Insomnia    Nail abnormalities    Neuropathy of foot    Obesity, unspecified    Type 2 diabetes mellitus ([facility] [address] )    Type II or unspecified type diabetes mellitus without mention of complication, uncontrolled    Microalbuminuria    Hyperlipidemia LDL goal <70    Skin ulcer of toe of right foot, limited to breakdown of skin ([facility] [address] )    Non-intractable vomiting    Acute cystitis with hematuria    Elevated lactic acid level         Past Medical History     Past Medical History:   Diagnosis Date    Allergic rhinitis     Anxiety disorder     Last assessed 2/7/2006     Carpal tunnel syndrome     Diabetes mellitus ([facility] [address] )     Diabetes mellitus with neurological manifestation ([facility] ) 04/06/2012    Last assessed 3/29/2016     Diabetes type 2, uncontrolled ([facility] )     Last assessed 4/6/2016     DM II (diabetes mellitus, type II), controlled ([facility] )     Last assessed 10/20/2014     Essential hypertension     Last assesssed 2/7/2006     Insomnia     Last assessed 1/13/2011     Numbness and tingling of foot     Resolved 3/29/2016     Obesity     Old disruption of knee ligament     Last assessed 3/26/2008     Tarsal tunnel syndrome     Urinary frequency     Resolved 3/29/2016     Visual impairment          Surgical History     Past Surgical History:   Procedure Laterality Date    WISDOM TOOTH EXTRACTION           Family History     Family History   Problem Relation Age of Onset    Heart attack Mother 79    Valvular heart disease Mother     Hypotension Mother     Pancreatic cancer Father 76    Diabetes Father     Lung cancer Sister 64        not a smoker    Cancer Brother         bile duct     Colon cancer Maternal Grandmother 58    Diabetes Maternal Grandmother     Pancreatic cancer Paternal Aunt     Pancreatic cancer Paternal Uncle     Hypertension Neg Hx     Prostate cancer Neg Hx          Social History     Social History     Social History     Tobacco Use   Smoking Status Never Smoker   Smokeless Tobacco Never Used         Pertinent Lab Values     Lab Results   Component Value Date    CREATININE 1 00 02/17/2022       Lab Results   Component Value Date    PSA 6 70 (H) 03/23/2022    PSA 6 60 (H) 10/26/2021    PSA 4 1 (H) 06/15/2021       @RESULTRCNT(1H])@      Pertinent Imaging      - n/a    Portions of the record may have been created with voice recognition software   Occasional wrong word or "sound a like" substitutions may have occurred due to the inherent limitations of voice recognition software   Read the chart carefully and recognize, using context, where substitutions have occurred

## 2022-04-13 ENCOUNTER — TELEPHONE (OUTPATIENT)
Dept: NEPHROLOGY | Facility: CLINIC | Age: 67
End: 2022-04-13

## 2022-04-14 DIAGNOSIS — I10 PRIMARY HYPERTENSION: ICD-10-CM

## 2022-04-14 RX ORDER — CHLORTHALIDONE 25 MG/1
TABLET ORAL
Qty: 45 TABLET | Refills: 3 | Status: SHIPPED | OUTPATIENT
Start: 2022-04-14

## 2022-04-27 ENCOUNTER — RA CDI HCC (OUTPATIENT)
Dept: OTHER | Facility: HOSPITAL | Age: 67
End: 2022-04-27

## 2022-04-27 NOTE — PROGRESS NOTES
[place] [id number]  coding opportunities     Suggestion not used     Chart Reviewed number of suggestions sent to Provider: 1     Based on clinical documentation indicated in your record, it appears that the patient may have the following condition;    E11 621, L97 512 T2DM with foot ulcer, non pressure chronic ulcer of other part of right foot with fat layer exposed ([facility] )    * see 4/20/22 podiatry exam     If this is correct, please document and assess at your next visit 5/4/22      Patients Insurance        Commercial Insurance: [employer]

## 2022-05-03 RX ORDER — SULFAMETHOXAZOLE AND TRIMETHOPRIM 800; 160 MG/1; MG/1
TABLET ORAL
COMMUNITY
Start: 2022-04-08

## 2022-05-04 ENCOUNTER — OFFICE VISIT (OUTPATIENT)
Dept: FAMILY MEDICINE CLINIC | Facility: CLINIC | Age: 67
End: 2022-05-04
Payer: COMMERCIAL

## 2022-05-04 VITALS
HEART RATE: 106 BPM | RESPIRATION RATE: 16 BRPM | OXYGEN SATURATION: 97 % | WEIGHT: 234.2 LBS | DIASTOLIC BLOOD PRESSURE: 78 MMHG | BODY MASS INDEX: 29.12 KG/M2 | SYSTOLIC BLOOD PRESSURE: 128 MMHG | HEIGHT: 75 IN

## 2022-05-04 DIAGNOSIS — I10 PRIMARY HYPERTENSION: ICD-10-CM

## 2022-05-04 DIAGNOSIS — R80.9 MICROALBUMINURIA: ICD-10-CM

## 2022-05-04 DIAGNOSIS — Z28.21 PNEUMOCOCCAL VACCINATION DECLINED BY PATIENT: ICD-10-CM

## 2022-05-04 DIAGNOSIS — E78.5 HYPERLIPIDEMIA LDL GOAL <70: ICD-10-CM

## 2022-05-04 DIAGNOSIS — E11.42 TYPE 2 DIABETES MELLITUS WITH DIABETIC POLYNEUROPATHY, WITHOUT LONG-TERM CURRENT USE OF INSULIN (HCC): Primary | ICD-10-CM

## 2022-05-04 PROCEDURE — 1036F TOBACCO NON-USER: CPT | Performed by: FAMILY MEDICINE

## 2022-05-04 PROCEDURE — 3008F BODY MASS INDEX DOCD: CPT | Performed by: FAMILY MEDICINE

## 2022-05-04 PROCEDURE — 99214 OFFICE O/P EST MOD 30 MIN: CPT | Performed by: FAMILY MEDICINE

## 2022-05-04 PROCEDURE — 3066F NEPHROPATHY DOC TX: CPT | Performed by: FAMILY MEDICINE

## 2022-05-04 PROCEDURE — 3074F SYST BP LT 130 MM HG: CPT | Performed by: FAMILY MEDICINE

## 2022-05-04 PROCEDURE — 3078F DIAST BP <80 MM HG: CPT | Performed by: FAMILY MEDICINE

## 2022-05-04 NOTE — PROGRESS NOTES
Assessment/Plan:   Diagnoses and all orders for this visit:    Type 2 diabetes mellitus with diabetic polyneuropathy, without long-term current use of insulin ([address] )  -     Diabetic foot exam; Future  - A1c stable at 7 2 (12/17/2021) - cont current regimen  - follows routinely with Podiatry - was recently treated for MRSA of R-big toe   - DM foot exam as outlined below  - discussed diet and exercise  - UTD with COVID IMMs and Booster  - declined Pneumovax   - RTO in 3months with labs for f/u and annual - pt aware and agreeable   Pneumococcal vaccination declined by patient    Primary hypertension  - BP stable in the office  - cont ACEI 20mg QD and Chlorthalidone 12 5mg QD for now  - did see Nephro and has f/u scheduled for 6/6/2022  Microalbuminuria    Hyperlipidemia LDL goal <70  - LDL 44  - cont statin   - The ASCVD Risk score (Aaron Rocha et al , 2013) failed to calculate for the following reasons: The valid total cholesterol range is 130 to 320 mg/dL    Other orders  -     mupirocin (BACTROBAN) 2 % ointment; APPLY TO THE AFFECTED AREA BY TOPICAL ROUTE ONCE DAILY  -     sulfamethoxazole-trimethoprim (BACTRIM DS) 800-160 mg per tablet; TAKE 1 TABLET BY MOUTH EVERY 12 HOURS FOR 7 DAYS (Patient not taking: Reported on 5/4/2022)          Subjective:    Patient ID: Magalis Hill is a 61 y o  male    HPI   60yo M presents to the office for f/u   - saw Dr Misa Howe (Podiatry) in Feb for treatment of MRSA infection R-big toe - wore out his water shoes   - unable to exercise as has been advised by Podiatry to keep weight off of R-foot   - UTD with COVID IMMs and Booster  - declined Pneumovax   - saw Nephro and Uro   - BP stable in the office today   - check at home = 135/73 at home  - today in the office pt denies F/C/N/V/CP/palpitations/SOB/wheezing/cough/abd pain/D/LE edema         The following portions of the patient's history were reviewed and updated as appropriate: allergies, current medications, past family history, past medical history, past social history, past surgical history and problem list     Review of Systems  as per HPI    Objective:  /78 (BP Location: Right arm, Patient Position: Sitting, Cuff Size: Standard)   Pulse (!) 106   Resp 16   Ht 6' 3" (1 905 m)   Wt 106 kg (234 lb 3 2 oz)   SpO2 97%   BMI 29 27 kg/m²    Physical Exam  Vitals reviewed  Constitutional:       General: He is not in acute distress  Appearance: Normal appearance  He is not ill-appearing, toxic-appearing or diaphoretic  HENT:      Head: Normocephalic and atraumatic  Right Ear: External ear normal       Left Ear: External ear normal       Nose: Nose normal    Eyes:      General: No scleral icterus  Right eye: No discharge  Left eye: No discharge  Extraocular Movements: Extraocular movements intact  Conjunctiva/sclera: Conjunctivae normal    Cardiovascular:      Rate and Rhythm: Normal rate and regular rhythm  Pulses: no weak pulses          Dorsalis pedis pulses are 2+ on the right side and 2+ on the left side  Heart sounds: Normal heart sounds  Pulmonary:      Effort: Pulmonary effort is normal  No respiratory distress  Breath sounds: Normal breath sounds  No stridor  No wheezing, rhonchi or rales  Abdominal:      Palpations: Abdomen is soft  Musculoskeletal:      Cervical back: Normal range of motion and neck supple  Right lower leg: No edema  Left lower leg: No edema  Feet:      Right foot:      Skin integrity: Dry skin present  Left foot:      Skin integrity: Dry skin present  Skin:     General: Skin is warm  Neurological:      General: No focal deficit present  Mental Status: He is alert and oriented to person, place, and time  Psychiatric:         Mood and Affect: Mood normal          Behavior: Behavior normal            Patient's shoes and socks removed  Right Foot/Ankle   Right Foot Inspection  Skin Exam: dry skin and pre-ulcer  Skin not intact  Toe Exam: ROM and strength within normal limits  Sensory   Monofilament testing: diminished    Vascular  The right DP pulse is 2+  Left Foot/Ankle  Left Foot Inspection  Skin Exam: dry skin  Toe Exam: ROM and strength within normal limits  Sensory   Monofilament testing: diminished    Vascular  The left DP pulse is 2+  Assign Risk Category  No deformity present  Loss of protective sensation  No weak pulses  Risk: 1    BMI Counseling: Body mass index is 29 27 kg/m²  The BMI is above normal  Nutrition recommendations include 3-5 servings of fruits/vegetables daily  Exercise recommendations include exercising 3-5 times per week

## 2022-05-31 LAB
APPEARANCE UR: CLEAR
BACTERIA UR QL AUTO: ABNORMAL /HPF
BILIRUB UR QL STRIP: NEGATIVE
BUN SERPL-MCNC: 18 MG/DL (ref 7–25)
BUN/CREAT SERPL: ABNORMAL (CALC) (ref 6–22)
CALCIUM SERPL-MCNC: 9 MG/DL (ref 8.6–10.3)
CHLORIDE SERPL-SCNC: 100 MMOL/L (ref 98–110)
CO2 SERPL-SCNC: 28 MMOL/L (ref 20–32)
COLOR UR: YELLOW
CREAT SERPL-MCNC: 1.11 MG/DL (ref 0.7–1.25)
CREAT UR-MCNC: 104 MG/DL (ref 20–320)
GLUCOSE SERPL-MCNC: 141 MG/DL (ref 65–99)
GLUCOSE UR QL STRIP: ABNORMAL
HGB UR QL STRIP: NEGATIVE
HYALINE CASTS #/AREA URNS LPF: ABNORMAL /LPF
KETONES UR QL STRIP: NEGATIVE
LEUKOCYTE ESTERASE UR QL STRIP: NEGATIVE
NITRITE UR QL STRIP: NEGATIVE
PH UR STRIP: 5.5 [PH] (ref 5–8)
POTASSIUM SERPL-SCNC: 4 MMOL/L (ref 3.5–5.3)
PROT UR QL STRIP: NEGATIVE
PROT UR-MCNC: 15 MG/DL (ref 5–25)
PROT/CREAT UR: 0.14 MG/MG CREAT (ref 0.02–0.13)
PROT/CREAT UR: 144 MG/G CREAT (ref 22–128)
RBC #/AREA URNS HPF: ABNORMAL /HPF
SL AMB EGFR AFRICAN AMERICAN: 81 ML/MIN/1.73M2
SL AMB EGFR NON AFRICAN AMERICAN: 70 ML/MIN/1.73M2
SODIUM SERPL-SCNC: 137 MMOL/L (ref 135–146)
SP GR UR STRIP: 1.01 (ref 1–1.03)
SQUAMOUS #/AREA URNS HPF: ABNORMAL /HPF
WBC #/AREA URNS HPF: ABNORMAL /HPF

## 2022-05-31 PROCEDURE — 3061F NEG MICROALBUMINURIA REV: CPT | Performed by: FAMILY MEDICINE

## 2022-06-06 ENCOUNTER — OFFICE VISIT (OUTPATIENT)
Dept: NEPHROLOGY | Facility: CLINIC | Age: 67
End: 2022-06-06
Payer: COMMERCIAL

## 2022-06-06 VITALS
DIASTOLIC BLOOD PRESSURE: 80 MMHG | BODY MASS INDEX: 30.21 KG/M2 | WEIGHT: 243 LBS | HEIGHT: 75 IN | SYSTOLIC BLOOD PRESSURE: 140 MMHG

## 2022-06-06 DIAGNOSIS — R80.9 MICROALBUMINURIA: Primary | ICD-10-CM

## 2022-06-06 PROCEDURE — 3008F BODY MASS INDEX DOCD: CPT | Performed by: STUDENT IN AN ORGANIZED HEALTH CARE EDUCATION/TRAINING PROGRAM

## 2022-06-06 PROCEDURE — 99213 OFFICE O/P EST LOW 20 MIN: CPT | Performed by: STUDENT IN AN ORGANIZED HEALTH CARE EDUCATION/TRAINING PROGRAM

## 2022-06-06 PROCEDURE — 3066F NEPHROPATHY DOC TX: CPT | Performed by: STUDENT IN AN ORGANIZED HEALTH CARE EDUCATION/TRAINING PROGRAM

## 2022-06-06 PROCEDURE — 3077F SYST BP >= 140 MM HG: CPT | Performed by: STUDENT IN AN ORGANIZED HEALTH CARE EDUCATION/TRAINING PROGRAM

## 2022-06-06 PROCEDURE — 3079F DIAST BP 80-89 MM HG: CPT | Performed by: STUDENT IN AN ORGANIZED HEALTH CARE EDUCATION/TRAINING PROGRAM

## 2022-06-06 NOTE — PATIENT INSTRUCTIONS
Thank you for coming to your visit today  As we discussed you kidney function is back to your baseline, your creatinine is 1 1mg/dL   Your electrolytes are nromal  Please follow the recommendations below       Recommend low sodium (salt) food    Avoid nonsteroidal anti-inflammatory drugs such as Naprosyn, ibuprofen, Aleve, Advil, Celebrex, Meloxicam (Mobic) etc   You can use Tylenol as needed if you do not have any liver condition to be concerned about    Try to exercise at least 30 minutes 3 days a week to begin with with an ultimate goal of 5 days a week for at least 30 minutes    You can continue follow up with your PCP      Lia Love MD  Nephrology Attending

## 2022-06-06 NOTE — PROGRESS NOTES
NEPHROLOGY OUTPATIENT PROGRESS NOTE   Mushtaq Infante 61 y o  male MRN: [id number]  DATE: 6/6/2022    Reason for visit:   Chief Complaint   Patient presents with    Follow-up    Hypertension        Patient Instructions   Thank you for coming to your visit today  As we discussed you kidney function is back to your baseline, your creatinine is 1 1mg/dL  Your electrolytes are nromal  Please follow the recommendations below        Recommend low sodium (salt) food     Avoid nonsteroidal anti-inflammatory drugs such as Naprosyn, ibuprofen, Aleve, Advil, Celebrex, Meloxicam (Mobic) etc   You can use Tylenol as needed if you do not have any liver condition to be concerned about     Try to exercise at least 30 minutes 3 days a week to begin with with an ultimate goal of 5 days a week for at least 30 minutes    You can continue follow up with your PCP      Griselda Tavarez MD  Nephrology Attending           There are no diagnoses linked to this encounter  Assessment/Plan:  60 yo man with PMH of DM with albuminuria, HTN, HLD, obesity   Patient presents for initial consultation for albuminuria       PLAN:     #Non-Oliguric KDIGO MACI stage 1 , resolved  · Etiology: likely secondary to hemodynamic changes in the settings of new start of chlorthalidone   · Baseline creatinine: 0 9-1mg/dL   · Current creatinine:1 1mg/dL at baseline  · UA: microhematuria, proteinuria, no WBC   · Unable to give urine for sediment   · Renal imaging :not indicated at this time  · Avoid nephrotoxic agents such as NSAIDs  · Can follow up with PCP     #Albuminuria  · UACr 26>936>33  · 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   · On Lisinopril 20mg         #Acid-base Disorder  · serum HCO3 49XIBP/P   · Metabolic alkalosis likely secondary to vascular contraction in the settings of diuretics    · Will continue diuretics for now     #Volume status/hypertension:  · Volume:euvolemic   · Blood pressure: borderline hypertensive 140/80mmhg  , goal <140/90mmHg   · Recommend:  · Chlorthalidone reduced to 12 5mg   · Lisinopril 20mg   · Low sodium diet  ·      #Anemia:  · Current hemoglobin:15 1mg/dL  · At goal   · No indication of SANJIV at this time       #DM  · hbA1c 7 2   · Advised to maintain a good DM control to prevent progression of kidney disease        SUBJECTIVE / INTERVAL HISTORY:  61 y o  male presents in follow up of elevated creatinine  MACI resolved, creatinine back to normal  Patient had a wound on left foot and haven't been able to do exercised in the last 3 months     Freda Jordan denies any recent illness/hospitalizations/medication changes since last office visit  Review of Systems   Constitutional: Negative for activity change  HENT: Negative for congestion  Eyes: Negative for discharge  Respiratory: Negative for shortness of breath  Cardiovascular: Negative for leg swelling  Gastrointestinal: Negative for abdominal pain  Endocrine: Negative for cold intolerance  Genitourinary: Negative for dysuria  Musculoskeletal: Negative for arthralgias  Skin: Negative for color change and rash  Neurological: Negative for dizziness  Psychiatric/Behavioral: Negative for agitation  OBJECTIVE:  /80   Ht 6' 3" (1 905 m)   Wt 110 kg (243 lb)   BMI 30 37 kg/m²  Body mass index is 30 37 kg/m²      Physical exam:  Physical Exam  General:   no acute distress at this time  Skin:  No acute rash  Eyes:  No scleral icterus and noninjected  ENT:  mucous membranes moist  Neck:  no carotid bruits  Chest:  Clear to auscultation percussion, good respiratory effort, no use of accessory respiratory muscles  CVS:  Regular rate and rhythm without rub   Abdomen:  soft and nontender   Extremities:   No LE edema   Neuro:  No gross focality  Psych:  Alert , cooperative     Medications:    Current Outpatient Medications:     aspirin 81 MG tablet, Take 1 tablet by mouth daily, Disp: , Rfl:     chlorthalidone 25 mg tablet, TAKE ONE-HALF (1/2) TABLET DAILY (Patient taking differently: 12 5 mg), Disp: 45 tablet, Rfl: 3    gabapentin (NEURONTIN) 100 mg capsule, Take 2 capsules (200 mg total) by mouth 3 (three) times a day, Disp: 540 capsule, Rfl: 0    glucose blood test strip, by In Vitro route, Disp: , Rfl:     lisinopril (ZESTRIL) 20 mg tablet, Take 1 tablet (20 mg total) by mouth daily, Disp: 90 tablet, Rfl: 1    metFORMIN (GLUCOPHAGE) 1000 MG tablet, Take 1 tablet (1,000 mg total) by mouth 2 (two) times a day with meals, Disp: 180 tablet, Rfl: 1    Misc Natural Products (Lutein 20) CAPS, Take 20 capsules by mouth daily Eye health, Disp: , Rfl:     rosuvastatin (CRESTOR) 5 mg tablet, Take 1 tablet (5 mg total) by mouth daily, Disp: 90 tablet, Rfl: 3    bisacodyl (FLEET) 10 MG/30ML ENEM, Insert 30 mL (10 mg total) into the rectum once for 1 dose, Disp: 30 mL, Rfl: 0    mupirocin (BACTROBAN) 2 % ointment, APPLY TO THE AFFECTED AREA BY TOPICAL ROUTE ONCE DAILY, Disp: , Rfl:     sulfamethoxazole-trimethoprim (BACTRIM DS) 800-160 mg per tablet, TAKE 1 TABLET BY MOUTH EVERY 12 HOURS FOR 7 DAYS (Patient not taking: No sig reported), Disp: , Rfl:     Allergies: Allergies as of 06/06/2022 - Reviewed 06/06/2022   Allergen Reaction Noted    Sudafed [pseudoephedrine] Other (See Comments) 12/17/2018       The following portions of the patient's history were reviewed and updated as appropriate: past family history, past surgical history and problem list     Laboratory Results:  Lab Results   Component Value Date    SODIUM 137 05/31/2022    K 4 0 05/31/2022     05/31/2022    CO2 28 05/31/2022    BUN 18 05/31/2022    CREATININE 1 11 05/31/2022    GLUC 141 (H) 05/31/2022    CALCIUM 9 0 05/31/2022        Lab Results   Component Value Date    CALCIUM 9 0 05/31/2022       Portions of the record may have been created with voice recognition software    Occasional wrong word or "sound a like" substitutions may have occurred due to the inherent limitations of voice recognition software  Read the chart carefully and recognize, using context, where substitutions have occurred

## 2022-06-27 LAB
ALBUMIN SERPL-MCNC: 4.1 G/DL (ref 3.6–5.1)
ALBUMIN/GLOB SERPL: 1.5 (CALC) (ref 1–2.5)
ALP SERPL-CCNC: 55 U/L (ref 35–144)
ALT SERPL-CCNC: 18 U/L (ref 9–46)
AST SERPL-CCNC: 27 U/L (ref 10–35)
BASOPHILS # BLD AUTO: 42 CELLS/UL (ref 0–200)
BASOPHILS NFR BLD AUTO: 0.6 %
BILIRUB SERPL-MCNC: 0.8 MG/DL (ref 0.2–1.2)
BUN SERPL-MCNC: 22 MG/DL (ref 7–25)
BUN/CREAT SERPL: ABNORMAL (CALC) (ref 6–22)
CALCIUM SERPL-MCNC: 9.5 MG/DL (ref 8.6–10.3)
CHLORIDE SERPL-SCNC: 97 MMOL/L (ref 98–110)
CHOLEST SERPL-MCNC: 116 MG/DL
CHOLEST/HDLC SERPL: 2.5 (CALC)
CO2 SERPL-SCNC: 27 MMOL/L (ref 20–32)
CREAT SERPL-MCNC: 1.08 MG/DL (ref 0.7–1.25)
EOSINOPHIL # BLD AUTO: 112 CELLS/UL (ref 15–500)
EOSINOPHIL NFR BLD AUTO: 1.6 %
ERYTHROCYTE [DISTWIDTH] IN BLOOD BY AUTOMATED COUNT: 11.9 % (ref 11–15)
EST. AVERAGE GLUCOSE BLD GHB EST-MCNC: 157 MG/DL
EST. AVERAGE GLUCOSE BLD GHB EST-SCNC: 8.7 MMOL/L
GLOBULIN SER CALC-MCNC: 2.7 G/DL (CALC) (ref 1.9–3.7)
GLUCOSE SERPL-MCNC: 211 MG/DL (ref 65–99)
HBA1C MFR BLD: 7.1 % OF TOTAL HGB
HCT VFR BLD AUTO: 40.1 % (ref 38.5–50)
HDLC SERPL-MCNC: 47 MG/DL
HGB BLD-MCNC: 14.3 G/DL (ref 13.2–17.1)
LDLC SERPL CALC-MCNC: 48 MG/DL (CALC)
LYMPHOCYTES # BLD AUTO: 2191 CELLS/UL (ref 850–3900)
LYMPHOCYTES NFR BLD AUTO: 31.3 %
MCH RBC QN AUTO: 34.5 PG (ref 27–33)
MCHC RBC AUTO-ENTMCNC: 35.7 G/DL (ref 32–36)
MCV RBC AUTO: 96.9 FL (ref 80–100)
MONOCYTES # BLD AUTO: 686 CELLS/UL (ref 200–950)
MONOCYTES NFR BLD AUTO: 9.8 %
NEUTROPHILS # BLD AUTO: 3969 CELLS/UL (ref 1500–7800)
NEUTROPHILS NFR BLD AUTO: 56.7 %
NONHDLC SERPL-MCNC: 69 MG/DL (CALC)
PLATELET # BLD AUTO: 200 THOUSAND/UL (ref 140–400)
PMV BLD REES-ECKER: 10.3 FL (ref 7.5–12.5)
POTASSIUM SERPL-SCNC: 4 MMOL/L (ref 3.5–5.3)
PROT SERPL-MCNC: 6.8 G/DL (ref 6.1–8.1)
PSA SERPL-MCNC: 8.86 NG/ML
RBC # BLD AUTO: 4.14 MILLION/UL (ref 4.2–5.8)
SL AMB EGFR AFRICAN AMERICAN: 84 ML/MIN/1.73M2
SL AMB EGFR NON AFRICAN AMERICAN: 72 ML/MIN/1.73M2
SODIUM SERPL-SCNC: 134 MMOL/L (ref 135–146)
T4 FREE SERPL-MCNC: 1.3 NG/DL (ref 0.8–1.8)
TRIGL SERPL-MCNC: 130 MG/DL
TSH SERPL-ACNC: 4.66 MIU/L (ref 0.4–4.5)
WBC # BLD AUTO: 7 THOUSAND/UL (ref 3.8–10.8)

## 2022-06-27 PROCEDURE — 3051F HG A1C>EQUAL 7.0%<8.0%: CPT | Performed by: STUDENT IN AN ORGANIZED HEALTH CARE EDUCATION/TRAINING PROGRAM

## 2022-06-29 DIAGNOSIS — R79.89 ELEVATED TSH: Primary | ICD-10-CM

## 2022-07-29 ENCOUNTER — OFFICE VISIT (OUTPATIENT)
Dept: UROLOGY | Facility: CLINIC | Age: 67
End: 2022-07-29
Payer: COMMERCIAL

## 2022-07-29 VITALS — WEIGHT: 240 LBS | OXYGEN SATURATION: 98 % | BODY MASS INDEX: 29.84 KG/M2 | HEIGHT: 75 IN | HEART RATE: 101 BPM

## 2022-07-29 DIAGNOSIS — R97.20 ELEVATED PSA: ICD-10-CM

## 2022-07-29 DIAGNOSIS — C61 PROSTATE CANCER (HCC): Primary | ICD-10-CM

## 2022-07-29 DIAGNOSIS — N52.9 ERECTILE DYSFUNCTION, UNSPECIFIED ERECTILE DYSFUNCTION TYPE: ICD-10-CM

## 2022-07-29 PROCEDURE — 99213 OFFICE O/P EST LOW 20 MIN: CPT | Performed by: PHYSICIAN ASSISTANT

## 2022-07-29 RX ORDER — TADALAFIL 20 MG/1
20 TABLET ORAL DAILY PRN
Qty: 10 TABLET | Refills: 1 | Status: SHIPPED | OUTPATIENT
Start: 2022-07-29

## 2022-07-29 RX ORDER — DIAZEPAM 5 MG/1
5 TABLET ORAL ONCE
Qty: 1 TABLET | Refills: 0 | Status: SHIPPED | OUTPATIENT
Start: 2022-07-29 | End: 2022-09-01 | Stop reason: ALTCHOICE

## 2022-07-29 NOTE — PROGRESS NOTES
1  Prostate cancer ([facility] [address] )  PSA Total, Diagnostic    Basic metabolic panel    MRI prostate multiparametric wo w contrast    diazepam (VALIUM) 5 mg tablet   2  Elevated PSA     3  Erectile dysfunction, unspecified erectile dysfunction type  tadalafil (CIALIS) 20 MG tablet         Assessment and plan:       1  Charu 6 prostate cancer on active surveillance  -cT1c, [place] 3+3=6, 1 of 12 cores, 15 % core volume  - pretreatment PSA:  6 6  - date of diagnosis: November 10/2021  - prostate volume on ultrasound: 68 g  - PSA rising slightly  - coordinate bmp, psa, mpMRI, and f/u office biopsy  If mpMRI showing any concerning lesions this will need to be transitioned to a fusion guided biopsy    2  Extensive familial history of fatal pancreatic cancer      Nilson Muir PA-C      Chief Complaint     Active surveillance    History of Present Illness     Naina Hilario is a 61 y o  male returns in follow-up  This is a very pleasant 62-year-old male with an extensive familial history of fatal pancreatic cancer  He was found to have a progressive rise in his PSA to 6 6 from a baseline of 3 7 over the past 1 year  Prostate biopsy revealed GL 6 in 1/12 cores  On active surveillance  PSA 8 86 (6/27/22), 6 7 (3/23/22), 6 6 (10/26/21), 4 1 (6/15/21), 3 7 (7/29/2020)  Review of Systems     Review of Systems   Constitutional: Negative for activity change and fatigue  HENT: Negative for congestion  Eyes: Negative for visual disturbance  Respiratory: Negative for shortness of breath and wheezing  Cardiovascular: Negative for chest pain and leg swelling  Gastrointestinal: Negative for abdominal pain  Genitourinary: Negative for dysuria, flank pain, hematuria and urgency  Musculoskeletal: Negative for back pain  Allergic/Immunologic: Negative for immunocompromised state  Neurological: Negative for dizziness and numbness  Psychiatric/Behavioral: Negative for dysphoric mood     All other systems reviewed and are negative  Allergies     Allergies   Allergen Reactions    Sudafed [Pseudoephedrine] Other (See Comments)     hyperactivity       Physical Exam     Physical Exam  Constitutional:       General: He is not in acute distress  Appearance: Normal appearance  He is well-developed and normal weight  He is not ill-appearing, toxic-appearing or diaphoretic  HENT:      Head: Normocephalic and atraumatic  Cardiovascular:      Comments: Negative lower extremity edema  Pulmonary:      Effort: Pulmonary effort is normal  No respiratory distress  Abdominal:      Palpations: Abdomen is soft  Musculoskeletal:         General: Normal range of motion  Cervical back: Normal range of motion  Skin:     General: Skin is warm  Neurological:      General: No focal deficit present  Mental Status: He is alert and oriented to person, place, and time     Psychiatric:         Behavior: Behavior normal              Vital Signs  Vitals:    07/29/22 0730   Pulse: 101   SpO2: 98%   Weight: 109 kg (240 lb)   Height: 6' 3" (1 905 m)         Current Medications       Current Outpatient Medications:     aspirin 81 MG tablet, Take 1 tablet by mouth daily, Disp: , Rfl:     chlorthalidone 25 mg tablet, TAKE ONE-HALF (1/2) TABLET DAILY (Patient taking differently: 12 5 mg), Disp: 45 tablet, Rfl: 3    diazepam (VALIUM) 5 mg tablet, Take 1 tablet (5 mg total) by mouth 1 (one) time for 1 dose, Disp: 1 tablet, Rfl: 0    gabapentin (NEURONTIN) 100 mg capsule, Take 2 capsules (200 mg total) by mouth 3 (three) times a day, Disp: 540 capsule, Rfl: 0    glucose blood test strip, by In Vitro route, Disp: , Rfl:     lisinopril (ZESTRIL) 20 mg tablet, Take 1 tablet (20 mg total) by mouth daily, Disp: 90 tablet, Rfl: 1    metFORMIN (GLUCOPHAGE) 1000 MG tablet, TAKE 1 TABLET TWICE A DAY WITH MEALS, Disp: 180 tablet, Rfl: 0    Misc Natural Products (Lutein 20) CAPS, Take 20 capsules by mouth daily Eye health, Disp: , Rfl:     mupirocin (BACTROBAN) 2 % ointment, APPLY TO THE AFFECTED AREA BY TOPICAL ROUTE ONCE DAILY, Disp: , Rfl:     rosuvastatin (CRESTOR) 5 mg tablet, Take 1 tablet (5 mg total) by mouth daily, Disp: 90 tablet, Rfl: 3    tadalafil (CIALIS) 20 MG tablet, Take 1 tablet (20 mg total) by mouth daily as needed for erectile dysfunction, Disp: 10 tablet, Rfl: 1    bisacodyl (FLEET) 10 MG/30ML ENEM, Insert 30 mL (10 mg total) into the rectum once for 1 dose, Disp: 30 mL, Rfl: 0    sulfamethoxazole-trimethoprim (BACTRIM DS) 800-160 mg per tablet, TAKE 1 TABLET BY MOUTH EVERY 12 HOURS FOR 7 DAYS (Patient not taking: No sig reported), Disp: , Rfl:       Active Problems     Patient Active Problem List   Diagnosis    Contact dermatitis    Depression with anxiety    Diabetes mellitus with neurological manifestation ([facility] )    [facility]uropathy [facility]    Enthesopathy of knee    Essential hypertension    Insomnia    Nail abnormalities    Neuropathy of foot    Obesity, unspecified    Type 2 diabetes mellitus (HCC)    Type II or unspecified type diabetes mellitus without mention of complication, uncontrolled    Microalbuminuria    Hyperlipidemia LDL goal <70    Skin ulcer of toe of right foot, limited to breakdown of skin (HCC)    Non-intractable vomiting    Acute cystitis with hematuria    Elevated lactic acid level         Past Medical History     Past Medical History:   Diagnosis Date    Allergic rhinitis     Anxiety disorder     Last assessed 2/7/2006     Carpal tunnel syndrome     Diabetes mellitus ([facility] [address] )     Diabetes mellitus with neurological manifestation ([facility] ) 04/06/2012    Last assessed 3/29/2016     Diabetes type 2, uncontrolled     Last assessed 4/6/2016     DM II (diabetes mellitus, type II), controlled ([facility] [address] )     Last assessed 10/20/2014     Essential hypertension     Last assesssed 2/7/2006     Insomnia     Last assessed 1/13/2011     Numbness and tingling of foot Resolved 3/29/2016     Obesity     Old disruption of knee ligament     Last assessed 3/26/2008     Tarsal tunnel syndrome     Urinary frequency     Resolved 3/29/2016     Visual impairment          Surgical History     Past Surgical History:   Procedure Laterality Date    WISDOM TOOTH EXTRACTION           Family History     Family History   Problem Relation Age of Onset    Heart attack Mother 79    Valvular heart disease Mother     Hypotension Mother     Pancreatic cancer Father 76    Diabetes Father     Lung cancer Sister 64        not a smoker    Cancer Brother         bile duct     Colon cancer Maternal Grandmother 58    Diabetes Maternal Grandmother     Pancreatic cancer Paternal Aunt     Pancreatic cancer Paternal Uncle     Hypertension Neg Hx     Prostate cancer Neg Hx          Social History     Social History     Social History     Tobacco Use   Smoking Status Never Smoker   Smokeless Tobacco Never Used         Pertinent Lab Values     Lab Results   Component Value Date    CREATININE 1 08 06/27/2022       Lab Results   Component Value Date    PSA 8 86 (H) 06/27/2022    PSA 6 70 (H) 03/23/2022    PSA 6 60 (H) 10/26/2021       @RESULTRCNT(1H])@      Pertinent Imaging      - n/a    Portions of the record may have been created with voice recognition software   Occasional wrong word or "sound a like" substitutions may have occurred due to the inherent limitations of voice recognition software   Read the chart carefully and recognize, using context, where substitutions have occurred

## 2022-08-25 ENCOUNTER — RA CDI HCC (OUTPATIENT)
Dept: OTHER | Facility: HOSPITAL | Age: 67
End: 2022-08-25

## 2022-08-25 NOTE — PROGRESS NOTES
[facility] [address]  coding opportunities          Chart Reviewed number of suggestions sent to Provider: 1   E11 51 see 6/27/22 podiatry exam note    Patients Insurance        Commercial Insurance: [employer]

## 2022-08-26 PROCEDURE — 4010F ACE/ARB THERAPY RXD/TAKEN: CPT | Performed by: PHYSICIAN ASSISTANT

## 2022-09-01 ENCOUNTER — OFFICE VISIT (OUTPATIENT)
Dept: FAMILY MEDICINE CLINIC | Facility: CLINIC | Age: 67
End: 2022-09-01
Payer: MEDICARE

## 2022-09-01 VITALS
SYSTOLIC BLOOD PRESSURE: 128 MMHG | DIASTOLIC BLOOD PRESSURE: 70 MMHG | HEIGHT: 75 IN | WEIGHT: 239 LBS | HEART RATE: 104 BPM | OXYGEN SATURATION: 95 % | BODY MASS INDEX: 29.72 KG/M2 | RESPIRATION RATE: 16 BRPM

## 2022-09-01 DIAGNOSIS — E03.8 SUBCLINICAL HYPOTHYROIDISM: ICD-10-CM

## 2022-09-01 DIAGNOSIS — E11.42 TYPE 2 DIABETES MELLITUS WITH DIABETIC POLYNEUROPATHY, WITHOUT LONG-TERM CURRENT USE OF INSULIN (HCC): ICD-10-CM

## 2022-09-01 DIAGNOSIS — I10 PRIMARY HYPERTENSION: ICD-10-CM

## 2022-09-01 DIAGNOSIS — R97.20 ELEVATED PSA: ICD-10-CM

## 2022-09-01 DIAGNOSIS — Z00.00 WELCOME TO MEDICARE PREVENTIVE VISIT: Primary | ICD-10-CM

## 2022-09-01 DIAGNOSIS — Z28.21 PNEUMOCOCCAL VACCINATION DECLINED BY PATIENT: ICD-10-CM

## 2022-09-01 DIAGNOSIS — E78.5 HYPERLIPIDEMIA LDL GOAL <70: ICD-10-CM

## 2022-09-01 DIAGNOSIS — Z28.21 TETANUS, DIPHTHERIA, AND ACELLULAR PERTUSSIS (TDAP) VACCINATION DECLINED: ICD-10-CM

## 2022-09-01 PROCEDURE — G0402 INITIAL PREVENTIVE EXAM: HCPCS | Performed by: FAMILY MEDICINE

## 2022-09-01 PROCEDURE — 99214 OFFICE O/P EST MOD 30 MIN: CPT | Performed by: FAMILY MEDICINE

## 2022-09-01 RX ORDER — GABAPENTIN 100 MG/1
200 CAPSULE ORAL 3 TIMES DAILY
Qty: 540 CAPSULE | Refills: 1 | Status: SHIPPED | OUTPATIENT
Start: 2022-09-01

## 2022-09-01 RX ORDER — CHLORTHALIDONE 25 MG/1
12.5 TABLET ORAL DAILY
Qty: 45 TABLET | Refills: 1 | Status: SHIPPED | OUTPATIENT
Start: 2022-09-01

## 2022-09-01 NOTE — PROGRESS NOTES
Assessment and Plan:     Problem List Items Addressed This Visit        Endocrine    Diabetes mellitus with neurological manifestation ([address] )    Relevant Medications    metFORMIN (GLUCOPHAGE) 1000 MG tablet    gabapentin (NEURONTIN) 100 mg capsule    Other Relevant Orders    Microalbumin / creatinine urine ratio  - A1c (6/27/2022): 7 1  - cont Metformin 1000mg BID   - Gabapentin 200mg TID for Neuropathic pain   - DM foot exam done in the office on 5/4/2022  - follows with Podiatry Q9wks   - follows annually with Optho   - declined PCV20 in the office today   - RTO in 3months for f/u - pt aware and agreeable        Other    Hyperlipidemia LDL goal <70  - LDL 48  - cont statin   - The ASCVD Risk score (Ousmane Kennedy et al , 2013) failed to calculate for the following reasons: The valid total cholesterol range is 130 to 320 mg/dL      Other Visit Diagnoses     Welcome to Medicare preventive visit    -  Primary  - reviewed PMHx, PSHx, meds, allergies, FHx, Soc Hx   - UTD with COVID IMMs and Booster x2  - declined Tdap and PCV20 in the office today  - follows with Uro for eval and treatment of Prostate Ca  - UTD with screening Cscope (2020) - 3yr recall  - reviewed labs done 6/27/2022  - RTO in 1yr for AWV - pt aware and agreeable      Tetanus, diphtheria, and acellular pertussis (Tdap) vaccination declined        Pneumococcal vaccination declined by patient          Primary hypertension        Relevant Medications    chlorthalidone 25 mg tablet  - BP stable in the office  - will check urine microalbumin   - cont current regimen   - was discharged from Nephro       Elevated PSA      - follows with Uro for eval and treatment of Prostate Ca      Subclinical hypothyroidism        Relevant Orders    TSH, 3rd generation with Free T4 reflex           Preventive health issues were discussed with patient, and age appropriate screening tests were ordered as noted in patient's After Visit Summary    Personalized health advice and appropriate referrals for health education or preventive services given if needed, as noted in patient's After Visit Summary       History of Present Illness:     Patient presents for a Medicare Wellness Visit    HPI   Patient Care Team:  Winter Olvera DO as PCP - General (Family Medicine)  Kale Gutierrez MD     Review of Systems:     Review of Systems as per HPI     Problem List:     Patient Active Problem List   Diagnosis    Contact dermatitis    Depression with anxiety    Diabetes mellitus with neurological manifestation ([address] )    Diabetic polyneuropathy ([address] )    Enthesopathy of knee    Essential hypertension    Insomnia    Nail abnormalities    Neuropathy of foot    Obesity, unspecified    Type 2 diabetes mellitus ([address] )    Type II or unspecified type diabetes mellitus without mention of complication, uncontrolled    Microalbuminuria    Hyperlipidemia LDL goal <70    Skin ulcer of toe of right foot, limited to breakdown of skin ([address] )    Non-intractable vomiting    Acute cystitis with hematuria    Elevated lactic acid level      Past Medical and Surgical History:     Past Medical History:   Diagnosis Date    Allergic rhinitis     Anxiety disorder     Last assessed 2/7/2006     Carpal tunnel syndrome     Diabetes mellitus ([address] )     Diabetes mellitus with neurological manifestation ([address] ) 04/06/2012    Last assessed 3/29/2016     Diabetes type 2, uncontrolled     Last assessed 4/6/2016     DM II (diabetes mellitus, type II), controlled ([address] )     Last assessed 10/20/2014     Essential hypertension     Last assesssed 2/7/2006     Insomnia     Last assessed 1/13/2011     Numbness and tingling of foot     Resolved 3/29/2016     Obesity     Old disruption of knee ligament     Last assessed 3/26/2008     Tarsal tunnel syndrome     Urinary frequency     Resolved 3/29/2016     Visual impairment      Past Surgical History:   Procedure Laterality Date    WISDOM TOOTH EXTRACTION        Family History:     Family History   Problem Relation Age of Onset    Heart attack Mother 79    Valvular heart disease Mother     Hypotension Mother     Pancreatic cancer Father 76    Diabetes Father     Lung cancer Sister 64        not a smoker    Cancer Brother         bile duct     Colon cancer Maternal Grandmother 58    Diabetes Maternal Grandmother     Pancreatic cancer Paternal Aunt     Pancreatic cancer Paternal Uncle     Hypertension Neg Hx     Prostate cancer Neg Hx       Social History:     Social History     Socioeconomic History    Marital status: /Civil Union     Spouse name: None    Number of children: None    Years of education: None    Highest education level: None   Occupational History    Occupation: Research tech    Tobacco Use    Smoking status: Never Smoker    Smokeless tobacco: Never Used   Substance and Sexual Activity    Alcohol use: Yes     Alcohol/week: 3 0 standard drinks     Types: 2 Cans of beer, 1 Standard drinks or equivalent per week    Drug use: Never    Sexual activity: Not Currently     Comment: declined STD screening in the office today    Other Topics Concern    None   Social History Narrative    Worked at [employer] 3/2021      Social Determinants of Health     Financial Resource Strain: Low Risk     Difficulty of Paying Living Expenses: Not hard at all   Food Insecurity: Not on file   Transportation Needs: No Transportation Needs    Lack of Transportation (Medical): No    Lack of Transportation (Non-Medical):  No   Physical Activity: Not on file   Stress: Not on file   Social Connections: Not on file   Intimate Partner Violence: Not on file   Housing Stability: Not on file      Medications and Allergies:     Current Outpatient Medications   Medication Sig Dispense Refill    aspirin 81 MG tablet Take 1 tablet by mouth daily      chlorthalidone 25 mg tablet Take 0 5 tablets (12 5 mg total) by mouth daily 45 tablet 1    gabapentin (NEURONTIN) 100 mg capsule Take 2 capsules (200 mg total) by mouth 3 (three) times a day 540 capsule 1    glucose blood test strip by In Vitro route      lisinopril (ZESTRIL) 20 mg tablet TAKE 1 TABLET DAILY 90 tablet 1    metFORMIN (GLUCOPHAGE) 1000 MG tablet Take 1 tablet (1,000 mg total) by mouth 2 (two) times a day with meals 180 tablet 1    Misc Natural Products (Lutein 20) CAPS Take 20 capsules by mouth daily Eye health      rosuvastatin (CRESTOR) 5 mg tablet Take 1 tablet (5 mg total) by mouth daily 90 tablet 3    tadalafil (CIALIS) 20 MG tablet Take 1 tablet (20 mg total) by mouth daily as needed for erectile dysfunction 10 tablet 1     No current facility-administered medications for this visit  Allergies   Allergen Reactions    Sudafed [Pseudoephedrine] Other (See Comments)     hyperactivity      Immunizations:     Immunization History   Administered Date(s) Administered    COVID-19 MODERNA VACC 0 25 ML IM BOOSTER 10/27/2021    COVID-19 MODERNA VACC 0 5 ML IM 03/08/2021, 04/05/2021      Health Maintenance:         Topic Date Due    HIV Screening  09/17/2022 (Originally 9/12/1970)    Colorectal Cancer Screening  08/11/2023    Hepatitis C Screening  Completed         Topic Date Due    COVID-19 Vaccine (4 - Booster for Moderna series) 02/27/2022    Influenza Vaccine (1) 09/01/2022      Medicare Screening Tests and Risk Assessments:     Marisol Snow is here for his Welcome to Medicare visit  Health Risk Assessment:   Patient rates overall health as good  Patient feels that their physical health rating is same  Patient is very satisfied with their life  Eyesight was rated as same  Hearing was rated as same  Patient feels that their emotional and mental health rating is same  Patients states they are never, rarely angry  Patient states they are never, rarely unusually tired/fatigued  Pain experienced in the last 7 days has been none  Patient states that he has experienced no weight loss or gain in last 6 months  Depression Screening:   PHQ-9 Score: 0      Fall Risk Screening: In the past year, patient has experienced: no history of falling in past year      Home Safety:  Patient does not have trouble with stairs inside or outside of their home  Patient has working smoke alarms and has working carbon monoxide detector  Home safety hazards include: none  Nutrition:   Current diet is Regular  Medications:   Patient is currently taking over-the-counter supplements  OTC medications include: see medication list  Patient is able to manage medications  Activities of Daily Living (ADLs)/Instrumental Activities of Daily Living (IADLs):   Walk and transfer into and out of bed and chair?: Yes  Dress and groom yourself?: Yes    Bathe or shower yourself?: Yes    Feed yourself?  Yes  Do your laundry/housekeeping?: Yes  Manage your money, pay your bills and track your expenses?: Yes  Make your own meals?: Yes    Do your own shopping?: Yes    Previous Hospitalizations:   Any hospitalizations or ED visits within the last 12 months?: No      Advance Care Planning:   Living will: No    Durable POA for healthcare: No    Advanced directive: No      Cognitive Screening:   Provider or family/friend/caregiver concerned regarding cognition?: No    PREVENTIVE SCREENINGS      Cardiovascular Screening:    General: History Lipid Disorder, Risks and Benefits Discussed and Screening Current      Diabetes Screening:     General: History Diabetes, Risks and Benefits Discussed and Screening Current      Colorectal Cancer Screening:     General: Screening Current      Prostate Cancer Screening:    General: History Prostate Cancer      Osteoporosis Screening:    General: Screening Not Indicated      Abdominal Aortic Aneurysm (AAA) Screening:        General: Screening Not Indicated      Lung Cancer Screening:     General: Screening Not Indicated      Hepatitis C Screening:    General: Screening Current      Preventive Screening Comments: Kim Gonzalez Sander John is a 61 y o  male who presents to the office for an annual exam and f/u of chronic conditions   1) Annual   - PMHx: DM2 with neuropathy, HTN, HL, CTS/TTS, obesity (BMI 29 9), h/o colonic polyps and internal hemorrhoids   - Specialists: Uro, Podiatry (Q9wks)   - allergies: NKDA  - Meds: see med rec  - PSHx: wisdom tooth extraction   - FHx: M (MI at 68yo, hypotension, valvular heart disease), F (DM, pancreatic ca - dx at 75yo), Sister (lung ca - dx at 62yo), B (bile duct ca - dx at 65yo), denies FHx of HTN/prostate ca   - Immunizations: UTD with COVID vaccines and Boosters x2; declined Tdap and Pneumovax in the office today    - Hm: Cscope in 8/2020 - goes Q3yrs   - diet/exercise: currently not exercising, not watching his diet   - social: denies tob/illicits, 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 [employer], 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   - sexual Hx: not currently sexually active, declined STD screening   - last vision: wears glasses, last OV was 1/2022 - goes annually   - last dental: goes Q6months  2) DM2 with polyneuropathy/HL/HTN  - A1c (6/2022): 7 1   - LDL 48  - BP in the office 128/70   - follows with Podiatry Q9wks   - UTD with COVID IMMs and Boosters x2  - declined Pneumovax and Tdap in the office today   - BP stable in the office today - 128/70   - was d/c'd from Nephro   - follows with Uro re: Prostate Ca  - today in the office pt denies F/C/N/V/CP/palpitations/SOB/wheezing/cough/abd pain/D/LE edema     Screening, Brief Intervention, and Referral to Treatment (SBIRT)    Screening  Typical number of drinks in a day: 0  Typical number of drinks in a week: 0  Interpretation: Low risk drinking behavior      Single Item Drug Screening:  How often have you used an illegal drug (including marijuana) or a prescription medication for non-medical reasons in the past year? never    Single Item Drug Screen Score: 0  Interpretation: Negative screen for possible drug use disorder     Hearing Screening    125Hz 250Hz 500Hz 1000Hz 2000Hz 3000Hz 4000Hz 6000Hz 8000Hz   Right ear:            Left ear:               Visual Acuity Screening    Right eye Left eye Both eyes   Without correction:      With correction: 20/20 20/25 20/20        Physical Exam:     /70 (BP Location: Left arm, Patient Position: Sitting, Cuff Size: Large)   Pulse 104   Resp 16   Ht 6' 3" (1 905 m)   Wt 108 kg (239 lb)   SpO2 95%   BMI 29 87 kg/m²     Physical Exam  Vitals reviewed  Constitutional:       General: He is not in acute distress  Appearance: Normal appearance  He is not ill-appearing, toxic-appearing or diaphoretic  HENT:      Head: Normocephalic and atraumatic  Right Ear: External ear normal       Left Ear: External ear normal       Nose: Nose normal    Eyes:      General: No scleral icterus  Right eye: No discharge  Left eye: No discharge  Extraocular Movements: Extraocular movements intact  Conjunctiva/sclera: Conjunctivae normal    Cardiovascular:      Rate and Rhythm: Normal rate and regular rhythm  Heart sounds: Normal heart sounds  No murmur heard  No friction rub  No gallop  Pulmonary:      Effort: Pulmonary effort is normal  No respiratory distress  Breath sounds: Normal breath sounds  No stridor  No wheezing, rhonchi or rales  Abdominal:      Palpations: Abdomen is soft  Musculoskeletal:         General: Normal range of motion  Cervical back: Normal range of motion  Right lower leg: No edema  Left lower leg: No edema  Skin:     General: Skin is warm  Neurological:      General: No focal deficit present  Mental Status: He is alert and oriented to person, place, and time  Psychiatric:         Mood and Affect: Mood normal          Behavior: Behavior normal         BMI Counseling: Body mass index is 29 87 kg/m²  The BMI is above normal  Nutrition recommendations include 3-5 servings of fruits/vegetables daily  Exercise recommendations include exercising 3-5 times per week  Depression Screening Follow-up Plan: Patient's depression screening was positive with a PHQ-2 score of   Their PHQ-9 score was 0  Clinically patient does not have depression  No treatment is required        Winter Olvera DO

## 2022-09-01 NOTE — PATIENT INSTRUCTIONS
Medicare Preventive Visit Patient Instructions  Thank you for completing your Welcome to Medicare Visit or Medicare Annual Wellness Visit today  Your next wellness visit will be due in one year (9/2/2023)  The screening/preventive services that you may require over the next 5-10 years are detailed below  Some tests may not apply to you based off risk factors and/or age  Screening tests ordered at today's visit but not completed yet may show as past due  Also, please note that scanned in results may not display below  Preventive Screenings:  Service Recommendations Previous Testing/Comments   Colorectal Cancer Screening  · Colonoscopy    · Fecal Occult Blood Test (FOBT)/Fecal Immunochemical Test (FIT)  · Fecal DNA/Cologuard Test  · Flexible Sigmoidoscopy Age: 39-70 years old   Colonoscopy: every 10 years (May be performed more frequently if at higher risk)  OR  FOBT/FIT: every 1 year  OR  Cologuard: every 3 years  OR  Sigmoidoscopy: every 5 years  Screening may be recommended earlier than age 39 if at higher risk for colorectal cancer  Also, an individualized decision between you and your healthcare provider will decide whether screening between the ages of 74-80 would be appropriate   Colonoscopy: 08/11/2020  FOBT/FIT: Not on file  Cologuard: Not on file  Sigmoidoscopy: Not on file    Screening Current     Prostate Cancer Screening Individualized decision between patient and health care provider in men between ages of 53-78   Medicare will cover every 12 months beginning on the day after your 50th birthday PSA: 8 86 ng/mL     History Prostate Cancer     Hepatitis C Screening Once for adults born between 1945 and 1965  More frequently in patients at high risk for Hepatitis C Hep C Antibody: 06/25/2020    Screening Current   Diabetes Screening 1-2 times per year if you're at risk for diabetes or have pre-diabetes Fasting glucose: No results in last 5 years (No results in last 5 years)  A1C: 7 1 % of total Hgb (6/27/2022)  Screening Not Indicated  History Diabetes   Cholesterol Screening Once every 5 years if you don't have a lipid disorder  May order more often based on risk factors  Lipid panel: 06/27/2022  Screening Not Indicated  History Lipid Disorder      Other Preventive Screenings Covered by Medicare:  1  Abdominal Aortic Aneurysm (AAA) Screening: covered once if your at risk  You're considered to be at risk if you have a family history of AAA or a male between the age of 73-68 who smoking at least 100 cigarettes in your lifetime  2  Lung Cancer Screening: covers low dose CT scan once per year if you meet all of the following conditions: (1) Age 50-69; (2) No signs or symptoms of lung cancer; (3) Current smoker or have quit smoking within the last 15 years; (4) You have a tobacco smoking history of at least 20 pack years (packs per day x number of years you smoked); (5) You get a written order from a healthcare provider  3  Glaucoma Screening: covered annually if you're considered high risk: (1) You have diabetes OR (2) Family history of glaucoma OR (3)  aged 48 and older OR (3)  American aged 72 and older  3  Osteoporosis Screening: covered every 2 years if you meet one of the following conditions: (1) Have a vertebral abnormality; (2) On glucocorticoid therapy for more than 3 months; (3) Have primary hyperparathyroidism; (4) On osteoporosis medications and need to assess response to drug therapy  5  HIV Screening: covered annually if you're between the age of 12-76  Also covered annually if you are younger than 13 and older than 72 with risk factors for HIV infection  For pregnant patients, it is covered up to 3 times per pregnancy      Immunizations:  Immunization Recommendations   Influenza Vaccine Annual influenza vaccination during flu season is recommended for all persons aged >= 6 months who do not have contraindications   Pneumococcal Vaccine   * Pneumococcal conjugate vaccine = PCV13 (Prevnar 13), PCV15 (Vaxneuvance), PCV20 (Prevnar 20)  * Pneumococcal polysaccharide vaccine = PPSV23 (Pneumovax) Adults 2364 years old: 1-3 doses may be recommended based on certain risk factors  Adults 72 years old: 1-2 doses may be recommended based off what pneumonia vaccine you previously received   Hepatitis B Vaccine 3 dose series if at intermediate or high risk (ex: diabetes, end stage renal disease, liver disease)   Tetanus (Td) Vaccine - COST NOT COVERED BY MEDICARE PART B Following completion of primary series, a booster dose should be given every 10 years to maintain immunity against tetanus  Td may also be given as tetanus wound prophylaxis  Tdap Vaccine - COST NOT COVERED BY MEDICARE PART B Recommended at least once for all adults  For pregnant patients, recommended with each pregnancy  Shingles Vaccine (Shingrix) - COST NOT COVERED BY MEDICARE PART B  2 shot series recommended in those aged 48 and above     Health Maintenance Due:      Topic Date Due    HIV Screening  09/17/2022 (Originally 9/12/1970)    Colorectal Cancer Screening  08/11/2023    Hepatitis C Screening  Completed     Immunizations Due:      Topic Date Due    COVID-19 Vaccine (4 - Booster for Moderna series) 02/27/2022    Influenza Vaccine (1) 09/01/2022     Advance Directives   What are advance directives? Advance directives are legal documents that state your wishes and plans for medical care  These plans are made ahead of time in case you lose your ability to make decisions for yourself  Advance directives can apply to any medical decision, such as the treatments you want, and if you want to donate organs  What are the types of advance directives? There are many types of advance directives, and each state has rules about how to use them  You may choose a combination of any of the following:  · Living will: This is a written record of the treatment you want   You can also choose which treatments you do not want, which to limit, and which to stop at a certain time  This includes surgery, medicine, IV fluid, and tube feedings  · Durable power of  for healthcare [facility]): This is a written record that states who you want to make healthcare choices for you when you are unable to make them for yourself  This person, called a proxy, is usually a family member or a friend  You may choose more than 1 proxy  · Do not resuscitate (DNR) order:  A DNR order is used in case your heart stops beating or you stop breathing  It is a request not to have certain forms of treatment, such as CPR  A DNR order may be included in other types of advance directives  · Medical directive: This covers the care that you want if you are in a coma, near death, or unable to make decisions for yourself  You can list the treatments you want for each condition  Treatment may include pain medicine, surgery, blood transfusions, dialysis, IV or tube feedings, and a ventilator (breathing machine)  · Values history: This document has questions about your views, beliefs, and how you feel and think about life  This information can help others choose the care that you would choose  Why are advance directives important? An advance directive helps you control your care  Although spoken wishes may be used, it is better to have your wishes written down  Spoken wishes can be misunderstood, or not followed  Treatments may be given even if you do not want them  An advance directive may make it easier for your family to make difficult choices about your care  Weight Management   Why it is important to manage your weight:  Being overweight increases your risk of health conditions such as heart disease, high blood pressure, type 2 diabetes, and certain types of cancer  It can also increase your risk for osteoarthritis, sleep apnea, and other respiratory problems  Aim for a slow, steady weight loss   Even a small amount of weight loss can lower your risk of health problems  How to lose weight safely:  A safe and healthy way to lose weight is to eat fewer calories and get regular exercise  You can lose up about 1 pound a week by decreasing the number of calories you eat by 500 calories each day  Healthy meal plan for weight management:  A healthy meal plan includes a variety of foods, contains fewer calories, and helps you stay healthy  A healthy meal plan includes the following:  · Eat whole-grain foods more often  A healthy meal plan should contain fiber  Fiber is the part of grains, fruits, and vegetables that is not broken down by your body  Whole-grain foods are healthy and provide extra fiber in your diet  Some examples of whole-grain foods are whole-wheat breads and pastas, oatmeal, brown rice, and bulgur  · Eat a variety of vegetables every day  Include dark, leafy greens such as spinach, kale, jerrica greens, and mustard greens  Eat yellow and orange vegetables such as carrots, sweet potatoes, and winter squash  · Eat a variety of fruits every day  Choose fresh or canned fruit (canned in its own juice or light syrup) instead of juice  Fruit juice has very little or no fiber  · Eat low-fat dairy foods  Drink fat-free (skim) milk or 1% milk  Eat fat-free yogurt and low-fat cottage cheese  Try low-fat cheeses such as mozzarella and other reduced-fat cheeses  · Choose meat and other protein foods that are low in fat  Choose beans or other legumes such as split peas or lentils  Choose fish, skinless poultry (chicken or turkey), or lean cuts of red meat (beef or pork)  Before you cook meat or poultry, cut off any visible fat  · Use less fat and oil  Try baking foods instead of frying them  Add less fat, such as margarine, sour cream, regular salad dressing and mayonnaise to foods  Eat fewer high-fat foods  Some examples of high-fat foods include french fries, doughnuts, ice cream, and cakes  · Eat fewer sweets    Limit foods and drinks that are high in sugar  This includes candy, cookies, regular soda, and sweetened drinks  Exercise:  Exercise at least 30 minutes per day on most days of the week  Some examples of exercise include walking, biking, dancing, and swimming  You can also fit in more physical activity by taking the stairs instead of the elevator or parking farther away from stores  Ask your healthcare provider about the best exercise plan for you  © Copyright [employer] 2018 Information is for End User's use only and may not be sold, redistributed or otherwise used for commercial purposes   All illustrations and images included in CareNotes® are the copyrighted property of A D [employer] M [employer]  or [address] [place], [place] [address]

## 2022-09-01 NOTE — PROGRESS NOTES
Assessment/Plan:   Diagnoses and all orders for this visit:    Annual physical exam    Type 2 diabetes mellitus with diabetic polyneuropathy, without long-term current use of insulin ([address] )  -     metFORMIN (GLUCOPHAGE) 1000 MG tablet; Take 1 tablet (1,000 mg total) by mouth 2 (two) times a day with meals  -     gabapentin (NEURONTIN) 100 mg capsule; Take 2 capsules (200 mg total) by mouth 3 (three) times a day  -     Microalbumin / creatinine urine ratio    Primary hypertension  -     chlorthalidone 25 mg tablet; Take 0 5 tablets (12 5 mg total) by mouth daily    Elevated PSA    Subclinical hypothyroidism  -     TSH, 3rd generation with Free T4 reflex    Pneumococcal vaccination declined by patient    Tetanus, diphtheria, and acellular pertussis (Tdap) vaccination declined    Hyperlipidemia LDL goal <70          Subjective:    Patient ID: Caden Kim is a 61 y o  male    Caden Kim is a 61 y o  male who presents to the office for an annual exam and f/u of chronic conditions   1) Annual   - PMHx: DM2 with neuropathy, HTN, HL, CTS/TTS, obesity (BMI 29 9), h/o colonic polyps and internal hemorrhoids   - Specialists: Uro, Podiatry (Q9wks)   - allergies: NKDA  - Meds: see med rec  - PSHx: wisdom tooth extraction   - FHx: M (MI at 70yo, hypotension, valvular heart disease), F (DM, pancreatic ca - dx at 77yo), Sister (lung ca - dx at 62yo), B (bile duct ca - dx at 65yo), denies FHx of HTN/prostate ca   - Immunizations: UTD with COVID vaccines and Boosters x2; declined Tdap and Pneumovax in the office today    - Hm: Cscope in 8/2020 - goes Q3yrs   - diet/exercise: currently not exercising, not watching his diet   - social: denies tob/illicits, [id number], 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   - sexual Hx: not currently sexually active, declined STD screening   - last vision: wears glasses, last OV was 1/2022 - goes annually   - last dental: goes Q6months  2) DM2 with polyneuropathy/HL/HTN  - A1c (6/2022): 7 1   - LDL 48  - BP in the office 128/70   - follows with Podiatry Q9wks   - UTD with COVID IMMs and Boosters x2  - declined Pneumovax and Tdap in the office today   - saw Nephro and Uro   - BP stable in the office today   - today in the office pt denies F/C/N/V/CP/palpitations/SOB/wheezing/cough/abd pain/D/LE edema       The following portions of the patient's history were reviewed and updated as appropriate: allergies, current medications, past family history, past medical history, past social history, past surgical history and problem list     Review of Systems  as per HPI    Objective:  /70 (BP Location: Left arm, Patient Position: Sitting, Cuff Size: Large)   Pulse 104   Resp 16   Ht 6' 3" (1 905 m)   Wt 108 kg (239 lb)   SpO2 95%   BMI 29 87 kg/m²    Physical Exam

## 2022-09-14 ENCOUNTER — HOSPITAL ENCOUNTER (OUTPATIENT)
Dept: RADIOLOGY | Age: 67
Discharge: HOME | End: 2022-09-14
Payer: MEDICARE

## 2022-09-14 DIAGNOSIS — C61 PROSTATE CANCER (HCC): ICD-10-CM

## 2022-09-14 PROCEDURE — G1004 CDSM NDSC: HCPCS

## 2022-09-14 PROCEDURE — 72197 MRI PELVIS W/O & W/DYE: CPT

## 2022-09-14 PROCEDURE — 76377 3D RENDER W/INTRP POSTPROCES: CPT

## 2022-09-14 PROCEDURE — A9585 GADOBUTROL INJECTION: HCPCS | Performed by: PHYSICIAN ASSISTANT

## 2022-09-14 RX ADMIN — GADOBUTROL 10 ML: 604.72 INJECTION INTRAVENOUS at 11:45

## 2022-10-10 LAB
BUN SERPL-MCNC: 25 MG/DL (ref 7–25)
BUN/CREAT SERPL: ABNORMAL (CALC) (ref 6–22)
CALCIUM SERPL-MCNC: 9.1 MG/DL (ref 8.6–10.3)
CHLORIDE SERPL-SCNC: 97 MMOL/L (ref 98–110)
CO2 SERPL-SCNC: 27 MMOL/L (ref 20–32)
CREAT SERPL-MCNC: 1.04 MG/DL (ref 0.7–1.35)
GFR/BSA.PRED SERPLBLD CYS-BASED-ARV: 80 ML/MIN/1.73M2
GLUCOSE SERPL-MCNC: 208 MG/DL (ref 65–99)
POTASSIUM SERPL-SCNC: 4 MMOL/L (ref 3.5–5.3)
PSA SERPL-MCNC: 7.21 NG/ML
SODIUM SERPL-SCNC: 135 MMOL/L (ref 135–146)
T4 FREE SERPL-MCNC: 1.2 NG/DL (ref 0.8–1.8)
TSH SERPL-ACNC: 5.36 MIU/L (ref 0.4–4.5)

## 2022-10-12 PROBLEM — N30.01 ACUTE CYSTITIS WITH HEMATURIA: Status: RESOLVED | Noted: 2021-05-24 | Resolved: 2022-10-12

## 2022-10-14 ENCOUNTER — OFFICE VISIT (OUTPATIENT)
Dept: FAMILY MEDICINE CLINIC | Facility: CLINIC | Age: 67
End: 2022-10-14
Payer: MEDICARE

## 2022-10-14 VITALS
RESPIRATION RATE: 16 BRPM | HEIGHT: 73 IN | SYSTOLIC BLOOD PRESSURE: 120 MMHG | DIASTOLIC BLOOD PRESSURE: 70 MMHG | WEIGHT: 240 LBS | HEART RATE: 104 BPM | BODY MASS INDEX: 31.81 KG/M2 | OXYGEN SATURATION: 96 %

## 2022-10-14 DIAGNOSIS — Z28.21 PNEUMOCOCCAL VACCINATION DECLINED BY PATIENT: ICD-10-CM

## 2022-10-14 DIAGNOSIS — R97.20 ELEVATED PSA: ICD-10-CM

## 2022-10-14 DIAGNOSIS — E03.9 HYPOTHYROIDISM, UNSPECIFIED TYPE: Primary | ICD-10-CM

## 2022-10-14 DIAGNOSIS — Z28.21 INFLUENZA VACCINATION DECLINED BY PATIENT: ICD-10-CM

## 2022-10-14 DIAGNOSIS — Z28.21 TETANUS, DIPHTHERIA, AND ACELLULAR PERTUSSIS (TDAP) VACCINATION DECLINED: ICD-10-CM

## 2022-10-14 PROCEDURE — 99214 OFFICE O/P EST MOD 30 MIN: CPT | Performed by: FAMILY MEDICINE

## 2022-10-14 RX ORDER — LEVOTHYROXINE SODIUM 0.03 MG/1
25 TABLET ORAL
Qty: 60 TABLET | Refills: 0 | Status: SHIPPED | OUTPATIENT
Start: 2022-10-14

## 2022-10-14 NOTE — PATIENT INSTRUCTIONS
Hypothyroidism   WHAT YOU NEED TO KNOW:   What is hypothyroidism? Hypothyroidism is a condition that develops when the thyroid gland does not make enough thyroid hormone  Thyroid hormones help control body temperature, heart rate, growth, and weight  What causes or increases my risk for hypothyroidism? A family history of hypothyroidism    Age 61 years or older or being female    Autoimmune disease, such as inflammation of your thyroid, or Hashimoto disease    Thyroid surgery, head or neck radiation therapy, or medicines such as lithium, sedatives, or narcotics    Thyroid cancer, a goiter, or a viral infection    Low iodine level    Down syndrome or Irvin syndrome    What are the signs and symptoms of hypothyroidism? The signs and symptoms may develop slowly, sometimes over several years  Exhaustion, depression, or irritability    Sensitivity to cold    Muscle aches, headaches, weakness, or trouble concentrating    Dry, flaky skin, brittle nails, or thinning hair    Recent weight gain without overeating, or constipation    Heavy or irregular monthly periods    Puffiness around your eyes or swelling in your hands and feet    Low heart rate, changes in your blood pressure    How is hypothyroidism diagnosed? Your healthcare provider will ask about your symptoms and what medicines you take  He or she will ask about your medical history and if anyone in your family has hypothyroidism  A blood test will show your thyroid hormone level  How is hypothyroidism treated? Thyroid hormone replacement medicine may bring your thyroid hormone level back to normal  You will need to take this medicine for the rest of your life to control hypothyroidism  It is important to take the medicine every day as directed  You will be given instructions for what to do if you miss a dose  Ask your healthcare provider for more information on other medicines you may need  How can I manage hypothyroidism? Get more iodine    The thyroid gland uses iodine to work correctly and to make thyroid hormones  Your healthcare provider may tell you to eat foods that are rich in iodine  He or she will tell you how much of these foods to eat  Milk and seafood are good sources of iodine  You may also need iodine supplements  Keep track of your blood pressure and weight:      Check your blood pressure  and write it down as often as directed  It is important to measure your blood pressure on the same arm and in the same position each time  Keep track of your blood pressure readings, along with the date and time you took them  Take this record with you to your followup visits  Weigh yourself daily  before breakfast after you urinate  Weight gain may be a sign of extra fluid in your body  Keep track of your daily weights and take the record with you to your followup visits  Call your local emergency number [phone] in the [facility]) or have someone call if:   You have sudden chest pain or shortness of breath  You have a seizure  You feel like you are going to faint  When should I seek immediate care? You have diarrhea, tremors, or trouble sleeping  Your legs, ankles, or feet are swollen  When should I call my doctor? You have a fever  You have chills, a cough, or feel weak and achy  You have pain and swelling in your muscles and joints  Your skin is itchy, swollen, or you have a rash  Your signs and symptoms return or get worse, even after treatment  You have questions or concerns about your condition or care  CARE AGREEMENT:   You have the right to help plan your care  Learn about your health condition and how it may be treated  Discuss treatment options with your healthcare providers to decide what care you want to receive  You always have the right to refuse treatment  The above information is an  only  It is not intended as medical advice for individual conditions or treatments   Talk to your doctor, nurse or pharmacist before following any medical regimen to see if it is safe and effective for you  © Copyright [facility] 2022 Information is for End User's use only and may not be sold, redistributed or otherwise used for commercial purposes   All illustrations and images included in CareNotes® are the copyrighted property of [employer]  or [address] [place] [address]

## 2022-10-14 NOTE — PROGRESS NOTES
Assessment/Plan:   Diagnoses and all orders for this visit:    Hypothyroidism, unspecified type  -     levothyroxine (Synthroid) 25 mcg tablet; Take 1 tablet (25 mcg total) by mouth daily in the early morning  -     TSH, 3rd generation with Free T4 reflex; Future  - elevated TSH - noted to be 5 36 <-- 4 66   - educational handout given to pt re: hypothyroidism   - will start on Levothyroxine 25mcg QAM and repeat labs in 4-6wks - script given to pt   - f/u after bloodwork - pt aware and agreeable     Elevated PSA  - has a prostate bx scheduled next month with Uro    Influenza vaccination declined by patient  Pneumococcal vaccination declined by patient  Tetanus, diphtheria, and acellular pertussis (Tdap) vaccination declined          Subjective:    Patient ID: Frankey Maul is a 74 y o  male  HPI   72yo M presents to the office for f/u of abnml labs   - elevated TSH - noted to be 5 36 <-- 4 66   - denies heat/cold intolerance, weight gain, thinning hair   - has a prostate bx scheduled next month with Uro    - declined Flu, PCV20 and Tdap in the office today       The following portions of the patient's history were reviewed and updated as appropriate: allergies, current medications, past family history, past medical history, past social history, past surgical history and problem list     Review of Systems  as per HPI    Objective:  /70   Pulse 104   Resp 16   Ht 6' 1" (1 854 m)   Wt 109 kg (240 lb)   SpO2 96%   BMI 31 66 kg/m²    Physical Exam  Vitals reviewed  Constitutional:       General: He is not in acute distress  Appearance: Normal appearance  He is not ill-appearing, toxic-appearing or diaphoretic  HENT:      Head: Normocephalic and atraumatic  Right Ear: External ear normal       Left Ear: External ear normal       Nose: Nose normal    Eyes:      General: No scleral icterus  Right eye: No discharge  Left eye: No discharge        Extraocular Movements: Extraocular movements intact  Conjunctiva/sclera: Conjunctivae normal    Pulmonary:      Effort: Pulmonary effort is normal    Musculoskeletal:         General: Normal range of motion  Cervical back: Normal range of motion  Neurological:      General: No focal deficit present  Mental Status: He is alert and oriented to person, place, and time  Psychiatric:         Mood and Affect: Mood normal          Behavior: Behavior normal          BMI Counseling: Body mass index is 31 66 kg/m²  The BMI is above normal  Nutrition recommendations include 3-5 servings of fruits/vegetables daily  Exercise recommendations include exercising 3-5 times per week  Depression Screening Follow-up Plan: Patient's depression screening was positive with a PHQ-2 score of   Their PHQ-9 score was 0  Clinically patient does not have depression  No treatment is required

## 2022-11-08 ENCOUNTER — HOSPITAL ENCOUNTER (OUTPATIENT)
Dept: RADIOLOGY | Facility: IMAGING CENTER | Age: 67
Discharge: HOME | End: 2022-11-08

## 2022-11-08 DIAGNOSIS — M79.674 PAIN OF TOE OF RIGHT FOOT: ICD-10-CM

## 2022-11-14 ENCOUNTER — NURSE TRIAGE (OUTPATIENT)
Dept: OTHER | Facility: OTHER | Age: 67
End: 2022-11-14

## 2022-11-14 NOTE — TELEPHONE ENCOUNTER
Regarding: medication advice prior to appts tomorrow  ----- Message from Mellissa Juarez sent at 11/14/2022  9:45 AM EST -----  "I am have appointments for tomorrow in urology for injection and a non cysto procedure and my instructions mention something about antibiotics    I am taking Ceftinar and doxycycline right now for a toe infection "

## 2022-11-14 NOTE — TELEPHONE ENCOUNTER
Patient is having a Urology procedure done tomorrow 11/15/22 - per his instructions it says to take  ABX for procedure  Pt wanted to make the office aware that he is currently on Ceftinar and Doxycycline for an infection of his toe  Please call patient back if needed  Reason for Disposition  • Caller has medicine question only, adult not sick, and triager answers question    Answer Assessment - Initial Assessment Questions  1   NAME of MEDICATION: "What medicine are you calling about?"     Ceftinar and Doxycycline    Protocols used: MEDICATION QUESTION CALL-ADULT-[place]

## 2022-11-15 ENCOUNTER — PROCEDURE VISIT (OUTPATIENT)
Dept: UROLOGY | Facility: CLINIC | Age: 67
End: 2022-11-15

## 2022-11-15 VITALS
SYSTOLIC BLOOD PRESSURE: 118 MMHG | HEIGHT: 73 IN | DIASTOLIC BLOOD PRESSURE: 70 MMHG | WEIGHT: 240 LBS | BODY MASS INDEX: 31.81 KG/M2

## 2022-11-15 DIAGNOSIS — C61 PROSTATE CANCER (HCC): Primary | ICD-10-CM

## 2022-11-15 RX ORDER — CEFTRIAXONE 1 G/1
1000 INJECTION, POWDER, FOR SOLUTION INTRAMUSCULAR; INTRAVENOUS ONCE
Status: COMPLETED | OUTPATIENT
Start: 2022-11-15 | End: 2022-11-15

## 2022-11-15 RX ADMIN — CEFTRIAXONE 1000 MG: 1 INJECTION, POWDER, FOR SOLUTION INTRAMUSCULAR; INTRAVENOUS at 13:16

## 2022-11-15 NOTE — TELEPHONE ENCOUNTER
Left message on patient's voicemail stating it is OK he should not need additional antibiotics  He may be given an injectable antibiotic at the time of visit later today  If any additional questions please call back

## 2022-11-15 NOTE — PROGRESS NOTES
Office TRUS-guided Prostate Biopsy Procedure Note    Indication    Active surveillance of prostate cancer    Informed consent   The risks, benefits and alternatives to TRUS-guided prostate biopsy were conveyed to the patient prior to performing the procedure  A discussion of the risks of the procedure included, but was not limited to: pain, hematuria, hematochezia, hematospermia, infection, and the possibility of a non-diagnostic biopsy  The patient was given the opportunity to have his questions answered and there was no perceived barrier to education  Antibiotic prophylaxis   The patient received the following antibiotics at least 30 minutes prior to undergoing biopsy: Ciprofloxacin 500 mg PO x2   1 g intramuscular Rocephin    Rectal cleansing  The patient was instructed to perform an evacuating rectal enema 1-2 hours prior to biopsy  Local anesthesia  Topical 2% lidocaine jelly was applied liberally to the anus and rectum and allowed to dwell for at least 5 min prior to starting the procedure  After insertion of the TRUS probe, 10 mL of 2% lidocaine solution was injected with ultrasound guidance at the  junction of the prostate and seminal vesicles  The anesthetic was allowed to dwell for at least 2 minutes prior to biopsy  Transrectal ultrasonography  The patient was placed in the left lateral decubitus position  After an attentive digital rectal examination, a 7 5 mHz sidefire ultrasound probe was gently inserted into the rectum and biplanar imaging of the prostate was done with the findings noted below  Images were taken of any abnormal findings and also to document prostate size      Bladder  The bladder base appeared normal     Prostate  Digital rectal exam findings:  - no palpable disease    Ultrasound size measurements:  -Volume:  61 cm3    Ultrasound findings:  -Cysts: None  -Masses: None  -Median lobe: absent    Clinical stage (assuming a positive biopsy):   -T1c     TRUS-guided needle biopsy  Using an 18 gauge biopsy needle and ultrasound guidance, the following biopsies were taken:    1 core(s) from the left lateral base  1 core(s) from the left lateral mid-gland  1 core(s) from the right middle base  1 core(s) from the right lateral base  1 core(s) from the left lateral mid-gland  1 core(s) from the left middle mid-gland  1 core(s) from the right middle mid-gland  1 core(s) from the right lateral mid-gland  1 core(s) from the left lateral apex  1 core(s) from the left middle apex  1 core(s) from the right middle apex  1 core(s) from the right lateral apex    Total number of cores: 12                Complications  There were no procedural complications  Disposition  The patient was dismissed to home after 30 minutes of observation in stable condition  Post-procedure instructions: Today he underwent an uncomplicated transrectal ultrasound-guided biopsy of the prostate, following a periprosthetic nerve block  I reviewed the normal postprocedure a course including bleeding per recutm, hematuria, and hematospermia  I instructed him to complete his course of antibiotics as prescribed  Instructed him to call with fever greater than 101, chills, nausea, vomiting, poorly controlled pain  His followup was scheduled in approximately 2 weeks' time to review the pathology

## 2022-11-15 NOTE — TELEPHONE ENCOUNTER
That should be fine no additional antibiotics needed  He may get I Rocephin at the time of the procedure as well

## 2022-11-16 ENCOUNTER — HOSPITAL ENCOUNTER (OUTPATIENT)
Dept: MRI IMAGING | Facility: HOSPITAL | Age: 67
Discharge: HOME | End: 2022-11-16
Attending: PODIATRIST

## 2022-11-16 DIAGNOSIS — L97.512 NON-PRESSURE CHRONIC ULCER OF OTHER PART OF RIGHT FOOT WITH FAT LAYER EXPOSED (HCC): ICD-10-CM

## 2022-11-16 DIAGNOSIS — E11.621 TYPE 2 DIABETES MELLITUS WITH FOOT ULCER (CODE) (HCC): ICD-10-CM

## 2022-11-17 ENCOUNTER — TELEPHONE (OUTPATIENT)
Dept: UROLOGY | Facility: CLINIC | Age: 67
End: 2022-11-17

## 2022-11-17 LAB — TSH SERPL-ACNC: 2.62 MIU/L (ref 0.4–4.5)

## 2022-11-17 RX ORDER — CEFDINIR 300 MG/1
CAPSULE ORAL
COMMUNITY

## 2022-11-17 RX ORDER — DOXYCYCLINE HYCLATE 100 MG
TABLET ORAL EVERY 12 HOURS
COMMUNITY

## 2022-11-17 RX ORDER — DOXYCYCLINE HYCLATE 100 MG
TABLET ORAL
COMMUNITY
Start: 2022-11-15 | End: 2022-11-17

## 2022-11-17 NOTE — TELEPHONE ENCOUNTER
----- Message from Yuridia Reynoso MD sent at 11/17/2022  8:50 AM EST -----  Please let the patient know the great news that their biopsy was negative for cancer! SHould followup as scheduled    Thank you

## 2022-11-18 ENCOUNTER — OFFICE VISIT (OUTPATIENT)
Dept: FAMILY MEDICINE CLINIC | Facility: CLINIC | Age: 67
End: 2022-11-18

## 2022-11-18 VITALS
HEIGHT: 75 IN | BODY MASS INDEX: 30.84 KG/M2 | RESPIRATION RATE: 16 BRPM | HEART RATE: 110 BPM | OXYGEN SATURATION: 97 % | WEIGHT: 248 LBS | SYSTOLIC BLOOD PRESSURE: 124 MMHG | DIASTOLIC BLOOD PRESSURE: 70 MMHG

## 2022-11-18 DIAGNOSIS — Z28.21 TETANUS, DIPHTHERIA, AND ACELLULAR PERTUSSIS (TDAP) VACCINATION DECLINED: ICD-10-CM

## 2022-11-18 DIAGNOSIS — M86.171 OTHER ACUTE OSTEOMYELITIS, RIGHT ANKLE AND FOOT (HCC): ICD-10-CM

## 2022-11-18 DIAGNOSIS — Z28.21 PNEUMOCOCCAL VACCINATION DECLINED BY PATIENT: ICD-10-CM

## 2022-11-18 DIAGNOSIS — E11.42 TYPE 2 DIABETES MELLITUS WITH DIABETIC POLYNEUROPATHY, WITHOUT LONG-TERM CURRENT USE OF INSULIN (HCC): ICD-10-CM

## 2022-11-18 DIAGNOSIS — R97.20 ELEVATED PSA: ICD-10-CM

## 2022-11-18 DIAGNOSIS — E03.9 HYPOTHYROIDISM, UNSPECIFIED TYPE: ICD-10-CM

## 2022-11-18 DIAGNOSIS — S98.131A AMPUTATION OF TOE OF RIGHT FOOT (HCC): ICD-10-CM

## 2022-11-18 DIAGNOSIS — Z28.21 INFLUENZA VACCINATION DECLINED BY PATIENT: ICD-10-CM

## 2022-11-18 DIAGNOSIS — Z01.818 PRE-OP EXAMINATION: Primary | ICD-10-CM

## 2022-11-18 DIAGNOSIS — E78.5 HYPERLIPIDEMIA LDL GOAL <70: ICD-10-CM

## 2022-11-18 DIAGNOSIS — I10 PRIMARY HYPERTENSION: ICD-10-CM

## 2022-11-18 NOTE — H&P (VIEW-ONLY)
Assessment/Plan:   Diagnoses and all orders for this visit:    Pre-op examination  -     Cancel: ECG 12 lead; Future  -     Cancel: CBC and differential; Future  -     Cancel: Comprehensive metabolic panel; Future  -     Cancel: HEMOGLOBIN A1C W/ EAG ESTIMATION; Future  - scheduled for amputation of 3rd R-toe for treatment of acute osteomyelitis on 11/23/2022 with Dr Cisco Parson   - reviewed PMHx, PSHx, meds, allergies, FHx, Soc Hx  - spoke with Podiatry office - pt does not need labs or EKG ordered by PCP   - UTD with COVID IMMs and Booster x2  - declined Tdap, Pneumovax and Flu vaccine in the office today   - STOP ASA, Fish Oil, Vit D, OTC supplements/herbals and NSAIDs 1wk prior to surgery     PT IS AT [address] [facility]   Other acute osteomyelitis, right ankle and foot (HCC)  Amputation of toe of right foot (HCC)    Hypothyroidism, unspecified type  - nml TSH   - cont Levothyroxine 25mcg QAM     Elevated PSA  - follows with Uro   - s/p prostate bx     Influenza vaccination declined by patient  Pneumococcal vaccination declined by patient  Tetanus, diphtheria, and acellular pertussis (Tdap) vaccination declined    Type 2 diabetes mellitus with diabetic polyneuropathy, without long-term current use of insulin ([facility] )  -     Cancel: HEMOGLOBIN A1C W/ EAG ESTIMATION; Future  - A1c 7 1   - advised to hold Metformin the morning of procedure     Primary hypertension  - BP stable     Hyperlipidemia LDL goal <70  - LDL 48     Other orders  -     cefdinir (OMNICEF) 300 mg capsule; cefdinir 300 mg capsule  -     doxycycline hyclate (VIBRA-TABS) 100 mg tablet; Every 12 hours  -     Discontinue: doxycycline hyclate (VIBRA-TABS) 100 mg tablet  -     mupirocin (BACTROBAN) 2 % ointment; APPLY TO THE AFFECTED AREA BY TOPICAL ROUTE ONCE DAILY              Subjective:    Patient ID: Le Sotelo is a 74 y o  male    Le Sotelo is a 74 y o  male who presents to the office for pre-op   1) Pre-Op  - scheduled for amputation of 3rd R-toe for treatment of acute osteomyelitis on 11/23/2022 with Dr Nirmal Blackwell   - spoke with Podiatry office - pt does not need labs or EKG ordered by PCP   - PMHx: DM2 with neuropathy, HTN, HL, CTS/TTS, obesity (BMI 31), h/o colonic polyps and internal hemorrhoids   - allergies: Amox (rash), sudafed   - Meds: see med rec   - PSHx: wisdom tooth extraction, prostate bx, foot surgery    - FHx: M (MI at 70yo, hypotension, valvular heart disease), F (DM, pancreatic ca - dx at 75yo), Sister (lung ca - dx at 62yo), B (bile duct ca - dx at 65yo), denies FHx of HTN/prostate ca   - Immunizations: UTD with COVID IMMs and Booster x2 ; declined Tdap, Pneumovax and Flu vaccine in the office today    - diet/exercise: currently not exercising, not watching his diet   - social: denies tob/illicits, 6-2PSSMKS Qnight, 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   - denies F/C/N/V/CP/palpitations/SOB/wheezing/abd pain/LE edema   2) DM2 with polyneuropathy/HL   - A1c = 7 1   - LDL 48  - currently on Metformin 1000mg BID, Gabapentin 100mg TID and Crestor 5mg QHS   - does not feel any different with Gabapentin 100mg TID   - diet/exercise: currently not exercising, not watching his diet   - social: denies tob/illicits, 8-5LMRSCF Qnight, 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   3) HTN   - BP in the office 140/70 and on my repeat 124/70  - currently on ACEI 20mg QD and tolerating it well   - (+) facial flushing   - no FHx of HTN   - not a smoker   4) Hypothyroidism   - repeat TSH within range  - cont Levothyroxine 25mcg QAM       The following portions of the patient's history were reviewed and updated as appropriate: allergies, current medications, past family history, past medical history, past social history, past surgical history and problem list     Review of Systems  as per HPI    Objective:  /70 (BP Location: Left arm, Patient Position: Sitting, Cuff Size: Large)   Pulse (!) 110   Resp 16   Ht 6' 3" (1 905 m)   Wt 112 kg (248 lb)   SpO2 97%   BMI 31 00 kg/m²    Physical Exam  Vitals reviewed  Constitutional:       General: He is not in acute distress  Appearance: Normal appearance  He is not ill-appearing, toxic-appearing or diaphoretic  HENT:      Head: Normocephalic and atraumatic  Right Ear: External ear normal       Left Ear: External ear normal       Nose: Nose normal    Eyes:      General: No scleral icterus  Right eye: No discharge  Left eye: No discharge  Extraocular Movements: Extraocular movements intact  Conjunctiva/sclera: Conjunctivae normal    Cardiovascular:      Rate and Rhythm: Normal rate and regular rhythm  Heart sounds: Normal heart sounds  Pulmonary:      Effort: Pulmonary effort is normal       Breath sounds: Normal breath sounds  Abdominal:      Palpations: Abdomen is soft  Musculoskeletal:         General: Normal range of motion  Cervical back: Normal range of motion  Right lower leg: No edema  Left lower leg: No edema  Skin:     General: Skin is warm  Comments: (+) chronic flushed face    Neurological:      General: No focal deficit present  Mental Status: He is alert and oriented to person, place, and time     Psychiatric:         Mood and Affect: Mood normal          Behavior: Behavior normal

## 2022-11-18 NOTE — PROGRESS NOTES
Assessment/Plan:   Diagnoses and all orders for this visit:    Pre-op examination  -     Cancel: ECG 12 lead; Future  -     Cancel: CBC and differential; Future  -     Cancel: Comprehensive metabolic panel; Future  -     Cancel: HEMOGLOBIN A1C W/ EAG ESTIMATION; Future  - scheduled for amputation of 3rd R-toe for treatment of acute osteomyelitis on 11/23/2022 with Dr Rita Quinones   - reviewed PMHx, PSHx, meds, allergies, FHx, Soc Hx  - spoke with Podiatry office - pt does not need labs or EKG ordered by PCP   - UTD with COVID IMMs and Booster x2  - declined Tdap, Pneumovax and Flu vaccine in the office today   - STOP ASA, Fish Oil, Vit D, OTC supplements/herbals and NSAIDs 1wk prior to surgery     PT IS AT [address] [facility]   Other acute osteomyelitis, right ankle and foot (HCC)  Amputation of toe of right foot (HCC)    Hypothyroidism, unspecified type  - nml TSH   - cont Levothyroxine 25mcg QAM     Elevated PSA  - follows with Uro   - s/p prostate bx     Influenza vaccination declined by patient  Pneumococcal vaccination declined by patient  Tetanus, diphtheria, and acellular pertussis (Tdap) vaccination declined    Type 2 diabetes mellitus with diabetic polyneuropathy, without long-term current use of insulin ([facility] )  -     Cancel: HEMOGLOBIN A1C W/ EAG ESTIMATION; Future  - A1c 7 1   - advised to hold Metformin the morning of procedure     Primary hypertension  - BP stable     Hyperlipidemia LDL goal <70  - LDL 48     Other orders  -     cefdinir (OMNICEF) 300 mg capsule; cefdinir 300 mg capsule  -     doxycycline hyclate (VIBRA-TABS) 100 mg tablet; Every 12 hours  -     Discontinue: doxycycline hyclate (VIBRA-TABS) 100 mg tablet  -     mupirocin (BACTROBAN) 2 % ointment; APPLY TO THE AFFECTED AREA BY TOPICAL ROUTE ONCE DAILY              Subjective:    Patient ID: Rosa Blanca is a 74 y o  male    Rosa Blanca is a 74 y o  male who presents to the office for pre-op   1) Pre-Op  - scheduled for amputation of 3rd R-toe for treatment of acute osteomyelitis on 11/23/2022 with Dr Richar Gill   - spoke with Podiatry office - pt does not need labs or EKG ordered by PCP   - PMHx: DM2 with neuropathy, HTN, HL, CTS/TTS, obesity (BMI 31), h/o colonic polyps and internal hemorrhoids   - allergies: Amox (rash), sudafed   - Meds: see med rec   - PSHx: wisdom tooth extraction, prostate bx, foot surgery    - FHx: M (MI at 70yo, hypotension, valvular heart disease), F (DM, pancreatic ca - dx at 77yo), Sister (lung ca - dx at 64yo), B (bile duct ca - dx at 63yo), denies FHx of HTN/prostate ca   - Immunizations: UTD with COVID IMMs and Booster x2 ; declined Tdap, Pneumovax and Flu vaccine in the office today    - diet/exercise: currently not exercising, not watching his diet   - social: denies tob/illicits, 3-2KXIDNY Qnight, 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   - denies F/C/N/V/CP/palpitations/SOB/wheezing/abd pain/LE edema   2) DM2 with polyneuropathy/HL   - A1c = 7 1   - LDL 48  - currently on Metformin 1000mg BID, Gabapentin 100mg TID and Crestor 5mg QHS   - does not feel any different with Gabapentin 100mg TID   - diet/exercise: currently not exercising, not watching his diet   - social: denies tob/illicits, 8-6ZOCTUE Qnight, 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   3) HTN   - BP in the office 140/70 and on my repeat 124/70  - currently on ACEI 20mg QD and tolerating it well   - (+) facial flushing   - no FHx of HTN   - not a smoker   4) Hypothyroidism   - repeat TSH within range  - cont Levothyroxine 25mcg QAM       The following portions of the patient's history were reviewed and updated as appropriate: allergies, current medications, past family history, past medical history, past social history, past surgical history and problem list     Review of Systems  as per HPI    Objective:  /70 (BP Location: Left arm, Patient Position: Sitting, Cuff Size: Large)   Pulse (!) 110   Resp 16   Ht 6' 3" (1 905 m)   Wt 112 kg (248 lb)   SpO2 97%   BMI 31 00 kg/m²    Physical Exam  Vitals reviewed  Constitutional:       General: He is not in acute distress  Appearance: Normal appearance  He is not ill-appearing, toxic-appearing or diaphoretic  HENT:      Head: Normocephalic and atraumatic  Right Ear: External ear normal       Left Ear: External ear normal       Nose: Nose normal    Eyes:      General: No scleral icterus  Right eye: No discharge  Left eye: No discharge  Extraocular Movements: Extraocular movements intact  Conjunctiva/sclera: Conjunctivae normal    Cardiovascular:      Rate and Rhythm: Normal rate and regular rhythm  Heart sounds: Normal heart sounds  Pulmonary:      Effort: Pulmonary effort is normal       Breath sounds: Normal breath sounds  Abdominal:      Palpations: Abdomen is soft  Musculoskeletal:         General: Normal range of motion  Cervical back: Normal range of motion  Right lower leg: No edema  Left lower leg: No edema  Skin:     General: Skin is warm  Comments: (+) chronic flushed face    Neurological:      General: No focal deficit present  Mental Status: He is alert and oriented to person, place, and time     Psychiatric:         Mood and Affect: Mood normal          Behavior: Behavior normal

## 2022-11-18 NOTE — PRE-PROCEDURE INSTRUCTIONS
Pre-Surgery Instructions:   Medication Instructions   • aspirin 81 MG tablet Stop taking 7 days prior to surgery  • chlorthalidone 25 mg tablet Hold day of surgery  • doxycycline hyclate (VIBRA-TABS) 100 mg tablet Hold day of surgery  • gabapentin (NEURONTIN) 100 mg capsule Take day of surgery  • levothyroxine (Synthroid) 25 mcg tablet Take day of surgery  • lisinopril (ZESTRIL) 20 mg tablet Hold day of surgery  • metFORMIN (GLUCOPHAGE) 1000 MG tablet Hold day of surgery  • Misc Natural Products (Lutein 20) CAPS Stop 11/18  Do not take morning of surgery   • mupirocin (BACTROBAN) 2 % ointment Hold day of surgery  • rosuvastatin (CRESTOR) 5 mg tablet Take day of surgery  • tadalafil (CIALIS) 20 MG tablet Uses PRN- DO NOT take day of surgery   Covid screening negative as per patient  Fully vaccinated  Reviewed showering and medication instructions  Take medications morning of surgery with a small sip of water  Patient verbalized understanding  Advised NPO after MN and ASC will call with scheduled surgical time

## 2022-11-23 ENCOUNTER — ANESTHESIA (OUTPATIENT)
Dept: PERIOP | Facility: HOSPITAL | Age: 67
End: 2022-11-23

## 2022-11-23 ENCOUNTER — APPOINTMENT (OUTPATIENT)
Dept: RADIOLOGY | Facility: HOSPITAL | Age: 67
End: 2022-11-23

## 2022-11-23 ENCOUNTER — ANESTHESIA EVENT (OUTPATIENT)
Dept: PERIOP | Facility: HOSPITAL | Age: 67
End: 2022-11-23

## 2022-11-23 ENCOUNTER — HOSPITAL ENCOUNTER (OUTPATIENT)
Facility: HOSPITAL | Age: 67
Setting detail: OUTPATIENT SURGERY
Discharge: HOME | End: 2022-11-23
Attending: PODIATRIST | Admitting: PODIATRIST

## 2022-11-23 VITALS
BODY MASS INDEX: 30.84 KG/M2 | OXYGEN SATURATION: 98 % | RESPIRATION RATE: 18 BRPM | HEART RATE: 83 BPM | TEMPERATURE: 98.1 F | DIASTOLIC BLOOD PRESSURE: 69 MMHG | SYSTOLIC BLOOD PRESSURE: 126 MMHG | HEIGHT: 75 IN | WEIGHT: 248 LBS

## 2022-11-23 DIAGNOSIS — M86.171 OTHER ACUTE OSTEOMYELITIS, RIGHT ANKLE AND FOOT (HCC): ICD-10-CM

## 2022-11-23 LAB — GLUCOSE SERPL-MCNC: 250 MG/DL (ref 65–140)

## 2022-11-23 RX ORDER — ONDANSETRON 2 MG/ML
4 INJECTION INTRAMUSCULAR; INTRAVENOUS ONCE AS NEEDED
Status: DISCONTINUED | OUTPATIENT
Start: 2022-11-23 | End: 2022-11-23 | Stop reason: HOSPADM

## 2022-11-23 RX ORDER — MAGNESIUM HYDROXIDE 1200 MG/15ML
LIQUID ORAL AS NEEDED
Status: DISCONTINUED | OUTPATIENT
Start: 2022-11-23 | End: 2022-11-23 | Stop reason: HOSPADM

## 2022-11-23 RX ORDER — TRAMADOL HYDROCHLORIDE 50 MG/1
TABLET ORAL EVERY 6 HOURS
COMMUNITY

## 2022-11-23 RX ORDER — FENTANYL CITRATE/PF 50 MCG/ML
50 SYRINGE (ML) INJECTION
Status: DISCONTINUED | OUTPATIENT
Start: 2022-11-23 | End: 2022-11-23 | Stop reason: HOSPADM

## 2022-11-23 RX ORDER — CEFAZOLIN SODIUM 2 G/50ML
2000 SOLUTION INTRAVENOUS ONCE
Status: DISCONTINUED | OUTPATIENT
Start: 2022-11-23 | End: 2022-11-23 | Stop reason: HOSPADM

## 2022-11-23 RX ORDER — PROPOFOL 10 MG/ML
INJECTION, EMULSION INTRAVENOUS AS NEEDED
Status: DISCONTINUED | OUTPATIENT
Start: 2022-11-23 | End: 2022-11-23

## 2022-11-23 RX ORDER — CHLORHEXIDINE GLUCONATE 0.12 MG/ML
15 RINSE ORAL ONCE
Status: COMPLETED | OUTPATIENT
Start: 2022-11-23 | End: 2022-11-23

## 2022-11-23 RX ORDER — MIDAZOLAM HYDROCHLORIDE 2 MG/2ML
INJECTION, SOLUTION INTRAMUSCULAR; INTRAVENOUS AS NEEDED
Status: DISCONTINUED | OUTPATIENT
Start: 2022-11-23 | End: 2022-11-23

## 2022-11-23 RX ORDER — SODIUM CHLORIDE, SODIUM LACTATE, POTASSIUM CHLORIDE, CALCIUM CHLORIDE 600; 310; 30; 20 MG/100ML; MG/100ML; MG/100ML; MG/100ML
100 INJECTION, SOLUTION INTRAVENOUS CONTINUOUS
Status: DISCONTINUED | OUTPATIENT
Start: 2022-11-23 | End: 2022-11-23 | Stop reason: HOSPADM

## 2022-11-23 RX ORDER — PROPOFOL 10 MG/ML
INJECTION, EMULSION INTRAVENOUS CONTINUOUS PRN
Status: DISCONTINUED | OUTPATIENT
Start: 2022-11-23 | End: 2022-11-23

## 2022-11-23 RX ORDER — SODIUM CHLORIDE, SODIUM LACTATE, POTASSIUM CHLORIDE, CALCIUM CHLORIDE 600; 310; 30; 20 MG/100ML; MG/100ML; MG/100ML; MG/100ML
INJECTION, SOLUTION INTRAVENOUS CONTINUOUS PRN
Status: DISCONTINUED | OUTPATIENT
Start: 2022-11-23 | End: 2022-11-23

## 2022-11-23 RX ORDER — FENTANYL CITRATE 50 UG/ML
INJECTION, SOLUTION INTRAMUSCULAR; INTRAVENOUS AS NEEDED
Status: DISCONTINUED | OUTPATIENT
Start: 2022-11-23 | End: 2022-11-23

## 2022-11-23 RX ORDER — CEFAZOLIN SODIUM 2 G/50ML
SOLUTION INTRAVENOUS AS NEEDED
Status: DISCONTINUED | OUTPATIENT
Start: 2022-11-23 | End: 2022-11-23

## 2022-11-23 RX ORDER — LIDOCAINE HYDROCHLORIDE 10 MG/ML
INJECTION, SOLUTION EPIDURAL; INFILTRATION; INTRACAUDAL; PERINEURAL AS NEEDED
Status: DISCONTINUED | OUTPATIENT
Start: 2022-11-23 | End: 2022-11-23

## 2022-11-23 RX ADMIN — SODIUM CHLORIDE, SODIUM LACTATE, POTASSIUM CHLORIDE, AND CALCIUM CHLORIDE: .6; .31; .03; .02 INJECTION, SOLUTION INTRAVENOUS at 08:01

## 2022-11-23 RX ADMIN — PROPOFOL 100 MCG/KG/MIN: 10 INJECTION, EMULSION INTRAVENOUS at 07:30

## 2022-11-23 RX ADMIN — CHLORHEXIDINE GLUCONATE 0.12% ORAL RINSE 15 ML: 1.2 LIQUID ORAL at 06:16

## 2022-11-23 RX ADMIN — LIDOCAINE HYDROCHLORIDE 50 MG: 10 INJECTION, SOLUTION EPIDURAL; INFILTRATION; INTRACAUDAL; PERINEURAL at 07:28

## 2022-11-23 RX ADMIN — CEFAZOLIN SODIUM 2000 MG: 2 SOLUTION INTRAVENOUS at 07:26

## 2022-11-23 RX ADMIN — PROPOFOL 100 MG: 10 INJECTION, EMULSION INTRAVENOUS at 07:28

## 2022-11-23 RX ADMIN — MIDAZOLAM 2 MG: 1 INJECTION INTRAMUSCULAR; INTRAVENOUS at 07:25

## 2022-11-23 RX ADMIN — SODIUM CHLORIDE, SODIUM LACTATE, POTASSIUM CHLORIDE, AND CALCIUM CHLORIDE: .6; .31; .03; .02 INJECTION, SOLUTION INTRAVENOUS at 06:18

## 2022-11-23 RX ADMIN — FENTANYL CITRATE 100 MCG: 50 INJECTION, SOLUTION INTRAMUSCULAR; INTRAVENOUS at 07:28

## 2022-11-23 NOTE — OR NURSING
CONVERSATION TOOK PLACE IN HOLDING AREA WITH PATIENT AND ANESTHESIA ABOUT PATIENTS CODE STATUS  PATIENT UNDERSTOOD AND CONSENTED TO FULL CODE STATUS IN THE OPERATING ROOM  SURGICAL TEAM AWARE

## 2022-11-23 NOTE — OP NOTE
OPERATIVE REPORT - Podiatry  PATIENT NAME: Stanly Mohs    :  1955  MRN: [id number]  Pt Location:  OR ROOM 10    SURGERY DATE: 2022    Surgeon(s) and Role:     * Jane Curry DPM - Primary     * Ramses Stallings DPM - Assisting    Pre-op Diagnosis:  Other acute osteomyelitis, right ankle and foot ([facility] [address] ) Catarino Camarena    Post-Op Diagnosis Codes:     * Other acute osteomyelitis, right ankle and foot ([facility] [address] ) [M86 171]    Procedure(s) (LRB):  AMPUTATION 3RD TOE (Right)    Specimen(s):  ID Type Source Tests Collected by Time Destination   1 : RIGHT 3RD TOE Tissue Toe, Right TISSUE EXAM Jane Curry DPM 2022 0749    A : RIGHT PROXIMAL PHALYNX, 3RD TOE Tissue Toe, Right ANAEROBIC CULTURE AND GRAM STAIN, WOUND CULTURE Jane Curry DPM 2022 0750        Estimated Blood Loss:   Minimal    Drains:  * No LDAs found *    Anesthesia Type:   IV Sedation with Anesthesia with 7 ml of 1% Lidocaine and 0 5% Bupivacaine in a 1:1 mixture    Hemostasis:  - Esmarch bandage  - Anatomic dissection  - Manual compression    Materials:  * No implants in log *      Operative Findings:  Consistent with diagnosis    Complications:   None    Procedure and Technique:     Under mild sedation, the patient was brought into the operating room and remained on the bed in supine position  IV sedation was achieved by anesthesia team and a universal timeout was performed where all parties are in agreement of correct patient, correct procedure and correct site  A local block was performed consisting of 7 ml of 1% Lidocaine and 0 5% Bupivacaine in a 1:1 mixture  The foot was then prepped and draped in the usual aseptic manner  An esmarch bandage was used to exsangunate the foot, and also served as tourniquet  Attention was then directed to the right third digit where a fishmouth shaped incision was drawn over PIPJ  15 blade used to make incision along this line straight down to bone   Soft tissue reflected off the joint Pt extubated at 0403   using pickups and 15 blade  Once joint capsule was visualized, 15 blade used to sever any soft tissue structures attaching middle phalanx to proximal phalanx, disarticulating the partial digit  Disarticulated digit was passed off the field and sent for pathological examination  Bone cutter was then utilized to remove the head of the proximal phalanx to allow for skin closure  Bone was passed off of the surgical field and sent for culture  Surgical site was then thoroughly irrigated with copious amount of normal sterile saline  Skin edges were then re-approximated and deep closure was obtaining utilizing Vicryl suture  Skin then closed using Prolene suture in both simple interrupted and horizontal mattress techniques  Surgical site was then cleansed and dried, and dressed with Betadine soaked Adaptic, 4x4 sterile gauze, and Kerlix  Light compression obtained via Coban wrap  The esmarch was removed and normal hyperemic response was noted to all digits  The patient tolerated the procedure and anesthesia well without immediate complications and transferred to PACU with vital signs stable  As with many limb salvage procedures, we contemplate the possibility of performing further stages to this procedure  Procedures may include debridements, delayed closure, plastic surgery techniques, or more proximal amputations  This procedure may be considered part of a multi-staged limb salvage treatment plan  Dr Reggie Marin was present during the entire procedure and participated in all key aspects  SIGNATURE: Anand Fernandez DPM  DATE: November 23, 2022  TIME: 7:54 AM      Portions of the record may have been created with voice recognition software  Occasional wrong word or "sound a like" substitutions may have occurred due to the inherent limitations of voice recognition software  Read the chart carefully and recognize, using context, where substitutions have occurred

## 2022-11-23 NOTE — ANESTHESIA PREPROCEDURE EVALUATION
Procedure:  AMPUTATION 3RD TOE (Right: Toe)    Relevant Problems   CARDIO   (+) Essential hypertension   (+) Hyperlipidemia LDL goal <70      ENDO   (+) Type 2 diabetes mellitus (HCC)   (+) Type II or unspecified type diabetes mellitus without mention of complication, uncontrolled      /RENAL   (+) Prostate cancer (HCC)      NEURO/PSYCH   (+) Depression with anxiety   (+) Diabetic polyneuropathy (HCC)      Endocrine   (+) Diabetes mellitus with neurological manifestation (HCC)      Nervous and Auditory   (+) Neuropathy of foot      Other   (+) Obesity, unspecified      Sinus tachycardia  Left anterior fascicular block  Abnormal ECG  No previous ECGs available  Confirmed by Fan  ([id number]) on 5/24/2021 4:49:35 PM  Physical Exam    Airway    Mallampati score: I  TM Distance: >3 FB  Neck ROM: full     Dental       Cardiovascular      Pulmonary      Other Findings        Anesthesia Plan  ASA Score- 3     Anesthesia Type- IV sedation with anesthesia with ASA Monitors  Additional Monitors:   Airway Plan:           Plan Factors-Exercise tolerance (METS): >4 METS  Chart reviewed  Patient summary reviewed  Patient is not a current smoker  Patient did not smoke on day of surgery  Obstructive sleep apnea risk education given perioperatively  Induction- intravenous  Postoperative Plan- Plan for postoperative opioid use  Informed Consent- Anesthetic plan and risks discussed with patient  I personally reviewed this patient with the CRNA  Discussed and agreed on the Anesthesia Plan with the CRNA  Hector Medina

## 2022-11-23 NOTE — NURSING NOTE
Ambulated to the bathroom with the use of walker and supervision of staff member  Surgical shoe was applied  Voided  Denies pain  Dressing remains dry and intact  No drainage

## 2022-11-23 NOTE — ANESTHESIA POSTPROCEDURE EVALUATION
Post-Op Assessment Note    CV Status:  Stable  Pain Score: 0    Pain management: adequate     Mental Status:  Alert and awake   Hydration Status:  Stable   PONV Controlled:  None   Airway Patency:  Patent      Post Op Vitals Reviewed: Yes      Staff: CRNA         No notable events documented      BP      Temp      Pulse     Resp      SpO2

## 2022-11-23 NOTE — DISCHARGE INSTRUCTIONS
Discharge Instructions - Podiatry    Weight Bearing Status: Heel weightbearing in surgical shoe  Pain: Continue analgesics as directed    Follow-up appointment instructions: Please make an appointment within one week of discharge with Dr Allyn Wagner  Contact sooner if any increase in pain, or signs of infection occur    Patient Wound Care Instructions: Leave dressings clean, dry, and intact until 1st outpatient office visit

## 2022-11-26 LAB
BACTERIA WND AEROBE CULT: NO GROWTH
GRAM STN SPEC: NORMAL

## 2022-11-30 ENCOUNTER — OFFICE VISIT (OUTPATIENT)
Dept: UROLOGY | Facility: CLINIC | Age: 67
End: 2022-11-30

## 2022-11-30 VITALS
OXYGEN SATURATION: 100 % | HEIGHT: 75 IN | HEART RATE: 108 BPM | BODY MASS INDEX: 30.84 KG/M2 | WEIGHT: 248 LBS | SYSTOLIC BLOOD PRESSURE: 132 MMHG | RESPIRATION RATE: 16 BRPM | DIASTOLIC BLOOD PRESSURE: 70 MMHG

## 2022-11-30 DIAGNOSIS — C61 PROSTATE CANCER (HCC): Primary | ICD-10-CM

## 2022-11-30 NOTE — PROGRESS NOTES
Referring Physician: Chris Villagran DO  A copy of this note was sent to the referring physician  Diagnoses and all orders for this visit:    Prostate cancer ([facility] )  -     PSA Total, Diagnostic; Future            Assessment and plan:       1  [place] prostate cancer  -cT1c, Charu 3+3=6, 1 of 12 cores, 15 % core volume  -pretreatment PSA:  6 6  - date of diagnosis: November 10/2021  - prostate volume on ultrasound: 68 g  - multiparametric prostate MRI September 2022:  No radiographically detectable lesions, PI-RADS 2, prostate volume 54 grams   -repeat transrectal biopsy November 15, 2022: No detectable disease, BPH only   -current PSA:  7 2    2  Extensive familial history of fatal pancreatic cancer      I was thrilled to discuss the negative biopsy with Tiffany Philip today  I reinforced that this does not imply spontaneous resolution of his cancer but certainly indicates stable clinically insignificant disease for which ongoing active surveillance is appropriate  We also discussed that we can step down the frequency of her follow-up visits at this point  He will return in 1 year with a PSA  I will likely plan for repeat MRI 2 years from his last imaging study to be followed by repeat biopsy at that time      Jennifer Del Rosario      Chief Complaint     Biopsy follow-up      History of Present Illness     Le Sotelo is a 74 y o  male returns in follow-up for low volume [place] prostate cancer on active surveillance    Detailed Urologic History     - please refer to HPI    Review of Systems     Review of Systems   Constitutional: Negative for activity change and fatigue  HENT: Negative for congestion  Eyes: Negative for visual disturbance  Respiratory: Negative for shortness of breath and wheezing  Cardiovascular: Negative for chest pain and leg swelling  Gastrointestinal: Negative for abdominal pain  Genitourinary: Negative for dysuria, flank pain, hematuria and urgency  Musculoskeletal: Negative for back pain  Allergic/Immunologic: Negative for immunocompromised state  Neurological: Negative for dizziness and numbness  Psychiatric/Behavioral: Negative for dysphoric mood  All other systems reviewed and are negative  Allergies     Allergies   Allergen Reactions   • Sudafed [Pseudoephedrine] Other (See Comments)     hyperactivity   • Amoxicillin Rash       Physical Exam     Physical Exam  Constitutional:       General: He is not in acute distress  Appearance: He is well-developed  HENT:      Head: Normocephalic and atraumatic  Cardiovascular:      Comments: Negative lower extremity edema  Pulmonary:      Effort: Pulmonary effort is normal       Breath sounds: Normal breath sounds  Abdominal:      Palpations: Abdomen is soft  Musculoskeletal:         General: Normal range of motion  Cervical back: Normal range of motion  Skin:     General: Skin is warm  Neurological:      Mental Status: He is alert and oriented to person, place, and time     Psychiatric:         Behavior: Behavior normal              Vital Signs  Vitals:    11/30/22 1115   BP: 132/70   BP Location: Left arm   Patient Position: Sitting   Cuff Size: Large   Pulse: (!) 108   Resp: 16   SpO2: 100%   Weight: 112 kg (248 lb)   Height: 6' 3" (1 905 m)         Current Medications       Current Outpatient Medications:   •  aspirin 81 MG tablet, Take 1 tablet by mouth daily, Disp: , Rfl:   •  chlorthalidone 25 mg tablet, Take 0 5 tablets (12 5 mg total) by mouth daily, Disp: 45 tablet, Rfl: 1  •  gabapentin (NEURONTIN) 100 mg capsule, Take 2 capsules (200 mg total) by mouth 3 (three) times a day (Patient taking differently: Take 100 mg by mouth 3 (three) times a day), Disp: 540 capsule, Rfl: 1  •  glucose blood test strip, by In Vitro route, Disp: , Rfl:   •  levothyroxine (Synthroid) 25 mcg tablet, Take 1 tablet (25 mcg total) by mouth daily in the early morning, Disp: 60 tablet, Rfl: 0  •  lisinopril (ZESTRIL) 20 mg tablet, TAKE 1 TABLET DAILY, Disp: 90 tablet, Rfl: 1  •  metFORMIN (GLUCOPHAGE) 1000 MG tablet, Take 1 tablet (1,000 mg total) by mouth 2 (two) times a day with meals, Disp: 180 tablet, Rfl: 1  •  Misc Natural Products (Lutein 20) CAPS, Take 20 capsules by mouth daily Eye health, Disp: , Rfl:   •  mupirocin (BACTROBAN) 2 % ointment, APPLY TO THE AFFECTED AREA BY TOPICAL ROUTE ONCE DAILY, Disp: , Rfl:   •  rosuvastatin (CRESTOR) 5 mg tablet, Take 1 tablet (5 mg total) by mouth daily, Disp: 90 tablet, Rfl: 3  •  tadalafil (CIALIS) 20 MG tablet, Take 1 tablet (20 mg total) by mouth daily as needed for erectile dysfunction, Disp: 10 tablet, Rfl: 1  •  traMADol (ULTRAM) 50 mg tablet, every 6 (six) hours, Disp: , Rfl:   •  cefdinir (OMNICEF) 300 mg capsule, cefdinir 300 mg capsule (Patient not taking: Reported on 11/30/2022), Disp: , Rfl:   •  doxycycline hyclate (VIBRA-TABS) 100 mg tablet, Every 12 hours (Patient not taking: Reported on 11/30/2022), Disp: , Rfl:       Active Problems     Patient Active Problem List   Diagnosis   • Contact dermatitis   • Depression with anxiety   • Diabetes mellitus with neurological manifestation ([facility] )   • Diabetic polyneuropathy ([facility] [address] )   • Enthesopathy of knee   • Essential hypertension   • Insomnia   • Nail abnormalities   • Neuropathy of foot   • Obesity, unspecified   • Type 2 diabetes mellitus (HCC)   • Type II or unspecified type diabetes mellitus without mention of complication, uncontrolled   • Microalbuminuria   • Hyperlipidemia LDL goal <70   • Skin ulcer of toe of right foot, limited to breakdown of skin (HCC)   • Non-intractable vomiting   • Elevated lactic acid level   • Prostate cancer [facility])         Past Medical History     Past Medical History:   Diagnosis Date   • Allergic rhinitis    • Anxiety disorder     Last assessed 2/7/2006    • Cancer ([facility] [address] )     Possible 11/18/22   • Carpal tunnel syndrome    • Diabetes mellitus ([facility] [address] )    • Diabetes mellitus with neurological manifestation ([facility] ) 04/06/2012    Last assessed 3/29/2016    • Diabetes type 2, uncontrolled     Last assessed 4/6/2016    • Disease of thyroid gland    • DM II (diabetes mellitus, type II), controlled ([facility] )     Last assessed 10/20/2014    • Essential hypertension     Last assesssed 2/7/2006    • Hypertension    • Insomnia     Last assessed 1/13/2011    • Numbness and tingling of foot     Resolved 3/29/2016    • Obesity    • Old disruption of knee ligament     Last assessed 3/26/2008    • Tarsal tunnel syndrome    • Urinary frequency     Resolved 3/29/2016    • Visual impairment          Surgical History     Past Surgical History:   Procedure Laterality Date   • COLONOSCOPY     • FOOT SURGERY     • W[place] AMPUTATION TOE,I-P JT Right 11/23/2022    Procedure: AMPUTATION 3RD TOE;  Surgeon: Chuck Alfred DPM;  Location: [address] [place] [address];  Service: Podiatry   • PROSTATE BIOPSY     • WISDOM TOOTH EXTRACTION           Family History     Family History   Problem Relation Age of Onset   • Heart attack Mother 79   • Valvular heart disease Mother    • Hypotension Mother    • Pancreatic cancer Father 76   • Diabetes Father    • Lung cancer Sister 64        not a smoker   • Cancer Brother         bile duct    • Colon cancer Maternal Grandmother 58   • Diabetes Maternal Grandmother    • Pancreatic cancer Paternal Aunt    • Pancreatic cancer Paternal Uncle    • Hypertension Neg Hx    • Prostate cancer Neg Hx          Social History     Social History     Social History     Tobacco Use   Smoking Status Never   Smokeless Tobacco Never         Pertinent Lab Values     Lab Results   Component Value Date    CREATININE 1 04 10/10/2022       Lab Results   Component Value Date    PSA 7 21 (H) 10/10/2022    PSA 8 86 (H) 06/27/2022    PSA 6 70 (H) 03/23/2022       @RESULTRCNT(1H])@      Pertinent Imaging      - n/a    Portions of the record may have been created with voice recognition software   Occasional wrong word or "sound a like" substitutions may have occurred due to the inherent limitations of voice recognition software   Read the chart carefully and recognize, using context, where substitutions have occurred

## 2022-12-06 ENCOUNTER — OFFICE VISIT (OUTPATIENT)
Dept: FAMILY MEDICINE CLINIC | Facility: CLINIC | Age: 67
End: 2022-12-06

## 2022-12-06 VITALS
DIASTOLIC BLOOD PRESSURE: 70 MMHG | SYSTOLIC BLOOD PRESSURE: 120 MMHG | WEIGHT: 248 LBS | BODY MASS INDEX: 30.84 KG/M2 | HEART RATE: 101 BPM | OXYGEN SATURATION: 97 % | HEIGHT: 75 IN

## 2022-12-06 DIAGNOSIS — E11.42 TYPE 2 DIABETES MELLITUS WITH DIABETIC POLYNEUROPATHY, WITHOUT LONG-TERM CURRENT USE OF INSULIN (HCC): ICD-10-CM

## 2022-12-06 DIAGNOSIS — I10 PRIMARY HYPERTENSION: ICD-10-CM

## 2022-12-06 DIAGNOSIS — E78.5 HYPERLIPIDEMIA LDL GOAL <70: ICD-10-CM

## 2022-12-06 DIAGNOSIS — Z28.21 PNEUMOCOCCAL VACCINATION DECLINED BY PATIENT: ICD-10-CM

## 2022-12-06 DIAGNOSIS — Z28.21 TETANUS, DIPHTHERIA, AND ACELLULAR PERTUSSIS (TDAP) VACCINATION DECLINED: ICD-10-CM

## 2022-12-06 DIAGNOSIS — Z28.21 INFLUENZA VACCINATION DECLINED BY PATIENT: ICD-10-CM

## 2022-12-06 DIAGNOSIS — S98.131A AMPUTATION OF TOE OF RIGHT FOOT (HCC): ICD-10-CM

## 2022-12-06 DIAGNOSIS — E03.9 HYPOTHYROIDISM, UNSPECIFIED TYPE: Primary | ICD-10-CM

## 2022-12-06 NOTE — PROGRESS NOTES
Assessment/Plan:   Diagnoses and all orders for this visit:    Hypothyroidism, unspecified type  -     TSH, 3rd generation with Free T4 reflex; Future  - cont Levothyroxine 25mcg QAM for now  - repeat labs in 1/2023 and RTO after for f/u - pt aware and agreeable     Influenza vaccination declined by patient  Pneumococcal vaccination declined by patient  Tetanus, diphtheria, and acellular pertussis (Tdap) vaccination declined    Type 2 diabetes mellitus with diabetic polyneuropathy, without long-term current use of insulin ([facility] )  -     HEMOGLOBIN A1C W/ EAG ESTIMATION; Future  -     Microalbumin / creatinine urine ratio; Future  -     Comprehensive metabolic panel; Future  - A1c (6/27/2022): 7 1   - declined Tdap, Flu and PCV20 in the office today   - goes to [facility] - goes annually - has an appt scheduled for 1/2023  - follows routinely with Podiatry   - repeat labs in 1/2023 and RTO after for f/u - pt aware and agreeable     Primary hypertension  - BP stable in the office on my repeat  - will check urine microalbumin   - cont current regimen   - was discharged from [facility]     Hyperlipidemia LDL goal <70  -     Lipid panel; Future    Amputation of toe of right foot ([facility] [address] )  - s/p amputation of 3rd toe on R-foot 2/2 osteomyelitis on 11/23/2022 - has f/u scheduled with Podiatry next week         Subjective:    Patient ID: Rosi Coats is a 74 y o  male    HPI  72yo M presents to the office for f/u  - s/p prostate bx - "quiet cancer" - repeat PSA in 1yr    - repeat TSH within range on 11/17/2022   - s/p amputation of 3rd toe on R-foot 2/2 osteomyelitis on 11/23/2022 - has f/u scheduled with Podiatry next week   - due for screening labs  - goes to [facility] - goes annually - has an appt scheduled for 1/2023  - declined Tdap, Flu and PCV20 in the office today   - does not currently need RFs  - frustrated that he cannot currently exercise   - denies F/C/N/V/CP/palpitations/SOB/wheezing/abd pain/edema  - repeat BP, on my check, 120/70       The following portions of the patient's history were reviewed and updated as appropriate: allergies, current medications, past family history, past medical history, past social history, past surgical history and problem list     Review of Systems  as per HPI    Objective:  /70 (BP Location: Left arm, Patient Position: Sitting, Cuff Size: Large)   Pulse 101   Ht 6' 3" (1 905 m)   Wt 112 kg (248 lb)   SpO2 97%   BMI 31 00 kg/m²    Physical Exam  Vitals reviewed  Constitutional:       General: He is not in acute distress  Appearance: Normal appearance  He is not ill-appearing, toxic-appearing or diaphoretic  HENT:      Head: Normocephalic and atraumatic  Right Ear: External ear normal       Left Ear: External ear normal       Nose: Nose normal    Eyes:      General: No scleral icterus  Right eye: No discharge  Left eye: No discharge  Extraocular Movements: Extraocular movements intact  Conjunctiva/sclera: Conjunctivae normal    Cardiovascular:      Rate and Rhythm: Normal rate and regular rhythm  Heart sounds: Normal heart sounds  Pulmonary:      Effort: Pulmonary effort is normal  No respiratory distress  Breath sounds: Normal breath sounds  No stridor  No wheezing, rhonchi or rales  Abdominal:      Palpations: Abdomen is soft  Musculoskeletal:      Cervical back: Normal range of motion  Right lower leg: No edema  Left lower leg: No edema  Comments: R-foot in boot and ambulating with cane    Skin:     General: Skin is warm  Neurological:      General: No focal deficit present  Mental Status: He is alert and oriented to person, place, and time  Psychiatric:         Mood and Affect: Mood normal          Behavior: Behavior normal          BMI Counseling: Body mass index is 31 kg/m²  The BMI is above normal  Nutrition recommendations include 3-5 servings of fruits/vegetables daily  Exercise recommendations include exercising 3-5 times per week

## 2022-12-11 DIAGNOSIS — E03.9 HYPOTHYROIDISM, UNSPECIFIED TYPE: ICD-10-CM

## 2022-12-12 RX ORDER — LEVOTHYROXINE SODIUM 0.03 MG/1
25 TABLET ORAL
Qty: 60 TABLET | Refills: 0 | Status: SHIPPED | OUTPATIENT
Start: 2022-12-12

## 2022-12-27 LAB
ALBUMIN SERPL-MCNC: 3.9 G/DL (ref 3.6–5.1)
ALBUMIN/CREAT UR: 24 MCG/MG CREAT
ALBUMIN/GLOB SERPL: 1.5 (CALC) (ref 1–2.5)
ALP SERPL-CCNC: 64 U/L (ref 35–144)
ALT SERPL-CCNC: 12 U/L (ref 9–46)
AST SERPL-CCNC: 21 U/L (ref 10–35)
BILIRUB SERPL-MCNC: 0.6 MG/DL (ref 0.2–1.2)
BUN SERPL-MCNC: 25 MG/DL (ref 7–25)
BUN/CREAT SERPL: ABNORMAL (CALC) (ref 6–22)
CALCIUM SERPL-MCNC: 9.4 MG/DL (ref 8.6–10.3)
CHLORIDE SERPL-SCNC: 98 MMOL/L (ref 98–110)
CHOLEST SERPL-MCNC: 113 MG/DL
CHOLEST/HDLC SERPL: 2.6 (CALC)
CO2 SERPL-SCNC: 26 MMOL/L (ref 20–32)
CREAT SERPL-MCNC: 1.19 MG/DL (ref 0.7–1.35)
CREAT UR-MCNC: 116 MG/DL (ref 20–320)
EST. AVERAGE GLUCOSE BLD GHB EST-MCNC: 200 MG/DL
EST. AVERAGE GLUCOSE BLD GHB EST-SCNC: 11.1 MMOL/L
GFR/BSA.PRED SERPLBLD CYS-BASED-ARV: 68 ML/MIN/1.73M2
GLOBULIN SER CALC-MCNC: 2.6 G/DL (CALC) (ref 1.9–3.7)
GLUCOSE SERPL-MCNC: 262 MG/DL (ref 65–99)
HBA1C MFR BLD: 8.6 % OF TOTAL HGB
HDLC SERPL-MCNC: 43 MG/DL
LDLC SERPL CALC-MCNC: 47 MG/DL (CALC)
MICROALBUMIN UR-MCNC: 2.8 MG/DL
NONHDLC SERPL-MCNC: 70 MG/DL (CALC)
POTASSIUM SERPL-SCNC: 4.1 MMOL/L (ref 3.5–5.3)
PROT SERPL-MCNC: 6.5 G/DL (ref 6.1–8.1)
SODIUM SERPL-SCNC: 134 MMOL/L (ref 135–146)
T4 FREE SERPL-MCNC: 1.2 NG/DL (ref 0.8–1.8)
TRIGL SERPL-MCNC: 155 MG/DL
TSH SERPL-ACNC: 4.67 MIU/L (ref 0.4–4.5)

## 2023-01-10 LAB
LEFT EYE DIABETIC RETINOPATHY: NORMAL
RIGHT EYE DIABETIC RETINOPATHY: NORMAL

## 2023-01-18 ENCOUNTER — OFFICE VISIT (OUTPATIENT)
Dept: FAMILY MEDICINE CLINIC | Facility: CLINIC | Age: 68
End: 2023-01-18

## 2023-01-18 VITALS
HEIGHT: 75 IN | DIASTOLIC BLOOD PRESSURE: 70 MMHG | RESPIRATION RATE: 16 BRPM | BODY MASS INDEX: 30.46 KG/M2 | WEIGHT: 245 LBS | SYSTOLIC BLOOD PRESSURE: 130 MMHG | HEART RATE: 103 BPM | OXYGEN SATURATION: 98 %

## 2023-01-18 DIAGNOSIS — I10 PRIMARY HYPERTENSION: ICD-10-CM

## 2023-01-18 DIAGNOSIS — Z28.21 TETANUS, DIPHTHERIA, AND ACELLULAR PERTUSSIS (TDAP) VACCINATION DECLINED: ICD-10-CM

## 2023-01-18 DIAGNOSIS — E11.42 TYPE 2 DIABETES MELLITUS WITH DIABETIC POLYNEUROPATHY, WITHOUT LONG-TERM CURRENT USE OF INSULIN (HCC): ICD-10-CM

## 2023-01-18 DIAGNOSIS — Z28.21 INFLUENZA VACCINATION DECLINED BY PATIENT: ICD-10-CM

## 2023-01-18 DIAGNOSIS — R73.9 HYPERGLYCEMIA: Primary | ICD-10-CM

## 2023-01-18 DIAGNOSIS — E03.9 HYPOTHYROIDISM, UNSPECIFIED TYPE: ICD-10-CM

## 2023-01-18 DIAGNOSIS — S98.131A AMPUTATION OF TOE OF RIGHT FOOT (HCC): ICD-10-CM

## 2023-01-18 DIAGNOSIS — E78.5 HYPERLIPIDEMIA LDL GOAL <70: ICD-10-CM

## 2023-01-18 DIAGNOSIS — Z28.21 PNEUMOCOCCAL VACCINATION DECLINED BY PATIENT: ICD-10-CM

## 2023-01-18 RX ORDER — LEVOTHYROXINE SODIUM 0.05 MG/1
50 TABLET ORAL
Qty: 30 TABLET | Refills: 1 | Status: SHIPPED | OUTPATIENT
Start: 2023-01-18

## 2023-01-18 RX ORDER — GLIPIZIDE 5 MG/1
5 TABLET, FILM COATED, EXTENDED RELEASE ORAL DAILY
Qty: 30 TABLET | Refills: 2 | Status: SHIPPED | OUTPATIENT
Start: 2023-01-18 | End: 2023-04-18

## 2023-01-18 NOTE — PROGRESS NOTES
Assessment/Plan:   Diagnoses and all orders for this visit:    Hyperglycemia  - FBS on labs done 12/27/2022 = 262  - A1c (12/27/2022): 8 6 <-- 7 1   - has not been exercising  s/p podiatric procedure and has gained weight  - cont Metformin 1000mg BID and will add Glipizide 5mg QD for now  - RTO in 1month with BMP for close f/u - pt aware and agreeable   Type 2 diabetes mellitus with diabetic polyneuropathy, without long-term current use of insulin (HCC)  -     glipiZIDE (GLUCOTROL XL) 5 mg 24 hr tablet; Take 1 tablet (5 mg total) by mouth daily  -     Basic metabolic panel; Future    Hypothyroidism, unspecified type  -     levothyroxine 50 mcg tablet; Take 1 tablet (50 mcg total) by mouth daily in the early morning  -     TSH, 3rd generation with Free T4 reflex; Future  - TSH elevated   - has not been exercising s/p podiatric procedure and has gained weight  - will increase Levothyroxine from 25mcg to 50mcg QAM and repeat labs in 4wks to discern if medication adjustment is warranted     Influenza vaccination declined by patient  Pneumococcal vaccination declined by patient  Tetanus, diphtheria, and acellular pertussis (Tdap) vaccination declined    Primary hypertension  - BP stable   - nml renal function and urine microalbumin     Hyperlipidemia LDL goal <70  - LDL 47   - The ASCVD Risk score (Demetri DK, et al , 2019) failed to calculate for the following reasons: The valid total cholesterol range is 130 to 320 mg/dL    Amputation of toe of right foot ([address] )  - management as per Podiatry             Subjective:    Patient ID: Serina Bishop is a 74 y o  male    HPI   72yo M presents to the office for f/u   - reviewed labs done 12/27/2022: Elevated sugars, A1c 8 6 (<-- 7 1), Elevated TSH   - noted to have blisters on R-foot and being treated by Podiatry   - has been advised no aerobic exercise (used to exercise 1hr QD) - gained weight   - UTD with DM eye exam (1/10/2023)    - declined Tdap, PCV20 and annual Flu vaccine in the office today   - denies F/C/N/V/CP/palpitations/SOB/wheezing/abd pain/LE edema       The following portions of the patient's history were reviewed and updated as appropriate: allergies, current medications, past family history, past medical history, past social history, past surgical history and problem list     Review of Systems  as per HPI    Objective:  /70   Pulse 103   Resp 16   Ht 6' 3" (1 905 m)   Wt 111 kg (245 lb)   SpO2 98%   BMI 30 62 kg/m²    Physical Exam  Vitals reviewed  Constitutional:       General: He is not in acute distress  Appearance: Normal appearance  He is not ill-appearing, toxic-appearing or diaphoretic  HENT:      Head: Normocephalic and atraumatic  Right Ear: External ear normal       Left Ear: External ear normal       Nose: Nose normal    Eyes:      General: No scleral icterus  Right eye: No discharge  Left eye: No discharge  Extraocular Movements: Extraocular movements intact  Conjunctiva/sclera: Conjunctivae normal    Cardiovascular:      Rate and Rhythm: Regular rhythm  Heart sounds: Normal heart sounds  Pulmonary:      Effort: Pulmonary effort is normal       Breath sounds: Normal breath sounds  Abdominal:      Palpations: Abdomen is soft  Musculoskeletal:         General: Normal range of motion  Cervical back: Normal range of motion and neck supple  Right lower leg: No edema  Left lower leg: No edema  Skin:     General: Skin is warm  Neurological:      General: No focal deficit present  Mental Status: He is alert and oriented to person, place, and time  Psychiatric:         Mood and Affect: Mood normal          Behavior: Behavior normal          BMI Counseling: Body mass index is 30 62 kg/m²  The BMI is above normal  Nutrition recommendations include 3-5 servings of fruits/vegetables daily  Exercise recommendations include exercising 3-5 times per week

## 2023-02-16 LAB
BUN SERPL-MCNC: 23 MG/DL (ref 7–25)
BUN/CREAT SERPL: ABNORMAL (CALC) (ref 6–22)
CALCIUM SERPL-MCNC: 9 MG/DL (ref 8.6–10.3)
CHLORIDE SERPL-SCNC: 103 MMOL/L (ref 98–110)
CO2 SERPL-SCNC: 26 MMOL/L (ref 20–32)
CREAT SERPL-MCNC: 1.04 MG/DL (ref 0.7–1.35)
GFR/BSA.PRED SERPLBLD CYS-BASED-ARV: 80 ML/MIN/1.73M2
GLUCOSE SERPL-MCNC: 149 MG/DL (ref 65–99)
POTASSIUM SERPL-SCNC: 4 MMOL/L (ref 3.5–5.3)
SODIUM SERPL-SCNC: 138 MMOL/L (ref 135–146)
TSH SERPL-ACNC: 2.38 MIU/L (ref 0.4–4.5)

## 2023-02-21 DIAGNOSIS — I10 PRIMARY HYPERTENSION: ICD-10-CM

## 2023-02-22 DIAGNOSIS — E11.42 TYPE 2 DIABETES MELLITUS WITH DIABETIC POLYNEUROPATHY, WITHOUT LONG-TERM CURRENT USE OF INSULIN (HCC): ICD-10-CM

## 2023-02-24 DIAGNOSIS — E11.42 TYPE 2 DIABETES MELLITUS WITH DIABETIC POLYNEUROPATHY, WITHOUT LONG-TERM CURRENT USE OF INSULIN (HCC): ICD-10-CM

## 2023-02-24 RX ORDER — CHLORTHALIDONE 25 MG/1
TABLET ORAL
Qty: 45 TABLET | Refills: 1 | Status: SHIPPED | OUTPATIENT
Start: 2023-02-24

## 2023-02-27 RX ORDER — GABAPENTIN 100 MG/1
CAPSULE ORAL
Qty: 540 CAPSULE | Refills: 1 | Status: SHIPPED | OUTPATIENT
Start: 2023-02-27

## 2023-03-03 ENCOUNTER — OFFICE VISIT (OUTPATIENT)
Dept: FAMILY MEDICINE CLINIC | Facility: CLINIC | Age: 68
End: 2023-03-03

## 2023-03-03 VITALS
HEIGHT: 75 IN | DIASTOLIC BLOOD PRESSURE: 80 MMHG | OXYGEN SATURATION: 98 % | SYSTOLIC BLOOD PRESSURE: 146 MMHG | WEIGHT: 251 LBS | BODY MASS INDEX: 31.21 KG/M2 | HEART RATE: 108 BPM | RESPIRATION RATE: 16 BRPM

## 2023-03-03 DIAGNOSIS — Z28.21 PNEUMOCOCCAL VACCINATION DECLINED BY PATIENT: ICD-10-CM

## 2023-03-03 DIAGNOSIS — S98.131A AMPUTATION OF TOE OF RIGHT FOOT (HCC): ICD-10-CM

## 2023-03-03 DIAGNOSIS — E03.9 HYPOTHYROIDISM, UNSPECIFIED TYPE: ICD-10-CM

## 2023-03-03 DIAGNOSIS — C61 PROSTATE CANCER (HCC): ICD-10-CM

## 2023-03-03 DIAGNOSIS — Z28.21 TETANUS, DIPHTHERIA, AND ACELLULAR PERTUSSIS (TDAP) VACCINATION DECLINED: ICD-10-CM

## 2023-03-03 DIAGNOSIS — Z28.21 INFLUENZA VACCINATION DECLINED BY PATIENT: ICD-10-CM

## 2023-03-03 DIAGNOSIS — E11.42 TYPE 2 DIABETES MELLITUS WITH DIABETIC POLYNEUROPATHY, WITHOUT LONG-TERM CURRENT USE OF INSULIN (HCC): Primary | ICD-10-CM

## 2023-03-03 DIAGNOSIS — E78.5 HYPERLIPIDEMIA LDL GOAL <70: ICD-10-CM

## 2023-03-03 RX ORDER — GLIPIZIDE 5 MG/1
5 TABLET, FILM COATED, EXTENDED RELEASE ORAL DAILY
Qty: 90 TABLET | Refills: 0 | Status: SHIPPED | OUTPATIENT
Start: 2023-03-03 | End: 2023-06-01

## 2023-03-03 RX ORDER — GLIPIZIDE 5 MG/1
5 TABLET, FILM COATED, EXTENDED RELEASE ORAL DAILY
Qty: 90 TABLET | Refills: 0 | Status: SHIPPED | OUTPATIENT
Start: 2023-03-03 | End: 2023-03-03

## 2023-03-03 RX ORDER — ROSUVASTATIN CALCIUM 5 MG/1
5 TABLET, COATED ORAL DAILY
Qty: 90 TABLET | Refills: 3 | Status: SHIPPED | OUTPATIENT
Start: 2023-03-03

## 2023-03-03 RX ORDER — LEVOTHYROXINE SODIUM 0.05 MG/1
50 TABLET ORAL
Qty: 90 TABLET | Refills: 0 | Status: SHIPPED | OUTPATIENT
Start: 2023-03-03

## 2023-03-03 NOTE — PROGRESS NOTES
Assessment/Plan:   Diagnoses and all orders for this visit:    Type 2 diabetes mellitus with diabetic polyneuropathy, without long-term current use of insulin (HCC)  -     Diabetic foot exam; Future  -     glipiZIDE (GLUCOTROL XL) 5 mg 24 hr tablet; Take 1 tablet (5 mg total) by mouth daily  -     Hemoglobin A1C; Future  -     Basic metabolic panel; Future  - reviewed BMP and TSH done 2/16/2023   -  <-- 262   - A1c (12/27/2022): 8 6 <-- 7 1   - has not been exercising s/p podiatric procedure and has gained weight  - on Metformin 1000mg BID and Glipizide 5mg QD - cont current regimen for now  - UTD with DM eye exam (1/10/2023)    - declined Tdap, PCV20 and annual Flu vaccine in the office today   - DM foot exam as documented below  - follows with Podiatry Q9wks   - RTO in 3months, with labs, for close f/u - pt aware and agreeable     Amputation of toe of right foot ([facility] [address] )  - management as per Podiatry     Hyperlipidemia LDL goal <70  -     rosuvastatin (CRESTOR) 5 mg tablet; Take 1 tablet (5 mg total) by mouth daily  - The ASCVD Risk score (Demetri DK, et al , 2019) failed to calculate for the following reasons: The valid total cholesterol range is 130 to 320 mg/dL    Hypothyroidism, unspecified type  -     levothyroxine 50 mcg tablet; Take 1 tablet (50 mcg total) by mouth daily in the early morning  -     TSH, 3rd generation with Free T4 reflex; Future  - nml TSH   - cont Levothyroxine 50mcg QAM   - RTO in 3months with labs for f/u - pt aware and agreeable     [facility] )  - follows with [facility]     Influenza vaccination declined by patient  Pneumococcal vaccination declined by patient  Tetanus, diphtheria, and acellular pertussis (Tdap) vaccination declined          Subjective:    Patient ID: Facundo Lau is a 74 y o  male    HPI  74yo M presents to the office for f/u   - reviewed BMP and TSH done 2/16/2023   -  <-- 262   - A1c (12/27/2022): 8 6 <-- 7 1   - has not been exercising s/p podiatric procedure and has gained weight  - on Metformin 1000mg BID and Glipizide 5mg QD   - BP elevated as pt with concerns re: his feet s/p 3rd toe amputation of R-foot   - UTD with DM eye exam (1/10/2023)    - declined Tdap, PCV20 and annual Flu vaccine in the office today   - denies F/C/N/V/CP/palpitations/SOB/wheezing/abd pain/LE edema        The following portions of the patient's history were reviewed and updated as appropriate: allergies, current medications, past family history, past medical history, past social history, past surgical history and problem list     Review of Systems  as per HPI    Objective:  /80 (BP Location: Left arm, Patient Position: Sitting, Cuff Size: Large)   Pulse (!) 108   Resp 16   Ht 6' 3" (1 905 m)   Wt 114 kg (251 lb)   SpO2 98%   BMI 31 37 kg/m²    Physical Exam  Vitals reviewed  Constitutional:       General: He is not in acute distress  Appearance: Normal appearance  He is not ill-appearing, toxic-appearing or diaphoretic  HENT:      Head: Normocephalic and atraumatic  Right Ear: External ear normal       Left Ear: External ear normal       Nose: Nose normal    Eyes:      General: No scleral icterus  Right eye: No discharge  Left eye: No discharge  Extraocular Movements: Extraocular movements intact  Conjunctiva/sclera: Conjunctivae normal    Cardiovascular:      Rate and Rhythm: Normal rate and regular rhythm  Pulses: no weak pulses          Dorsalis pedis pulses are 2+ on the right side and 2+ on the left side  Heart sounds: Normal heart sounds  Pulmonary:      Effort: Pulmonary effort is normal       Breath sounds: Normal breath sounds  Abdominal:      Palpations: Abdomen is soft  Musculoskeletal:         General: Normal range of motion  Cervical back: Normal range of motion  Right lower leg: No edema  Left lower leg: No edema     Feet:      Right foot:      Skin integrity: Callus and dry skin present  Left foot:      Skin integrity: Callus and dry skin present  Skin:     General: Skin is warm  Neurological:      General: No focal deficit present  Mental Status: He is alert and oriented to person, place, and time  Psychiatric:         Mood and Affect: Mood normal          Behavior: Behavior normal            Patient's shoes and socks removed  Right Foot/Ankle   Right Foot Inspection  Skin Exam: dry skin, callus and callus  Toe Exam: ROM and strength within normal limits  Sensory   Monofilament testing: intact    Vascular  The right DP pulse is 2+  Left Foot/Ankle  Left Foot Inspection  Skin Exam: dry skin and callus  Toe Exam: ROM and strength within normal limits  Sensory   Monofilament testing: intact    Vascular  The left DP pulse is 2+  Assign Risk Category  Deformity present  No loss of protective sensation  No weak pulses  Risk: 0    BMI Counseling: Body mass index is 31 37 kg/m²  The BMI is above normal  Nutrition recommendations include 3-5 servings of fruits/vegetables daily  Exercise recommendations include exercising 3-5 times per week

## 2023-03-06 DIAGNOSIS — I10 ELEVATED BLOOD PRESSURE READING IN OFFICE WITH DIAGNOSIS OF HYPERTENSION: ICD-10-CM

## 2023-03-06 RX ORDER — LISINOPRIL 20 MG/1
20 TABLET ORAL DAILY
Qty: 90 TABLET | Refills: 1 | Status: SHIPPED | OUTPATIENT
Start: 2023-03-06

## 2023-05-26 DIAGNOSIS — E11.42 TYPE 2 DIABETES MELLITUS WITH DIABETIC POLYNEUROPATHY, WITHOUT LONG-TERM CURRENT USE OF INSULIN (HCC): ICD-10-CM

## 2023-05-26 RX ORDER — GLIPIZIDE 5 MG/1
5 TABLET, FILM COATED, EXTENDED RELEASE ORAL DAILY
Qty: 90 TABLET | Refills: 0 | Status: SHIPPED | OUTPATIENT
Start: 2023-05-26 | End: 2023-08-24

## 2023-06-07 LAB
BUN SERPL-MCNC: 23 MG/DL (ref 7–25)
BUN/CREAT SERPL: ABNORMAL (CALC) (ref 6–22)
CALCIUM SERPL-MCNC: 8.8 MG/DL (ref 8.6–10.3)
CHLORIDE SERPL-SCNC: 101 MMOL/L (ref 98–110)
CO2 SERPL-SCNC: 26 MMOL/L (ref 20–32)
CREAT SERPL-MCNC: 1.01 MG/DL (ref 0.7–1.35)
GFR/BSA.PRED SERPLBLD CYS-BASED-ARV: 83 ML/MIN/1.73M2
GLUCOSE SERPL-MCNC: 117 MG/DL (ref 65–99)
HBA1C MFR BLD: 5.9 % OF TOTAL HGB
POTASSIUM SERPL-SCNC: 4.4 MMOL/L (ref 3.5–5.3)
SODIUM SERPL-SCNC: 136 MMOL/L (ref 135–146)
TSH SERPL-ACNC: 2.49 MIU/L (ref 0.4–4.5)

## 2023-06-16 ENCOUNTER — OFFICE VISIT (OUTPATIENT)
Dept: FAMILY MEDICINE CLINIC | Facility: CLINIC | Age: 68
End: 2023-06-16
Payer: MEDICARE

## 2023-06-16 VITALS
BODY MASS INDEX: 31.21 KG/M2 | HEART RATE: 107 BPM | DIASTOLIC BLOOD PRESSURE: 76 MMHG | RESPIRATION RATE: 16 BRPM | HEIGHT: 75 IN | WEIGHT: 251 LBS | OXYGEN SATURATION: 96 % | SYSTOLIC BLOOD PRESSURE: 140 MMHG

## 2023-06-16 DIAGNOSIS — Z28.21 TETANUS, DIPHTHERIA, AND ACELLULAR PERTUSSIS (TDAP) VACCINATION DECLINED: ICD-10-CM

## 2023-06-16 DIAGNOSIS — Z28.21 PNEUMOCOCCAL VACCINATION DECLINED BY PATIENT: ICD-10-CM

## 2023-06-16 DIAGNOSIS — Z12.11 COLON CANCER SCREENING: ICD-10-CM

## 2023-06-16 DIAGNOSIS — M25.562 ACUTE PAIN OF LEFT KNEE: ICD-10-CM

## 2023-06-16 DIAGNOSIS — G56.03 BILATERAL CARPAL TUNNEL SYNDROME: ICD-10-CM

## 2023-06-16 DIAGNOSIS — E78.5 HYPERLIPIDEMIA LDL GOAL <70: ICD-10-CM

## 2023-06-16 DIAGNOSIS — I10 ELEVATED BLOOD PRESSURE READING IN OFFICE WITH DIAGNOSIS OF HYPERTENSION: ICD-10-CM

## 2023-06-16 DIAGNOSIS — E03.9 HYPOTHYROIDISM, UNSPECIFIED TYPE: ICD-10-CM

## 2023-06-16 DIAGNOSIS — E11.42 TYPE 2 DIABETES MELLITUS WITH DIABETIC POLYNEUROPATHY, WITHOUT LONG-TERM CURRENT USE OF INSULIN (HCC): Primary | ICD-10-CM

## 2023-06-16 PROBLEM — M86.171 OTHER ACUTE OSTEOMYELITIS, RIGHT ANKLE AND FOOT (HCC): Status: ACTIVE | Noted: 2023-06-16

## 2023-06-16 PROCEDURE — 99214 OFFICE O/P EST MOD 30 MIN: CPT | Performed by: FAMILY MEDICINE

## 2023-06-16 RX ORDER — LISINOPRIL 20 MG/1
30 TABLET ORAL DAILY
Qty: 90 TABLET | Refills: 0
Start: 2023-06-16

## 2023-06-16 RX ORDER — NAPROXEN 500 MG/1
500 TABLET ORAL 2 TIMES DAILY WITH MEALS
Qty: 28 TABLET | Refills: 0 | Status: SHIPPED | OUTPATIENT
Start: 2023-06-16

## 2023-06-16 RX ORDER — LEVOTHYROXINE SODIUM 0.05 MG/1
50 TABLET ORAL
Qty: 90 TABLET | Refills: 0 | Status: SHIPPED | OUTPATIENT
Start: 2023-06-16

## 2023-06-16 NOTE — PROGRESS NOTES
Assessment/Plan:   Diagnoses and all orders for this visit:    Type 2 diabetes mellitus with diabetic polyneuropathy, without long-term current use of insulin ([facility] [address] )  - reviewed labs done 6/6/2023   -  <-- 149 <-- 262   - A1c (6/6/2023): 5 9 <-- 8 6 <-- 7 1   - on Metformin 1000mg BID and Glipizide 5mg QD   - UTD with DM eye exam (1/10/2023)    - UTD with DM foot exam in the office (3/3/2023)    - declined Tdap and PCV20 in the office today   - follows with Podiatry Q9wks     Elevated blood pressure reading in office with diagnosis of hypertension  -     Albumin / creatinine urine ratio; Future  -     lisinopril (ZESTRIL) 20 mg tablet; Take 1 5 tablets (30 mg total) by mouth daily  - BP in the office 140/76 on my repeat   - nml renal function on BMP   - advised pt to increase ACEI from 20mg QD to 30mg QD  - cont Chlorthalidone 25mg QD   - RTO in 2-3wks, with urine microalbumin, for BP check - pt aware and agreeable     Hyperlipidemia LDL goal <70  - cont Crestor 5mg QHS     Hypothyroidism, unspecified type  -     levothyroxine 50 mcg tablet; Take 1 tablet (50 mcg total) by mouth daily in the early morning    Colon cancer screening  - last Cscope 8/2020 - 3yr recall - advised to call GI to schedule - pt aware and agreeable     Acute pain of left knee  -     naproxen (NAPROSYN) 500 mg tablet; Take 1 tablet (500 mg total) by mouth 2 (two) times a day with meals  -     Ambulatory Referral to Physical Therapy; Future    Bilateral carpal tunnel syndrome  -     naproxen (NAPROSYN) 500 mg tablet; Take 1 tablet (500 mg total) by mouth 2 (two) times a day with meals    Tetanus, diphtheria, and acellular pertussis (Tdap) vaccination declined  Pneumococcal vaccination declined by patient          Subjective:    Patient ID: Luis Casarez is a 74 y o  male    HPI   74yo M presents to the office for f/u   - reviewed labs done 6/6/2023   -  <-- 149 <-- 262   - A1c (12/27/2022): 5 9 <-- 8 6 <-- 7 1   - stable TSH "  - on Metformin 1000mg BID and Glipizide 5mg QD   - UTD with DM eye exam (1/10/2023)    - declined Tdap and PCV20 in the office today   - denies F/C/N/V/change in vision/HA/CP/palpitations/SOB/wheezing/abd pain/LE edema  - (+) L-knee has been bothering pt for months   - sometimes wakes up at night and when bending to get in and out of car   - if walk 200yards or stand for >1hr it starts to hurt - \"dull irritating pain\"   - tried Aleve for 1 5days without help   - has not been able to exercise       The following portions of the patient's history were reviewed and updated as appropriate: allergies, current medications, past family history, past medical history, past social history, past surgical history and problem list     Review of Systems  as per HPI    Objective:  /76 (BP Location: Right arm, Patient Position: Sitting, Cuff Size: Large)   Pulse (!) 107   Resp 16   Ht 6' 3\" (1 905 m)   Wt 114 kg (251 lb)   SpO2 96%   BMI 31 37 kg/m²    Physical Exam  Vitals reviewed  Constitutional:       General: He is not in acute distress  Appearance: Normal appearance  He is not ill-appearing, toxic-appearing or diaphoretic  HENT:      Head: Normocephalic and atraumatic  Right Ear: External ear normal       Left Ear: External ear normal       Nose: Nose normal    Eyes:      General: No scleral icterus  Right eye: No discharge  Left eye: No discharge  Extraocular Movements: Extraocular movements intact  Conjunctiva/sclera: Conjunctivae normal    Pulmonary:      Effort: Pulmonary effort is normal    Abdominal:      Palpations: Abdomen is soft  Musculoskeletal:         General: No swelling or tenderness  Cervical back: Normal range of motion  Right lower leg: No edema  Left lower leg: No edema  Skin:     General: Skin is warm  Neurological:      General: No focal deficit present  Mental Status: He is alert and oriented to person, place, and time   " Psychiatric:         Mood and Affect: Mood normal          Behavior: Behavior normal          BMI Counseling: Body mass index is 31 37 kg/m²  The BMI is above normal  Nutrition recommendations include 3-5 servings of fruits/vegetables daily  Exercise recommendations include exercising 3-5 times per week

## 2023-06-29 LAB
ALBUMIN/CREAT UR: 50 MCG/MG CREAT
CREAT UR-MCNC: 125 MG/DL (ref 20–320)
MICROALBUMIN UR-MCNC: 6.2 MG/DL

## 2023-07-05 ENCOUNTER — OFFICE VISIT (OUTPATIENT)
Dept: FAMILY MEDICINE CLINIC | Facility: CLINIC | Age: 68
End: 2023-07-05
Payer: MEDICARE

## 2023-07-05 VITALS
HEIGHT: 75 IN | HEART RATE: 95 BPM | OXYGEN SATURATION: 96 % | WEIGHT: 263 LBS | RESPIRATION RATE: 16 BRPM | BODY MASS INDEX: 32.7 KG/M2 | SYSTOLIC BLOOD PRESSURE: 146 MMHG | DIASTOLIC BLOOD PRESSURE: 70 MMHG

## 2023-07-05 DIAGNOSIS — Z28.21 PNEUMOCOCCAL VACCINATION DECLINED BY PATIENT: ICD-10-CM

## 2023-07-05 DIAGNOSIS — M25.562 ACUTE PAIN OF LEFT KNEE: ICD-10-CM

## 2023-07-05 DIAGNOSIS — M86.171 OTHER ACUTE OSTEOMYELITIS, RIGHT ANKLE AND FOOT (HCC): ICD-10-CM

## 2023-07-05 DIAGNOSIS — I10 ELEVATED BLOOD PRESSURE READING IN OFFICE WITH DIAGNOSIS OF HYPERTENSION: Primary | ICD-10-CM

## 2023-07-05 DIAGNOSIS — E11.42 TYPE 2 DIABETES MELLITUS WITH DIABETIC POLYNEUROPATHY, WITHOUT LONG-TERM CURRENT USE OF INSULIN (HCC): ICD-10-CM

## 2023-07-05 DIAGNOSIS — G56.03 BILATERAL CARPAL TUNNEL SYNDROME: ICD-10-CM

## 2023-07-05 DIAGNOSIS — L97.512 NON-PRESSURE CHRONIC ULCER OF OTHER PART OF RIGHT FOOT WITH FAT LAYER EXPOSED (HCC): ICD-10-CM

## 2023-07-05 PROCEDURE — 99214 OFFICE O/P EST MOD 30 MIN: CPT | Performed by: FAMILY MEDICINE

## 2023-07-05 RX ORDER — LISINOPRIL 40 MG/1
40 TABLET ORAL DAILY
Qty: 90 TABLET | Refills: 0 | Status: SHIPPED | OUTPATIENT
Start: 2023-07-05

## 2023-07-05 NOTE — PROGRESS NOTES
Assessment/Plan:   Diagnoses and all orders for this visit:    Elevated blood pressure reading in office with diagnosis of hypertension  -     lisinopril (ZESTRIL) 40 mg tablet; Take 1 tablet (40 mg total) by mouth daily  - BP in the office 144/70 and 146/70 on my repeat   - ACEI was increased from 20mg to 30mg QD at last OV given elevated BP   - (+) microalbuminuria on labs done 6/29/2023   - has been taking Naproxen BID for knee pain and CTS  - for now, will increase ACEI to 40mg QD   - cont Chlorthalidone 25mg QD   - RTO in 1month with repeat urine microalbumin for BP check - pt aware and agreeable     Non-pressure chronic ulcer of other part of right foot with fat layer exposed ([address])  Other acute osteomyelitis, right ankle and foot ([address])  - resolved  - follows Q9wks with Podiatry     Acute pain of left knee  - feels better taking Naproxen 500mg BID (has 4 more days left)   - walking more     Bilateral carpal tunnel syndrome  - feeling better with NSAIDs    Type 2 diabetes mellitus with diabetic polyneuropathy, without long-term current use of insulin ([facility])  - on Metformin 1000mg BID and Glipizide 5mg QD   - UTD with DM eye exam (1/10/2023)    - UTD with DM foot exam in the office (3/3/2023)    - declined PCV20 in the office today   - follows with Podiatry Q9wks   Pneumococcal vaccination declined by patient          Subjective:    Patient ID: Mack Hernandez is a 74 y.o. male.   HPI   72yo M presents to the office for f/u   - BP in the office 144/70 and 146/70 on my repeat   - ACEI was increased from 20mg to 30mg QD at last OV given elevated BP   - (+) microalbuminuria on labs done 6/29/2023   - knee and b/l CTS feels better taking Naproxen 500mg BID (has 4 more days left)   - walking more   - has script for PT but has not started yet   - UTD with DM eye exam (1/10/2023)    - declined Tdap and PCV20 in the office today   - denies F/C/N/V/change in vision/HA/CP/palpitations/SOB/wheezing/abd pain/LE edema      The following portions of the patient's history were reviewed and updated as appropriate: allergies, current medications, past family history, past medical history, past social history, past surgical history and problem list.    Review of Systems  as per HPI    Objective:  /70 (BP Location: Right arm, Patient Position: Sitting, Cuff Size: Large)   Pulse 95   Resp 16   Ht 6' 3" (1.905 m)   Wt 119 kg (263 lb)   SpO2 96%   BMI 32.87 kg/m²    Physical Exam  Vitals reviewed. Constitutional:       General: He is not in acute distress. Appearance: Normal appearance. He is not ill-appearing, toxic-appearing or diaphoretic. HENT:      Head: Normocephalic and atraumatic. Right Ear: External ear normal.      Left Ear: External ear normal.      Nose: Nose normal.   Eyes:      General: No scleral icterus. Right eye: No discharge. Left eye: No discharge. Extraocular Movements: Extraocular movements intact. Conjunctiva/sclera: Conjunctivae normal.   Cardiovascular:      Rate and Rhythm: Normal rate and regular rhythm. Heart sounds: Normal heart sounds. Pulmonary:      Effort: Pulmonary effort is normal.      Breath sounds: Normal breath sounds. Abdominal:      Palpations: Abdomen is soft. Musculoskeletal:         General: Normal range of motion. Cervical back: Normal range of motion. Right lower leg: No edema. Left lower leg: No edema. Skin:     General: Skin is warm. Neurological:      General: No focal deficit present. Mental Status: He is alert and oriented to person, place, and time. Psychiatric:         Mood and Affect: Mood normal.         Behavior: Behavior normal.         BMI Counseling: Body mass index is 32.87 kg/m². The BMI is above normal. Nutrition recommendations include 3-5 servings of fruits/vegetables daily. Exercise recommendations include exercising 3-5 times per week.

## 2023-07-25 ENCOUNTER — EVALUATION (OUTPATIENT)
Dept: PHYSICAL THERAPY | Facility: CLINIC | Age: 68
End: 2023-07-25
Payer: MEDICARE

## 2023-07-25 DIAGNOSIS — M25.562 ACUTE PAIN OF LEFT KNEE: ICD-10-CM

## 2023-07-25 PROCEDURE — 97530 THERAPEUTIC ACTIVITIES: CPT

## 2023-07-25 PROCEDURE — 97162 PT EVAL MOD COMPLEX 30 MIN: CPT

## 2023-07-25 NOTE — PROGRESS NOTES
PT Evaluation     Today's date: 2023  Patient name: Zeus Jarrett  : 1955  MRN: [id number]  Referring provider: Doyle Franklin DO  Dx:   Encounter Diagnosis     ICD-10-CM    1. Acute pain of left knee  M25.562 Ambulatory Referral to Physical Therapy          Start Time: 745  Stop Time: 08  Total time in clinic (min): 45 minutes    Assessment  Assessment details: Patient is a 74 y.o. male who presents with reports of Left knee pain that started months ago with no recent injury or fall to cause. Patient presents with pain primarily with knee flexion and weight-bearing tasks such as sit-stand transfers, stair negotiation, squatting, getting in/out of car, prolonged sitting, etc. Patient noted to have limitations in knee mobility with ERP noted at about 100 deg of knee flexion, and has difficulty to achieve full knee extension in standing or supine. This affects his overall stability, mechanics, and tolerance with walking and the abovementioned functional tasks. Special testing of meniscus and ligamentous integrity did not reproduce symptoms. Noted to have TTP at medial and lateral joint lines, but no pain with patellar mobs. Signs and symptoms suggest possible OA that may have been pre-existing and was aggravated for compensating for Right foot surgery last year. Patient is independent with his ADLs and driving, however his current impairments affect his tolerance and participation with prolonged activities and certain household chores that he was able to do without issue per PLOF. Patient will benefit from skilled PT services to improve LE mobility, improve LE strength, reduce pain levels, and improve overall ease with ADLs and other functional tasks to progress towards his PLOF. Patient would benefit from skilled PT services to address these impairments and to maximize function.   Thank you for the referral.    Impairments: abnormal gait, abnormal or restricted ROM, activity intolerance, impaired balance, impaired physical strength, lacks appropriate home exercise program, pain with function, weight-bearing intolerance, poor posture  and poor body mechanics  Functional limitations: prolonged sitting in car, getting in/out of car, stair negotiation, picking up objects from floor, balanceUnderstanding of Dx/Px/POC: good   Prognosis: good    Goals  Impairment Goals 4-6 weeks   In order to maximize function patient will be able to. ..   - Decrease intensity/duration/frequency of pain to 4/10  - Demonstrate symmetrical knee AROM without pain  - Increase hip/knee strength to 4/5 throughout  - Demonstrate improved hip flexibility as demonstrated by increased ROM through therapeutic exercise    Functional Goals 6-8 weeks  In order to return to prior level of function patient will be able to. ..   - Participate in ADL's/IADL's/sport specific activities with no greater than 2/10 pain. - Increase Functional Status Measure (FOTO) to: anticipated at discharge  - Demonstrate independence and compliant with HEP  - Demonstrate a squat and or sit to stand with good mechanics and eccentric control without pain/difficulty/compensation  - Demonstrate functional activities with good core and glute strength without compensation/pain/difficulty   - Ascend and descend stairs without increased pain/compensation/difficulty and a reciprocal gait pattern. Plan  Patient would benefit from: skilled PT  Planned modality interventions: cryotherapy  Other planned modality interventions: moist heat  Planned therapy interventions: joint mobilization, manual therapy, neuromuscular re-education, patient education, strengthening, stretching, therapeutic activities, therapeutic exercise, home exercise program, functional ROM exercises, Green taping, postural training, balance/weight bearing training, body mechanics training, flexibility, IASTM, kinesiology taping, massage and transfer training  Frequency: 1-2x/week.   Duration in weeks: 4  Treatment plan discussed with: patient, PTA and referring physician        Subjective Evaluation    History of Present Illness  Mechanism of injury: Custer Apley is a 74y.o. year-old male who presents to outpatient PT with reports of Left knee pain that started since end of . He reports he had Right foot surgery in 2022 and was in a 3inch heel boot for about 3-4 weeks. He reports since then, he has been feeling pain that has been progressively getting worse in Left knee. Patient denies any particular ABDI or fall. He saw his PCP and was recommended for PT at this time. Quality of life: good    Patient Goals  Patient goals for therapy: decreased pain, increased motion, improved balance, increased strength, independence with ADLs/IADLs and return to sport/leisure activities    Pain  Current pain ratin  At best pain ratin  At worst pain ratin  Location: Left knee  Quality: tight and sharp  Relieving factors: medications  Aggravating factors: sitting, stair climbing, walking and lifting  Progression: worsening    Social Support  Steps to enter house: yes  Stairs in house: yes   Lives in: multiple-level home    Employment status: not working    Diagnostic Tests  No diagnostic tests performed  Treatments  Current treatment: physical therapy        Objective     Observations     Additional Observation Details  Standing Posture: Left shoulder depressed; toeing out; bilateral slight knee flexion; bilaterally posterior pelvic tilted; anteriorly weight-shifted; knee varus posture bilaterally    Tenderness   Left Knee   Tenderness in the lateral joint line and medial joint line.      Active Range of Motion   Left Knee   Flexion: 100 degrees with pain  Extensor lag: -5 degrees     Passive Range of Motion   Left Knee   Flexion: 102 degrees   Extension: -3 degrees     Additional Passive Range of Motion Details  Hamstring Flexibility: moderate tightness at mid-range PROM bilaterally    Mobility Patellar Mobility:   Left Knee   WFL: medial, lateral, superior and inferior. Strength/Myotome Testing     Left Knee   Flexion: 4-  Extension: 4-    Right Knee   Normal strength    Additional Strength Details  Ankle DF/PF: 4-/5 bilaterally    Hip Flexion: 4-/5 bilaterally  Hip Extension (hook-lying): 3+/5 bilaterally  Hip ABD/ER (hook-lying): Left 4-/5, Right 4/5  Hip ADD/IR (hook-lying): Left 4-/5, Right 4/5    Tests     Left Knee   Negative anterior drawer, lateral Fatmata, medial Fatmata, patellar compression, posterior drawer, valgus stress test at 30 degrees and varus stress test at 30 degrees.               Diagnosis: Left knee pain  Precautions: DM2, HTN, neuropathy, hx of cancer; hx of Right foot surgery (Nov 2022)  ( * asterisk indicates given per HEP)  Next Physician Appointment:   Shawna Lopez:     Manuals 7/25         STM          PROM          Taping                    Neuro Re-Ed          Quad sets          Supine SLR          Hip ADD ball squeeze          Clamshells / BKFO          Bridges          Foam Balance          Tandem amb                                        Ther Ex          Calf stretch          HS stretch          DKTC w/ ball          Heel Raises          Standing hip 3-way                              Ther Activity          Bike for LE mobility and endurance          Leg Press          TRX squats          Sowmya walkouts          X walks          Pt Ed HEP, POC         Re-Evaluation          Modalities          Heat/ice (PRN)

## 2023-07-27 ENCOUNTER — OFFICE VISIT (OUTPATIENT)
Dept: PHYSICAL THERAPY | Facility: CLINIC | Age: 68
End: 2023-07-27
Payer: MEDICARE

## 2023-07-27 DIAGNOSIS — M25.562 ACUTE PAIN OF LEFT KNEE: Primary | ICD-10-CM

## 2023-07-27 LAB
ALBUMIN/CREAT UR: 16 MCG/MG CREAT
CREAT UR-MCNC: 157 MG/DL (ref 20–320)
MICROALBUMIN UR-MCNC: 2.5 MG/DL

## 2023-07-27 PROCEDURE — 97110 THERAPEUTIC EXERCISES: CPT

## 2023-07-27 PROCEDURE — 97140 MANUAL THERAPY 1/> REGIONS: CPT

## 2023-07-27 PROCEDURE — 97112 NEUROMUSCULAR REEDUCATION: CPT

## 2023-07-27 NOTE — PROGRESS NOTES
Daily Note     Today's date: 2023  Patient name: Satish Whiteside  : 1955  MRN: [id number]  Referring provider: Adelaida Hamilton DO  Dx:   Encounter Diagnosis     ICD-10-CM    1. Acute pain of left knee  M25.562           Start Time: 1443  Stop Time: 1525  Total time in clinic (min): 42 minutes    Subjective: Reports that any exercises that required him to bend his knee were troublesome. Noted that he had increased pain with any activity that required him to bend his knee. Reports that getting in and out of the car are very painful for him, anything past 70-90 degree angle. Objective: See treatment diary below      Assessment: Tolerated treatment well. Marco pain dominant with exercises, trial of many various exercises in order to reudce pain response. Had pain with any activity that required the knee to move, as well as isometrics. Will benefit from PNE next visit. During manual therapy, patient had increased symptomology with correction of external rotation of LLE. Any ext/flex OP increased pain as well. Towards end of session reported more tightness in Knee rather than pain suggesting some improvement. Patient demonstrated fatigue post treatment, exhibited good technique with therapeutic exercises and would benefit from continued PT      Plan: Continue per plan of care. Progress treatment as tolerated. Monitor response to session, adjust accordingly NV.       Diagnosis: Left knee pain  Precautions: DM2, HTN, neuropathy, hx of cancer; hx of Right foot surgery (2022)  ( * asterisk indicates given per HEP)  Next Physician Appointment:   Justa Lopez:     Manuals         STM          PROM  MH flexion EOB, supine ext         Taping                    Neuro Re-Ed          Quad sets  20x5"         Supine SLR  3x10x1" L         Hip ADD ball squeeze  20x5"         Clamshells / Elveria Console 20x5"        Bridges  3x10x3"        Foam Balance          Tandem amb Ther Ex          Calf stretch  3x30" Strap supine        HS stretch          DKTC w/ ball          Heel Raises  2x10        Standing hip 3-way  2x10 abd        Heel Slides  20x3" strap supine                  Ther Activity          Bike for LE mobility and endurance  Trial - unable 2/2 p! Leg Press          TRX squats          [employer] walkouts          X walks          Pt Ed HEP, POC         Re-Evaluation          Modalities          Heat/ice (PRN)          TENS?

## 2023-07-28 ENCOUNTER — TELEPHONE (OUTPATIENT)
Dept: FAMILY MEDICINE CLINIC | Facility: CLINIC | Age: 68
End: 2023-07-28

## 2023-07-28 NOTE — TELEPHONE ENCOUNTER
----- Message from Doyle Franklin DO sent at 7/28/2023  8:57 AM EDT -----  No longer with albuminuria - cont ACEI 40mg QD

## 2023-07-31 ENCOUNTER — OFFICE VISIT (OUTPATIENT)
Dept: PHYSICAL THERAPY | Facility: CLINIC | Age: 68
End: 2023-07-31
Payer: MEDICARE

## 2023-07-31 DIAGNOSIS — M25.562 ACUTE PAIN OF LEFT KNEE: Primary | ICD-10-CM

## 2023-07-31 PROCEDURE — 97110 THERAPEUTIC EXERCISES: CPT

## 2023-07-31 PROCEDURE — 97112 NEUROMUSCULAR REEDUCATION: CPT

## 2023-07-31 PROCEDURE — 97140 MANUAL THERAPY 1/> REGIONS: CPT

## 2023-07-31 NOTE — PROGRESS NOTES
Daily Note     Today's date: 2023  Patient name: Arie Deleon  : 1955  MRN: [id number]  Referring provider: Dax Flores DO  Dx:   Encounter Diagnosis     ICD-10-CM    1. Acute pain of left knee  M25.562                      Subjective: Pt states that he felt really good after his last visit and had minimal pain. Pt reports that today he did some painting and bending and that seemed to aggravate his knee. Objective: See treatment diary below      Assessment: Tolerated treatment well. Continued with outlined program and progressed with strengthening as tolerated. Pt challenged with hip strengthening due to weakness and requires cues to avoid compensations. Pt most challenged with SLR and is unable to increase reps this visit. He tolerates gentle knee flexion ROM with no complaints. Pt will benefit from continued therapy. Will progress as able. Plan: Continue per plan of care     Diagnosis: Left knee pain  Precautions: DM2, HTN, neuropathy, hx of cancer; hx of Right foot surgery (2022)  ( * asterisk indicates given per HEP)  Next Physician Appointment:   Joan Batch:     Manuals        STM   TH       PROM  MH flexion EOB, supine ext  TH       Taping                    Neuro Re-Ed          Quad sets  20x5"  20x5"       Supine SLR  3x10x1" L  10x   on L       Hip ADD ball squeeze  20x5"         Clamshells / Chaya Beecham 20x5" Clams  10x2       Bridges  3x10x3" 3' x10x2       Foam Balance          Tandem amb                                        Ther Ex          Calf stretch  3x30" Strap supine Stand  10x10"       HS stretch   Seated 10x10"       DKTC w/ ball          Heel Raises  2x10 2x10       Standing hip 3-way  2x10 abd        Heel Slides  20x3" strap supine 20x3"                   Ther Activity          Bike for LE mobility and endurance  Trial - unable 2/2 p!         Side stepping   3x at mirror       Leg Press          TRX squats          Sowmya walkouts X walks          Pt Ed HEP, POC         Re-Evaluation          Modalities          Heat/ice (PRN)          TENS?

## 2023-08-03 ENCOUNTER — OFFICE VISIT (OUTPATIENT)
Dept: PHYSICAL THERAPY | Facility: CLINIC | Age: 68
End: 2023-08-03
Payer: MEDICARE

## 2023-08-03 DIAGNOSIS — M25.562 ACUTE PAIN OF LEFT KNEE: Primary | ICD-10-CM

## 2023-08-03 PROCEDURE — 97110 THERAPEUTIC EXERCISES: CPT

## 2023-08-03 PROCEDURE — 97530 THERAPEUTIC ACTIVITIES: CPT

## 2023-08-03 PROCEDURE — 97112 NEUROMUSCULAR REEDUCATION: CPT

## 2023-08-03 NOTE — PROGRESS NOTES
Daily Note     Today's date: 8/3/2023  Patient name: Amadou Ridley  : 1955  MRN: [id number]  Referring provider: Lizeth Knowles DO  Dx:   Encounter Diagnosis     ICD-10-CM    1. Acute pain of left knee  M25.562           Start Time: 1530  Stop Time: 2893  Total time in clinic (min): 45 minutes    Subjective: Patient reports he is not having a good day today, with a lot going on at home. He reports no changes in his pain. Objective: See treatment diary below      Assessment: Tolerated treatment well. Patient was challenged with recumbent bike and unable to complete full revolutions. Thus performed rocking for knee flexibility/stretching within pain-free ROM. Added wobble board balance and posterior tib heel raises to improve ankle stability. Added TB TKEs to progress quad strength with TKEs with walking. Good tolerance with self-stretches knee flexion with strap with no significant ERP. Good tolerance with PROM, with Grade 1-2 PA mobs for tib-fem joint. Educated patient on POC and pain science, and benefit of continuing exercises per HEP. Patient exhibited good technique with therapeutic exercises and would benefit from continued PT      Plan: Continue per plan of care. Progress treatment as tolerated.        Diagnosis: Left knee pain  Precautions: DM2, HTN, neuropathy, hx of cancer; hx of Right foot surgery (2022)  ( * asterisk indicates given per HEP)  Next Physician Appointment:   Katie Govern:     Manuals 7/25 7/27 7/31 8/3      STM   TH DK      PROM  MH flexion EOB, supine ext  TH DK      Taping                    Neuro Re-Ed          Quad sets  20x5"  20x5"       Supine SLR  3x10x1" L  10x   on L 10x   on L      Hip ADD ball squeeze  20x5"         Clamshells / Lacy Soles 20x5" Clams  10x2       Bridges  3x10x3" 3' x10x2       TB TKEs    GTB x20      Foam Balance          Tandem amb          Wobble Board     x2' ea                          Ther Ex          Calf stretch  3x30" Strap supine Stand  10x10" Stand  10x10"      HS stretch   Seated 10x10" seated w/ strap 10"x10      DKTC w/ ball          Heel Raises  2x10 2x10 20x      Post Tib HR    20x      Standing hip 3-way  2x10 abd        Heel Slides  20x3" strap supine 20x3"   20x3" strap supine                Ther Activity          Bike for LE mobility and endurance  Trial - unable 2/2 p!  recumb rocking x3min      Side stepping   3x at mirror       Leg Press          TRX squats          Sowmya walkouts          X walks          Pt Ed HEP, POC   POC, pain science      Re-Evaluation          Modalities          Heat/ice (PRN)          TENS?

## 2023-08-04 ENCOUNTER — OFFICE VISIT (OUTPATIENT)
Dept: FAMILY MEDICINE CLINIC | Facility: CLINIC | Age: 68
End: 2023-08-04
Payer: MEDICARE

## 2023-08-04 VITALS
HEIGHT: 75 IN | WEIGHT: 263 LBS | HEART RATE: 103 BPM | RESPIRATION RATE: 16 BRPM | DIASTOLIC BLOOD PRESSURE: 78 MMHG | OXYGEN SATURATION: 96 % | BODY MASS INDEX: 32.7 KG/M2 | SYSTOLIC BLOOD PRESSURE: 130 MMHG

## 2023-08-04 DIAGNOSIS — G56.03 BILATERAL CARPAL TUNNEL SYNDROME: ICD-10-CM

## 2023-08-04 DIAGNOSIS — M25.562 ACUTE PAIN OF LEFT KNEE: ICD-10-CM

## 2023-08-04 DIAGNOSIS — Z28.21 PNEUMOCOCCAL VACCINATION DECLINED BY PATIENT: ICD-10-CM

## 2023-08-04 DIAGNOSIS — I10 PRIMARY HYPERTENSION: Primary | ICD-10-CM

## 2023-08-04 DIAGNOSIS — Z28.21 TETANUS, DIPHTHERIA, AND ACELLULAR PERTUSSIS (TDAP) VACCINATION DECLINED: ICD-10-CM

## 2023-08-04 DIAGNOSIS — E11.42 TYPE 2 DIABETES MELLITUS WITH DIABETIC POLYNEUROPATHY, WITHOUT LONG-TERM CURRENT USE OF INSULIN (HCC): ICD-10-CM

## 2023-08-04 PROCEDURE — 99214 OFFICE O/P EST MOD 30 MIN: CPT | Performed by: FAMILY MEDICINE

## 2023-08-04 NOTE — PROGRESS NOTES
Assessment/Plan:   Diagnoses and all orders for this visit:    Primary hypertension  - BP in the office 144/70 and 130/78 on my repeat   - microalbuminuria resolved on repeat labs done 7/27/2023  - cont ACEI 40mg QD and Chlorthalidone 25mg QD   - declined PCV20 in the office today  - RTO in 3months for f/u - pt aware and agreeable     Type 2 diabetes mellitus with diabetic polyneuropathy, without long-term current use of insulin (HCC)  - on Metformin 1000mg BID and Glipizide 5mg QD   - UTD with DM eye exam (1/10/2023)    - UTD with DM foot exam in the office (3/3/2023)    - declined PCV20 in the office today   - follows with Podiatry [facility] in 3months for f/u - pt aware and agreeable   Pneumococcal vaccination declined by patient  Tetanus, diphtheria, and acellular pertussis (Tdap) vaccination declined    Acute pain of left knee  - started PT and "not bad" - cont   Bilateral carpal tunnel syndrome  -     Ambulatory Referral to Physical Therapy; Future          Subjective:    Patient ID: Vaishali Jack is a 74 y.o. male.   HPI  72yo M presents to the office for f/u   - BP in the office 144/70 and 130/78 on my repeat   - ACEI was increased from 30mg to 40mg QD at last OV given elevated BP   - also taking Chlorthalidone 25mg QD   - microalbuminuria resolved on repeat labs done 7/27/2023  - knee and b/l CTS better after 2wks of NSAIDs (Naproxen 500mg BID)   - has started PT for L-knee - "not bad"   - UTD with DM eye exam (1/10/2023)    - declined Tdap and PCV20 in the office today   - denies F/C/N/V/change in vision/HA/CP/palpitations/SOB/wheezing/abd pain/LE edema      The following portions of the patient's history were reviewed and updated as appropriate: allergies, current medications, past family history, past medical history, past social history, past surgical history and problem list.    Review of Systems  as per HPI    Objective:  /78 (BP Location: Right arm, Patient Position: Sitting, Cuff Size: Large)   Pulse 103   Resp 16   Ht 6' 3" (1.905 m)   Wt 119 kg (263 lb)   SpO2 96%   BMI 32.87 kg/m²    Physical Exam  Vitals reviewed. Constitutional:       General: He is not in acute distress. Appearance: Normal appearance. He is not ill-appearing, toxic-appearing or diaphoretic. HENT:      Head: Normocephalic and atraumatic. Right Ear: External ear normal.      Left Ear: External ear normal.      Nose: Nose normal.   Eyes:      General: No scleral icterus. Right eye: No discharge. Left eye: No discharge. Extraocular Movements: Extraocular movements intact. Conjunctiva/sclera: Conjunctivae normal.   Cardiovascular:      Rate and Rhythm: Normal rate and regular rhythm. Heart sounds: Normal heart sounds. Pulmonary:      Effort: Pulmonary effort is normal. No respiratory distress. Breath sounds: Normal breath sounds. No stridor. No wheezing, rhonchi or rales. Abdominal:      Palpations: Abdomen is soft. Musculoskeletal:         General: Normal range of motion. Cervical back: Normal range of motion. Right lower leg: No edema. Left lower leg: No edema. Skin:     General: Skin is warm. Neurological:      General: No focal deficit present. Mental Status: He is alert and oriented to person, place, and time. Psychiatric:         Mood and Affect: Mood normal.         Behavior: Behavior normal.         BMI Counseling: Body mass index is 32.87 kg/m². The BMI is above normal. Nutrition recommendations include 3-5 servings of fruits/vegetables daily. Exercise recommendations include exercising 3-5 times per week.

## 2023-08-07 ENCOUNTER — OFFICE VISIT (OUTPATIENT)
Dept: PHYSICAL THERAPY | Facility: CLINIC | Age: 68
End: 2023-08-07
Payer: MEDICARE

## 2023-08-07 DIAGNOSIS — M25.562 ACUTE PAIN OF LEFT KNEE: Primary | ICD-10-CM

## 2023-08-07 PROCEDURE — 97110 THERAPEUTIC EXERCISES: CPT

## 2023-08-07 PROCEDURE — 97530 THERAPEUTIC ACTIVITIES: CPT

## 2023-08-07 PROCEDURE — 97112 NEUROMUSCULAR REEDUCATION: CPT

## 2023-08-07 NOTE — PROGRESS NOTES
Daily Note     Today's date: 2023  Patient name: Ghazala Chen  : 1955  MRN: [id number]  Referring provider: Sanjuanita Bolden DO  Dx:   Encounter Diagnosis     ICD-10-CM    1. Acute pain of left knee  M25.562                      Subjective: Pt states that he is feeling better today. His knee is less painful yesterday and today. Objective: See treatment diary below      Assessment: Tolerated treatment well. Pt able to perform full revolutions on upright bike with no pain. Progressed pt with strengthening ex's in standing with no reports of increased sx's. Cues are needed for form and to avoid hip ER. Knee flexion stretch performed in seated at EOT. Pt will benefit from continued therapy. Will continue to progress as able.         Plan: Continue per plan of care     Diagnosis: Left knee pain  Precautions: DM2, HTN, neuropathy, hx of cancer; hx of Right foot surgery (2022)  ( * asterisk indicates given per HEP)  Next Physician Appointment:   Negro Pascal:     Manuals 7/25 7/27 7/31 8/3 8/7     STM   TH DK TH     PROM  MH flexion EOB, supine ext  TH DK TH     Taping                    Neuro Re-Ed          Quad sets  20x5"  20x5"       Supine SLR  3x10x1" L  10x   on L 10x   on L 10x on L     Hip ADD ball squeeze  20x5"         Clamshells / Alonza Halter 20x5" Clams  10x2       Bridges  3x10x3" 3' x10x2       TB TKEs    GTB x20      Foam Balance          Tandem amb          Wobble Board     x2' ea x2' ea                         Ther Ex          Calf stretch  3x30" Strap supine Stand  10x10" Stand  10x10" Stand  10x10"     HS stretch   Seated 10x10" seated w/ strap 10"x10 Seated  10x10"     DKTC w/ ball          Heel Raises  2x10 2x10 20x      Post Tib HR    20x      Standing hip 3-way  2x10 abd        Heel Slides  20x3" strap supine 20x3"   20x3" strap supine EOT   20x3"               Ther Activity          Bike for LE mobility and endurance  Trial - unable 2/2 p!  recumb rocking x3min Upright  5 mins     Side stepping   3x at mirror  3x at mirror     Leg Press     70# 10x2     TRX squats     10x2     [place] walkouts          X walks          Pt Ed HEP, POC   POC, pain science HEP     Re-Evaluation          Modalities          Heat/ice (PRN)          TENS?

## 2023-08-10 ENCOUNTER — OFFICE VISIT (OUTPATIENT)
Dept: PHYSICAL THERAPY | Facility: CLINIC | Age: 68
End: 2023-08-10
Payer: MEDICARE

## 2023-08-10 DIAGNOSIS — M25.562 ACUTE PAIN OF LEFT KNEE: Primary | ICD-10-CM

## 2023-08-10 PROCEDURE — 97530 THERAPEUTIC ACTIVITIES: CPT

## 2023-08-10 PROCEDURE — 97112 NEUROMUSCULAR REEDUCATION: CPT

## 2023-08-10 PROCEDURE — 97110 THERAPEUTIC EXERCISES: CPT

## 2023-08-10 NOTE — PROGRESS NOTES
Daily Note     Today's date: 8/10/2023  Patient name: Satish Whiteside  : 1955  MRN: [id number]  Referring provider: Adelaida Hamilton DO  Dx:   Encounter Diagnosis     ICD-10-CM    1. Acute pain of left knee  M25.562           Start Time: 56  Stop Time:   Total time in clinic (min): 45 minutes    Subjective: Patient reports no changes in pain on arrival.       Objective: See treatment diary below      Assessment: Tolerated treatment well. Unable to perform full revolutions with upright bike, thus perform to tolerable ROM. TKEs performed on [place] with improved quad strengthening. Mini squats provided no challenged for patient, thus performed deeper squat with cuing on form with slightly improved quad and glute activation. Good form noted with Leg press this session, without increase in pain. Patient exhibited good technique with therapeutic exercises and would benefit from continued PT      Plan: Continue per plan of care. Progress treatment as tolerated.        Diagnosis: Left knee pain  Precautions: DM2, HTN, neuropathy, hx of cancer; hx of Right foot surgery (2022)  ( * asterisk indicates given per HEP)  Next Physician Appointment:   Justa Lopez:     Manuals 7/25 7/27 7/31 8/3 8/7 8/10          STM   TH DK TH           PROM  MH flexion EOB, supine ext  TH DK TH           Taping                                Neuro Re-Ed                Quad sets  20x5"  20x5"             Supine SLR  3x10x1" L  10x   on L 10x   on L 10x on L           Hip ADD ball squeeze  20x5"               Clamshells / BKFO  GTB 20x5" Clams  10x2             Bridges  3x10x3" 3' x10x2             TB TKEs    GTB x20  [place]a 12# 2x10          Foam Balance                Tandem amb                Wobble Board     x2' ea x2' ea x2' ea                                          Ther Ex                Calf stretch  3x30" Strap supine Stand  10x10" Stand  10x10" Stand  10x10" Stand  10x10"          HS stretch   Seated 10x10" seated w/ strap 10"x10 Seated  10x10" Seated  10x10"          DKTC w/ ball                Heel Raises  2x10 2x10 20x  20x          Post Tib HR    20x  20x          Standing hip 3-way  2x10 abd              Heel Slides  20x3" strap supine 20x3"   20x3" strap supine EOT   20x3"                           Ther Activity                Bike for LE mobility and endurance  Trial - unable 2/2 p!  recumb rocking x3min Upright  5 mins Upright  Rocking for ROM x5min          Side stepping   3x at mirror  3x at mirror           Leg Press     70# 10x2 70# 10x2          TRX squats     10x2 2x10          [place] walkouts                X walks                Pt Ed HEP, POC   POC, pain science HEP           Re-Evaluation                Modalities                Heat/ice (PRN)                TENS?

## 2023-08-14 ENCOUNTER — OFFICE VISIT (OUTPATIENT)
Dept: PHYSICAL THERAPY | Facility: CLINIC | Age: 68
End: 2023-08-14
Payer: MEDICARE

## 2023-08-14 DIAGNOSIS — M25.562 ACUTE PAIN OF LEFT KNEE: Primary | ICD-10-CM

## 2023-08-14 PROCEDURE — 97110 THERAPEUTIC EXERCISES: CPT

## 2023-08-14 PROCEDURE — 97112 NEUROMUSCULAR REEDUCATION: CPT

## 2023-08-14 PROCEDURE — 97530 THERAPEUTIC ACTIVITIES: CPT

## 2023-08-14 NOTE — PROGRESS NOTES
Daily Note     Today's date: 2023  Patient name: Jessenia Brown  : 1955  MRN: [id number]  Referring provider: Freda Ritchie DO  Dx:   Encounter Diagnosis     ICD-10-CM    1. Acute pain of left knee  M25.562                      Subjective: Pt states that he has a little pain on the outside of knee this morning but it isn't too bad. Objective: See treatment diary below      Assessment: Tolerated treatment well. Progressed pt with weights and strengthening as indicated. Pt challenged with progressions and muscle fatigue is noted. Minimal discomfort is noted with last reps of leg press but no other complaints. Fatigue is also noted with supine and S/L strengthening. Pt has no pain at end of session. Will progress as able as pt will benefit from continued therapy.         Plan: Continue per plan of care     Diagnosis: Left knee pain  Precautions: DM2, HTN, neuropathy, hx of cancer; hx of Right foot surgery (2022)  ( * asterisk indicates given per HEP)  Next Physician Appointment:   Drew Shirts:     Manuals 7/25 7/27 7/31 8/3 8/7 8/10 8/14         STM   TH DK TH           PROM  MH flexion EOB, supine ext  TH DK TH           Taping                                Neuro Re-Ed                Quad sets  20x5"  20x5"             Supine SLR  3x10x1" L  10x   on L 10x   on L 10x on L           Hip ADD ball squeeze  20x5"               Clamshells / BKFO  GTB 20x5" Clams  10x2    GTB  20x ea         Bridges  3x10x3" 3' x10x2             TB TKEs    GTB x20  [place] 12# 2x10          Foam Balance                Tandem amb                Wobble Board     x2' ea x2' ea x2' ea x2' ea         SLS       2x30" ea                         Ther Ex                Calf stretch  3x30" Strap supine Stand  10x10" Stand  10x10" Stand  10x10" Stand  10x10"          HS stretch   Seated 10x10" seated w/ strap 10"x10 Seated  10x10" Seated  10x10" Seated  10x10"         DKTC w/ ball                Heel Raises  2x10 2x10 20x  20x          Post Tib HR    20x  20x          Standing hip 3-way  2x10 abd              Heel Slides  20x3" strap supine 20x3"   20x3" strap supine EOT   20x3"                           Ther Activity                Bike for LE mobility and endurance  Trial - unable 2/2 p!  recumb rocking x3min Upright  5 mins Upright  Rocking for ROM x5min Upright full rev seat 10  x5 min         Side stepping   3x at mirror  3x at mirror           Leg Press     70# 10x2 70# 10x2 80#  10x2         TRX squats     10x2 2x10          [place] walkouts                X walks                Pt Ed HEP, POC   POC, pain science HEP  HEP         Re-Evaluation                Modalities                Heat/ice (PRN)                TENS?

## 2023-08-16 ENCOUNTER — TELEPHONE (OUTPATIENT)
Dept: GASTROENTEROLOGY | Facility: CLINIC | Age: 68
End: 2023-08-16

## 2023-08-17 ENCOUNTER — OFFICE VISIT (OUTPATIENT)
Dept: PHYSICAL THERAPY | Facility: CLINIC | Age: 68
End: 2023-08-17
Payer: MEDICARE

## 2023-08-17 DIAGNOSIS — M25.562 ACUTE PAIN OF LEFT KNEE: Primary | ICD-10-CM

## 2023-08-17 PROCEDURE — 97112 NEUROMUSCULAR REEDUCATION: CPT

## 2023-08-17 PROCEDURE — 97530 THERAPEUTIC ACTIVITIES: CPT

## 2023-08-17 PROCEDURE — 97110 THERAPEUTIC EXERCISES: CPT

## 2023-08-17 NOTE — PROGRESS NOTES
Daily Note     Today's date: 2023  Patient name: Patti Melchor  : 1955  MRN: [id number]  Referring provider: Jimmy You DO  Dx:   Encounter Diagnosis     ICD-10-CM    1. Acute pain of left knee  M25.562           Start Time: 0800  Stop Time: 0845  Total time in clinic (min): 45 minutes    Subjective: Patient reports less pain on arrival today. Objective: See treatment diary below      Assessment: Tolerated treatment well. Initiated session with stationary bike. Able to complete full revolutions without pain. Patient requested increasing resistance on bike at next visit as long as no pain. Patient completed functional strengthening exercises with good form and without pain. Added [place] walkouts, with slight unsteadiness but able to maintain good control without LOB. Completed all other exercises with good form. Educated patient on proper frequency of HEP as per patient's request.  Patient exhibited good technique with therapeutic exercises and would benefit from continued PT      Plan: Continue per plan of care. Progress treatment as tolerated.        Diagnosis: Left knee pain  Precautions: DM2, HTN, neuropathy, hx of cancer; hx of Right foot surgery (2022)  ( * asterisk indicates given per HEP)  Next Physician Appointment:   Jo Francis:     Manuals 7/25 7/27 7/31 8/3 8/7 8/10 8/14 8/17        STM   TH DK TH           PROM  MH flexion EOB, supine ext  TH DK TH           Taping                                Neuro Re-Ed                Quad sets  20x5"  20x5"             Supine SLR  3x10x1" L  10x   on L 10x   on L 10x on L           Hip ADD ball squeeze  20x5"               Clamshells / BKFO  GTB 20x5" Clams  10x2    GTB  20x ea BKFO GTB alt x10ea        Bridges  3x10x3" 3' x10x2             TB TKEs    GTB x20  S[place] 12# 2x10  S[place] 13# 2x10        Foam Balance                Tandem amb                Wobble Board     x2' ea x2' ea x2' ea x2' ea x2' ea        SLS       2x30" ea Ther Ex                Calf stretch  3x30" Strap supine Stand  10x10" Stand  10x10" Stand  10x10" Stand  10x10"          HS stretch   Seated 10x10" seated w/ strap 10"x10 Seated  10x10" Seated  10x10" Seated  10x10"         DKTC w/ ball        2x10        Heel Raises  2x10 2x10 20x  20x  2x10        Post Tib HR    20x  20x  20x        Standing hip 3-way  2x10 abd              Heel Slides  20x3" strap supine 20x3"   20x3" strap supine EOT   20x3"                           Ther Activity                Bike for LE mobility and endurance  Trial - unable 2/2 p!  recumb rocking x3min Upright  5 mins Upright  Rocking for ROM x5min Upright full rev seat 10  x5 min Upright full rev seat 10  x5 min        Side stepping   3x at mirror  3x at mirror           Leg Press     70# 10x2 70# 10x2 80#  10x2 80# 2x10        TRX squats     10x2 2x10          [place] walkouts        F/B 15# 5 laps        X walks                Pt Ed HEP, POC   POC, pain science HEP  HEP         Re-Evaluation                Modalities                Heat/ice (PRN)                TENS?

## 2023-08-18 DIAGNOSIS — E11.42 TYPE 2 DIABETES MELLITUS WITH DIABETIC POLYNEUROPATHY, WITHOUT LONG-TERM CURRENT USE OF INSULIN (HCC): ICD-10-CM

## 2023-08-18 DIAGNOSIS — I10 PRIMARY HYPERTENSION: ICD-10-CM

## 2023-08-18 RX ORDER — GLIPIZIDE 5 MG/1
5 TABLET, FILM COATED, EXTENDED RELEASE ORAL DAILY
Qty: 90 TABLET | Refills: 0 | Status: SHIPPED | OUTPATIENT
Start: 2023-08-18 | End: 2023-11-16

## 2023-08-18 RX ORDER — CHLORTHALIDONE 25 MG/1
TABLET ORAL
Qty: 45 TABLET | Refills: 1 | Status: SHIPPED | OUTPATIENT
Start: 2023-08-18

## 2023-08-22 ENCOUNTER — OFFICE VISIT (OUTPATIENT)
Dept: PHYSICAL THERAPY | Facility: CLINIC | Age: 68
End: 2023-08-22
Payer: MEDICARE

## 2023-08-22 ENCOUNTER — PREP FOR PROCEDURE (OUTPATIENT)
Age: 68
End: 2023-08-22

## 2023-08-22 ENCOUNTER — TELEPHONE (OUTPATIENT)
Age: 68
End: 2023-08-22

## 2023-08-22 DIAGNOSIS — M25.562 ACUTE PAIN OF LEFT KNEE: Primary | ICD-10-CM

## 2023-08-22 DIAGNOSIS — Z12.11 COLON CANCER SCREENING: Primary | ICD-10-CM

## 2023-08-22 PROCEDURE — 97530 THERAPEUTIC ACTIVITIES: CPT

## 2023-08-22 PROCEDURE — 97112 NEUROMUSCULAR REEDUCATION: CPT

## 2023-08-22 PROCEDURE — 97110 THERAPEUTIC EXERCISES: CPT

## 2023-08-22 NOTE — PROGRESS NOTES
Daily Note     Today's date: 2023  Patient name: Marietta Bueno  : 1955  MRN: [id number]  Referring provider: Yanira Acevedo DO  Dx:   Encounter Diagnosis     ICD-10-CM    1. Acute pain of left knee  M25.562                      Subjective: Pt states that he is doing well. His knee is not painful anymore but just is stiff. He reports that he was able to sit in the car longer without having knee pain. Objective: See treatment diary below      Assessment: Tolerated treatment well. Progressed pt with strengthening as tolerated with no reports of increased pain sx's. Pt challenged and muscle fatigue is noted. Cues for form given with fair carryover noted. Hip flexor stretch added to assess pt's response due to c/o stiffness/tightness. Will continue to progress pt as tolerated.       Plan: Continue per plan of care     Diagnosis: Left knee pain  Precautions: DM2, HTN, neuropathy, hx of cancer; hx of Right foot surgery (2022)  ( * asterisk indicates given per HEP)  Next Physician Appointment:   Nayana Card:     Manuals 7/25 7/27 7/31 8/3 8/7 8/10 8/14 8/17 8/22       STM   TH DK TH           PROM  MH flexion EOB, supine ext  TH DK TH           Taping                                Neuro Re-Ed                Quad sets  20x5"  20x5"             Supine SLR  3x10x1" L  10x   on L 10x   on L 10x on L           Hip ADD ball squeeze  20x5"               Clamshells / BKFO  GTB 20x5" Clams  10x2    GTB  20x ea BKFO GTB alt x10ea        Bridges  3x10x3" 3' x10x2             TB TKEs    GTB x20  Sowmya 12# 2x10  Sowmya 13# 2x10 Marcell  13# 2x10       Foam Balance                Tandem amb                Wobble Board     x2' ea x2' ea x2' ea x2' ea x2' ea 2' ea       SLS       2x30" ea  2x30" ea                       Ther Ex                Calf stretch  3x30" Strap supine Stand  10x10" Stand  10x10" Stand  10x10" Stand  10x10"          HS stretch   Seated 10x10" seated w/ strap 10"x10 Seated  10x10" Seated  10x10" Seated  10x10"  Seated   10x10"       DKTC w/ ball        2x10        Heel Raises  2x10 2x10 20x  20x  2x10        Post Tib HR    20x  20x  20x        Standing hip 3-way  2x10 abd              Heel Slides  20x3" strap supine 20x3"   20x3" strap supine EOT   20x3"           Hip flexor stretch         EOT  10x10"       Ther Activity                Bike for LE mobility and endurance  Trial - unable 2/2 p!  recumb rocking x3min Upright  5 mins Upright  Rocking for ROM x5min Upright full rev seat 10  x5 min Upright full rev seat 10  x5 min Upright full rev  Seat 9  x5 min       Side stepping   3x at mirror  3x at mirror    Red   3x at Magnus Nathan       Leg Press     70# 10x2 70# 10x2 80#  10x2 80# 2x10 90# 2x10       TRX squats     10x2 2x10   10x2       [place] walkouts        F/B 15# 5 laps        X walks                Pt Ed HEP, POC   POC, pain science HEP  HEP         Re-Evaluation                Modalities                Heat/ice (PRN)                TENS?

## 2023-08-22 NOTE — TELEPHONE ENCOUNTER
Scheduled date of colonoscopy (as of today):9/6/2023    Physician performing colonoscopy:Aneesh Puri Senior      Location of colonoscopy[facility]    Bowel prep reviewed with patient:yes    Instructions reviewed with patient by:ramona luis Clearances: dm pt    ASC Assessment      Name: Evelyn Frey  YOB: 1955  Last Height: 6' 3" (1.905 m)  Last weight: 119 kg (263 lb)  BMI: 32.87 kg/m²  Procedure: Colon  Diagnosis: screening crc  Date of procedure: 9/6/2023  Prep: ordered  Responsible : spouse  Phone#: [phone]  Name completing form: Leila June, MA  Date form completed: 08/22/23      If the patient answers yes to any of these questions, schedule in a hospital  Are you pregnant: No  Do you rely on a wheelchair for mobility: No  Have you been diagnosed with End Stage Renal Disease (ESRD): No  Do you need oxygen during the day: No  Have you had a heart attack or stroke within the past three months: No  Have you had a seizure within the past three months: No  Have you ever been informed by anesthesia that you have a difficult airway: No  Additional Questions  Have you had any cardiac testing or are under the care of a Cardiologist (see cardiac list): No  Cardiac list:   Do you have an implanted cardiac defibrillator: No (Comment: This patient should be scheduled in the hospital)    Have any bleeding problems, such as anemia or hemophilia (If patient has H&H result below 8, schedule in hospital.  H&H must be within 30 days of procedure): No    Had an organ transplant within the past 3 months: No    Do you have any present infections: No  Do you get short of breath when walking a few blocks: No  Have you been diagnosed with diabetes: Yes  Comments (provide cardiac provider information if applicable):        Prep emailed to [email or web address]        COLONOSCOPY  MIRALAX/Dulcolax Bowel Preparation Instructions    The OR/GI Lab will contact you the evening prior to your procedure with your exact arrival time. Our practice requires a 1 week notice for any cancellations or rescheduling. We kindly ask that you immediately notify us of any changes including any new medications that are prescribed. Thank you for your cooperation. WEEK BEFORE YOUR PROCEDURE:  • Stop taking Iron tablets. • 5 days prior, AVOID vegetables and fruits with skins or seeds, nuts, corn, popcorn and whole grain breads. • Purchase: One (1) 238-gram container of Miralax (polyethylene glycol 3350), four (4) 5 mg Dulcolax (bisacodyl) tablets, and one (1) 64-ounce bottle of Gatorade (sports drink) - no red, orange, or purple. These may be purchased at any pharmacy without a prescription. Generic products are permissible. • Arrange responsible transportation for day of the procedure. DAY BEFORE THE PROCEDURE:   • CLEAR liquids only for entire day prior. Nothing red, orange or purple. • You MAY have:                                                               - Soda  - Water  - Broth - Gatorade  - Jello  - Popsicles - Coffee/tea without milk/creamer     • YOU MAY NOT HAVE:  o Solid foods   o Milk and milk products    o Juice with pulp    BOWEL PREPARATION:  Includes: One (1) 238-gram container of Miralax (polyethylene glycol 3350), four (4) 5 mg Dulcolax (bisacodyl) tablets, and one (1) 64-ounce bottle of Gatorade (sports drink). Preparation may be refrigerated. Entire bowel prep should be completed. Afternoon before the procedure (2:00 pm - 5:00 pm): • Take two (2) 5 mg Dulcolax laxative tablets. Evening before the procedure (6:00 pm):  • Mix entire container of Miralax with one (1) 64-ounce bottle of Gatorade and shake until all medication is dissolved. • Begin drinking solution. Drink an eight (8) ounce cup every 10-15 minutes until you have consumed half (32 ounces) of the solution. Refrigerate remaining solution. Night before the procedure (8:00 pm):   • Take two (2) 5 mg Dulcolax laxative tablets. Beginning 5 hours before your procedure:  • Drink the remaining amount of prepared solution (32 ounces). Drink an eight (8) ounce cup every 10-15 minutes until you have consumed the remaining solution. Bowel prep should be completed 4 hours prior to procedure time. NOTHING TO EAT OR DRINK AFTER MIDNIGHT- EXCEPT FOR YOUR PREP    DAY OF THE PROCEDURE:  • You may brush your teeth. • Leave all jewelry at home. • Please arrive for your procedure as indicated by the OR / GI Lab / Endoscopy Unit. The hospital will contact you the day before with your exact arrival time. • Make sure you have arranged ahead of time for a responsible adult (18 or older) to accompany and drive you home after the procedure. Please discuss any transportation concerns with our staff prior to your procedure. • The effects of the anesthesia can persist for 24 hours. After receiving the sedation, you must exercise caution before engaging in any activity that could harm yourself and others (such as driving a car). Do not make any important decisions or do not drink any alcoholic beverages during this time period. • After your procedure, you may have anything you'd like to eat or drink. You will probably want to start with something light. Please include plenty of fluids. Avoid items that cause gas such as sodas and salads. SPECIAL INSTRUCTIONS:    For patients currently taking blood thinners and/or antiplatelet therapy our office will contact the prescribing provider. Our office will contact you with any required changes to your medication regimen. Blood thinner (i.e. - Coumadin, Pradaxa, Lovenox, Xarelto, Eliquis)  ? Continue (Do Not Stop)  ? Stop______________for_____________days prior to the procedure. Antiplatelet (i.e. - Plavix, Aggrenox, Effient, Brilinta)  ? Continue (Do Not Stop)  ? Stop______________for_____________days prior to the procedure.        Diabetes:   • If you are Diabetic, please see separate Diabetic Instruction Sheet. Prescribed medications:  • Do not stop your aspirin, or any of your other medications (unless instructed otherwise). Take the rest of your prescribed medications with small sips of water at least 2 hours prior to your procedure. For any questions or concerns related to your bowel preparation or pre-procedure instructions, please contact our office at [phone]. Thank you for choosing [facility]!

## 2023-08-22 NOTE — TELEPHONE ENCOUNTER
08/22/23  Screened by: Roger Burks MA    Referring Provider 3 years recall    Pre- Screening: There is no height or weight on file to calculate BMI. Has patient been referred for a routine screening Colonoscopy? no  Is the patient between 43-73 years old? yes      Previous Colonoscopy yes   If yes:    Date: 8/11/2020    Facility:     Reason:       SCHEDULING STAFF: If the patient is between 45yrs-49yrs, please advise patient to confirm benefits/coverage with their insurance company for a routine screening colonoscopy, some insurance carriers will only cover at [address] [place] [address] or older. If the patient is over 66years old, please schedule an office visit. Does the patient want to see a Gastroenterologist prior to their procedure OR are they having any GI symptoms? no    Has the patient been hospitalized or had abdominal surgery in the past 6 months? no    Does the patient use supplemental oxygen? no    Does the patient take Coumadin, Lovenox, Plavix, Elliquis, Xarelto, or other blood thinning medication? no    Has the patient had a stroke, cardiac event, or stent placed in the past year? no    SCHEDULING STAFF: If patient answers NO to above questions, then schedule procedure. If patient answers YES to above questions, then schedule office appointment. Passed oa    If patient is between 45yrs - 49yrs, please advise patient that we will have to confirm benefits & coverage with their insurance company for a routine screening colonoscopy.

## 2023-08-25 ENCOUNTER — EVALUATION (OUTPATIENT)
Dept: PHYSICAL THERAPY | Facility: CLINIC | Age: 68
End: 2023-08-25
Payer: MEDICARE

## 2023-08-25 DIAGNOSIS — M25.562 ACUTE PAIN OF LEFT KNEE: Primary | ICD-10-CM

## 2023-08-25 PROCEDURE — 97530 THERAPEUTIC ACTIVITIES: CPT

## 2023-08-25 PROCEDURE — 97112 NEUROMUSCULAR REEDUCATION: CPT

## 2023-08-25 NOTE — PROGRESS NOTES
PT Re-Evaluation     Today's date: 2023  Patient name: Ras Qiu  : 1955  MRN: [id number]  Referring provider: Livia Baron DO  Dx:   Encounter Diagnosis     ICD-10-CM    1. Acute pain of left knee  M25.562           Start Time: 0700  Stop Time: 0745  Total time in clinic (min): 45 minutes    Assessment  Assessment details: Patient is a 74 y.o. male who presents with reports of Left knee pain that started months ago with no recent injury or fall to cause. Patient has attended PT for past 1 month. He has reported an improved overall pain levels since start of PT. He reports 3/10 at worst compared to 6/10 on IE. He reports pain is primarily stiffness or achy with increased activity. Patient presents with improved Left knee ROM and LE strength, which is beneficial for improved mobility and control with walking, transfers, and other weight-bearing activities. He reports improved ability with prolonged sitting and getting in/out of car, with reports of reduced difficulty or sharp pain. He continues to report difficulty with stair negotiation, prolonged walking, and balance. Steadily improving in balance training in PT and will progress to performing on varying/compliant surfaces. Patient is independent with his ADLs and driving, however his current impairments affect his tolerance and participation with prolonged activities and certain household chores that he was able to do without issue per PLOF. He has demonstrated a good progress towards his set goals since IE, with good tolerance to PT interventions and exercises. Patient will benefit from continued skilled PT services to further improve LE mobility, improve LE strength, reduce pain levels, and improve overall ease with ADLs and other functional tasks to progress towards his PLOF. Patient would benefit from skilled PT services to address these impairments and to maximize function.   Thank you for the referral.  Impairments: abnormal gait, abnormal or restricted ROM, activity intolerance, impaired balance, impaired physical strength, lacks appropriate home exercise program, pain with function, weight-bearing intolerance, poor posture  and poor body mechanics  Functional limitations: stair negotiation, prolonged walking, balanceUnderstanding of Dx/Px/POC: good   Prognosis: good    Goals  Impairment Goals 4-6 weeks   In order to maximize function patient will be able to. ..   - Decrease intensity/duration/frequency of pain to 4/10. Met   - Demonstrate symmetrical knee AROM without pain. Improved  - Increase hip/knee strength to 4/5 throughout. Partially Met  - Demonstrate improved hip flexibility as demonstrated by increased ROM through therapeutic exercise. Improved    Functional Goals 6-8 weeks  In order to return to prior level of function patient will be able to. ..   - Participate in ADL's/IADL's/sport specific activities with no greater than 2/10 pain. Partially Met  - Increase Functional Status Measure (FOTO) to: anticipated at discharge. Partially Met  - Demonstrate independence and compliant with HEP. Met  - Demonstrate a squat and or sit to stand with good mechanics and eccentric control without pain/difficulty/compensation. Improved  - Demonstrate functional activities with good core and glute strength without compensation/pain/difficulty. Improved  - Ascend and descend stairs without increased pain/compensation/difficulty and a reciprocal gait pattern.  Improved    Plan  Patient would benefit from: skilled PT  Planned modality interventions: cryotherapy  Other planned modality interventions: moist heat  Planned therapy interventions: joint mobilization, manual therapy, neuromuscular re-education, patient education, strengthening, stretching, therapeutic activities, therapeutic exercise, home exercise program, functional ROM exercises, Green taping, postural training, balance/weight bearing training, body mechanics training, flexibility, IASTM, kinesiology taping, massage, transfer training and nerve gliding  Frequency: 1-2x/week. Duration in weeks: 4  Treatment plan discussed with: patient, PTA and referring physician        Subjective Evaluation    History of Present Illness  Mechanism of injury: Rodger Lundberg is a 74y.o. year-old male who presents to outpatient PT with reports of Left knee pain that started since end of . He reports he had Right foot surgery in 2022 and was in a 3inch heel boot for about 3-4 weeks. He reports since then, he has been feeling pain that has been progressively getting worse in Left knee. Patient denies any particular ABDI or fall. He saw his PCP and was recommended for PT at this time. Quality of life: good    Patient Goals  Patient goals for therapy: decreased pain, increased motion, improved balance, increased strength, independence with ADLs/IADLs and return to sport/leisure activities    Pain  Current pain ratin  At best pain ratin  At worst pain rating: 3  Location: Left knee  Quality: tight  Relieving factors: medications  Aggravating factors: sitting, stair climbing, walking and lifting  Progression: worsening    Social Support  Steps to enter house: yes  Stairs in house: yes   Lives in: multiple-level home    Employment status: not working    Diagnostic Tests  No diagnostic tests performed  Treatments  Current treatment: physical therapy        Objective     Observations     Additional Observation Details  Standing Posture: Left shoulder depressed; toeing out; bilateral slight knee flexion; bilaterally posterior pelvic tilted; anteriorly weight-shifted; knee varus posture bilaterally    Tenderness   Left Knee   Tenderness in the lateral joint line and medial joint line.      Active Range of Motion   Left Knee   Flexion: 112 degrees with pain  Extensor lag: -3 degrees     Passive Range of Motion   Left Knee   Flexion: 115 degrees   Extension: -2 degrees     Additional Passive Range of Motion Details  Hamstring Flexibility: moderate tightness at mid-range PROM bilaterally    Mobility   Patellar Mobility:   Left Knee   WFL: medial, lateral, superior and inferior. Strength/Myotome Testing     Left Knee   Flexion: 4  Extension: 4    Right Knee   Normal strength    Additional Strength Details  Ankle DF/PF: 4/5 bilaterally    Hip Flexion: 4/5 bilaterally  Hip Extension (hook-lying): 3+/5 bilaterally  Hip ABD/ER (hook-lying): Left 4/5, Right 4/5  Hip ADD/IR (hook-lying): Left 4/5, Right 4/5    Tests     Left Knee   Negative anterior drawer, lateral Fatmata, medial Fatmata, patellar compression, posterior drawer, valgus stress test at 30 degrees and varus stress test at 30 degrees.        Flowsheet Rows    Flowsheet Row Most Recent Value   PT/OT G-Codes    Current Score 72   Projected Score 70             Diagnosis: Left knee pain  Precautions: DM2, HTN, neuropathy, hx of cancer; hx of Right foot surgery (Nov 2022)  ( * asterisk indicates given per HEP)  Next Physician Appointment:   [facility] Blood HEP:     Manuals 8/14 8/17 8/22 8/25            STM                PROM                Taping                                Neuro Re-Ed                Quad sets                Supine SLR                Hip ADD ball squeeze                Clamshells / BKFO GTB  20x ea BKFO GTB alt x10ea              Bridges                TB TKEs  [place] 13# 2x10 [place]  13# 2x10             Foam Balance                Tandem amb                Wobble Board  x2' ea x2' ea 2' ea 2' ea            SLS 2x30" ea  2x30" ea                             Ther Ex                Calf stretch                HS stretch Seated  10x10"  Seated   10x10"             DKTC w/ ball  2x10              Heel Raises  2x10              Post Tib HR  20x              Standing hip 3-way                Heel Slides                Hip flexor stretch   EOT  10x10"             Ther Activity                Bike for LE mobility and endurance Upright full rev seat 10  x5 min Upright full rev seat 10  x5 min Upright full rev  Seat 9  x5 min Upright full rev  Seat 9  x5 min            Side stepping   Red   3x at mirror             Leg Press 80#  10x2 80# 2x10 90# 2x10 90# 2x10            TRX squats   10x2 10x2            [place] walkouts  F/B 15# 5 laps              X walks                Pt Ed HEP               Re-Evaluation    DK            Modalities                Heat/ice (PRN)                TENS?

## 2023-08-28 ENCOUNTER — OFFICE VISIT (OUTPATIENT)
Dept: PHYSICAL THERAPY | Facility: CLINIC | Age: 68
End: 2023-08-28
Payer: MEDICARE

## 2023-08-28 DIAGNOSIS — M25.562 ACUTE PAIN OF LEFT KNEE: Primary | ICD-10-CM

## 2023-08-28 PROCEDURE — 97530 THERAPEUTIC ACTIVITIES: CPT

## 2023-08-28 PROCEDURE — 97112 NEUROMUSCULAR REEDUCATION: CPT

## 2023-08-28 PROCEDURE — 97110 THERAPEUTIC EXERCISES: CPT

## 2023-08-30 DIAGNOSIS — I10 ELEVATED BLOOD PRESSURE READING IN OFFICE WITH DIAGNOSIS OF HYPERTENSION: ICD-10-CM

## 2023-08-30 RX ORDER — LISINOPRIL 20 MG/1
20 TABLET ORAL DAILY
Qty: 90 TABLET | Refills: 1 | OUTPATIENT
Start: 2023-08-30

## 2023-08-31 ENCOUNTER — OFFICE VISIT (OUTPATIENT)
Dept: PHYSICAL THERAPY | Facility: CLINIC | Age: 68
End: 2023-08-31
Payer: MEDICARE

## 2023-08-31 DIAGNOSIS — M25.562 ACUTE PAIN OF LEFT KNEE: Primary | ICD-10-CM

## 2023-08-31 PROCEDURE — 97530 THERAPEUTIC ACTIVITIES: CPT

## 2023-08-31 PROCEDURE — 97112 NEUROMUSCULAR REEDUCATION: CPT

## 2023-08-31 PROCEDURE — 97110 THERAPEUTIC EXERCISES: CPT

## 2023-08-31 NOTE — PROGRESS NOTES
Daily Note     Today's date: 2023  Patient name: Madai Morrissey  : 1955  MRN: [id number]  Referring provider: Kenton Quijano DO  Dx:   Encounter Diagnosis     ICD-10-CM    1. Acute pain of left knee  M25.562                      Subjective: Pt states that he had some knee pain yesterday so did not do his stretches. Pt is not sure if he did something to aggravate it or if it was from a new exercise. Objective: See treatment diary below      Assessment: Tolerated treatment well. Continued with outlined progressions from last visit and modified slightly due to pt's report. No increased sx's this visit. Pt performs standing ex's and balance activities with B/L hip ER. Pt progressing slowly towards his goals and will benefit from continued therapy.       Plan: Continue per plan of care     Diagnosis: Left knee pain  Precautions: DM2, HTN, neuropathy, hx of cancer; hx of Right foot surgery (2022)  ( * asterisk indicates given per HEP)  Next Physician Appointment:   Joon Alexander:     Manuals           STM                PROM                Taping                                Neuro Re-Ed                Quad sets                Supine SLR      10x2 ea          Hip ADD ball squeeze                Clamshells / BKFO GTB  20x ea BKFO GTB alt x10ea   Clams  20x ea           Bridges      2x10          TB TKEs  [place] 13# 2x10 [place]  13# 2x10             Foam Balance      tandem 3x30" ea          Tandem amb                Wobble Board  x2' ea x2' ea 2' ea 2' ea 2' ea 2' ea          SLS 2x30" ea  2x30" ea  Foam  2x30" ea Foam   2x30" ea                          Ther Ex                Calf stretch                HS stretch Seated  10x10"  Seated   10x10"  Seated   10x10" Seated  10x10"          DKTC w/ ball  2x10              Heel Raises  2x10              Post Tib HR  20x              Standing hip 3-way                Heel Slides                Hip flexor stretch EOT  10x10"             Ther Activity                Bike for LE mobility and endurance Upright full rev seat 10  x5 min Upright full rev seat 10  x5 min Upright full rev  Seat 9  x5 min Upright full rev  Seat 9  x5 min Upright full rev  x5 min  LV 12 Upright full rev   x5 min  Lv 12          Side stepping   Red   3x at mirror  Green  3x at [employer] 3x at [employer] 80#  10x2 80# 2x10 90# 2x10 90# 2x10 100# 2x10 100#  2x10          TRX squats   10x2 10x2 10x2 10x2          Step ups     6" 10x ea           Step over hurdles     Frd+s/s  2x ea           [place] walkouts  F/B 15# 5 laps              X walks                Pt Ed HEP     HEP          Re-Evaluation    DK            Modalities                Heat/ice (PRN)                TENS?

## 2023-09-05 ENCOUNTER — OFFICE VISIT (OUTPATIENT)
Dept: PHYSICAL THERAPY | Facility: CLINIC | Age: 68
End: 2023-09-05
Payer: MEDICARE

## 2023-09-05 DIAGNOSIS — M25.562 ACUTE PAIN OF LEFT KNEE: Primary | ICD-10-CM

## 2023-09-05 PROCEDURE — 97110 THERAPEUTIC EXERCISES: CPT

## 2023-09-05 PROCEDURE — 97112 NEUROMUSCULAR REEDUCATION: CPT

## 2023-09-05 PROCEDURE — 97530 THERAPEUTIC ACTIVITIES: CPT

## 2023-09-05 NOTE — PROGRESS NOTES
Daily Note     Today's date: 2023  Patient name: Ras Qiu  : 1955  MRN: [id number]  Referring provider: Livia Baron DO  Dx:   Encounter Diagnosis     ICD-10-CM    1. Acute pain of left knee  M25.562                      Subjective: Pt states that he is doing well and his knee is still feeling good. Objective: See treatment diary below      Assessment: Tolerated treatment well. Continued with outlined progressions in strengthening with minimal reports of increased pain sx's. Pt challenged with SLS and tandem stance on foam and requires occasional UE support. Pt notes that he has twinges of discomfort in his knee at times, but nothing like he had before. Pt progressing slowly towards his goals and will benefit from continued therapy.       Plan: Continue per plan of care     Diagnosis: Left knee pain  Precautions: DM2, HTN, neuropathy, hx of cancer; hx of Right foot surgery (2022)  ( * asterisk indicates given per HEP)  Next Physician Appointment:   Samantha Barragan:     Manuals          STM                PROM                Taping                                Neuro Re-Ed                Quad sets                Supine SLR      10x2 ea          Hip ADD ball squeeze                Clamshells / BKFO GTB  20x ea BKFO GTB alt x10ea   Clams  20x ea           Bridges      2x10 On SB 2x10         TB TKEs  [place] 13# 2x10 [place]  13# 2x10             Foam Balance      tandem 3x30" ea Tandem 3x30" ea         Tandem amb                Wobble Board  x2' ea x2' ea 2' ea 2' ea 2' ea 2' ea 2' ea         SLS 2x30" ea  2x30" ea  Foam  2x30" ea Foam   2x30" ea Foam  2x30" ea                         Ther Ex                Calf stretch                HS stretch Seated  10x10"  Seated   10x10"  Seated   10x10" Seated  10x10"          DKTC w/ ball  2x10              Heel Raises  2x10              Post Tib HR  20x              Standing hip 3-way                Heel Slides Hip flexor stretch   EOT  10x10"             Ther Activity                Bike for LE mobility and endurance Upright full rev seat 10  x5 min Upright full rev seat 10  x5 min Upright full rev  Seat 9  x5 min Upright full rev  Seat 9  x5 min Upright full rev  x5 min  LV 12 Upright full rev   x5 min  Lv 12 Upright full rev x6 min  lv 12         Side stepping   Red   3x at mirror  Green  3x at [employer] 3x at [employer] 3x at [employer] 80#  10x2 80# 2x10 90# 2x10 90# 2x10 100# 2x10 100#  2x10 100#  2x10         TRX squats   10x2 10x2 10x2 10x2 10x2         Step ups     6" 10x ea           Step over hurdles     Frd+s/s  2x ea           [place] walkouts  F/B 15# 5 laps              X walks                Pt Ed HEP     HEP          Re-Evaluation    DK            Modalities                Heat/ice (PRN)                TENS?

## 2023-09-06 ENCOUNTER — HOSPITAL ENCOUNTER (OUTPATIENT)
Dept: GASTROENTEROLOGY | Facility: AMBULATORY SURGERY CENTER | Age: 68
Discharge: HOME | End: 2023-09-06
Payer: MEDICARE

## 2023-09-06 ENCOUNTER — ANESTHESIA EVENT (OUTPATIENT)
Dept: GASTROENTEROLOGY | Facility: AMBULATORY SURGERY CENTER | Age: 68
End: 2023-09-06

## 2023-09-06 ENCOUNTER — ANESTHESIA (OUTPATIENT)
Dept: GASTROENTEROLOGY | Facility: AMBULATORY SURGERY CENTER | Age: 68
End: 2023-09-06

## 2023-09-06 VITALS
HEART RATE: 86 BPM | TEMPERATURE: 97.7 F | BODY MASS INDEX: 30.46 KG/M2 | RESPIRATION RATE: 18 BRPM | HEIGHT: 75 IN | SYSTOLIC BLOOD PRESSURE: 110 MMHG | WEIGHT: 245 LBS | DIASTOLIC BLOOD PRESSURE: 58 MMHG | OXYGEN SATURATION: 99 %

## 2023-09-06 DIAGNOSIS — Z12.11 COLON CANCER SCREENING: ICD-10-CM

## 2023-09-06 PROCEDURE — 88305 TISSUE EXAM BY PATHOLOGIST: CPT | Performed by: PATHOLOGY

## 2023-09-06 PROCEDURE — 45385 COLONOSCOPY W/LESION REMOVAL: CPT | Performed by: INTERNAL MEDICINE

## 2023-09-06 RX ORDER — SODIUM CHLORIDE, SODIUM LACTATE, POTASSIUM CHLORIDE, CALCIUM CHLORIDE 600; 310; 30; 20 MG/100ML; MG/100ML; MG/100ML; MG/100ML
INJECTION, SOLUTION INTRAVENOUS CONTINUOUS PRN
Status: DISCONTINUED | OUTPATIENT
Start: 2023-09-06 | End: 2023-09-06

## 2023-09-06 RX ORDER — PROPOFOL 10 MG/ML
INJECTION, EMULSION INTRAVENOUS AS NEEDED
Status: DISCONTINUED | OUTPATIENT
Start: 2023-09-06 | End: 2023-09-06

## 2023-09-06 RX ADMIN — PROPOFOL 50 MG: 10 INJECTION, EMULSION INTRAVENOUS at 09:43

## 2023-09-06 RX ADMIN — PROPOFOL 100 MG: 10 INJECTION, EMULSION INTRAVENOUS at 09:37

## 2023-09-06 RX ADMIN — PROPOFOL 50 MG: 10 INJECTION, EMULSION INTRAVENOUS at 09:42

## 2023-09-06 RX ADMIN — PROPOFOL 30 MG: 10 INJECTION, EMULSION INTRAVENOUS at 09:38

## 2023-09-06 RX ADMIN — SODIUM CHLORIDE, SODIUM LACTATE, POTASSIUM CHLORIDE, CALCIUM CHLORIDE: 600; 310; 30; 20 INJECTION, SOLUTION INTRAVENOUS at 09:28

## 2023-09-06 RX ADMIN — PROPOFOL 50 MG: 10 INJECTION, EMULSION INTRAVENOUS at 09:59

## 2023-09-06 RX ADMIN — PROPOFOL 50 MG: 10 INJECTION, EMULSION INTRAVENOUS at 09:56

## 2023-09-06 RX ADMIN — PROPOFOL 20 MG: 10 INJECTION, EMULSION INTRAVENOUS at 09:39

## 2023-09-06 RX ADMIN — PROPOFOL 50 MG: 10 INJECTION, EMULSION INTRAVENOUS at 09:40

## 2023-09-06 RX ADMIN — PROPOFOL 50 MG: 10 INJECTION, EMULSION INTRAVENOUS at 09:52

## 2023-09-06 RX ADMIN — PROPOFOL 50 MG: 10 INJECTION, EMULSION INTRAVENOUS at 09:47

## 2023-09-06 NOTE — ANESTHESIA PREPROCEDURE EVALUATION
Procedure:  COLONOSCOPY    Relevant Problems   CARDIO   (+) Essential hypertension   (+) Hyperlipidemia LDL goal <70      ENDO   (+) Type 2 diabetes mellitus (HCC)   (+) Type II or unspecified type diabetes mellitus without mention of complication, uncontrolled      /RENAL   (+) Prostate cancer (HCC)      NEURO/PSYCH   (+) Depression with anxiety   (+) Diabetic polyneuropathy (HCC)      Other   (+) Other acute osteomyelitis, right ankle and foot (HCC)        Physical Exam    Airway    Mallampati score: III  TM Distance: >3 FB  Neck ROM: full     Dental   No notable dental hx     Cardiovascular  Cardiovascular exam normal    Pulmonary  Pulmonary exam normal     Other Findings        Anesthesia Plan  ASA Score- 2     Anesthesia Type- IV sedation with anesthesia with ASA Monitors. Additional Monitors:   Airway Plan:           Plan Factors-Exercise tolerance (METS): >4 METS. Chart reviewed. Patient summary reviewed. Patient is not a current smoker. Induction- intravenous. Postoperative Plan-     Informed Consent- Anesthetic plan and risks discussed with patient.

## 2023-09-06 NOTE — H&P
History and Physical - [facility]  Lauren Kelley 74 y.o. male MRN: [id number]                  HPI: Lauren Kelley is a 74y.o. year old male who presents for colonoscopy for polyp surveillance. Last colonoscopy 3 years ago 3 tubular adenomas removed. REVIEW OF SYSTEMS: Per the HPI, and otherwise unremarkable.     Historical Information   Past Medical History:   Diagnosis Date   • Allergic rhinitis    • Anxiety disorder     Last assessed 2/7/2006    • Cancer ([address])     Possible 11/18/22   • Carpal tunnel syndrome    • Diabetes mellitus ([address])    • Diabetes mellitus with neurological manifestation ([address]) 04/06/2012    Last assessed 3/29/2016    • Diabetes type 2, uncontrolled     Last assessed 4/6/2016    • Disease of thyroid gland    • DM II (diabetes mellitus, type II), controlled ([address])     Last assessed 10/20/2014    • Essential hypertension     Last assesssed 2/7/2006    • Hypertension    • Insomnia     Last assessed 1/13/2011    • Numbness and tingling of foot     Resolved 3/29/2016    • Obesity    • Old disruption of knee ligament     Last assessed 3/26/2008    • Tarsal tunnel syndrome    • Urinary frequency     Resolved 3/29/2016    • Visual impairment      Past Surgical History:   Procedure Laterality Date   • COLONOSCOPY     • FOOT SURGERY     • NC AMPUTATION TOE INTERPHALANGEAL JOINT Right 11/23/2022    Procedure: AMPUTATION 3RD TOE;  Surgeon: Salma Leon DPM;  Location: [facility];  Service: Podiatry   • PROSTATE BIOPSY     • WISDOM TOOTH EXTRACTION       Social History   Social History     Substance and Sexual Activity   Alcohol Use Yes   • Alcohol/week: 9.0 standard drinks of alcohol   • Types: 2 Cans of beer, 7 Standard drinks or equivalent per week     Social History     Substance and Sexual Activity   Drug Use Never     Social History     Tobacco Use   Smoking Status Never   Smokeless Tobacco Never     Family History   Problem Relation Age of Onset   • Heart attack Mother 79   • Valvular heart disease Mother    • Hypotension Mother    • Pancreatic cancer Father 76   • Diabetes Father    • Lung cancer Sister 64        not a smoker   • Cancer Brother         bile duct    • Colon cancer Maternal Grandmother 58   • Diabetes Maternal Grandmother    • Pancreatic cancer Paternal Aunt    • Pancreatic cancer Paternal Uncle    • Hypertension Neg Hx    • Prostate cancer Neg Hx        Meds/Allergies       Current Outpatient Medications:   •  aspirin 81 MG tablet  •  chlorthalidone 25 mg tablet  •  gabapentin (NEURONTIN) 100 mg capsule  •  glipiZIDE (GLUCOTROL XL) 5 mg 24 hr tablet  •  glucose blood test strip  •  levothyroxine 50 mcg tablet  •  lisinopril (ZESTRIL) 40 mg tablet  •  metFORMIN (GLUCOPHAGE) 1000 MG tablet  •  Misc Natural Products (Lutein 20) CAPS  •  mupirocin (BACTROBAN) 2 % ointment  •  naproxen (NAPROSYN) 500 mg tablet  •  rosuvastatin (CRESTOR) 5 mg tablet  •  tadalafil (CIALIS) 20 MG tablet  •  traMADol (ULTRAM) 50 mg tablet    Allergies   Allergen Reactions   • Sudafed [Pseudoephedrine] Other (See Comments)     hyperactivity   • Amoxicillin Rash       Objective     There were no vitals taken for this visit. PHYSICAL EXAM    Gen: NAD  Head: NCAT  CV: RRR  CHEST: Clear  ABD: soft, NT/ND  EXT: no edema      ASSESSMENT/PLAN:  This is a 74y.o. year old male here for colonoscopy, and he is stable and optimized for his procedure.

## 2023-09-07 ENCOUNTER — OFFICE VISIT (OUTPATIENT)
Dept: PHYSICAL THERAPY | Facility: CLINIC | Age: 68
End: 2023-09-07
Payer: MEDICARE

## 2023-09-07 ENCOUNTER — TELEPHONE (OUTPATIENT)
Age: 68
End: 2023-09-07

## 2023-09-07 DIAGNOSIS — M25.562 ACUTE PAIN OF LEFT KNEE: Primary | ICD-10-CM

## 2023-09-07 DIAGNOSIS — R97.20 ELEVATED PSA: Primary | ICD-10-CM

## 2023-09-07 PROCEDURE — 97530 THERAPEUTIC ACTIVITIES: CPT

## 2023-09-07 PROCEDURE — 97112 NEUROMUSCULAR REEDUCATION: CPT

## 2023-09-07 PROCEDURE — 97110 THERAPEUTIC EXERCISES: CPT

## 2023-09-07 NOTE — TELEPHONE ENCOUNTER
Patient will need PSA order mailed to him to go to [facility] prior to his next f/u. Address verified with patient.

## 2023-09-07 NOTE — PROGRESS NOTES
Daily Note     Today's date: 2023  Patient name: Allen Carrera  : 1955  MRN: [id number]  Referring provider: Rosalba Carrera DO  Dx:   Encounter Diagnosis     ICD-10-CM    1. Acute pain of left knee  M25.562                      Subjective: Pt states that he is still doing well and has much less pain than when he started. He reports that he just has some achyness. around his knee. Objective: See treatment diary below      Assessment: Tolerated treatment well. Continued with outlined program and progressions with no reports of increased pain sx's. Pt challenged with step ups with muscle fatigue noted. Cues for full knee extension at top of step with good carryover noted. Hip flexor stretch to improve ROM/muscle length and decrease overall pain. Pt progressing slowly towards his goals and will benefit from continued therapy.          Plan: Continue per plan of care     Diagnosis: Left knee pain  Precautions: DM2, HTN, neuropathy, hx of cancer; hx of Right foot surgery (2022)  ( * asterisk indicates given per HEP)  Next Physician Appointment:   Naty Paling:     Manuals         STM                PROM                Taping                                Neuro Re-Ed                Quad sets                Supine SLR      10x2 ea          Hip ADD ball squeeze                Clamshells / BKFO GTB  20x ea BKFO GTB alt x10ea   Clams  20x ea           Bridges      2x10 On SB 2x10         TB TKEs  [place] 13# 2x10 [place]  13# 2x10             Foam Balance      tandem 3x30" ea Tandem 3x30" ea         Tandem amb                Wobble Board  x2' ea x2' ea 2' ea 2' ea 2' ea 2' ea 2' ea 2' ea        SLS 2x30" ea  2x30" ea  Foam  2x30" ea Foam   2x30" ea Foam  2x30" ea                         Ther Ex                Calf stretch                HS stretch Seated  10x10"  Seated   10x10"  Seated   10x10" Seated  10x10"  Seated  10x10"        DKTC w/ ball  2x10 Heel Raises  2x10              Post Tib HR  20x              Standing hip 3-way                Heel Slides                Hip flexor stretch   EOT  10x10"     EOT  10x10"        Ther Activity                Bike for LE mobility and endurance Upright full rev seat 10  x5 min Upright full rev seat 10  x5 min Upright full rev  Seat 9  x5 min Upright full rev  Seat 9  x5 min Upright full rev  x5 min  LV 12 Upright full rev   x5 min  Lv 12 Upright full rev x6 min  lv 12 Upright full rev  x5 min  lv 12        Side stepping   Red   3x at mirror  Green  3x at [employer] 3x at [employer] 3x at [employer] 3x at [facility] 80#  10x2 80# 2x10 90# 2x10 90# 2x10 100# 2x10 100#  2x10 100#  2x10 100#  2x10        TRX squats   10x2 10x2 10x2 10x2 10x2 10x2        Step ups     6" 10x ea   6" 10x2 ea        Step over hurdles     Frd+s/s  2x ea           [place] walkouts  F/B 15# 5 laps              X walks                Pt Ed HEP     HEP          Re-Evaluation    DK            Modalities                Heat/ice (PRN)                TENS?

## 2023-09-11 ENCOUNTER — OFFICE VISIT (OUTPATIENT)
Dept: PHYSICAL THERAPY | Facility: CLINIC | Age: 68
End: 2023-09-11
Payer: MEDICARE

## 2023-09-11 DIAGNOSIS — M25.562 ACUTE PAIN OF LEFT KNEE: Primary | ICD-10-CM

## 2023-09-11 PROCEDURE — 97112 NEUROMUSCULAR REEDUCATION: CPT

## 2023-09-11 PROCEDURE — 97110 THERAPEUTIC EXERCISES: CPT

## 2023-09-11 PROCEDURE — 97530 THERAPEUTIC ACTIVITIES: CPT

## 2023-09-11 NOTE — PROGRESS NOTES
Daily Note     Today's date: 2023  Patient name: Lauren Kelley  : 1955  MRN: [id number]  Referring provider: Wanda Wesley DO  Dx:   Encounter Diagnosis     ICD-10-CM    1. Acute pain of left knee  M25.562                      Subjective: Pt states that he is still doing well. He still has some achyness in his knee with standing long times or just off and on. He reports that it is still much better than it was. Objective: See treatment diary below      Assessment: Tolerated treatment well. Progressed pt with strengthening as indicated in table. Pt challenged with holding his L knee in knee flexion during stool scoots due to tightness. Prone quad stretch added to improve knee flexion ROM and decrease stiffness. Pt performs ex's in hip ER B//L as this is his normal stance. Pt progressing slowly towards his goals and will benefit from continued therapy.       Plan: Continue per plan of care     Diagnosis: Left knee pain  Precautions: DM2, HTN, neuropathy, hx of cancer; hx of Right foot surgery (2022)  ( * asterisk indicates given per HEP)  Next Physician Appointment:   Zenaida Esquivel:     Manuals        STM                PROM                Taping                                Neuro Re-Ed                Quad sets                Supine SLR      10x2 ea          Hip ADD ball squeeze                Clamshells / BKFO GTB  20x ea BKFO GTB alt x10ea   Clams  20x ea           Bridges      2x10 On SB 2x10         TB TKEs  [place] 13# 2x10 [place]  13# 2x10             Foam Balance      tandem 3x30" ea Tandem 3x30" ea  Tandem  3x30" ea       Tandem amb                Wobble Board  x2' ea x2' ea 2' ea 2' ea 2' ea 2' ea 2' ea 2' ea        SLS 2x30" ea  2x30" ea  Foam  2x30" ea Foam   2x30" ea Foam  2x30" ea  Foam  2x30" ea                       Ther Ex                Calf stretch                HS stretch Seated  10x10"  Seated   10x10"  Seated   10x10" Seated  10x10"  Seated  10x10" Seated   10x10"       Prone quad stretch         10x 10"       DKTC w/ ball  2x10              Heel Raises  2x10              Post Tib HR  20x              Standing hip 3-way                Heel Slides                Hip flexor stretch   EOT  10x10"     EOT  10x10"        Ther Activity                Bike for LE mobility and endurance Upright full rev seat 10  x5 min Upright full rev seat 10  x5 min Upright full rev  Seat 9  x5 min Upright full rev  Seat 9  x5 min Upright full rev  x5 min  LV 12 Upright full rev   x5 min  Lv 12 Upright full rev x6 min  lv 12 Upright full rev  x5 min  lv 12 Upright full rev  x5 min  lv 14       Side stepping   Red   3x at mirror  Green  3x at [employer] 3x at [employer] 3x at [employer] 3x at [employer] 3x at [facility] 80#  10x2 80# 2x10 90# 2x10 90# 2x10 100# 2x10 100#  2x10 100#  2x10 100#  2x10 100#  3x10       TRX squats   10x2 10x2 10x2 10x2 10x2 10x2        Step ups     6" 10x ea   6" 10x2 ea        Lateral step overs         6" 10x       Stool scoots         5# 20'x4       Step over hurdles     Frd+s/s  2x ea           [place] walkouts  F/B 15# 5 laps              X walks                Pt Ed HEP     HEP          Re-Evaluation    DK            Modalities                Heat/ice (PRN)                TENS?

## 2023-09-13 PROCEDURE — 88305 TISSUE EXAM BY PATHOLOGIST: CPT | Performed by: PATHOLOGY

## 2023-09-15 ENCOUNTER — OFFICE VISIT (OUTPATIENT)
Dept: PHYSICAL THERAPY | Facility: CLINIC | Age: 68
End: 2023-09-15
Payer: MEDICARE

## 2023-09-15 DIAGNOSIS — M25.562 ACUTE PAIN OF LEFT KNEE: Primary | ICD-10-CM

## 2023-09-15 PROCEDURE — 97110 THERAPEUTIC EXERCISES: CPT

## 2023-09-15 PROCEDURE — 97530 THERAPEUTIC ACTIVITIES: CPT

## 2023-09-15 PROCEDURE — 97112 NEUROMUSCULAR REEDUCATION: CPT

## 2023-09-15 NOTE — PROGRESS NOTES
Daily Note     Today's date: 9/15/2023  Patient name: Germaine Dallas  : 1955  MRN: [id number]  Referring provider: Drake Fuchs DO  Dx:   Encounter Diagnosis     ICD-10-CM    1. Acute pain of left knee  M25.562           Start Time: 830  Stop Time: 915  Total time in clinic (min): 45 minutes    Subjective: Patient reports no new changes in pain. Objective: See treatment diary below      Assessment: Tolerated treatment well. Patient initiated session with balance and functional strengthening activities. Initially challenged with lateral step overs, but improved with repetitions and cuing for muscle control. Performed stool scoots for hamstring with some difficulty maintaining position of other LE, but performed with good stability. Patient exhibited good technique with therapeutic exercises and would benefit from continued PT      Plan: Continue per plan of care. Progress treatment as tolerated.        Diagnosis: Left knee pain  Precautions: DM2, HTN, neuropathy, hx of cancer; hx of Right foot surgery (2022)  ( * asterisk indicates given per HEP)  Next Physician Appointment:   Vadim Dill:     Manuals 8/14 8/17 8/22 8/25 8/28 8/31 9/5 9/7 9/11 9/15      STM                PROM                Taping                                Neuro Re-Ed                Quad sets                Supine SLR      10x2 ea          Hip ADD ball squeeze                Clamshells / BKFO GTB  20x ea BKFO GTB alt x10ea   Clams  20x ea           Bridges      2x10 On SB 2x10         TB TKEs  [place] 13# 2x10 [place]  13# 2x10             Foam Balance      tandem 3x30" ea Tandem 3x30" ea  Tandem  3x30" ea       Tandem amb                Wobble Board  x2' ea x2' ea 2' ea 2' ea 2' ea 2' ea 2' ea 2' ea  2' ea      SLS 2x30" ea  2x30" ea  Foam  2x30" ea Foam   2x30" ea Foam  2x30" ea  Foam  2x30" ea                       Ther Ex                Calf stretch                HS stretch Seated  10x10"  Seated   10x10"  Seated 10x10" Seated  10x10"  Seated  10x10" Seated   10x10" Seated   10x10"      Prone quad stretch         10x 10"       DKTC w/ ball  2x10              Heel Raises  2x10              Post Tib HR  20x              Standing hip 3-way                Heel Slides                Hip flexor stretch   EOT  10x10"     EOT  10x10"        Ther Activity                Bike for LE mobility and endurance Upright full rev seat 10  x5 min Upright full rev seat 10  x5 min Upright full rev  Seat 9  x5 min Upright full rev  Seat 9  x5 min Upright full rev  x5 min  LV 12 Upright full rev   x5 min  Lv 12 Upright full rev x6 min  lv 12 Upright full rev  x5 min  lv 12 Upright full rev  x5 min  lv 14 Upright full rev  x5 min  lv 14      Side stepping   Red   3x at mirror  Green  3x at [employer] 3x at [employer] 3x at [employer] 3x at [employer] 3x at [employer] 3x at [employer] 80#  10x2 80# 2x10 90# 2x10 90# 2x10 100# 2x10 100#  2x10 100#  2x10 100#  2x10 100#  3x10 100#  3x10      TRX squats   10x2 10x2 10x2 10x2 10x2 10x2        Step ups     6" 10x ea   6" 10x2 ea  6" 10x2 ea      Lateral step overs         6" 10x 6" 10x      Stool scoots         5# 20'x4 5# 20'x4      Step over hurdles     Frd+s/s  2x ea           [place] walkouts  F/B 15# 5 laps              X walks                Pt Ed HEP     HEP          Re-Evaluation    DK            Modalities                Heat/ice (PRN)                TENS?

## 2023-09-16 DIAGNOSIS — E03.9 HYPOTHYROIDISM, UNSPECIFIED TYPE: ICD-10-CM

## 2023-09-18 RX ORDER — LEVOTHYROXINE SODIUM 0.05 MG/1
50 TABLET ORAL
Qty: 90 TABLET | Refills: 0 | Status: SHIPPED | OUTPATIENT
Start: 2023-09-18

## 2023-09-19 ENCOUNTER — OFFICE VISIT (OUTPATIENT)
Dept: PHYSICAL THERAPY | Facility: CLINIC | Age: 68
End: 2023-09-19
Payer: MEDICARE

## 2023-09-19 DIAGNOSIS — M25.562 ACUTE PAIN OF LEFT KNEE: Primary | ICD-10-CM

## 2023-09-19 PROCEDURE — 97530 THERAPEUTIC ACTIVITIES: CPT

## 2023-09-19 PROCEDURE — 97110 THERAPEUTIC EXERCISES: CPT

## 2023-09-19 PROCEDURE — 97112 NEUROMUSCULAR REEDUCATION: CPT

## 2023-09-19 NOTE — PROGRESS NOTES
Daily Note     Today's date: 2023  Patient name: Dawood Syed  : 1955  MRN: [id number]  Referring provider: Sierra Barragan DO  Dx:   Encounter Diagnosis     ICD-10-CM    1. Acute pain of left knee  M25.562                      Subjective: Pt states that his knee is about the same. Better but still achy. Objective: See treatment diary below      Assessment: Tolerated treatment well. Progressed pt with strengthening ex's as tolerated with no reports of increased pain sx's. Pt demonstrates muscle fatigue with progressions and short rest breaks are taken. Cues given to keep toes pointed forward during SLS and standing strengthening as pt has tendency to  exaggerated hip ER. Improvement noted with cues. Session also focused on updated HEP along with reviewing ex's with printout given. Pt reports understanding. Pt progressing slowly towards his goals and will benefit from continued therapy.       Plan: Continue per plan of care     Diagnosis: Left knee pain  Precautions: DM2, HTN, neuropathy, hx of cancer; hx of Right foot surgery (2022)  ( * asterisk indicates given per HEP)  Next Physician Appointment:   Melida Davidson:     Manuals 9/19 8/17 8/22 8/25 8/28 8/31 9/5 9/7 9/11 9/15   STM             PROM             Taping                          Neuro Re-Ed             Quad sets             Supine SLR      10x2 ea       Hip ADD ball squeeze             Clamshells / BKFO Clams  20x ea BKFO GTB alt x10ea   Clams  20x ea        Bridges GTB  20x     2x10 On SB 2x10      tay  TKEs 13#  20x3" [place] 13# 2x10 [place]  13# 2x10          Foam Balance      tandem 3x30" ea Tandem 3x30" ea  Tandem  3x30" ea    Tandem amb             Wobble Board   x2' ea 2' ea 2' ea 2' ea 2' ea 2' ea 2' ea  2' ea   SLS foam  2x30" ea  2x30" ea  Foam  2x30" ea Foam   2x30" ea Foam  2x30" ea  Foam  2x30" ea                 Ther Ex             Calf stretch             HS stretch Seated   10x10"  Seated 10x10"  Seated   10x10" Seated  10x10"  Seated  10x10" Seated   10x10" Seated   10x10"   Prone quad stretch 10x10"        10x 10"    DKTC w/ ball  2x10           Heel Raises  2x10           Post Tib HR  20x           Standing hip 3-way             Heel Slides             Hip flexor stretch   EOT  10x10"     EOT  10x10"     Ther Activity             Bike for LE mobility and endurance Upright full rev seat 10  x5 min Upright full rev seat 10  x5 min Upright full rev  Seat 9  x5 min Upright full rev  Seat 9  x5 min Upright full rev  x5 min  LV 12 Upright full rev   x5 min  Lv 12 Upright full rev x6 min  lv 12 Upright full rev  x5 min  lv 12 Upright full rev  x5 min  lv 14 Upright full rev  x5 min  lv 14   Side stepping   Red   3x at mirror  Green  3x at [employer] 3x at [employer] 3x at [employer] 3x at [employer] 3x at [employer] 3x at [email or web address] 110#  3x10 80# 2x10 90# 2x10 90# 2x10 100# 2x10 100#  2x10 100#  2x10 100#  2x10 100#  3x10 100#  3x10   TRX squats   10x2 10x2 10x2 10x2 10x2 10x2     Step ups     6" 10x ea   6" 10x2 ea  6" 10x2 ea   Lateral step overs         6" 10x 6" 10x   Stool scoots 5# 20'x4        5# 20'x4 5# 20'x4   Step over hurdles     Frd+s/s  2x ea        [place] walkouts  F/B 15# 5 laps           X walks Red 2x            Pt Ed HEP     HEP       Re-Evaluation    DK         Modalities             Heat/ice (PRN)             TENS?

## 2023-09-22 ENCOUNTER — OFFICE VISIT (OUTPATIENT)
Dept: PHYSICAL THERAPY | Facility: CLINIC | Age: 68
End: 2023-09-22
Payer: MEDICARE

## 2023-09-22 DIAGNOSIS — M25.562 ACUTE PAIN OF LEFT KNEE: Primary | ICD-10-CM

## 2023-09-22 PROCEDURE — 97110 THERAPEUTIC EXERCISES: CPT

## 2023-09-22 PROCEDURE — 97112 NEUROMUSCULAR REEDUCATION: CPT

## 2023-09-22 PROCEDURE — 97530 THERAPEUTIC ACTIVITIES: CPT

## 2023-09-22 NOTE — PROGRESS NOTES
Daily Note     Today's date: 2023  Patient name: Zelda Hdez  : 1955  MRN: [id number]  Referring provider: Efren Snow DO  Dx:   Encounter Diagnosis     ICD-10-CM    1. Acute pain of left knee  M25.562           Start Time: 830  Stop Time: 915  Total time in clinic (min): 45 minutes    Subjective: Patient reports feeling better overall, gets a little twinge every now and then. Objective: See treatment diary below      Assessment: Tolerated treatment well. Initiated session with stationary bike and leg press. Performed with good form and without significant compensations. Patient with good form and quad control with weighted TKEs. Improving knee mobility with extension stretching. Overall good tolerance with PT exercises without increase in pain. Patient exhibited good technique with therapeutic exercises and would benefit from continued PT      Plan: Continue per plan of care. Progress note during next visit.       Diagnosis: Left knee pain  Precautions: DM2, HTN, neuropathy, hx of cancer; hx of Right foot surgery (2022)  ( * asterisk indicates given per HEP)  Next Physician Appointment:   Susan Guevara:     Manuals 9/5 9/7 9/11 9/15 9/19 9/22         STM               PROM               Taping                              Neuro Re-Ed               Quad sets               Supine SLR               Hip ADD ball squeeze               Clamshells / BKFO     Clams  20x ea          Bridges On SB 2x10    GTB  20x          tay  TKEs     13#  20x3" 13#  20x3"         Foam Balance Tandem 3x30" ea  Tandem  3x30" ea            Tandem amb               Wobble Board  2' ea 2' ea  2' ea           SLS foam  Foam  2x30" ea  Foam  2x30" ea  2x30" ea                         Ther Ex               Calf stretch               HS stretch  Seated  10x10" Seated   10x10" Seated   10x10" Seated   10x10" Seated   10x10"         Prone quad stretch   10x 10"  10x10"          DKTC w/ ball               Heel Raises               Post Tib HR               Standing hip 3-way               Heel Slides               Hip flexor stretch  EOT  10x10"             Ther Activity               Bike for LE mobility and endurance Upright full rev x6 min  lv 12 Upright full rev  x5 min  lv 12 Upright full rev  x5 min  lv 14 Upright full rev  x5 min  lv 14 Upright full rev seat 10  x5 min Upright full rev seat 10  x5 min         Side stepping Green 3x at [employer] 3x at [employer] 3x at [employer] 3x at [employer] 100#  2x10 100#  2x10 100#  3x10 100#  3x10 110#  3x10 110#  3x10         TRX squats 10x2 10x2             Step ups  6" 10x2 ea  6" 10x2 ea  8" 2x10 Left         Lateral step overs   6" 10x 6" 10x  8" 10xea         Stool scoots   5# 20'x4 5# 20'x4 5# 20'x4          Step over hurdles               [place] walkouts               X walks     Red 2x Red 2x         Pt Ed     HEP          Re-Evaluation               Modalities               Heat/ice (PRN)               TENS?

## 2023-09-26 ENCOUNTER — EVALUATION (OUTPATIENT)
Dept: PHYSICAL THERAPY | Facility: CLINIC | Age: 68
End: 2023-09-26
Payer: MEDICARE

## 2023-09-26 DIAGNOSIS — M25.562 ACUTE PAIN OF LEFT KNEE: Primary | ICD-10-CM

## 2023-09-26 PROCEDURE — 97530 THERAPEUTIC ACTIVITIES: CPT

## 2023-09-26 PROCEDURE — 97110 THERAPEUTIC EXERCISES: CPT

## 2023-09-26 NOTE — PROGRESS NOTES
PT Re-Evaluation     Today's date: 2023  Patient name: Rodger Lundberg  : 1955  MRN: [id number]  Referring provider: Alfredo Duran DO  Dx:   Encounter Diagnosis     ICD-10-CM    1. Acute pain of left knee  M25.562           Start Time: 830  Stop Time: 915  Total time in clinic (min): 45 minutes    Assessment  Assessment details: Patient is a 74 y.o. male who presents with reports of Left knee pain that started months ago with no recent injury or fall to cause. Patient has attended PT for past 2 months. He has reported an improved overall pain levels since start of PT. He reports 1/10 at worst compared to 6/10 on IE. He reports pain is primarily stiffness or achy with increased activity. Improving Left knee flexion mobility noted, however continues to have stiffness and limitations with knee extension. Patient continues to report difficulty with standing up after prolonged sitting and with his balance. He reports improved ability with stair negotiation, prolonged sitting, and getting in/out of car, with reports of reduced difficulty or sharp pain. Steadily improving in balance training in PT and will progress to performing on varying/compliant surfaces. Also progressing functional strength training to improve motor control and ease with transfers as mentioned above. Patient is independent with his ADLs and driving, however his current impairments affect his tolerance and participation with prolonged activities and certain household chores that he was able to do without issue per PLOF. He has demonstrated a good progress towards his set goals since IE, with good tolerance to PT interventions and exercises. Patient will benefit from continued skilled PT services to further improve LE mobility, improve LE strength, reduce pain levels, and improve overall ease with ADLs and other functional tasks to progress towards his PLOF.   Patient would benefit from skilled PT services to address these impairments and to maximize function. Thank you for the referral.  Impairments: abnormal gait, abnormal or restricted ROM, activity intolerance, impaired balance, impaired physical strength, lacks appropriate home exercise program, weight-bearing intolerance, poor posture  and poor body mechanics  Functional limitations: standing after prolonged sitting, balanceUnderstanding of Dx/Px/POC: good   Prognosis: good    Goals  Impairment Goals 4-6 weeks   In order to maximize function patient will be able to. ..   - Decrease intensity/duration/frequency of pain to 4/10. Met   - Demonstrate symmetrical knee AROM without pain. Partially Met  - Increase hip/knee strength to 4/5 throughout. Partially Met  - Demonstrate improved hip flexibility as demonstrated by increased ROM through therapeutic exercise. Improved    Functional Goals 6-8 weeks  In order to return to prior level of function patient will be able to. ..   - Participate in ADL's/IADL's/sport specific activities with no greater than 2/10 pain. Partially Met  - Increase Functional Status Measure (FOTO) to: anticipated at discharge. Partially Met  - Demonstrate independence and compliant with HEP. Met  - Demonstrate a squat and or sit to stand with good mechanics and eccentric control without pain/difficulty/compensation. Improved  - Demonstrate functional activities with good core and glute strength without compensation/pain/difficulty. Improved  - Ascend and descend stairs without increased pain/compensation/difficulty and a reciprocal gait pattern.  Met    Plan  Patient would benefit from: skilled PT  Planned modality interventions: cryotherapy  Other planned modality interventions: moist heat  Planned therapy interventions: joint mobilization, manual therapy, neuromuscular re-education, patient education, strengthening, stretching, therapeutic activities, therapeutic exercise, home exercise program, functional ROM exercises, Green taping, postural training, balance/weight bearing training, body mechanics training, flexibility, IASTM, kinesiology taping, massage, transfer training and nerve gliding  Frequency: 1-2x/week. Duration in weeks: 4  Treatment plan discussed with: patient, PTA and referring physician        Subjective Evaluation    History of Present Illness  Mechanism of injury: Kapil Adame is a 74y.o. year-old male who presents to outpatient PT with reports of Left knee pain that started since end of . He reports he had Right foot surgery in 2022 and was in a 3inch heel boot for about 3-4 weeks. He reports since then, he has been feeling pain that has been progressively getting worse in Left knee. Patient denies any particular ABDI or fall. He saw his PCP and was recommended for PT at this time. Quality of life: good    Patient Goals  Patient goals for therapy: decreased pain, increased motion, improved balance, increased strength, independence with ADLs/IADLs and return to sport/leisure activities    Pain  Current pain ratin  At best pain ratin  At worst pain ratin  Location: Left knee  Quality: dull ache  Relieving factors: medications  Aggravating factors: sitting and lifting  Progression: worsening    Social Support  Steps to enter house: yes  Stairs in house: yes   Lives in: multiple-level home    Employment status: not working    Diagnostic Tests  No diagnostic tests performed  Treatments  Current treatment: physical therapy        Objective     Observations     Additional Observation Details  Standing Posture: Left shoulder depressed; toeing out; bilateral slight knee flexion; bilaterally posterior pelvic tilted; anteriorly weight-shifted; knee varus posture bilaterally    Tenderness   Left Knee   Tenderness in the lateral joint line and medial joint line.      Active Range of Motion   Left Knee   Flexion: 115 degrees with pain  Extensor lag: -3 degrees     Passive Range of Motion   Left Knee   Flexion: 116 degrees Extension: -2 degrees     Additional Passive Range of Motion Details  Hamstring Flexibility: moderate tightness at mid-range PROM bilaterally    Mobility   Patellar Mobility:   Left Knee   WFL: medial, lateral, superior and inferior. Strength/Myotome Testing     Left Knee   Flexion: 4  Extension: 4    Right Knee   Normal strength    Additional Strength Details  Ankle DF/PF: 4/5 bilaterally    Hip Flexion: 4/5 bilaterally  Hip Extension (hook-lying): 3+/5 bilaterally  Hip ABD/ER (hook-lying): Left 4/5, Right 4/5  Hip ADD/IR (hook-lying): Left 4/5, Right 4/5    Tests     Left Knee   Negative anterior drawer, lateral Fatmata, medial Fatmata, patellar compression, posterior drawer, valgus stress test at 30 degrees and varus stress test at 30 degrees.               Diagnosis: Left knee pain  Precautions: DM2, HTN, neuropathy, hx of cancer; hx of Right foot surgery (Nov 2022)  ( * asterisk indicates given per HEP)  Next Physician Appointment:   Sadiq Styles:     Manuals 9/5 9/7 9/11 9/15 9/19 9/22 9/26        STM               PROM               Taping                              Neuro Re-Ed               Quad sets               Supine SLR               Hip ADD ball squeeze               Clamshells / BKFO     Clams  20x ea          Bridges On SB 2x10    GTB  20x          tay  TKEs     13#  20x3" 13#  20x3"         Foam Balance Tandem 3x30" ea  Tandem  3x30" ea            Tandem amb               Wobble Board  2' ea 2' ea  2' ea           SLS foam  Foam  2x30" ea  Foam  2x30" ea  2x30" ea                         Ther Ex               Calf stretch               HS stretch  Seated  10x10" Seated   10x10" Seated   10x10" Seated   10x10" Seated   10x10" Seated   10x10"        Knee Extensionator       Supine x3min        Prone quad stretch   10x 10"  10x10"          DKTC w/ ball               Heel Raises               Post Tib HR               Standing hip 3-way               Heel Slides               Hip flexor stretch  EOT  10x10"             Ther Activity               Bike for LE mobility and endurance Upright full rev x6 min  lv 12 Upright full rev  x5 min  lv 12 Upright full rev  x5 min  lv 14 Upright full rev  x5 min  lv 14 Upright full rev seat 10  x5 min Upright full rev seat 10  x5 min Upright full rev seat 10  x5 min        Side stepping Green 3x at [employer] 3x at [employer] 3x at [employer] 3x at [employer] 100#  2x10 100#  2x10 100#  3x10 100#  3x10 110#  3x10 110#  3x10 110# 3x10        TRX squats 10x2 10x2             Step ups  6" 10x2 ea  6" 10x2 ea  8" 2x10 Left         Lateral step overs   6" 10x 6" 10x  8" 10xea         Stool scoots   5# 20'x4 5# 20'x4 5# 20'x4          Step over hurdles               [place] walkouts               X walks     Red 2x Red 2x         Pt Ed     HEP  POC        Re-Evaluation       DK        Modalities               Heat/ice (PRN)               TENS?

## 2023-09-28 ENCOUNTER — APPOINTMENT (OUTPATIENT)
Dept: PHYSICAL THERAPY | Facility: CLINIC | Age: 68
End: 2023-09-28
Payer: MEDICARE

## 2023-09-29 DIAGNOSIS — I10 ELEVATED BLOOD PRESSURE READING IN OFFICE WITH DIAGNOSIS OF HYPERTENSION: ICD-10-CM

## 2023-09-29 RX ORDER — LISINOPRIL 40 MG/1
40 TABLET ORAL DAILY
Qty: 90 TABLET | Refills: 0 | Status: SHIPPED | OUTPATIENT
Start: 2023-09-29

## 2023-10-03 ENCOUNTER — OFFICE VISIT (OUTPATIENT)
Dept: PHYSICAL THERAPY | Facility: CLINIC | Age: 68
End: 2023-10-03
Payer: MEDICARE

## 2023-10-03 DIAGNOSIS — M25.562 ACUTE PAIN OF LEFT KNEE: Primary | ICD-10-CM

## 2023-10-03 PROCEDURE — 97112 NEUROMUSCULAR REEDUCATION: CPT

## 2023-10-03 PROCEDURE — 97530 THERAPEUTIC ACTIVITIES: CPT

## 2023-10-03 PROCEDURE — 97110 THERAPEUTIC EXERCISES: CPT

## 2023-10-03 NOTE — PROGRESS NOTES
Daily Note     Today's date: 10/3/2023  Patient name: Dago Dawkins  : 1955  MRN: [id number]  Referring provider: Tehe Bob DO  Dx:   Encounter Diagnosis     ICD-10-CM    1. Acute pain of left knee  M25.562                      Subjective: Pt states that his knee was achy the past couple of nights but today it's feeling ok. Objective: See treatment diary below      Assessment: Tolerated treatment well. Continued with outlined program to improve strength and ROM. Stretching performed to improve pt's knee flexion and ext ROM. Pt continues to be challenged with hamstring stretching due to tightness. Minimal pain noted with strengthening. Pt performs strengthening and ambulates with hip in ER. Pt progressing slowly towards his goals and will benefit from continued therapy. Will progress as able.     Plan: Continue per plan of care     Diagnosis: Left knee pain  Precautions: DM2, HTN, neuropathy, hx of cancer; hx of Right foot surgery (2022)  ( * asterisk indicates given per HEP)  Next Physician Appointment:   Rocio Peak:     Manuals 9/5 9/7 9/11 9/15 9/19 9/22 9/26 10/3       STM               PROM               Taping                              Neuro Re-Ed               Quad sets        10x10"       Supine SLR               Hip ADD ball squeeze               Clamshells / BKFO     Clams  20x ea          Bridges On SB 2x10    GTB  20x          tay  TKEs     13#  20x3" 13#  20x3"  13#  20x3"       Foam Balance Tandem 3x30" ea  Tandem  3x30" ea            Tandem amb               Wobble Board  2' ea 2' ea  2' ea    2' ea       SLS foam  Foam  2x30" ea  Foam  2x30" ea  2x30" ea                         Ther Ex               Calf stretch               HS stretch  Seated  10x10" Seated   10x10" Seated   10x10" Seated   10x10" Seated   10x10" Seated   10x10" Seated   10x10"       Knee Extensionator       Supine x3min        Prone quad stretch   10x 10"  10x10"   10x10"       DKTC w/ ball Knee flex stretch on chair        10x10"       Post Tib HR               Standing hip 3-way               Heel Slides               Hip flexor stretch  EOT  10x10"             Ther Activity               Bike for LE mobility and endurance Upright full rev x6 min  lv 12 Upright full rev  x5 min  lv 12 Upright full rev  x5 min  lv 14 Upright full rev  x5 min  lv 14 Upright full rev seat 10  x5 min Upright full rev seat 10  x5 min Upright full rev seat 10  x5 min Upright full rev  Seat 10  x5 min       Side stepping Green 3x at [employer] 3x at [employer] 3x at [employer] 3x at [employer] 100#  2x10 100#  2x10 100#  3x10 100#  3x10 110#  3x10 110#  3x10 110# 3x10 110#  3x10       TRX squats 10x2 10x2             Step ups  6" 10x2 ea  6" 10x2 ea  8" 2x10 Left         Lateral step overs   6" 10x 6" 10x  8" 10xea         Stool scoots   5# 20'x4 5# 20'x4 5# 20'x4          Step over hurdles               [place] walkouts               X walks     Red 2x Red 2x  Red 2x       Pt Ed     HEP  POC        Re-Evaluation       DK        Modalities               Heat/ice (PRN)               TENS?

## 2023-10-05 ENCOUNTER — OFFICE VISIT (OUTPATIENT)
Dept: PHYSICAL THERAPY | Facility: CLINIC | Age: 68
End: 2023-10-05
Payer: MEDICARE

## 2023-10-05 DIAGNOSIS — M25.562 ACUTE PAIN OF LEFT KNEE: Primary | ICD-10-CM

## 2023-10-05 PROCEDURE — 97110 THERAPEUTIC EXERCISES: CPT

## 2023-10-05 PROCEDURE — 97112 NEUROMUSCULAR REEDUCATION: CPT

## 2023-10-05 PROCEDURE — 97530 THERAPEUTIC ACTIVITIES: CPT

## 2023-10-05 NOTE — PROGRESS NOTES
Daily Note     Today's date: 10/5/2023  Patient name: Steven Bowers  : 1955  MRN: [id number]  Referring provider: Brooklyn Martinez DO  Dx:   Encounter Diagnosis     ICD-10-CM    1. Acute pain of left knee  M25.562                      Subjective:  Pt reports that he is doing about the same. His knee is no better no worse. Objective: See treatment diary below      Assessment: Tolerated treatment well. Progressed pt with strengthening as able. Pt continues to be challenged with knee flexion and is limited due to pain. Focused on knee flexion and extension stretching and discussed importance of every day on his HEP. Pt reports understanding and compliance. Pt has low tolerance to knee extension PROM due to pain. Will continue to progress pt as tolerated as he will benefit from continued therapy.         Plan: Continue per plan of care     Diagnosis: Left knee pain  Precautions: DM2, HTN, neuropathy, hx of cancer; hx of Right foot surgery (2022)  ( * asterisk indicates given per HEP)  Next Physician Appointment:   Adrienne Ray:     Manuals 9/5 9/7 9/11 9/15 9/19 9/22 9/26 10/3 10/5      STM               PROM         TH      Taping                              Neuro Re-Ed               Quad sets        10x10" 5x10"      Supine SLR               Hip ADD ball squeeze               Clamshells / BKFO     Clams  20x ea          Bridges On SB 2x10    GTB  20x          tay  TKEs     13#  20x3" 13#  20x3"  13#  20x3" 13#  20x3"      Foam Balance Tandem 3x30" ea  Tandem  3x30" ea            Tandem amb               Wobble Board  2' ea 2' ea  2' ea    2' ea 2' ea      SLS foam  Foam  2x30" ea  Foam  2x30" ea  2x30" ea    2x30" ea                     Ther Ex               Calf stretch               HS stretch  Seated  10x10" Seated   10x10" Seated   10x10" Seated   10x10" Seated   10x10" Seated   10x10" Seated   10x10" Seated  10x10"      Knee Extensionator       Supine x3min        Prone quad stretch 10x 10"  10x10"   10x10" 10x10"      DKTC w/ ball               Knee flex stretch on chair        10x10" 10x10"      Post Tib HR               Standing hip 3-way               Heel Slides               Hip flexor stretch  EOT  10x10"             Ther Activity               Bike for LE mobility and endurance Upright full rev x6 min  lv 12 Upright full rev  x5 min  lv 12 Upright full rev  x5 min  lv 14 Upright full rev  x5 min  lv 14 Upright full rev seat 10  x5 min Upright full rev seat 10  x5 min Upright full rev seat 10  x5 min Upright full rev  Seat 10  x5 min Upright full rev seat 10  x5 min      Side stepping Green 3x at [employer] 3x at [employer] 3x at [employer] 3x at [address] 100#  2x10 100#  2x10 100#  3x10 100#  3x10 110#  3x10 110#  3x10 110# 3x10 110#  3x10 120#  3x10      TRX squats 10x2 10x2       10x2      Step ups  6" 10x2 ea  6" 10x2 ea  8" 2x10 Left         Lateral step overs   6" 10x 6" 10x  8" 10xea         Stool scoots   5# 20'x4 5# 20'x4 5# 20'x4          Step over hurdles               [place] walkouts         13#  10x2      X walks     Red 2x Red 2x  Red 2x       Pt Ed     HEP  POC  HEP      Re-Evaluation       DK        Modalities               Heat/ice (PRN)               TENS?

## 2023-10-10 ENCOUNTER — OFFICE VISIT (OUTPATIENT)
Dept: PHYSICAL THERAPY | Facility: CLINIC | Age: 68
End: 2023-10-10
Payer: MEDICARE

## 2023-10-10 DIAGNOSIS — M25.562 ACUTE PAIN OF LEFT KNEE: Primary | ICD-10-CM

## 2023-10-10 PROCEDURE — 97110 THERAPEUTIC EXERCISES: CPT | Performed by: PHYSICAL THERAPIST

## 2023-10-10 PROCEDURE — 97530 THERAPEUTIC ACTIVITIES: CPT | Performed by: PHYSICAL THERAPIST

## 2023-10-10 PROCEDURE — 97112 NEUROMUSCULAR REEDUCATION: CPT | Performed by: PHYSICAL THERAPIST

## 2023-10-10 NOTE — PROGRESS NOTES
Daily Note     Today's date: 10/10/2023  Patient name: Alistair Flores  : 1955  MRN: [id number]  Referring provider: Gael Mcclure DO  Dx:   Encounter Diagnosis     ICD-10-CM    1. Acute pain of left knee  M25.562           Start Time: 830  Stop Time: 915  Total time in clinic (min): 45 minutes    Subjective: Patient reports he did exercises at home and didn't have any Left knee pain. He reports he had a little soreness in Right knee. Objective: See treatment diary below      Assessment: Tolerated treatment well. Performed functional strengthening exercises with improved form. Performed TRX squats with red TB around knees to avoid and reduce valgus compensations. Improved knee flexibility noted with self-stretches. Overall good tolerance with exercises without increase in pain. Patient exhibited good technique with therapeutic exercises and would benefit from continued PT      Plan: Continue per plan of care. Progress treatment as tolerated.        Diagnosis: Left knee pain  Precautions: DM2, HTN, neuropathy, hx of cancer; hx of Right foot surgery (2022)  ( * asterisk indicates given per HEP)  Next Physician Appointment:   Adrienne Pretty:     Manuals 9/19 9/22 9/26 10/3 10/5 10/10           [facility]                 PROM     TH            Taping                                  Neuro Re-Ed                 Quad sets    10x10" 5x10" 5x10"           Supine SLR                 Hip ADD ball squeeze                 Clamshells / BKFO Clams  20x ea                Bridges GTB  20x                tay  TKEs 13#  20x3" 13#  20x3"  13#  20x3" 13#  20x3"            Foam Balance                 Tandem amb                 Wobble Board     2' ea 2' ea 2' ea           SLS foam  2x30" ea    2x30" ea                             Ther Ex                 Calf stretch                 HS stretch Seated   10x10" Seated   10x10" Seated   10x10" Seated   10x10" Seated  10x10" Seated  10x10"           Knee Extensionator Supine x3min              Prone quad stretch 10x10"   10x10" 10x10" 10x10"           DKTC w/ ball                 Knee flex stretch on chair    10x10" 10x10" 10x10"           Post Tib HR                 Standing hip 3-way                 Heel Slides                 Hip flexor stretch                 Ther Activity                 Bike for LE mobility and endurance Upright full rev seat 10  x5 min Upright full rev seat 10  x5 min Upright full rev seat 10  x5 min Upright full rev  Seat 10  x5 min Upright full rev seat 10  x5 min Upright full rev seat 10  x5 min           Side stepping                 Leg Press 110#  3x10 110#  3x10 110# 3x10 110#  3x10 120#  3x10 120#  3x10           TRX squats     10x2 RTB 2x10           Step ups  8" 2x10 Left               Lateral step overs  8" 10xea               Stool scoots 5# 20'x4                Step over hurdles                 [place] walkouts     13#  10x2 F/b 15# 2x10           X walks Red 2x Red 2x  Red 2x  Red 2x           Pt Ed HEP  POC  HEP            Re-Evaluation   DK              Modalities                 Heat/ice (PRN)                 TENS?

## 2023-10-13 ENCOUNTER — OFFICE VISIT (OUTPATIENT)
Dept: PHYSICAL THERAPY | Facility: CLINIC | Age: 68
End: 2023-10-13
Payer: MEDICARE

## 2023-10-13 DIAGNOSIS — M25.562 ACUTE PAIN OF LEFT KNEE: Primary | ICD-10-CM

## 2023-10-13 PROCEDURE — 97530 THERAPEUTIC ACTIVITIES: CPT | Performed by: PHYSICAL THERAPIST

## 2023-10-13 PROCEDURE — 97110 THERAPEUTIC EXERCISES: CPT | Performed by: PHYSICAL THERAPIST

## 2023-10-13 PROCEDURE — 97112 NEUROMUSCULAR REEDUCATION: CPT | Performed by: PHYSICAL THERAPIST

## 2023-10-13 NOTE — PROGRESS NOTES
Daily Note     Today's date: 10/13/2023  Patient name: Dago Dawkins  : 1955  MRN: [id number]  Referring provider: Thee Bob DO  Dx:   Encounter Diagnosis     ICD-10-CM    1. Acute pain of left knee  M25.562           Start Time: 0700  Stop Time: 745  Total time in clinic (min): 45 minutes    Subjective: Patient reports he felt sore yesterday after Wednesday's session. He reports no changes or increase in activity otherwise. He reports he is feeling better today, but not as good as before Wednesday. Objective: See treatment diary below      Assessment: Tolerated treatment well. Initiated session with functional strength training. Cuing to minimize ROM with TRX squats to avoid pain/strain on knees. Improved tolerance noted with minimized ROM. [place] TKEs noted to have slight pain in Left knee, cuing for avoiding full TKE to avoid pain. Improved tolerance with cuing to perform exercises within pain-free ROM. Patient exhibited good technique with therapeutic exercises and would benefit from continued PT      Plan: Continue per plan of care. Progress treatment as tolerated.        Diagnosis: Left knee pain  Precautions: DM2, HTN, neuropathy, hx of cancer; hx of Right foot surgery (2022)  ( * asterisk indicates given per HEP)  Next Physician Appointment:   Rocio Peak:     Manuals 9/19 9/22 9/26 10/3 10/5 10/10 10/13          [facility]                 PROM     TH            Taping                                  Neuro Re-Ed                 Quad sets    10x10" 5x10" 5x10" 5x10"          Supine SLR                 Hip ADD ball squeeze                 Clamshells / BKFO Clams  20x ea                Bridges GTB  20x                sowmya  TKEs 13#  20x3" 13#  20x3"  13#  20x3" 13#  20x3"  13#  20x3"          Foam Balance                 Tandem amb                 Wobble Board     2' ea 2' ea 2' ea 2' ea          SLS foam  2x30" ea    2x30" ea  2x30" ea                           Ther Ex Calf stretch                 HS stretch Seated   10x10" Seated   10x10" Seated   10x10" Seated   10x10" Seated  10x10" Seated  10x10" Seated  10x10"          Knee Extensionator   Supine x3min              Prone quad stretch 10x10"   10x10" 10x10" 10x10" 10x10"          DKTC w/ ball                 Knee flex stretch on chair    10x10" 10x10" 10x10"           Post Tib HR                 Standing hip 3-way                 Heel Slides                 Hip flexor stretch                 Ther Activity                 Bike for LE mobility and endurance Upright full rev seat 10  x5 min Upright full rev seat 10  x5 min Upright full rev seat 10  x5 min Upright full rev  Seat 10  x5 min Upright full rev seat 10  x5 min Upright full rev seat 10  x5 min Upright full rev seat 10  x5 min          Side stepping                 Leg Press 110#  3x10 110#  3x10 110# 3x10 110#  3x10 120#  3x10 120#  3x10 120#  3x10          TRX squats     10x2 RTB 2x10 RTB 2x10          Step ups  8" 2x10 Left               Lateral step overs  8" 10xea               Stool scoots 5# 20'x4                Step over hurdles                 [place] walkouts     13#  10x2 F/b 15# 2x10           X walks Red 2x Red 2x  Red 2x  Red 2x Red 2x          Pt Ed HEP  POC  HEP            Re-Evaluation   DK              Modalities                 Heat/ice (PRN)                 TENS?

## 2023-10-16 ENCOUNTER — OFFICE VISIT (OUTPATIENT)
Dept: PHYSICAL THERAPY | Facility: CLINIC | Age: 68
End: 2023-10-16
Payer: MEDICARE

## 2023-10-16 DIAGNOSIS — M25.562 ACUTE PAIN OF LEFT KNEE: Primary | ICD-10-CM

## 2023-10-16 PROCEDURE — 97112 NEUROMUSCULAR REEDUCATION: CPT

## 2023-10-16 PROCEDURE — 97530 THERAPEUTIC ACTIVITIES: CPT

## 2023-10-16 PROCEDURE — 97110 THERAPEUTIC EXERCISES: CPT

## 2023-10-16 NOTE — PROGRESS NOTES
Daily Note     Today's date: 10/16/2023  Patient name: Ras Qiu  : 1955  MRN: [id number]  Referring provider: Livia Baron DO  Dx:   Encounter Diagnosis     ICD-10-CM    1. Acute pain of left knee  M25.562                      Subjective:  Pt states that he is doing fine. He feels that his L knee is 80% better but now he is concerned because he has been having some R knee pain the past week. Pt would like to hold on therapy this week so that he can see how his R knee does. Objective: See treatment diary below      Assessment: Tolerated treatment well. Modified session slightly to avoid aggravating pt's R knee. Pt had no c/o of increased pain throughout. Discussed HEP and importance with pt reporting understanding. Will re-eval nv and determine whether pt is ready for discharge or continued therapy. Plan: Continue per plan of care.       Diagnosis: Left knee pain  Precautions: DM2, HTN, neuropathy, hx of cancer; hx of Right foot surgery (2022)  ( * asterisk indicates given per HEP)  Next Physician Appointment:   Samantha Barragan:     Manuals 9/19 9/22 9/26 10/3 10/5 10/10 10/13 10/16       STM               Taping                              Neuro Re-Ed               Quad sets    10x10" 5x10" 5x10" 5x10"        Hip ADD ball squeeze               tay [place] 13#  20x3" 13#  20x3"  13#  20x3" 13#  20x3"  13#  20x3" 13#  20x3"       Foam Balance               Tandem amb               Wobble Board     2' ea 2' ea 2' ea 2' ea 2' ea       SLS foam  2x30" ea    2x30" ea  2x30" ea 3x30" ea                      Ther Ex               Calf stretch               HS stretch Seated   10x10" Seated   10x10" Seated   10x10" Seated   10x10" Seated  10x10" Seated  10x10" Seated  10x10" Seated  10x10"       Knee Extensionator   Supine x3min            Prone quad stretch 10x10"   10x10" 10x10" 10x10" 10x10" 10x10"       DKTC w/ ball               Knee flex stretch on chair    10x10" 10x10" 10x10" Post Tib HR               Standing hip 3-way               Heel Slides               Hip flexor stretch               Ther Activity               Bike for LE mobility and endurance Upright full rev seat 10  x5 min Upright full rev seat 10  x5 min Upright full rev seat 10  x5 min Upright full rev  Seat 10  x5 min Upright full rev seat 10  x5 min Upright full rev seat 10  x5 min Upright full rev seat 10  x5 min Upright  Full rev  Seat 11  X5 min       Side stepping               Leg Press 110#  3x10 110#  3x10 110# 3x10 110#  3x10 120#  3x10 120#  3x10 120#  3x10 120#  3x10       TRX squats     10x2 RTB 2x10 RTB 2x10        Step ups  8" 2x10 Left             Lateral step overs  8" 10xea             Stool scoots 5# 20'x4              Step over hurdles               [place] walkouts     13#  10x2 F/b 15# 2x10         X walks Red 2x Red 2x  Red 2x  Red 2x Red 2x        Pt Ed HEP  POC  HEP   HEP       Re-Evaluation   DK            Modalities               Heat/ice (PRN)               TENS?

## 2023-10-19 ENCOUNTER — APPOINTMENT (OUTPATIENT)
Dept: PHYSICAL THERAPY | Facility: CLINIC | Age: 68
End: 2023-10-19
Payer: MEDICARE

## 2023-10-23 ENCOUNTER — APPOINTMENT (OUTPATIENT)
Dept: PHYSICAL THERAPY | Facility: CLINIC | Age: 68
End: 2023-10-23
Payer: MEDICARE

## 2023-10-26 ENCOUNTER — EVALUATION (OUTPATIENT)
Dept: PHYSICAL THERAPY | Facility: CLINIC | Age: 68
End: 2023-10-26
Payer: MEDICARE

## 2023-10-26 DIAGNOSIS — M25.562 ACUTE PAIN OF LEFT KNEE: Primary | ICD-10-CM

## 2023-10-26 PROCEDURE — 97530 THERAPEUTIC ACTIVITIES: CPT | Performed by: PHYSICAL THERAPIST

## 2023-10-26 NOTE — PROGRESS NOTES
PT Discharge    Today's date: 10/26/2023  Patient name: Rahul Cordero  : 1955  MRN: [id number]  Referring provider: Jeri Juarez DO  Dx:   Encounter Diagnosis     ICD-10-CM    1. Acute pain of left knee  M25.562           Start Time: 0830  Stop Time: 0900  Total time in clinic (min): 30 minutes    Assessment  Assessment details: Patient is a 74 y.o. male who presents with reports of Left knee pain that started months ago with no recent injury or fall to cause. Patient has attended PT for past 3 months. Patient has reported an improvement in overall pain levels, no pain in Right knee and mild 1 or 2/10 pain at worst in Left knee at this time. He reports his discomfort is primarily a stiffness / tightness in nature. He reports improved walking, sitting, and prolonged standing tolerance. He reports overall improved mobility and function since start of PT. He has demonstrated a good tolerance with PT interventions and exercises. Overall good progress made with function and set goals established on IE. He will be discharged from PT at this time with PT in agreement to continue exercises per HEP to maintain PT gains and carryover at home. He was instructed to contact this office with any issues or concerns in the future. Impairments: abnormal gait, abnormal or restricted ROM, activity intolerance and impaired balance  Understanding of Dx/Px/POC: good   Prognosis: good    Goals  Impairment Goals 4-6 weeks   In order to maximize function patient will be able to. ..   - Decrease intensity/duration/frequency of pain to 4/10. Met   - Demonstrate symmetrical knee AROM without pain. Partially Met  - Increase hip/knee strength to 4/5 throughout. Partially Met  - Demonstrate improved hip flexibility as demonstrated by increased ROM through therapeutic exercise. Met    Functional Goals 6-8 weeks  In order to return to prior level of function patient will be able to. ..   - Participate in ADL's/IADL's/sport specific activities with no greater than 2/10 pain. Met  - Increase Functional Status Measure (FOTO) to: anticipated at discharge. Met  - Demonstrate independence and compliant with HEP. Met  - Demonstrate a squat and or sit to stand with good mechanics and eccentric control without pain/difficulty/compensation. Partially Met  - Demonstrate functional activities with good core and glute strength without compensation/pain/difficulty. Partially Met  - Ascend and descend stairs without increased pain/compensation/difficulty and a reciprocal gait pattern. Met    Plan  Frequency: 1x/week. Duration in weeks: 1  Treatment plan discussed with: patient        Subjective Evaluation    History of Present Illness  Mechanism of injury: Abbey Davison is a 74y.o. year-old male who presents to outpatient PT with reports of Left knee pain that started since end of . He reports he had Right foot surgery in 2022 and was in a 3inch heel boot for about 3-4 weeks. He reports since then, he has been feeling pain that has been progressively getting worse in Left knee. Patient denies any particular ABDI or fall. He saw his PCP and was recommended for PT at this time.   Quality of life: good    Patient Goals  Patient goals for therapy: decreased pain, increased motion, improved balance, increased strength, independence with ADLs/IADLs and return to sport/leisure activities    Pain  Current pain ratin  At best pain ratin  At worst pain ratin  Location: Left knee  Quality: dull ache  Relieving factors: medications  Aggravating factors: sitting and lifting  Progression: worsening    Social Support  Steps to enter house: yes  Stairs in house: yes   Lives in: multiple-level home    Employment status: not working    Diagnostic Tests  No diagnostic tests performed  Treatments  Current treatment: physical therapy        Objective     Observations     Additional Observation Details  Standing Posture: Left shoulder depressed; toeing out; bilateral slight knee flexion; bilaterally posterior pelvic tilted; anteriorly weight-shifted; knee varus posture bilaterally    Tenderness   Left Knee   Tenderness in the lateral joint line and medial joint line. Active Range of Motion   Left Knee   Flexion: 115 degrees with pain  Extensor lag: -3 degrees     Passive Range of Motion   Left Knee   Flexion: 116 degrees   Extension: -2 degrees     Additional Passive Range of Motion Details  Hamstring Flexibility: moderate tightness at mid-range PROM bilaterally    Mobility   Patellar Mobility:   Left Knee   WFL: medial, lateral, superior and inferior. Strength/Myotome Testing     Left Knee   Flexion: 4  Extension: 4    Right Knee   Normal strength    Additional Strength Details  Ankle DF/PF: 4/5 bilaterally    Hip Flexion: 4/5 bilaterally  Hip Extension (hook-lying): 3+/5 bilaterally  Hip ABD/ER (hook-lying): Left 4/5, Right 4/5  Hip ADD/IR (hook-lying): Left 4/5, Right 4/5    Tests     Left Knee   Negative anterior drawer, lateral Fatmata, medial Fatmata, patellar compression, posterior drawer, valgus stress test at 30 degrees and varus stress test at 30 degrees.               Diagnosis: Left knee pain  Precautions: DM2, HTN, neuropathy, hx of cancer; hx of Right foot surgery (Nov 2022)  ( * asterisk indicates given per HEP)  Next Physician Appointment:   Denita Monroyr:     Manuals 10/3 10/5 10/10 10/13 10/16 10/26    STM          Taping                    Neuro Re-Ed          Quad sets 10x10" 5x10" 5x10" 5x10"      Hip ADD ball squeeze          tay  [place] 13#  20x3" 13#  20x3"  13#  20x3" 13#  20x3"     Foam Balance          Tandem amb          Wobble Board  2' ea 2' ea 2' ea 2' ea 2' ea     SLS foam   2x30" ea  2x30" ea 3x30" ea               Ther Ex          Calf stretch          HS stretch Seated   10x10" Seated  10x10" Seated  10x10" Seated  10x10" Seated  10x10"     Knee Extensionator          Prone quad stretch 10x10" 10x10" 10x10" 10x10" 10x10"     DKTC w/ ball          Knee flex stretch on chair 10x10" 10x10" 10x10"       Post Tib HR          Standing hip 3-way          Heel Slides          Hip flexor stretch          Ther Activity          Bike for LE mobility and endurance Upright full rev  Seat 10  x5 min Upright full rev seat 10  x5 min Upright full rev seat 10  x5 min Upright full rev seat 10  x5 min Upright  Full rev  Seat 11  X5 min Upright  Full rev  Seat 11  X5 min    Side stepping          Leg Press 110#  3x10 120#  3x10 120#  3x10 120#  3x10 120#  3x10     TRX squats  10x2 RTB 2x10 RTB 2x10      Step ups          Lateral step overs          Stool scoots          Step over hurdles          [place] walkouts  13#  10x2 F/b 15# 2x10       X walks Red 2x  Red 2x Red 2x      Pt Ed  HEP   HEP HEP    Re-Evaluation      DK, D/C    Modalities          Heat/ice (PRN)          TENS?

## 2023-11-02 ENCOUNTER — OFFICE VISIT (OUTPATIENT)
Dept: FAMILY MEDICINE CLINIC | Facility: CLINIC | Age: 68
End: 2023-11-02
Payer: MEDICARE

## 2023-11-02 VITALS
HEIGHT: 75 IN | BODY MASS INDEX: 33.2 KG/M2 | WEIGHT: 267 LBS | OXYGEN SATURATION: 98 % | RESPIRATION RATE: 16 BRPM | SYSTOLIC BLOOD PRESSURE: 146 MMHG | DIASTOLIC BLOOD PRESSURE: 76 MMHG | HEART RATE: 99 BPM

## 2023-11-02 DIAGNOSIS — S98.131A AMPUTATION OF TOE OF RIGHT FOOT (HCC): ICD-10-CM

## 2023-11-02 DIAGNOSIS — Z28.21 TETANUS, DIPHTHERIA, AND ACELLULAR PERTUSSIS (TDAP) VACCINATION DECLINED: ICD-10-CM

## 2023-11-02 DIAGNOSIS — Z28.21 PNEUMOCOCCAL VACCINATION DECLINED BY PATIENT: ICD-10-CM

## 2023-11-02 DIAGNOSIS — E11.42 TYPE 2 DIABETES MELLITUS WITH DIABETIC POLYNEUROPATHY, WITHOUT LONG-TERM CURRENT USE OF INSULIN (HCC): ICD-10-CM

## 2023-11-02 DIAGNOSIS — Z00.00 MEDICARE ANNUAL WELLNESS VISIT, INITIAL: Primary | ICD-10-CM

## 2023-11-02 DIAGNOSIS — I10 PRIMARY HYPERTENSION: ICD-10-CM

## 2023-11-02 DIAGNOSIS — Z28.21 INFLUENZA VACCINATION DECLINED BY PATIENT: ICD-10-CM

## 2023-11-02 DIAGNOSIS — Z12.11 COLON CANCER SCREENING: ICD-10-CM

## 2023-11-02 DIAGNOSIS — E78.5 HYPERLIPIDEMIA LDL GOAL <70: ICD-10-CM

## 2023-11-02 DIAGNOSIS — C61 PROSTATE CANCER (HCC): ICD-10-CM

## 2023-11-02 DIAGNOSIS — E03.9 HYPOTHYROIDISM, UNSPECIFIED TYPE: ICD-10-CM

## 2023-11-02 PROCEDURE — G0438 PPPS, INITIAL VISIT: HCPCS | Performed by: FAMILY MEDICINE

## 2023-11-02 PROCEDURE — 99214 OFFICE O/P EST MOD 30 MIN: CPT | Performed by: FAMILY MEDICINE

## 2023-11-02 RX ORDER — CHLORTHALIDONE 25 MG/1
25 TABLET ORAL DAILY
Qty: 90 TABLET | Refills: 0 | Status: SHIPPED | OUTPATIENT
Start: 2023-11-02

## 2023-11-02 NOTE — PROGRESS NOTES
Assessment and Plan:     Problem List Items Addressed This Visit          Endocrine    Diabetes mellitus with neurological manifestation ([address])    Relevant Orders    HEMOGLOBIN A1C W/ EAG ESTIMATION  - A1c (6/2023): 5.9   - on Metformin 1000mg BID and Glipizide 5mg QD   - UTD with DM eye exam (1/10/2023)    - UTD with DM foot exam in the office (3/3/2023)    - declined PCV20 in the office today   - declined annual Flu vaccine in the office today   - follows with Podiatry Q9wks          Genitourinary    Prostate cancer ([address])  - management as per Uro   - has script for repeat PSA         Other    Hyperlipidemia LDL goal <70    Relevant Orders    Lipid panel  - LDL 48   - cont Statin   - script given for repeat labs      Other Visit Diagnoses       Medicare annual wellness visit, initial    -  Primary    Relevant Orders    US abdominal aorta screening aaa  - reviewed PMHx, PSHx, meds, allergies, FHx, Soc Hx   - UTD with COVID IMMs and Booster x2  - declined Tdap, annual Flu and PCV20 in the office today  - follows with Uro for treatment of Prostate Ca  - UTD with screening Cscope (9/2023) - - 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 - 4month recall - advised to schedule   - RTO in 1yr for AWV - pt aware and agreeable      Pneumococcal vaccination declined by patient        Tetanus, diphtheria, and acellular pertussis (Tdap) vaccination declined        Influenza vaccination declined by patient          Primary hypertension        Relevant Medications    chlorthalidone 25 mg tablet    Other Relevant Orders    Basic metabolic panel  - BP in the office 140/78 and 146/76 on my repeat   - microalbuminuria resolved on repeat labs done 7/27/2023  - cont ACEI 40mg QD and Chlorthalidone 25mg QD (of note, pt has been taking 12.5mg QD)   - declined PCV20 in the office today  - declined annual Flu vaccine in the office today   - RTO in 1month for f/u - pt aware and agreeable       Colon cancer screening      - UTD with screening Cscope (9/2023) - - 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 Personalized health advice and appropriate referrals for health education or preventive services given if needed, as noted in patient's After Visit Summary.      History of Present Illness:     Patient presents for a Medicare Wellness Visit    HPI as below     Patient Care Team:  Drake Fuchs DO as PCP - General (Family Medicine)  Michaela Rodriguez MD     Review of Systems:     Review of Systems as per HPI      Problem List:     Patient Active Problem List   Diagnosis    Contact dermatitis    Depression with anxiety    Diabetes mellitus with neurological manifestation ([address])    Diabetic polyneuropathy ([address])    Enthesopathy of knee    Essential hypertension    Insomnia    Nail abnormalities    Neuropathy of foot    Obesity, unspecified    Type 2 diabetes mellitus ([address])    Type II or unspecified type diabetes mellitus without mention of complication, uncontrolled    Microalbuminuria    Hyperlipidemia LDL goal <70    Skin ulcer of toe of right foot, limited to breakdown of skin (HCC)    Non-intractable vomiting    Elevated lactic acid level    Prostate cancer ([address])    Other acute osteomyelitis, right ankle and foot ([address])      Past Medical and Surgical History:     Past Medical History:   Diagnosis Date    Allergic rhinitis     Anxiety disorder     Last assessed 2/7/2006     Arthritis     Cancer [facility])     prostate    Carpal tunnel syndrome     Colon polyp     Diabetes mellitus ([address])     Diabetes mellitus with neurological manifestation ([address]) 04/06/2012    Last assessed 3/29/2016     Diabetes type 2, uncontrolled     Last assessed 4/6/2016     Disease of thyroid gland     DM II (diabetes mellitus, type II), controlled ([address])     Last assessed 10/20/2014     Essential hypertension     Last assesssed 2/7/2006     Hyperlipidemia     Hypertension     Insomnia     Last assessed 1/13/2011     Numbness and tingling of foot     Resolved 3/29/2016     Obesity     Old disruption of knee ligament     Last assessed 3/26/2008     Tarsal tunnel syndrome     Urinary frequency     Resolved 3/29/2016     Visual impairment      Past Surgical History:   Procedure Laterality Date    COLONOSCOPY      FOOT SURGERY      NE AMPUTATION TOE INTERPHALANGEAL JOINT Right 11/23/2022    Procedure: AMPUTATION 3RD TOE;  Surgeon: Sliver Marinelli DPM;  Location: [address] [place] [address] OR;  Service: Podiatry    PROSTATE BIOPSY      WISDOM TOOTH EXTRACTION        Family History:     Family History   Problem Relation Age of Onset    Heart attack Mother 79    Valvular heart disease Mother     Hypotension Mother     Pancreatic cancer Father 76    Diabetes Father     Lung cancer Sister 64        not a smoker    Cancer Brother         bile duct     Colon cancer Maternal Grandmother 58    Diabetes Maternal Grandmother     Pancreatic cancer Paternal Aunt     Pancreatic cancer Paternal Uncle     Hypertension Neg Hx     Prostate cancer Neg Hx       Social History:     Social History     Socioeconomic History    Marital status: /Civil Union     Spouse name: None    Number of children: None    Years of education: None    Highest education level: None   Occupational History    Occupation: Research tech    Tobacco Use    Smoking status: Never    Smokeless tobacco: Never   Vaping Use    Vaping Use: Never used   Substance and Sexual Activity    Alcohol use:  Yes     Alcohol/week: 9.0 standard drinks of alcohol     Types: 2 Cans of beer, 7 Standard drinks or equivalent per week    Drug use: Never    Sexual activity: Not Currently     Comment: declined STD screening in the office today    Other Topics Concern    None   Social History Narrative    Worked at [employer] - retired 3/2021      Social Determinants of Health     Financial Resource Strain: Low Risk  (11/2/2023)    Overall Financial Resource Strain (CARDIA)     Difficulty of Paying Living Expenses: Not hard at all   Food Insecurity: Not on file   Transportation Needs: No Transportation Needs (11/2/2023)    PRAPARE - Transportation     Lack of Transportation (Medical): No     Lack of Transportation (Non-Medical): No   Physical Activity: Not on file   Stress: Not on file   Social Connections: Not on file   Intimate Partner Violence: Not on file   Housing Stability: Not on file      Medications and Allergies:     Current Outpatient Medications   Medication Sig Dispense Refill    aspirin 81 MG tablet Take 1 tablet by mouth daily      chlorthalidone 25 mg tablet Take 1 tablet (25 mg total) by mouth daily 90 tablet 0    gabapentin (NEURONTIN) 100 mg capsule TAKE 2 CAPSULES BY MOUTH 3 TIMES A DAY. 540 capsule 1    glipiZIDE (GLUCOTROL XL) 5 mg 24 hr tablet TAKE 1 TABLET (5 MG TOTAL) BY MOUTH DAILY.  90 tablet 0    glucose blood test strip by In Vitro route      levothyroxine 50 mcg tablet TAKE 1 TABLET (50 MCG TOTAL) BY MOUTH DAILY IN THE EARLY MORNING 90 tablet 0    lisinopril (ZESTRIL) 40 mg tablet TAKE 1 TABLET BY MOUTH EVERY DAY 90 tablet 0    metFORMIN (GLUCOPHAGE) 1000 MG tablet TAKE 1 TABLET BY MOUTH TWICE A DAY WITH MEALS 180 tablet 1    Misc Natural Products (Lutein 20) CAPS Take 20 capsules by mouth daily Eye health      mupirocin (BACTROBAN) 2 % ointment APPLY TO THE AFFECTED AREA BY TOPICAL ROUTE ONCE DAILY      rosuvastatin (CRESTOR) 5 mg tablet Take 1 tablet (5 mg total) by mouth daily 90 tablet 3    tadalafil (CIALIS) 20 MG tablet Take 1 tablet (20 mg total) by mouth daily as needed for erectile dysfunction 10 tablet 1    traMADol (ULTRAM) 50 mg tablet every 6 (six) hours       No current facility-administered medications for this visit. Allergies   Allergen Reactions    Sudafed [Pseudoephedrine] Other (See Comments)     hyperactivity    Amoxicillin Rash    Penicillins Rash     Category: Allergy;       Immunizations:     Immunization History   Administered Date(s) Administered    COVID-19 MODERNA VACC 0.25 ML IM BOOSTER 10/27/2021    COVID-19 MODERNA VACC 0.5 ML IM 03/08/2021, 04/05/2021      Health Maintenance:         Topic Date Due    Colorectal Cancer Screening  09/05/2026    Hepatitis C Screening  Completed         Topic Date Due    COVID-19 Vaccine (4 - Moderna series) 12/22/2021      Medicare Screening Tests and Risk Assessments:     Leidy Dudley is here for his Initial Wellness visit. Last Medicare Wellness visit information reviewed, patient interviewed and updates made to the record today. Health Risk Assessment:   Patient rates overall health as good. Patient feels that their physical health rating is same. Patient is very satisfied with their life. Eyesight was rated as same. Hearing was rated as same. Patient feels that their emotional and mental health rating is same. Patients states they are never, rarely angry. Patient states they are never, rarely unusually tired/fatigued. Pain experienced in the last 7 days has been none. Patient states that he has experienced no weight loss or gain in last 6 months. Depression Screening:   PHQ-9 Score: 0      Fall Risk Screening: In the past year, patient has experienced: no history of falling in past year      Home Safety:  Patient does not have trouble with stairs inside or outside of their home. Patient has working smoke alarms and has working carbon monoxide detector. Home safety hazards include: none. Nutrition:   Current diet is Regular. Medications:   Patient is currently taking over-the-counter supplements. OTC medications include: see medication list. Patient is able to manage medications.      Activities of Daily Living (ADLs)/Instrumental Activities of Daily Living (IADLs):   Walk and transfer into and out of bed and chair?: Yes  Dress and groom yourself?: Yes    Bathe or shower yourself?: Yes    Feed yourself?  Yes  Do your laundry/housekeeping?: Yes  Manage your money, pay your bills and track your expenses?: Yes  Make your own meals?: Yes    Do your own shopping?: Yes    Previous Hospitalizations:   Any hospitalizations or ED visits within the last 12 months?: No      Advance Care Planning:   Living will: No    Durable POA for healthcare: No    Advanced directive: No      Cognitive Screening:   Provider or family/friend/caregiver concerned regarding cognition?: No    PREVENTIVE SCREENINGS      Cardiovascular Screening:    General: History Lipid Disorder and Risks and Benefits Discussed    Due for: Lipid Panel      Diabetes Screening:     General: History Diabetes, Risks and Benefits Discussed and Screening Current    Due for: Blood Glucose      Colorectal Cancer Screening:     General: Screening Current      Prostate Cancer Screening:    General: History Prostate Cancer    Due for: PSA      Osteoporosis Screening:    General: Screening Not Indicated      Abdominal Aortic Aneurysm (AAA) Screening:    Risk factors include: age between 70-77 yo        General: Risks and Benefits Discussed    Due for: Screening AAA Ultrasound      Lung Cancer Screening:     General: Screening Not Indicated      Hepatitis C Screening:    General: Screening Current      Preventive Screening Comments: Germaine Dallas is a 79 y.o. male who presents to the office for initial AWV and f/u of chronic conditions   1) AWV  - PMHx: DM2 with neuropathy, HTN, HL, CTS/TTS, obesity (BMI 33.4), h/o colonic polyps (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) and internal hemorrhoids   - Specialists: Uro, Optho, GI, Podiatry (Q9wks)   - allergies: NKDA  - Meds: see med rec  - PSHx: wisdom tooth extraction   - FHx: M (MI at 72yo, hypotension, valvular heart disease), F (DM, pancreatic ca - dx at 75yo), Sister (lung ca - dx at 62yo), B (bile duct ca - dx at 65yo), denies FHx of HTN/prostate ca   - Immunizations: UTD with COVID vaccines and Boosters x2; declined Tdap, Flu and Pneumovax in the office today    - Hm: Cscope in 9/2023 - 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 - 4month recall   - diet/exercise: currently not exercising, not watching his diet   - social: denies tob/illicits, [employer], 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   - sexual Hx: not currently sexually active, declined STD screening   - last vision: wears glasses, last OV was within the last 3months - goes annually   - last dental: goes Q6months  2) DM2 with polyneuropathy/HL/HTN  - A1c (6/2023): 5.9   - LDL 48  - BP in the office 140/78 and 146/76 on my repeat   - follows with Podiatry Q9wks   - UTD with COVID IMMs and Boosters x2  - declined Pneumovax, annual Flu and Tdap in the office today   - was d/c'd from Nephro   - follows with Uro re: Prostate Ca  - today in the office pt denies F/C/N/V/CP/palpitations/SOB/wheezing/cough/abd pain/D/LE edema     Screening, Brief Intervention, and Referral to Treatment (SBIRT)    Screening  Typical number of drinks in a day: 0  Typical number of drinks in a week: 0  Interpretation: Low risk drinking behavior. Single Item Drug Screening:  How often have you used an illegal drug (including marijuana) or a prescription medication for non-medical reasons in the past year? never    Single Item Drug Screen Score: 0  Interpretation: Negative screen for possible drug use disorder    Review of Current Opioid Use    Opioid Risk Tool (ORT) Interpretation: Complete Opioid Risk Tool (ORT)    No results found. Physical Exam:     /76 (BP Location: Left arm, Patient Position: Sitting, Cuff Size: Large)   Pulse 99   Resp 16   Ht 6' 3" (1.905 m)   Wt 121 kg (267 lb)   SpO2 98%   BMI 33.37 kg/m²     Physical Exam  Vitals reviewed. Constitutional:       General: He is not in acute distress. Appearance: Normal appearance. He is not ill-appearing, toxic-appearing or diaphoretic. HENT:      Head: Normocephalic and atraumatic. Right Ear: External ear normal.      Left Ear: External ear normal.      Nose: Nose normal.   Eyes:      General: No scleral icterus. Right eye: No discharge. Left eye: No discharge. Extraocular Movements: Extraocular movements intact. Conjunctiva/sclera: Conjunctivae normal.   Cardiovascular:      Rate and Rhythm: Normal rate and regular rhythm. Heart sounds: Normal heart sounds. Pulmonary:      Effort: Pulmonary effort is normal. No respiratory distress. Breath sounds: Normal breath sounds. No stridor. No wheezing, rhonchi or rales. Abdominal:      Palpations: Abdomen is soft. Musculoskeletal:      Cervical back: Normal range of motion. Right lower leg: No edema. Left lower leg: No edema. Skin:     General: Skin is warm. Neurological:      General: No focal deficit present. Mental Status: He is alert and oriented to person, place, and time. Psychiatric:         Mood and Affect: Mood normal.         Behavior: Behavior normal.        BMI Counseling: Body mass index is 33.37 kg/m². The BMI is above normal. Nutrition recommendations include 3-5 servings of fruits/vegetables daily. Exercise recommendations include exercising 3-5 times per week. Depression Screening Follow-up Plan: Patient's depression screening was positive with a PHQ-2 score of . Their PHQ-9 score was 0. Clinically patient does not have depression. No treatment is required.       Alfredo Duran,

## 2023-11-02 NOTE — PATIENT INSTRUCTIONS
Medicare Preventive Visit Patient Instructions  Thank you for completing your Welcome to Medicare Visit or Medicare Annual Wellness Visit today. Your next wellness visit will be due in one year (11/2/2024). The screening/preventive services that you may require over the next 5-10 years are detailed below. Some tests may not apply to you based off risk factors and/or age. Screening tests ordered at today's visit but not completed yet may show as past due. Also, please note that scanned in results may not display below. Preventive Screenings:  Service Recommendations Previous Testing/Comments   Colorectal Cancer Screening  Colonoscopy    Fecal Occult Blood Test (FOBT)/Fecal Immunochemical Test (FIT)  Fecal DNA/Cologuard Test  Flexible Sigmoidoscopy Age: 43-73 years old   Colonoscopy: every 10 years (May be performed more frequently if at higher risk)  OR  FOBT/FIT: every 1 year  OR  Cologuard: every 3 years  OR  Sigmoidoscopy: every 5 years  Screening may be recommended earlier than age 39 if at higher risk for colorectal cancer. Also, an individualized decision between you and your healthcare provider will decide whether screening between the ages of 77-80 would be appropriate.  Colonoscopy: 09/06/2023  FOBT/FIT: Not on file  Cologuard: Not on file  Sigmoidoscopy: Not on file    Screening Current     Prostate Cancer Screening Individualized decision between patient and health care provider in men between ages of 53-66   Medicare will cover every 12 months beginning on the day after your 50th birthday PSA: 7.21 ng/mL     History Prostate Cancer     Hepatitis C Screening Once for adults born between 1945 and 1965  More frequently in patients at high risk for Hepatitis C Hep C Antibody: 06/25/2020    Screening Current   Diabetes Screening 1-2 times per year if you're at risk for diabetes or have pre-diabetes Fasting glucose: No results in last 5 years (No results in last 5 years)  A1C: 5.9 % of total Hgb (6/6/2023)  Screening Not Indicated  History Diabetes   Cholesterol Screening Once every 5 years if you don't have a lipid disorder. May order more often based on risk factors. Lipid panel: 12/27/2022  Screening Not Indicated  History Lipid Disorder      Other Preventive Screenings Covered by Medicare:  Abdominal Aortic Aneurysm (AAA) Screening: covered once if your at risk. You're considered to be at risk if you have a family history of AAA or a male between the age of 70-76 who smoking at least 100 cigarettes in your lifetime. Lung Cancer Screening: covers low dose CT scan once per year if you meet all of the following conditions: (1) Age 48-67; (2) No signs or symptoms of lung cancer; (3) Current smoker or have quit smoking within the last 15 years; (4) You have a tobacco smoking history of at least 20 pack years (packs per day x number of years you smoked); (5) You get a written order from a healthcare provider. Glaucoma Screening: covered annually if you're considered high risk: (1) You have diabetes OR (2) Family history of glaucoma OR (3)  aged 48 and older OR (3)  American aged 72 and older  Osteoporosis Screening: covered every 2 years if you meet one of the following conditions: (1) Have a vertebral abnormality; (2) On glucocorticoid therapy for more than 3 months; (3) Have primary hyperparathyroidism; (4) On osteoporosis medications and need to assess response to drug therapy. HIV Screening: covered annually if you're between the age of 14-79. Also covered annually if you are younger than 13 and older than 72 with risk factors for HIV infection. For pregnant patients, it is covered up to 3 times per pregnancy.     Immunizations:  Immunization Recommendations   Influenza Vaccine Annual influenza vaccination during flu season is recommended for all persons aged >= 6 months who do not have contraindications   Pneumococcal Vaccine   * Pneumococcal conjugate vaccine = PCV13 (Prevnar 15), PCV15 (Vaxneuvance), PCV20 (Prevnar 20)  * Pneumococcal polysaccharide vaccine = PPSV23 (Pneumovax) Adults 02-01 yo with certain risk factors or if 69+ yo  If never received any pneumonia vaccine: recommend Prevnar 20 (PCV20)  Give PCV20 if previously received 1 dose of PCV13 or PPSV23   Hepatitis B Vaccine 3 dose series if at intermediate or high risk (ex: diabetes, end stage renal disease, liver disease)   Respiratory syncytial virus (RSV) Vaccine - COVERED BY MEDICARE PART D  * RSVPreF3 (Arexvy) CDC recommends that adults 61years of age and older may receive a single dose of RSV vaccine using shared clinical decision-making (SCDM)   Tetanus (Td) Vaccine - COST NOT COVERED BY MEDICARE PART B Following completion of primary series, a booster dose should be given every 10 years to maintain immunity against tetanus. Td may also be given as tetanus wound prophylaxis. Tdap Vaccine - COST NOT COVERED BY MEDICARE PART B Recommended at least once for all adults. For pregnant patients, recommended with each pregnancy. Shingles Vaccine (Shingrix) - COST NOT COVERED BY MEDICARE PART B  2 shot series recommended in those 19 years and older who have or will have weakened immune systems or those 50 years and older     Health Maintenance Due:      Topic Date Due   • Colorectal Cancer Screening  09/05/2026   • Hepatitis C Screening  Completed     Immunizations Due:      Topic Date Due   • Pneumococcal Vaccine: 65+ Years (1 - PCV) Never done   • COVID-19 Vaccine (4 - Moderna series) 12/22/2021   • Influenza Vaccine (1) Never done     Advance Directives   What are advance directives? Advance directives are legal documents that state your wishes and plans for medical care. These plans are made ahead of time in case you lose your ability to make decisions for yourself. Advance directives can apply to any medical decision, such as the treatments you want, and if you want to donate organs.    What are the types of advance directives? There are many types of advance directives, and each state has rules about how to use them. You may choose a combination of any of the following:  Living will: This is a written record of the treatment you want. You can also choose which treatments you do not want, which to limit, and which to stop at a certain time. This includes surgery, medicine, IV fluid, and tube feedings. Durable power of  for [facility]): This is a written record that states who you want to make healthcare choices for you when you are unable to make them for yourself. This person, called a proxy, is usually a family member or a friend. You may choose more than 1 proxy. Do not resuscitate (DNR) order:  A DNR order is used in case your heart stops beating or you stop breathing. It is a request not to have certain forms of treatment, such as CPR. A DNR order may be included in other types of advance directives. Medical directive: This covers the care that you want if you are in a coma, near death, or unable to make decisions for yourself. You can list the treatments you want for each condition. Treatment may include pain medicine, surgery, blood transfusions, dialysis, IV or tube feedings, and a ventilator (breathing machine). Values history: This document has questions about your views, beliefs, and how you feel and think about life. This information can help others choose the care that you would choose. Why are advance directives important? An advance directive helps you control your care. Although spoken wishes may be used, it is better to have your wishes written down. Spoken wishes can be misunderstood, or not followed. Treatments may be given even if you do not want them. An advance directive may make it easier for your family to make difficult choices about your care.    Weight Management   Why it is important to manage your weight:  Being overweight increases your risk of health conditions such as heart disease, high blood pressure, type 2 diabetes, and certain types of cancer. It can also increase your risk for osteoarthritis, sleep apnea, and other respiratory problems. Aim for a slow, steady weight loss. Even a small amount of weight loss can lower your risk of health problems. How to lose weight safely:  A safe and healthy way to lose weight is to eat fewer calories and get regular exercise. You can lose up about 1 pound a week by decreasing the number of calories you eat by 500 calories each day. Healthy meal plan for weight management:  A healthy meal plan includes a variety of foods, contains fewer calories, and helps you stay healthy. A healthy meal plan includes the following:  Eat whole-grain foods more often. A healthy meal plan should contain fiber. Fiber is the part of grains, fruits, and vegetables that is not broken down by your body. Whole-grain foods are healthy and provide extra fiber in your diet. Some examples of whole-grain foods are whole-wheat breads and pastas, oatmeal, brown rice, and bulgur. Eat a variety of vegetables every day. Include dark, leafy greens such as spinach, kale, jerrica greens, and mustard greens. Eat yellow and orange vegetables such as carrots, sweet potatoes, and winter squash. Eat a variety of fruits every day. Choose fresh or canned fruit (canned in its own juice or light syrup) instead of juice. Fruit juice has very little or no fiber. Eat low-fat dairy foods. Drink fat-free (skim) milk or 1% milk. Eat fat-free yogurt and low-fat cottage cheese. Try low-fat cheeses such as mozzarella and other reduced-fat cheeses. Choose meat and other protein foods that are low in fat. Choose beans or other legumes such as split peas or lentils. Choose fish, skinless poultry (chicken or turkey), or lean cuts of red meat (beef or pork). Before you cook meat or poultry, cut off any visible fat. Use less fat and oil. Try baking foods instead of frying them.  Add less fat, such as margarine, sour cream, regular salad dressing and mayonnaise to foods. Eat fewer high-fat foods. Some examples of high-fat foods include french fries, doughnuts, ice cream, and cakes. Eat fewer sweets. Limit foods and drinks that are high in sugar. This includes candy, cookies, regular soda, and sweetened drinks. Exercise:  Exercise at least 30 minutes per day on most days of the week. Some examples of exercise include walking, biking, dancing, and swimming. You can also fit in more physical activity by taking the stairs instead of the elevator or parking farther away from stores. Ask your healthcare provider about the best exercise plan for you. Narcotic (Opioid) Safety    Use narcotics safely:  Take prescribed narcotics exactly as directed  Do not give narcotics to others or take narcotics that belong to someone else  Do not mix narcotics without medicines or alcohol  Do not drive or operate heavy machinery after you take the narcotic  Monitor for side effects and notify your healthcare provider if you experienced side effects such as nausea, sleepiness, itching, or trouble thinking clearly. Manage constipation:    Constipation is the most common side effect of narcotic medicine. Constipation is when you have hard, dry bowel movements, or you go longer than usual between bowel movements. Tell your healthcare provider about all changes in your bowel movements while you are taking narcotics. He or she may recommend laxative medicine to help you have a bowel movement. He or she may also change the kind of narcotic you are taking, or change when you take it. The following are more ways you can prevent or relieve constipation:    Drink liquids as directed. You may need to drink extra liquids to help soften and move your bowels. Ask how much liquid to drink each day and which liquids are best for you. Eat high-fiber foods.   This may help decrease constipation by adding bulk to your bowel movements. High-fiber foods include fruits, vegetables, whole-grain breads and cereals, and beans. Your healthcare provider or dietitian can help you create a high-fiber meal plan. Your provider may also recommend a fiber supplement if you cannot get enough fiber from food. Exercise regularly. Regular physical activity can help stimulate your intestines. Walking is a good exercise to prevent or relieve constipation. Ask which exercises are best for you. Schedule a time each day to have a bowel movement. This may help train your body to have regular bowel movements. Bend forward while you are on the toilet to help move the bowel movement out. Sit on the toilet for at least 10 minutes, even if you do not have a bowel movement. Store narcotics safely:   Store narcotics where others cannot easily get them. Keep them in a locked cabinet or secure area. Do not  keep them in a purse or other bag you carry with you. A person may be looking for something else and find the narcotics. Make sure narcotics are stored out of the reach of children. A child can easily overdose on narcotics. Narcotics may look like candy to a small child. The best way to dispose of narcotics: The laws vary by country and area. In the [facility], the best way is to return the narcotics through a take-back program. This program is offered by the [employer] ([employer]). The following are options for using the program:  Take the narcotics to a [employer] collection site. The site is often a law enforcement center. Call your local law enforcement center for scheduled take-back days in your area. You will be given information on where to go if the collection site is in a different location. Take the narcotics to an approved pharmacy or hospital.  A pharmacy or hospital may be set up as a collection site. You will need to ask if it is a [place] collection site if you were not directed there.  A pharmacy or doctor's office may not be able to take back narcotics unless it is a [place] site. Use a mail-back system. This means you are given containers to put the narcotics into. You will then mail them in the containers. Use a take-back drop box. This is a place to leave the narcotics at any time. People and animals will not be able to get into the box. Your local law enforcement agency can tell you where to find a drop box in your area. Other ways to manage pain:   Ask your healthcare provider about non-narcotic medicines to control pain. Nonprescription medicines include NSAIDs (such as ibuprofen) and acetaminophen. Prescription medicines include muscle relaxers, antidepressants, and steroids. Pain may be managed without any medicines. Some ways to relieve pain include massage, aromatherapy, or meditation. Physical or occupational therapy may also help. For more information:   Drug Enforcement Administration  [address]  Phone: 4- 751 - [phone]  Web Address: [facility] [place] [facility].[employer]/drug_disposal/    [address] [place] [address]  Phone: 2- 503 - [phone]  Web Address: http://[employer]/     © Copyright [employer] 2018 Information is for End User's use only and may not be sold, redistributed or otherwise used for commercial purposes.  All illustrations and images included in CareNotes® are the copyrighted property of A.D.A.M., Inc. or [address] [place] [address]

## 2023-11-08 ENCOUNTER — HOSPITAL ENCOUNTER (OUTPATIENT)
Dept: ULTRASOUND IMAGING | Facility: HOSPITAL | Age: 68
Discharge: HOME | End: 2023-11-08
Payer: MEDICARE

## 2023-11-08 DIAGNOSIS — Z00.00 MEDICARE ANNUAL WELLNESS VISIT, INITIAL: ICD-10-CM

## 2023-11-08 PROCEDURE — 76706 US ABDL AORTA SCREEN AAA: CPT

## 2023-11-13 DIAGNOSIS — E11.42 TYPE 2 DIABETES MELLITUS WITH DIABETIC POLYNEUROPATHY, WITHOUT LONG-TERM CURRENT USE OF INSULIN (HCC): ICD-10-CM

## 2023-11-13 RX ORDER — GLIPIZIDE 5 MG/1
5 TABLET, FILM COATED, EXTENDED RELEASE ORAL DAILY
Qty: 90 TABLET | Refills: 0 | Status: SHIPPED | OUTPATIENT
Start: 2023-11-13 | End: 2024-02-11

## 2023-11-20 ENCOUNTER — TELEPHONE (OUTPATIENT)
Age: 68
End: 2023-11-20

## 2023-11-20 DIAGNOSIS — I77.811 ABDOMINAL AORTIC ECTASIA (HCC): Primary | ICD-10-CM

## 2023-11-20 NOTE — TELEPHONE ENCOUNTER
Patient called in for results of US abdominal aorta. RN reviewed PCP comments and referral to Vascular Surgery. Phone number provided to Vascular Surgery, [place] ([place]) office #[phone] patient to follow up on referral and get OV scheduled.

## 2023-11-21 ENCOUNTER — TELEPHONE (OUTPATIENT)
Dept: FAMILY MEDICINE CLINIC | Facility: CLINIC | Age: 68
End: 2023-11-21

## 2023-11-21 NOTE — TELEPHONE ENCOUNTER
----- Message from Anuja Gaxiola DO sent at 11/20/2023  4:06 PM EST -----  LM for pt re: Ectasia of the proximal abdominal aorta measuring 2.9 cm and will refer to Vascular (referral in chart)

## 2023-11-27 LAB
BUN SERPL-MCNC: 33 MG/DL (ref 7–25)
BUN/CREAT SERPL: 22 (CALC) (ref 6–22)
CALCIUM SERPL-MCNC: 9.5 MG/DL (ref 8.6–10.3)
CHLORIDE SERPL-SCNC: 100 MMOL/L (ref 98–110)
CHOLEST SERPL-MCNC: 122 MG/DL
CHOLEST/HDLC SERPL: 2.3 (CALC)
CO2 SERPL-SCNC: 27 MMOL/L (ref 20–32)
CREAT SERPL-MCNC: 1.47 MG/DL (ref 0.7–1.35)
EST. AVERAGE GLUCOSE BLD GHB EST-MCNC: 148 MG/DL
EST. AVERAGE GLUCOSE BLD GHB EST-SCNC: 8.2 MMOL/L
GFR/BSA.PRED SERPLBLD CYS-BASED-ARV: 52 ML/MIN/1.73M2
GLUCOSE SERPL-MCNC: 182 MG/DL (ref 65–99)
HBA1C MFR BLD: 6.8 % OF TOTAL HGB
HDLC SERPL-MCNC: 52 MG/DL
LDLC SERPL CALC-MCNC: 55 MG/DL (CALC)
NONHDLC SERPL-MCNC: 70 MG/DL (CALC)
POTASSIUM SERPL-SCNC: 4.7 MMOL/L (ref 3.5–5.3)
PSA SERPL-MCNC: 8.38 NG/ML
SODIUM SERPL-SCNC: 135 MMOL/L (ref 135–146)
TRIGL SERPL-MCNC: 73 MG/DL
TSH SERPL-ACNC: 2.02 MIU/L (ref 0.4–4.5)

## 2023-11-29 ENCOUNTER — TELEPHONE (OUTPATIENT)
Dept: FAMILY MEDICINE CLINIC | Facility: CLINIC | Age: 68
End: 2023-11-29

## 2023-11-29 NOTE — TELEPHONE ENCOUNTER
----- Message from J Luis Brock, DO sent at 11/28/2023  7:57 PM EST -----  Please add pt to my schedule on 11/30/2023 to discuss abnml labs

## 2023-11-30 ENCOUNTER — OFFICE VISIT (OUTPATIENT)
Dept: FAMILY MEDICINE CLINIC | Facility: CLINIC | Age: 68
End: 2023-11-30
Payer: MEDICARE

## 2023-11-30 VITALS
DIASTOLIC BLOOD PRESSURE: 70 MMHG | HEIGHT: 75 IN | HEART RATE: 111 BPM | BODY MASS INDEX: 31.71 KG/M2 | OXYGEN SATURATION: 98 % | SYSTOLIC BLOOD PRESSURE: 140 MMHG | WEIGHT: 255 LBS

## 2023-11-30 DIAGNOSIS — Z28.21 INFLUENZA VACCINATION DECLINED BY PATIENT: ICD-10-CM

## 2023-11-30 DIAGNOSIS — Z28.21 PNEUMOCOCCAL VACCINATION DECLINED BY PATIENT: ICD-10-CM

## 2023-11-30 DIAGNOSIS — I10 PRIMARY HYPERTENSION: ICD-10-CM

## 2023-11-30 DIAGNOSIS — E78.5 HYPERLIPIDEMIA LDL GOAL <70: ICD-10-CM

## 2023-11-30 DIAGNOSIS — Z28.21 TETANUS, DIPHTHERIA, AND ACELLULAR PERTUSSIS (TDAP) VACCINATION DECLINED: ICD-10-CM

## 2023-11-30 DIAGNOSIS — N17.9 AKI (ACUTE KIDNEY INJURY) (HCC): Primary | ICD-10-CM

## 2023-11-30 DIAGNOSIS — E03.9 HYPOTHYROIDISM, UNSPECIFIED TYPE: ICD-10-CM

## 2023-11-30 DIAGNOSIS — N18.31 STAGE 3A CHRONIC KIDNEY DISEASE (HCC): ICD-10-CM

## 2023-11-30 DIAGNOSIS — C61 PROSTATE CANCER (HCC): ICD-10-CM

## 2023-11-30 DIAGNOSIS — E11.42 TYPE 2 DIABETES MELLITUS WITH DIABETIC POLYNEUROPATHY, WITHOUT LONG-TERM CURRENT USE OF INSULIN (HCC): ICD-10-CM

## 2023-11-30 PROCEDURE — 99214 OFFICE O/P EST MOD 30 MIN: CPT | Performed by: FAMILY MEDICINE

## 2023-11-30 NOTE — PROGRESS NOTES
Assessment/Plan:   Diagnoses and all orders for this visit:    MACI (acute kidney injury) ([address] )  -     Basic metabolic panel; Future  -     Ambulatory Referral to Nephrology; Future  -     UA w Reflex to Microscopic w Reflex to Culture -Lab Collect; Future  -     Albumin / creatinine urine ratio; Future  - BMP = , BUN/Crt 33/1.47, GFR 52 (<-- 83)   - per pt was hydrated when he got labs done   - advise continued hydration and repeat labs in 1wk   - re-referred to Nephro   - RTO after bloodwork for f/u - pt aware and agreeable     Type 2 diabetes mellitus with diabetic polyneuropathy, without long-term current use of insulin (HCC)  -     Empagliflozin (Jardiance) 25 MG TABS; Take 1 tablet (25 mg total) by mouth daily  -     HEMOGLOBIN A1C W/ EAG ESTIMATION; Future  - A1c 6.8 <-- 5.9   - Crt 1.47 <-- 1.01 (6/2023)   - advised to STOP Metformin for now   - will start on Jardiance 25mg QD for now  - cont Glipizide 5mg QD   - diet/exercise - caution with carbs   Stage 3a chronic kidney disease (HCC)    Hyperlipidemia LDL goal <70  - LDL 55  - cont statin     Pneumococcal vaccination declined by patient  Tetanus, diphtheria, and acellular pertussis (Tdap) vaccination declined  Influenza vaccination declined by patient    Primary hypertension  - BP stable  - cont current regimen for now     Prostate cancer ([address] [facility])  - elevated PSA - has an appt with Uro tmrw     Hypothyroidism, unspecified type  - nml TSH   - cont Levothyroxine 50mcg QAM for now           Subjective:    Patient ID: Pama Heimlich is a 79 y.o. male.   HPI  73yo M presents to the office for f/u of abnml labs  - had labs done 11/27/2023  - LDL = 55  - BMP = , BUN/Crt 33/1.47, GFR 52 (<-- 83)   - nml TSH   - A1c = 6.8 <-- 5.9  - elevated PSA - has an appt with Uro tmrw   - last OV with Nephro was 2022 and was advised PRN f/u       The following portions of the patient's history were reviewed and updated as appropriate: allergies, current medications, past family history, past medical history, past social history, past surgical history and problem list.    Review of Systems  as per HPI    Objective:  /70   Pulse (!) 111   Ht 6' 3" (1.905 m)   Wt 116 kg (255 lb)   SpO2 98%   BMI 31.87 kg/m²    Physical Exam  Vitals reviewed. Constitutional:       General: He is not in acute distress. Appearance: Normal appearance. He is not ill-appearing, toxic-appearing or diaphoretic. HENT:      Head: Normocephalic and atraumatic. Right Ear: External ear normal.      Left Ear: External ear normal.      Nose: Nose normal.   Eyes:      General: No scleral icterus. Right eye: No discharge. Left eye: No discharge. Extraocular Movements: Extraocular movements intact. Conjunctiva/sclera: Conjunctivae normal.   Pulmonary:      Effort: Pulmonary effort is normal.   Musculoskeletal:         General: Normal range of motion. Cervical back: Normal range of motion. Skin:     Comments: Flushed face   Neurological:      Mental Status: He is alert.    Psychiatric:         Mood and Affect: Mood normal.         Behavior: Behavior normal.

## 2023-12-01 ENCOUNTER — OFFICE VISIT (OUTPATIENT)
Dept: UROLOGY | Facility: CLINIC | Age: 68
End: 2023-12-01
Payer: MEDICARE

## 2023-12-01 ENCOUNTER — TELEPHONE (OUTPATIENT)
Dept: NEPHROLOGY | Facility: CLINIC | Age: 68
End: 2023-12-01

## 2023-12-01 VITALS
DIASTOLIC BLOOD PRESSURE: 82 MMHG | OXYGEN SATURATION: 99 % | HEART RATE: 56 BPM | BODY MASS INDEX: 31.71 KG/M2 | WEIGHT: 255 LBS | RESPIRATION RATE: 18 BRPM | SYSTOLIC BLOOD PRESSURE: 136 MMHG | HEIGHT: 75 IN

## 2023-12-01 DIAGNOSIS — R97.20 ELEVATED PSA: Primary | ICD-10-CM

## 2023-12-01 DIAGNOSIS — C61 PROSTATE CANCER (HCC): ICD-10-CM

## 2023-12-01 PROCEDURE — 99213 OFFICE O/P EST LOW 20 MIN: CPT | Performed by: PHYSICIAN ASSISTANT

## 2023-12-01 NOTE — RESULT ENCOUNTER NOTE
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 Instead of repeating his colonoscopy in 3 years this was changed to 4 months. MRN: [id number], Raina Rojas is a 36 year old female admitted for induction  Chief Complaint   Patient presents with   • Scheduled Induction   .    Admission Dt/Tm     2023  8:06 AM  Discharge DT/TM  2023 12:31 PM    Hospital Course     Patient underwent primary  section for fetal intolerance of labor.  Please see my operative note for details.  This is postop day 2 and the patient is tolerating general diet and receiving adequate pain relief with orals.  She is passing gas.  Patient will be discharged home on oral pain meds as well as Lovenox 40 mg subcu twice daily and follow-up in the office in 1 week for incision check.  Lab Results  Last WBC:  WBC (K/mcL)   Date Value   2023 9.0     LAST HCT:  HCT (%)   Date Value   2023 30.5 (L)     LAST HGB:  HGB (g/dL)   Date Value   2023 9.6 (L)     LAST PLT:  PLT (K/mcL)   Date Value   2023 234     LAST GLUCOSE:  Glucose (mg/dL)   Date Value   2023 106 (H)     LAST CO2:  Carbon Dioxide (mmol/L)   Date Value   2023 22     LAST BUN:  BUN (mg/dL)   Date Value   2023 6           Pertinent Physical Exam At Time of Discharge  Physical Exam  Constitutional:       Appearance: She is obese.   Cardiovascular:      Rate and Rhythm: Normal rate.   Pulmonary:      Effort: Pulmonary effort is normal.   Abdominal:      Palpations: Abdomen is soft.   Skin:     General: Skin is warm and dry.   Neurological:      Mental Status: She is alert.   Psychiatric:         Mood and Affect: Mood normal.         Discharge Diagnosis  Fetal intolerance to labor delivered  Primary low transverse segment  section    Plan  Patient Active Problem List   Diagnosis   • Depression   • Hypertension affecting pregnancy in third trimester   • Multigravida in third trimester   • Anemia of pregnancy   • Hyperglycemia during pregnancy   • Obesity, Class III, BMI 40-49.9 (morbid obesity) (CMD)   • Pregnancy complicated by intrauterine  device (IUD)       Discharge Instructions   follow-up in the office in 1 week.  Report any new onset bleeding pain or fever    Discharge Medications     Summary of your Discharge Medications      Take these Medications      Details   acetaminophen 500 MG tablet  Commonly known as: TYLENOL   Take 2 tablets by mouth every 8 hours.     enoxaparin 40 MG/0.4ML injectable solution  Commonly known as: LOVENOX   Inject 0.4 mLs into the skin every 12 hours.     escitalopram 10 MG tablet  Commonly known as: LEXAPRO   Take 1 tablet by mouth daily.     Iron 28 MG Tab   Take 27 mg by mouth daily. Slow FE 1 tab daily     ondansetron 4 MG disintegrating tablet  Commonly known as: ZOFRAN ODT   Place 1 tablet onto the tongue every 8 hours as needed for Nausea.     PRENATAL PO      vitamin B complex tablet   Take 1 tablet by mouth daily.     VITAMIN D PO             Primary Care Physician  Evans Gaston MD

## 2023-12-01 NOTE — TELEPHONE ENCOUNTER
Pt called bc his primary care doctor let him know based on his labwork that he does need to come back to see Dr. Luca Pitts. I see a referral in his chart. Just wanted to know when Dr Luca Pitts suggests pt come back and then we can set up an appointment. Also pt would like to speak with Dr Luca Pitts.

## 2023-12-01 NOTE — PROGRESS NOTES
1. Elevated PSA        2. Prostate cancer (HCC)  PSA Total, Diagnostic              Assessment and plan:       1. [place] [facility] prostate cancer  -cT1c, [place] 3+3=6, 1 of 12 cores, 15 % core volume  -pretreatment PSA:  6.6  - date of diagnosis: November 10/2021  - prostate volume on ultrasound: 68 g  - multiparametric prostate MRI September 2022:  No radiographically detectable lesions, PI-RADS 2, prostate volume 54 grams   -repeat transrectal biopsy November 15, 2022: No detectable disease, BPH only   -current PSA:  7.2    2. Extensive familial history of fatal pancreatic cancer    PSA and ONELIA remains stable. Will repeat in 6 to 9 months. At that time we will coordinate for his next multiparametric prostate MRI and possible biopsy thereafter. Sherlyn Gist      Chief Complaint     Prostate cancer    History of Present Illness     Abdoul Lamar is a 79 y.o. male returns in follow-up for low volume [place] [facility] prostate cancer on active surveillance    Pt has an extensive familial history of fatal pancreatic cancer. PSA had risen to 6.6 which prompted a prostate biopsy in 2021 which revealed [place] [facility] (3+3) prostate cancer in 1 out of 12 cores, 15% core volume. Initiated on active surveillance. With a parametric prostate MRI (9/14/2022) PI-RADS 2, 54 g prostate. Repeat prostate biopsy 11/15/2022 was negative for detectable cancer. Patient is overall comfortable with his lower urinary tract symptoms. Denies any dysuria, gross hematuria, incontinence, or infections. Nocturia 0-1 time nightly. PSA 8.38 (11/27/23), 7.21 (10/10/22) 8.86 (6/27/22), 6.7 (3/23/22), 6.6 (10/26/21), 4.1 (6/15/21), 3.7 (7/29/2020). Review of Systems     Review of Systems   Constitutional:  Negative for activity change and fatigue. HENT:  Negative for congestion. Eyes:  Negative for visual disturbance. Respiratory:  Negative for shortness of breath and wheezing.     Cardiovascular:  Negative for chest pain and leg swelling. Gastrointestinal:  Negative for abdominal pain. Genitourinary:  Negative for dysuria, flank pain, hematuria and urgency. Musculoskeletal:  Negative for back pain. Allergic/Immunologic: Negative for immunocompromised state. Neurological:  Negative for dizziness and numbness. Psychiatric/Behavioral:  Negative for dysphoric mood. All other systems reviewed and are negative. Allergies     Allergies   Allergen Reactions    Sudafed [Pseudoephedrine] Other (See Comments)     hyperactivity    Amoxicillin Rash    Penicillins Rash     Category: Allergy; Physical Exam     Physical Exam  Constitutional:       General: He is not in acute distress. Appearance: He is well-developed. HENT:      Head: Normocephalic and atraumatic. Cardiovascular:      Comments: Negative lower extremity edema  Pulmonary:      Effort: Pulmonary effort is normal.      Breath sounds: Normal breath sounds. Abdominal:      Palpations: Abdomen is soft. Genitourinary:     Comments: Good rectal tone. Prostate 40g, smooth, symmetric, no palpable nodules  Musculoskeletal:         General: Normal range of motion. Cervical back: Normal range of motion. Skin:     General: Skin is warm. Neurological:      Mental Status: He is alert and oriented to person, place, and time.    Psychiatric:         Behavior: Behavior normal.             Vital Signs  Vitals:    12/01/23 1233   BP: 136/82   BP Location: Left arm   Patient Position: Sitting   Cuff Size: Adult   Pulse: 56   Resp: 18   SpO2: 99%   Weight: 116 kg (255 lb)   Height: 6' 3" (1.905 m)         Current Medications       Current Outpatient Medications:     aspirin 81 MG tablet, Take 1 tablet by mouth daily, Disp: , Rfl:     chlorthalidone 25 mg tablet, Take 1 tablet (25 mg total) by mouth daily, Disp: 90 tablet, Rfl: 0    Empagliflozin (Jardiance) 25 MG TABS, Take 1 tablet (25 mg total) by mouth daily, Disp: 30 tablet, Rfl: 2    gabapentin (NEURONTIN) 100 mg capsule, TAKE 2 CAPSULES BY MOUTH 3 TIMES A DAY., Disp: 540 capsule, Rfl: 1    glipiZIDE (GLUCOTROL XL) 5 mg 24 hr tablet, TAKE 1 TABLET (5 MG TOTAL) BY MOUTH DAILY. , Disp: 90 tablet, Rfl: 0    levothyroxine 50 mcg tablet, TAKE 1 TABLET (50 MCG TOTAL) BY MOUTH DAILY IN THE EARLY MORNING, Disp: 90 tablet, Rfl: 0    lisinopril (ZESTRIL) 40 mg tablet, TAKE 1 TABLET BY MOUTH EVERY DAY, Disp: 90 tablet, Rfl: 0    Misc Natural Products (Lutein 20) CAPS, Take 20 capsules by mouth daily Eye health, Disp: , Rfl:     mupirocin (BACTROBAN) 2 % ointment, APPLY TO THE AFFECTED AREA BY TOPICAL ROUTE ONCE DAILY, Disp: , Rfl:     rosuvastatin (CRESTOR) 5 mg tablet, Take 1 tablet (5 mg total) by mouth daily, Disp: 90 tablet, Rfl: 3    tadalafil (CIALIS) 20 MG tablet, Take 1 tablet (20 mg total) by mouth daily as needed for erectile dysfunction, Disp: 10 tablet, Rfl: 1    traMADol (ULTRAM) 50 mg tablet, every 6 (six) hours, Disp: , Rfl:     glucose blood test strip, by In Vitro route (Patient not taking: Reported on 11/30/2023), Disp: , Rfl:       Active Problems     Patient Active Problem List   Diagnosis    Contact dermatitis    Depression with anxiety    Diabetes mellitus with neurological manifestation (HCC)    Diabetic polyneuropathy ([address])    Enthesopathy of knee    Essential hypertension    Insomnia    Nail abnormalities    Neuropathy of foot    Obesity, unspecified    Type 2 diabetes mellitus (HCC)    Type II or unspecified type diabetes mellitus without mention of complication, uncontrolled    Microalbuminuria    Hyperlipidemia LDL goal <70    Skin ulcer of toe of right foot, limited to breakdown of skin (HCC)    Non-intractable vomiting    Elevated lactic acid level    Prostate cancer (HCC)    Other acute osteomyelitis, right ankle and foot (HCC)    Stage 3a chronic kidney disease (HCC)         Past Medical History     Past Medical History:   Diagnosis Date    Allergic rhinitis     Anxiety disorder Last assessed 2/7/2006     Arthritis     Cancer [facility])     prostate    Carpal tunnel syndrome     Colon polyp     Diabetes mellitus ([address])     Diabetes mellitus with neurological manifestation ([address]) 04/06/2012    Last assessed 3/29/2016     Diabetes type 2, uncontrolled     Last assessed 4/6/2016     Disease of thyroid gland     DM II (diabetes mellitus, type II), controlled ([address])     Last assessed 10/20/2014     Essential hypertension     Last assesssed 2/7/2006     Hyperlipidemia     Hypertension     Insomnia     Last assessed 1/13/2011     Numbness and tingling of foot     Resolved 3/29/2016     Obesity     Old disruption of knee ligament     Last assessed 3/26/2008     Tarsal tunnel syndrome     Urinary frequency     Resolved 3/29/2016     Visual impairment          Surgical History     Past Surgical History:   Procedure Laterality Date    COLONOSCOPY      FOOT SURGERY      OH AMPUTATION TOE INTERPHALANGEAL JOINT Right 11/23/2022    Procedure: AMPUTATION 3RD TOE;  Surgeon: Zeeshan Hernandez DPM;  Location: [address] [place] [address] OR;  Service: Podiatry    PROSTATE BIOPSY      WISDOM TOOTH EXTRACTION           Family History     Family History   Problem Relation Age of Onset    Heart attack Mother 79    Valvular heart disease Mother     Hypotension Mother     Pancreatic cancer Father 76    Diabetes Father     Lung cancer Sister 64        not a smoker    Cancer Brother         bile duct     Colon cancer Maternal Grandmother 58    Diabetes Maternal Grandmother     Pancreatic cancer Paternal Aunt     Pancreatic cancer Paternal Uncle     Hypertension Neg Hx     Prostate cancer Neg Hx          Social History     Social History     Social History     Tobacco Use   Smoking Status Never   Smokeless Tobacco Never         Pertinent Lab Values     Lab Results   Component Value Date    CREATININE 1.47 (H) 11/27/2023       Lab Results   Component Value Date    PSA 8.38 (H) 11/27/2023    PSA 7.21 (H) 10/10/2022    PSA 8.86 (H) 06/27/2022 @RESULTRCNT(1H])@      Pertinent Imaging      - n/a    Portions of the record may have been created with voice recognition software. Occasional wrong word or "sound a like" substitutions may have occurred due to the inherent limitations of voice recognition software. Read the chart carefully and recognize, using context, where substitutions have occurred.

## 2023-12-04 ENCOUNTER — TELEPHONE (OUTPATIENT)
Age: 68
End: 2023-12-04

## 2023-12-04 DIAGNOSIS — E11.42 TYPE 2 DIABETES MELLITUS WITH DIABETIC POLYNEUROPATHY, WITHOUT LONG-TERM CURRENT USE OF INSULIN (HCC): Primary | ICD-10-CM

## 2023-12-04 RX ORDER — PIOGLITAZONEHYDROCHLORIDE 30 MG/1
30 TABLET ORAL DAILY
Qty: 30 TABLET | Refills: 2 | Status: SHIPPED | OUTPATIENT
Start: 2023-12-04

## 2023-12-04 NOTE — TELEPHONE ENCOUNTER
Patient called back regarding message above. He is very concerned as his PCP advised him to see and speak with Dr. Luly Guillory ASAP. Patient scheduled for soonest available with Dr. Luly Guillory in either office 1/11/24, on wait list. Gearlena Brock to either office. Please advise if patient needs to be seen sooner.

## 2023-12-04 NOTE — TELEPHONE ENCOUNTER
Patient calling stating that he cannot see  nephrology for 5 wks . Natalia Franco Is it ok to hold out that long . Also patient is asking about jardiance he stated that it will cost 516 dollars a month to get. Is there something better that he can take.     Camille Handley

## 2023-12-06 LAB
ALBUMIN/CREAT UR: 8 MCG/MG CREAT
APPEARANCE UR: CLEAR
BILIRUB UR QL STRIP: NEGATIVE
BUN SERPL-MCNC: 50 MG/DL (ref 7–25)
BUN/CREAT SERPL: 22 (CALC) (ref 6–22)
CALCIUM SERPL-MCNC: 9.1 MG/DL (ref 8.6–10.3)
CHLORIDE SERPL-SCNC: 93 MMOL/L (ref 98–110)
CO2 SERPL-SCNC: 24 MMOL/L (ref 20–32)
COLOR UR: YELLOW
CREAT SERPL-MCNC: 2.25 MG/DL (ref 0.7–1.35)
CREAT UR-MCNC: 76 MG/DL (ref 20–320)
EST. AVERAGE GLUCOSE BLD GHB EST-MCNC: 143 MG/DL
EST. AVERAGE GLUCOSE BLD GHB EST-SCNC: 7.9 MMOL/L
GFR/BSA.PRED SERPLBLD CYS-BASED-ARV: 31 ML/MIN/1.73M2
GLUCOSE SERPL-MCNC: 171 MG/DL (ref 65–99)
GLUCOSE UR QL STRIP: ABNORMAL
HBA1C MFR BLD: 6.6 % OF TOTAL HGB
HGB UR QL STRIP: NEGATIVE
KETONES UR QL STRIP: NEGATIVE
LEUKOCYTE ESTERASE UR QL STRIP: NEGATIVE
MICROALBUMIN UR-MCNC: 0.6 MG/DL
NITRITE UR QL STRIP: NEGATIVE
PH UR STRIP: 5.5 [PH] (ref 5–8)
POTASSIUM SERPL-SCNC: 4.3 MMOL/L (ref 3.5–5.3)
PROT UR QL STRIP: NEGATIVE
SODIUM SERPL-SCNC: 130 MMOL/L (ref 135–146)
SP GR UR STRIP: 1.01 (ref 1–1.03)

## 2023-12-11 ENCOUNTER — OFFICE VISIT (OUTPATIENT)
Dept: NEPHROLOGY | Facility: CLINIC | Age: 68
End: 2023-12-11
Payer: MEDICARE

## 2023-12-11 VITALS
WEIGHT: 253.6 LBS | SYSTOLIC BLOOD PRESSURE: 132 MMHG | OXYGEN SATURATION: 98 % | HEART RATE: 90 BPM | BODY MASS INDEX: 31.53 KG/M2 | DIASTOLIC BLOOD PRESSURE: 68 MMHG | HEIGHT: 75 IN

## 2023-12-11 DIAGNOSIS — I10 ESSENTIAL HYPERTENSION: ICD-10-CM

## 2023-12-11 DIAGNOSIS — N17.9 AKI (ACUTE KIDNEY INJURY) (HCC): Primary | ICD-10-CM

## 2023-12-11 DIAGNOSIS — R80.9 TYPE 2 DIABETES MELLITUS WITH MICROALBUMINURIA, WITHOUT LONG-TERM CURRENT USE OF INSULIN: ICD-10-CM

## 2023-12-11 DIAGNOSIS — C61 PROSTATE CANCER (HCC): ICD-10-CM

## 2023-12-11 DIAGNOSIS — E11.29 TYPE 2 DIABETES MELLITUS WITH MICROALBUMINURIA, WITHOUT LONG-TERM CURRENT USE OF INSULIN: ICD-10-CM

## 2023-12-11 DIAGNOSIS — E87.1 HYPONATREMIA: ICD-10-CM

## 2023-12-11 DIAGNOSIS — E66.09 CLASS 1 OBESITY DUE TO EXCESS CALORIES WITH SERIOUS COMORBIDITY AND BODY MASS INDEX (BMI) OF 31.0 TO 31.9 IN ADULT: ICD-10-CM

## 2023-12-11 PROCEDURE — 99214 OFFICE O/P EST MOD 30 MIN: CPT | Performed by: STUDENT IN AN ORGANIZED HEALTH CARE EDUCATION/TRAINING PROGRAM

## 2023-12-11 RX ORDER — NIFEDIPINE 30 MG/1
30 TABLET, EXTENDED RELEASE ORAL DAILY
Qty: 90 TABLET | Refills: 1 | Status: SHIPPED | OUTPATIENT
Start: 2023-12-11

## 2023-12-11 NOTE — PROGRESS NOTES
NEPHROLOGY OUTPATIENT PROGRESS NOTE   Abdoul Lamar 79 y.o. male MRN: [id number]  DATE: 12/11/2023    Reason for visit:   Chief Complaint   Patient presents with    Follow-up     Microalbuminuria         Patient Instructions   Thank you for coming to your visit today. As we discussed you kidney function is abnormal, above your baseline. Your electrolytes are normal. Please follow the recommendations below       Recommend low sodium (salt) food    Avoid nonsteroidal anti-inflammatory drugs such as Naprosyn, ibuprofen, Aleve, Advil, Celebrex, Meloxicam (Mobic) etc.  You can use Tylenol as needed if you do not have any liver condition to be concerned about    Try to exercise at least 30 minutes 3 days a week to begin with with an ultimate goal of 5 days a week for at least 30 minutes    Try to lose 5-10 lb by your next visit  Discontinue Chlorthalidone for now   Decrease Lisinorpril to 20mg daily   Start Nifedipine 30mg   Labs in 3 weeks     Next Visit in 4-6 weeks with results   If you need to see us earlier we can change the appointment for you      Mary Anne Lema MD  Nephrology Attending         Paul Ernandez was seen today for follow-up. Diagnoses and all orders for this visit:    MACI (acute kidney injury) ([address])  -     US kidney and bladder with pvr; Future  -     Basic metabolic panel; Future  -     Urinalysis with microscopic  -     Protein, Total w/Creat, Random Urine  -     Phosphorus; Future  -     PTH, intact; Future  -     Ambulatory Referral to Nephrology    Type 2 diabetes mellitus with diabetic neuropathy, without long-term current use of insulin (HCC)    Essential hypertension  -     NIFEdipine (PROCARDIA XL) 30 mg 24 hr tablet;  Take 1 tablet (30 mg total) by mouth daily    Prostate cancer (HCC)    Class 1 obesity due to excess calories with serious comorbidity and body mass index (BMI) of 31.0 to 31.9 in adult    Hyperlipidemia LDL goal <70        Assessment/Plan:  62 yo man with PMH of DM with albuminuria, HTN, HLD, obesity. Patient presents for initial consultation for albuminuria. 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   Lisinopril reduced to 20 mg as above  On Jardiance        #Acid-base Disorder  serum HCO3 24 mmol/L   At goal     #Volume status/hypertension:  Volume:euvolemic   Blood pressure: Normotensive, //68, goal less than 140/90Recommend:  Discontinue chlorthalidone for now   Lisinopril 20 mg, reduce dose above   Start nifedipine 30 mg    Low sodium diet    # Hyponatremia  Serum sodium 130 mEq/L  Secondary to volume depletion  Changes as above     #Anemia:  Current hemoglobin: 15.1 mg/dL  At goal   No indication of SANJIV at this time       #DM  hbA1c 6.6  Advised to maintain a good DM control to prevent progression of kidney disease     # Prostate cancer  On surveillance biopsy  Ultrasound with PVR    SUBJECTIVE / INTERVAL HISTORY:  79 y.o. male presents in follow up of MACI. Patient reports that lisinopril was increased to 40 mg 2 to 3 months ago, he was started on Jardiance a few weeks ago. He noticed increase of urinary output with poor oral intake. No chest pain, no shortness of breath    Gil Efrain denies any recent illness/hospitalizations/medication changes since last office visit. Review of Systems   Constitutional:  Negative for activity change and appetite change. HENT:  Negative for congestion and dental problem. Eyes:  Negative for discharge. Respiratory:  Negative for shortness of breath. Cardiovascular:  Negative for chest pain, palpitations and leg swelling. Gastrointestinal:  Negative for abdominal distention and abdominal pain. Endocrine: Negative for cold intolerance. Genitourinary:  Negative for dysuria. Musculoskeletal:  Negative for arthralgias. Skin:  Negative for color change and pallor. Allergic/Immunologic: Negative for environmental allergies. Neurological:  Negative for dizziness. Psychiatric/Behavioral:  Negative for agitation. OBJECTIVE:  /68 (BP Location: Left arm, Patient Position: Sitting, Cuff Size: Standard)   Pulse 90   Ht 6' 3" (1.905 m)   Wt 115 kg (253 lb 9.6 oz)   SpO2 98%   BMI 31.70 kg/m²  Body mass index is 31.7 kg/m². Physical exam:  Physical Exam  General:  no acute distress at this time  Skin:  No acute rash  Eyes:  No scleral icterus and noninjected  ENT:  mucous membranes moist  Neck:  no carotid bruits  Chest:  Clear to auscultation percussion, good respiratory effort, no use of accessory respiratory muscles  CVS:  Regular rate and rhythm without rub   Abdomen:  soft and nontender   Extremities: no significant lower extremity edema  Neuro:  No gross focality  Psych:  Alert , cooperative         Medications:    Current Outpatient Medications:     aspirin 81 MG tablet, Take 1 tablet by mouth daily, Disp: , Rfl:     chlorthalidone 25 mg tablet, Take 1 tablet (25 mg total) by mouth daily, Disp: 90 tablet, Rfl: 0    gabapentin (NEURONTIN) 100 mg capsule, TAKE 2 CAPSULES BY MOUTH 3 TIMES A DAY., Disp: 540 capsule, Rfl: 1    glipiZIDE (GLUCOTROL XL) 5 mg 24 hr tablet, TAKE 1 TABLET (5 MG TOTAL) BY MOUTH DAILY. , Disp: 90 tablet, Rfl: 0    levothyroxine 50 mcg tablet, TAKE 1 TABLET (50 MCG TOTAL) BY MOUTH DAILY IN THE EARLY MORNING, Disp: 90 tablet, Rfl: 0    lisinopril (ZESTRIL) 40 mg tablet, TAKE 1 TABLET BY MOUTH EVERY DAY, Disp: 90 tablet, Rfl: 0    Misc Natural Products (Lutein 20) CAPS, Take 20 capsules by mouth daily Eye health, Disp: , Rfl:     NIFEdipine (PROCARDIA XL) 30 mg 24 hr tablet, Take 1 tablet (30 mg total) by mouth daily, Disp: 90 tablet, Rfl: 1    pioglitazone (ACTOS) 30 mg tablet, Take 1 tablet (30 mg total) by mouth daily, Disp: 30 tablet, Rfl: 2    rosuvastatin (CRESTOR) 5 mg tablet, Take 1 tablet (5 mg total) by mouth daily, Disp: 90 tablet, Rfl: 3    glucose blood test strip, by In Vitro route (Patient not taking: Reported on 11/30/2023), Disp: , Rfl:     mupirocin (BACTROBAN) 2 % ointment, APPLY TO THE AFFECTED AREA BY TOPICAL ROUTE ONCE DAILY, Disp: , Rfl:     tadalafil (CIALIS) 20 MG tablet, Take 1 tablet (20 mg total) by mouth daily as needed for erectile dysfunction, Disp: 10 tablet, Rfl: 1    traMADol (ULTRAM) 50 mg tablet, every 6 (six) hours (Patient not taking: Reported on 12/11/2023), Disp: , Rfl:     Allergies: Allergies as of 12/11/2023 - Reviewed 12/11/2023   Allergen Reaction Noted    Sudafed [pseudoephedrine] Other (See Comments) 12/17/2018    Amoxicillin Rash 11/18/2022    Penicillins Rash 01/02/2008       The following portions of the patient's history were reviewed and updated as appropriate: past family history, past surgical history and problem list.    Laboratory Results:  Lab Results   Component Value Date    SODIUM 130 (L) 12/06/2023    K 4.3 12/06/2023    CL 93 (L) 12/06/2023    CO2 24 12/06/2023    BUN 50 (H) 12/06/2023    CREATININE 2.25 (H) 12/06/2023    GLUC 171 (H) 12/06/2023    CALCIUM 9.1 12/06/2023        Lab Results   Component Value Date    CALCIUM 9.1 12/06/2023       Portions of the record may have been created with voice recognition software. Occasional wrong word or "sound a like" substitutions may have occurred due to the inherent limitations of voice recognition software. Read the chart carefully and recognize, using context, where substitutions have occurred.

## 2023-12-11 NOTE — PATIENT INSTRUCTIONS
Thank you for coming to your visit today. As we discussed you kidney function is abnormal, above your baseline.  Your electrolytes are normal. Please follow the recommendations below       Recommend low sodium (salt) food    Avoid nonsteroidal anti-inflammatory drugs such as Naprosyn, ibuprofen, Aleve, Advil, Celebrex, Meloxicam (Mobic) etc.  You can use Tylenol as needed if you do not have any liver condition to be concerned about    Try to exercise at least 30 minutes 3 days a week to begin with with an ultimate goal of 5 days a week for at least 30 minutes    Try to lose 5-10 lb by your next visit  Discontinue Chlorthalidone for now   Decrease Lisinorpril to 20mg daily   Start Nifedipine 30mg   Labs in 3 weeks     Next Visit in 4-6 weeks with results   If you need to see us earlier we can change the appointment for you      Al Pace MD  Nephrology Attending

## 2023-12-12 ENCOUNTER — OFFICE VISIT (OUTPATIENT)
Dept: FAMILY MEDICINE CLINIC | Facility: CLINIC | Age: 68
End: 2023-12-12
Payer: MEDICARE

## 2023-12-12 VITALS
BODY MASS INDEX: 31.46 KG/M2 | HEART RATE: 90 BPM | HEIGHT: 75 IN | DIASTOLIC BLOOD PRESSURE: 70 MMHG | SYSTOLIC BLOOD PRESSURE: 122 MMHG | RESPIRATION RATE: 16 BRPM | OXYGEN SATURATION: 98 % | WEIGHT: 253 LBS

## 2023-12-12 DIAGNOSIS — N17.9 AKI (ACUTE KIDNEY INJURY) (HCC): ICD-10-CM

## 2023-12-12 DIAGNOSIS — I10 PRIMARY HYPERTENSION: ICD-10-CM

## 2023-12-12 DIAGNOSIS — E11.42 TYPE 2 DIABETES MELLITUS WITH DIABETIC POLYNEUROPATHY, WITHOUT LONG-TERM CURRENT USE OF INSULIN (HCC): Primary | ICD-10-CM

## 2023-12-12 DIAGNOSIS — C61 PROSTATE CANCER (HCC): ICD-10-CM

## 2023-12-12 DIAGNOSIS — N18.31 STAGE 3A CHRONIC KIDNEY DISEASE (HCC): ICD-10-CM

## 2023-12-12 DIAGNOSIS — Z28.21 PNEUMOCOCCAL VACCINATION DECLINED BY PATIENT: ICD-10-CM

## 2023-12-12 DIAGNOSIS — Z28.21 TETANUS, DIPHTHERIA, AND ACELLULAR PERTUSSIS (TDAP) VACCINATION DECLINED: ICD-10-CM

## 2023-12-12 DIAGNOSIS — E78.5 HYPERLIPIDEMIA LDL GOAL <70: ICD-10-CM

## 2023-12-12 DIAGNOSIS — Z28.21 INFLUENZA VACCINATION DECLINED BY PATIENT: ICD-10-CM

## 2023-12-12 PROCEDURE — 99214 OFFICE O/P EST MOD 30 MIN: CPT | Performed by: FAMILY MEDICINE

## 2023-12-12 RX ORDER — GLIPIZIDE 5 MG/1
5 TABLET, FILM COATED, EXTENDED RELEASE ORAL DAILY
Qty: 90 TABLET | Refills: 0 | Status: SHIPPED | OUTPATIENT
Start: 2023-12-12 | End: 2024-03-11

## 2023-12-12 NOTE — PROGRESS NOTES
Assessment/Plan:   Diagnoses and all orders for this visit:    Type 2 diabetes mellitus with diabetic polyneuropathy, without long-term current use of insulin (HCC)  -     Empagliflozin 25 MG TABS; Take 1 tablet (25 mg total) by mouth daily  -     glipiZIDE (GLUCOTROL XL) 5 mg 24 hr tablet; Take 1 tablet (5 mg total) by mouth daily  -     HEMOGLOBIN A1C W/ EAG ESTIMATION; Future  - A1c (12/6/2023): 6.6   - BMP with worsening renal function   - was advised to STOP Metformin at last OV on 11/30/2023   - cont Glipizide 5mg QD - RF sent   - pt would like to cont Jardiance despite cost - RF sent  - advised to STOP Actos   - diet/exercise - caution with carbs   - RTO in 3months, with labs, for f/u - pt aware and agreeable     Primary hypertension  - BP stable in office on my recheck   - BMP with worsening renal function   - did see Nephro 12/11/2023 - was advised to decrease ACEI from 40mg QD to 20mg QD, STOP chlorthalidone, and started on Nifedipine 30mg QD   - has renal US schedule 12/18/2023 and close f/u with Nephro   - cont with hydration   - caution/avoidance of NSAIDs  - - RTO in 3months, with labs, for f/u - pt aware and agreeable   MAIC (acute kidney injury) ([address])  -     Basic metabolic panel; Future  -     Albumin / creatinine urine ratio; Future  Stage 3a chronic kidney disease (HCC)    Hyperlipidemia LDL goal <70  - LDL 55  - cont statin     Pneumococcal vaccination declined by patient  Tetanus, diphtheria, and acellular pertussis (Tdap) vaccination declined  Influenza vaccination declined by patient    Prostate cancer ([address])  - elevated PSA - saw Uro 12/1/2023 - "PSA and ONELIA remains stable. Will repeat in 6 to 9 months"          Subjective:    Patient ID: Dawood Syed is a 79 y.o. male.   HPI  - did see Nephro 12/11/2023 - was advised to decrease ACEI from 40mg QD to 20mg QD, STOP chlorthalidone, and started on Nifedipine 30mg QD   - wants to cont Jardiance vs Actos   - reviewed labs done 12/6/2023   - has renal US schedule 12/18/2023   - elevated PSA - saw Uro 12/1/2023 - "PSA and ONELIA remains stable. Will repeat in 6 to 9 months"  - doing OK - denies F/C/N/V/CP/palpitations/SOB/wheezing/abd pain/LE edema   - declined annual Flu vaccine  - declined PCV20   - declined COVID IMMs and Boosters       The following portions of the patient's history were reviewed and updated as appropriate: allergies, current medications, past family history, past medical history, past social history, past surgical history and problem list.    Review of Systems  as per HPI    Objective:  /70 (BP Location: Right arm, Patient Position: Sitting, Cuff Size: Large)   Pulse 90   Resp 16   Ht 6' 3" (1.905 m)   Wt 115 kg (253 lb)   SpO2 98%   BMI 31.62 kg/m²    Physical Exam  Vitals reviewed. Constitutional:       General: He is not in acute distress. Appearance: Normal appearance. He is not ill-appearing, toxic-appearing or diaphoretic. HENT:      Head: Normocephalic and atraumatic. Right Ear: External ear normal.      Left Ear: External ear normal.      Nose: Nose normal.   Eyes:      General: No scleral icterus. Right eye: No discharge. Left eye: No discharge. Extraocular Movements: Extraocular movements intact. Conjunctiva/sclera: Conjunctivae normal.   Cardiovascular:      Rate and Rhythm: Normal rate and regular rhythm. Heart sounds: Normal heart sounds. Pulmonary:      Effort: Pulmonary effort is normal. No respiratory distress. Breath sounds: Normal breath sounds. No stridor. No wheezing, rhonchi or rales. Abdominal:      Palpations: Abdomen is soft. Musculoskeletal:         General: Normal range of motion. Cervical back: Normal range of motion. Right lower leg: No edema. Left lower leg: No edema. Skin:     General: Skin is warm. Neurological:      General: No focal deficit present. Mental Status: He is alert and oriented to person, place, and time. Psychiatric:         Mood and Affect: Mood normal.         Behavior: Behavior normal.

## 2023-12-14 DIAGNOSIS — E03.9 HYPOTHYROIDISM, UNSPECIFIED TYPE: ICD-10-CM

## 2023-12-14 RX ORDER — LEVOTHYROXINE SODIUM 0.05 MG/1
50 TABLET ORAL
Qty: 90 TABLET | Refills: 1 | Status: SHIPPED | OUTPATIENT
Start: 2023-12-14

## 2023-12-18 ENCOUNTER — HOSPITAL ENCOUNTER (OUTPATIENT)
Dept: ULTRASOUND IMAGING | Facility: HOSPITAL | Age: 68
Discharge: HOME | End: 2023-12-18
Attending: STUDENT IN AN ORGANIZED HEALTH CARE EDUCATION/TRAINING PROGRAM
Payer: MEDICARE

## 2023-12-18 DIAGNOSIS — N17.9 AKI (ACUTE KIDNEY INJURY) (HCC): ICD-10-CM

## 2023-12-18 PROCEDURE — 76770 US EXAM ABDO BACK WALL COMP: CPT

## 2023-12-25 DIAGNOSIS — I10 ELEVATED BLOOD PRESSURE READING IN OFFICE WITH DIAGNOSIS OF HYPERTENSION: ICD-10-CM

## 2024-01-04 RX ORDER — LISINOPRIL 20 MG/1
20 TABLET ORAL DAILY
Qty: 90 TABLET | Refills: 0 | Status: SHIPPED | OUTPATIENT
Start: 2024-01-04

## 2024-01-06 LAB
APPEARANCE UR: CLEAR
BILIRUB UR QL STRIP: NEGATIVE
BUN SERPL-MCNC: 19 MG/DL (ref 7–25)
BUN/CREAT SERPL: ABNORMAL (CALC) (ref 6–22)
CALCIUM SERPL-MCNC: 9.4 MG/DL (ref 8.6–10.3)
CALCIUM SERPL-MCNC: 9.4 MG/DL (ref 8.6–10.3)
CHLORIDE SERPL-SCNC: 99 MMOL/L (ref 98–110)
CO2 SERPL-SCNC: 21 MMOL/L (ref 20–32)
COLOR UR: YELLOW
CREAT SERPL-MCNC: 1.13 MG/DL (ref 0.7–1.35)
CREAT UR-MCNC: 70 MG/DL (ref 20–320)
GFR/BSA.PRED SERPLBLD CYS-BASED-ARV: 72 ML/MIN/1.73M2
GLUCOSE SERPL-MCNC: 84 MG/DL (ref 65–99)
GLUCOSE UR QL STRIP: ABNORMAL
HGB UR QL STRIP: NEGATIVE
KETONES UR QL STRIP: ABNORMAL
LEUKOCYTE ESTERASE UR QL STRIP: NEGATIVE
NITRITE UR QL STRIP: NEGATIVE
PH UR STRIP: 5.5 [PH] (ref 5–8)
PHOSPHATE SERPL-MCNC: 3.5 MG/DL (ref 2.1–4.3)
POTASSIUM SERPL-SCNC: 4.4 MMOL/L (ref 3.5–5.3)
PROT UR QL STRIP: NEGATIVE
PROT UR-MCNC: 13 MG/DL (ref 5–25)
PROT/CREAT UR: 0.19 MG/MG CREAT (ref 0.03–0.15)
PROT/CREAT UR: 186 MG/G CREAT (ref 25–148)
PTH-INTACT SERPL-MCNC: 26 PG/ML (ref 16–77)
SODIUM SERPL-SCNC: 134 MMOL/L (ref 135–146)
SP GR UR STRIP: 1.01 (ref 1–1.03)

## 2024-01-22 ENCOUNTER — OFFICE VISIT (OUTPATIENT)
Dept: NEPHROLOGY | Facility: CLINIC | Age: 69
End: 2024-01-22
Payer: MEDICARE

## 2024-01-22 VITALS
HEIGHT: 75 IN | BODY MASS INDEX: 30.46 KG/M2 | SYSTOLIC BLOOD PRESSURE: 140 MMHG | DIASTOLIC BLOOD PRESSURE: 70 MMHG | WEIGHT: 245 LBS

## 2024-01-22 DIAGNOSIS — R80.9 TYPE 2 DIABETES MELLITUS WITH MICROALBUMINURIA, WITHOUT LONG-TERM CURRENT USE OF INSULIN: Primary | ICD-10-CM

## 2024-01-22 DIAGNOSIS — N17.9 ACUTE RENAL FAILURE, UNSPECIFIED ACUTE RENAL FAILURE TYPE (HCC): ICD-10-CM

## 2024-01-22 DIAGNOSIS — C61 PROSTATE CANCER (HCC): ICD-10-CM

## 2024-01-22 DIAGNOSIS — I10 ESSENTIAL HYPERTENSION: ICD-10-CM

## 2024-01-22 DIAGNOSIS — R80.9 ALBUMINURIA: ICD-10-CM

## 2024-01-22 DIAGNOSIS — E66.09 CLASS 1 OBESITY DUE TO EXCESS CALORIES WITH SERIOUS COMORBIDITY AND BODY MASS INDEX (BMI) OF 30.0 TO 30.9 IN ADULT: ICD-10-CM

## 2024-01-22 DIAGNOSIS — E87.1 HYPONATREMIA: ICD-10-CM

## 2024-01-22 DIAGNOSIS — E11.42 DIABETIC POLYNEUROPATHY ASSOCIATED WITH TYPE 2 DIABETES MELLITUS (HCC): ICD-10-CM

## 2024-01-22 DIAGNOSIS — E11.29 TYPE 2 DIABETES MELLITUS WITH MICROALBUMINURIA, WITHOUT LONG-TERM CURRENT USE OF INSULIN: Primary | ICD-10-CM

## 2024-01-22 PROCEDURE — 99214 OFFICE O/P EST MOD 30 MIN: CPT | Performed by: STUDENT IN AN ORGANIZED HEALTH CARE EDUCATION/TRAINING PROGRAM

## 2024-01-22 NOTE — PROGRESS NOTES
NEPHROLOGY OUTPATIENT PROGRESS NOTE   Marco Richard 68 y.o. male MRN: [id number]  DATE: 1/22/2024    Reason for visit:   Chief Complaint   Patient presents with    Follow-up    CKD III        Patient Instructions   Thank you for coming to your visit today. As we discussed you kidney function is improving, your creatinine is 1.1 mg/dL.  Your sodium is low . Please follow the recommendations below     Avoid nonsteroidal anti-inflammatory drugs such as Naprosyn, ibuprofen, Aleve, Advil, Celebrex, Meloxicam (Mobic) etc.  You can use Tylenol as needed if you do not have any liver condition to be concerned about    Try to avoid medications such as pantoprazole or  Protonix/Nexium or Esomeprazole)/Prilosec or omeprazole/Prevacid or lansoprazole/AcipHex or Rabeprazole.  If you are able to, use Pepcid as this is safer for your kidneys.    Try to exercise at least 30 minutes 3 days a week to begin with with an ultimate goal of 5 days a week for at least 30 minutes    Try to lose 5-10 lb by your next visit      Next Visit in 6 months with results   If you need to see us earlier we can change the appointment for you      Joselyn Reyes Bahamonde, MD  Nephrology Attending         Marco was seen today for follow-up and ckd iii.    Diagnoses and all orders for this visit:    Type 2 diabetes mellitus with microalbuminuria, without long-term current use of insulin   -     Cancel: Basic metabolic panel; Future  -     Cancel: Urinalysis with microscopic; Future  -     Cancel: Albumin / creatinine urine ratio; Future    Acute renal failure, unspecified acute renal failure type (HCC)  -     Basic metabolic panel; Future  -     Urinalysis with microscopic; Future    Albuminuria  -     Albumin / creatinine urine ratio; Future  -     Urinalysis with microscopic; Future    Essential hypertension    Hyponatremia    Prostate cancer (HCC)    Class 1 obesity due to excess calories with serious comorbidity and body mass index (BMI) of 30.0 to  30.9 in adult    Diabetic polyneuropathy associated with type 2 diabetes mellitus (HCC)        Assessment/Plan:  67 yo man with PMH of DM with albuminuria, HTN, HLD, obesity. Patient presents for initial consultation for albuminuria.      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   Continue lisinopril 20 mg and Jardiance        #Acid-base Disorder  serum HCO3 21 mmol/L   At goal     #Volume status/hypertension:  Volume: Sharla euvolemic on exam  Blood pressure: Borderline hypotension, /70, goal less than 140/90  Home /70  Continue lisinopril 20 mg daily   nifedipine 30 mg    Low sodium diet  Advised to maintain a good BP control to prevent progression of CKD        # Hyponatremia  Serum sodium 134 mEq/L improved  Off chlorthalidone     #Anemia:  Current hemoglobin: 15.1 mg/dL  At goal   No indication of SANJIV at this time       #DM  hbA1c 6.6  Advised to maintain a good DM control to prevent progression of kidney disease     # Prostate cancer  On surveillance biopsy  No PVR     #Obesity   BMI 30.62  Lost 8 pounds  Recommend weight loss , heathy diet  Lifestyle modification      #Diabetic polyneuropathy  On gabapentin  Avoid NSAIDs        SUBJECTIVE / INTERVAL HISTORY:  68 y.o. male presents in follow up of MACI. Feels well, no SOB, no CP, no recent hospitalizations. No issues with medication       Marco Richard denies any  "recent illness/hospitalizations/medication changes since last office visit.    Review of Systems   Constitutional:  Negative for activity change and appetite change.   HENT:  Negative for congestion and dental problem.    Eyes:  Negative for discharge.   Respiratory:  Negative for shortness of breath and stridor.    Cardiovascular:  Negative for chest pain and leg swelling.   Gastrointestinal:  Negative for abdominal distention and abdominal pain.   Endocrine: Negative for cold intolerance.   Genitourinary:  Negative for dysuria.   Musculoskeletal:  Negative for arthralgias.   Skin:  Negative for color change and pallor.   Neurological:  Negative for dizziness.   Psychiatric/Behavioral:  Negative for agitation and behavioral problems.        OBJECTIVE:  /70   Ht 6' 3\" (1.905 m)   Wt 111 kg (245 lb)   BMI 30.62 kg/m²  Body mass index is 30.62 kg/m².    Physical exam:  Physical Exam  General:  no acute distress at this time  Skin:  No acute rash  Eyes:  No scleral icterus and noninjected  ENT:  mucous membranes moist  Neck:  no carotid bruits  Chest:  Clear to auscultation percussion, good respiratory effort, no use of accessory respiratory muscles  CVS:  Regular rate and rhythm without rub   Abdomen:  soft and nontender   Extremities: no significant lower extremity edema  Neuro:  No gross focality  Psych:  Alert , cooperative       Medications:    Current Outpatient Medications:     aspirin 81 MG tablet, Take 1 tablet by mouth daily, Disp: , Rfl:     Empagliflozin 25 MG TABS, Take 1 tablet (25 mg total) by mouth daily, Disp: 90 tablet, Rfl: 0    gabapentin (NEURONTIN) 100 mg capsule, TAKE 2 CAPSULES BY MOUTH 3 TIMES A DAY., Disp: 540 capsule, Rfl: 1    glipiZIDE (GLUCOTROL XL) 5 mg 24 hr tablet, Take 1 tablet (5 mg total) by mouth daily, Disp: 90 tablet, Rfl: 0    glucose blood test strip, Test, Disp: , Rfl:     levothyroxine 50 mcg tablet, TAKE 1 TABLET (50 MCG TOTAL) BY MOUTH DAILY IN THE EARLY MORNING, " "Disp: 90 tablet, Rfl: 1    lisinopril (ZESTRIL) 20 mg tablet, Take 1 tablet (20 mg total) by mouth daily, Disp: 90 tablet, Rfl: 0    Misc Natural Products (Lutein 20) CAPS, Take 20 capsules by mouth daily Eye health, Disp: , Rfl:     mupirocin (BACTROBAN) 2 % ointment, APPLY TO THE AFFECTED AREA BY TOPICAL ROUTE ONCE DAILY, Disp: , Rfl:     NIFEdipine (PROCARDIA XL) 30 mg 24 hr tablet, Take 1 tablet (30 mg total) by mouth daily, Disp: 90 tablet, Rfl: 1    rosuvastatin (CRESTOR) 5 mg tablet, Take 1 tablet (5 mg total) by mouth daily, Disp: 90 tablet, Rfl: 3    tadalafil (CIALIS) 20 MG tablet, Take 1 tablet (20 mg total) by mouth daily as needed for erectile dysfunction, Disp: 10 tablet, Rfl: 1    traMADol (ULTRAM) 50 mg tablet, every 6 (six) hours (Patient not taking: Reported on 12/11/2023), Disp: , Rfl:     Allergies:  Allergies as of 01/22/2024 - Reviewed 01/22/2024   Allergen Reaction Noted    Sudafed [pseudoephedrine] Other (See Comments) 12/17/2018    Amoxicillin Rash 11/18/2022    Penicillins Rash 01/02/2008       The following portions of the patient's history were reviewed and updated as appropriate: past family history, past surgical history and problem list.    Laboratory Results:  Lab Results   Component Value Date    SODIUM 134 (L) 01/05/2024    K 4.4 01/05/2024    CL 99 01/05/2024    CO2 21 01/05/2024    BUN 19 01/05/2024    CREATININE 1.13 01/05/2024    GLUC 84 01/05/2024    CALCIUM 9.4 01/05/2024    CALCIUM 9.4 01/05/2024        Lab Results   Component Value Date    CALCIUM 9.4 01/05/2024    CALCIUM 9.4 01/05/2024       Portions of the record may have been created with voice recognition software.  Occasional wrong word or \"sound a like\" substitutions may have occurred due to the inherent limitations of voice recognition software.  Read the chart carefully and recognize, using context, where substitutions have occurred.    "

## 2024-01-22 NOTE — PATIENT INSTRUCTIONS
Thank you for coming to your visit today. As we discussed you kidney function is improving, your creatinine is 1.1 mg/dL.  Your sodium is low . Please follow the recommendations below     Avoid nonsteroidal anti-inflammatory drugs such as Naprosyn, ibuprofen, Aleve, Advil, Celebrex, Meloxicam (Mobic) etc.  You can use Tylenol as needed if you do not have any liver condition to be concerned about    Try to avoid medications such as pantoprazole or  Protonix/Nexium or Esomeprazole)/Prilosec or omeprazole/Prevacid or lansoprazole/AcipHex or Rabeprazole.  If you are able to, use Pepcid as this is safer for your kidneys.    Try to exercise at least 30 minutes 3 days a week to begin with with an ultimate goal of 5 days a week for at least 30 minutes    Try to lose 5-10 lb by your next visit      Next Visit in 6 months with results   If you need to see us earlier we can change the appointment for you      Joselyn Reyes Bahamonde, MD  Nephrology Attending

## 2024-01-24 DIAGNOSIS — E11.42 TYPE 2 DIABETES MELLITUS WITH DIABETIC POLYNEUROPATHY, WITHOUT LONG-TERM CURRENT USE OF INSULIN (HCC): ICD-10-CM

## 2024-01-25 ENCOUNTER — TELEPHONE (OUTPATIENT)
Age: 69
End: 2024-01-25

## 2024-01-25 DIAGNOSIS — G89.29 CHRONIC PAIN OF BOTH KNEES: Primary | ICD-10-CM

## 2024-01-25 DIAGNOSIS — M25.562 CHRONIC PAIN OF BOTH KNEES: Primary | ICD-10-CM

## 2024-01-25 DIAGNOSIS — M25.561 CHRONIC PAIN OF BOTH KNEES: Primary | ICD-10-CM

## 2024-01-25 RX ORDER — GABAPENTIN 100 MG/1
CAPSULE ORAL
Qty: 540 CAPSULE | Refills: 1 | Status: SHIPPED | OUTPATIENT
Start: 2024-01-25

## 2024-01-25 NOTE — TELEPHONE ENCOUNTER
Pt called to find out which ortho doctor he is being referred to. I did not see a referral had been placed yet so please, follow up with pt when done. Pt said his appt was cancelled because one was not needed for the referral. Thank you.

## 2024-01-31 LAB
LEFT EYE DIABETIC RETINOPATHY: NORMAL
RIGHT EYE DIABETIC RETINOPATHY: NORMAL

## 2024-02-05 ENCOUNTER — HOSPITAL ENCOUNTER (OUTPATIENT)
Dept: RADIOLOGY | Facility: HOSPITAL | Age: 69
Discharge: HOME | End: 2024-02-05
Attending: STUDENT IN AN ORGANIZED HEALTH CARE EDUCATION/TRAINING PROGRAM
Payer: MEDICARE

## 2024-02-05 ENCOUNTER — OFFICE VISIT (OUTPATIENT)
Dept: OBGYN CLINIC | Facility: HOSPITAL | Age: 69
End: 2024-02-05
Payer: MEDICARE

## 2024-02-05 VITALS
HEART RATE: 100 BPM | DIASTOLIC BLOOD PRESSURE: 71 MMHG | SYSTOLIC BLOOD PRESSURE: 136 MMHG | WEIGHT: 250 LBS | HEIGHT: 75 IN | BODY MASS INDEX: 31.08 KG/M2

## 2024-02-05 DIAGNOSIS — M17.0 BILATERAL PRIMARY OSTEOARTHRITIS OF KNEE: Primary | ICD-10-CM

## 2024-02-05 DIAGNOSIS — M25.561 CHRONIC PAIN OF BOTH KNEES: ICD-10-CM

## 2024-02-05 DIAGNOSIS — M25.562 CHRONIC PAIN OF BOTH KNEES: ICD-10-CM

## 2024-02-05 DIAGNOSIS — G89.29 CHRONIC PAIN OF BOTH KNEES: ICD-10-CM

## 2024-02-05 PROCEDURE — 73564 X-RAY EXAM KNEE 4 OR MORE: CPT

## 2024-02-05 PROCEDURE — 20610 DRAIN/INJ JOINT/BURSA W/O US: CPT | Performed by: STUDENT IN AN ORGANIZED HEALTH CARE EDUCATION/TRAINING PROGRAM

## 2024-02-05 PROCEDURE — 99203 OFFICE O/P NEW LOW 30 MIN: CPT | Performed by: STUDENT IN AN ORGANIZED HEALTH CARE EDUCATION/TRAINING PROGRAM

## 2024-02-05 RX ORDER — LIDOCAINE HYDROCHLORIDE 10 MG/ML
1 INJECTION, SOLUTION EPIDURAL; INFILTRATION; INTRACAUDAL; PERINEURAL
Status: COMPLETED | OUTPATIENT
Start: 2024-02-05 | End: 2024-02-05

## 2024-02-05 RX ORDER — BUPIVACAINE HYDROCHLORIDE 2.5 MG/ML
1 INJECTION, SOLUTION INFILTRATION; PERINEURAL
Status: COMPLETED | OUTPATIENT
Start: 2024-02-05 | End: 2024-02-05

## 2024-02-05 RX ORDER — BETAMETHASONE SODIUM PHOSPHATE AND BETAMETHASONE ACETATE 3; 3 MG/ML; MG/ML
12 INJECTION, SUSPENSION INTRA-ARTICULAR; INTRALESIONAL; INTRAMUSCULAR; SOFT TISSUE
Status: COMPLETED | OUTPATIENT
Start: 2024-02-05 | End: 2024-02-05

## 2024-02-05 RX ADMIN — BUPIVACAINE HYDROCHLORIDE 1 ML: 2.5 INJECTION, SOLUTION INFILTRATION; PERINEURAL at 08:00

## 2024-02-05 RX ADMIN — BETAMETHASONE SODIUM PHOSPHATE AND BETAMETHASONE ACETATE 12 MG: 3; 3 INJECTION, SUSPENSION INTRA-ARTICULAR; INTRALESIONAL; INTRAMUSCULAR; SOFT TISSUE at 08:00

## 2024-02-05 RX ADMIN — LIDOCAINE HYDROCHLORIDE 1 ML: 10 INJECTION, SOLUTION EPIDURAL; INFILTRATION; INTRACAUDAL; PERINEURAL at 08:00

## 2024-02-05 NOTE — PROGRESS NOTES
Date: 24  Marco Richard   MRN# [id number]  : 1955      Chief Complaint: Bilateral knee pain, right greater than left    Assessment and Plan:    Bilateral primary osteoarthritis of knee  Patient has a history, physical examination and radiographic evaluation consistent with moderate bilateral knee osteoarthritis    -WBAT  -Activity modification to limit strain or impact on the joint  -ibuprofen (Motrin) as needed  -Tylenol 1000mg up to three times daily as needed. Do not exceed 3000mg daily  -Supervised physical therapy. Script provided   -Home exercise program directed by PT  -Weight loss discussed   -Knee sleeve or brace for comfort  -Cane or walker recommended to offload joint  -Corticosteroid injection was offered, accepted, and administered.  Risk benefits and alternatives were discussed prior to injection.  Patient tolerated the procedure well.  -Patient may follow up in 3 month(s) for further evaluation and treatment       Subjective:     Knee Pain  Patient complains of bilateral knee pain. This is evaluated as a personal injury. The pain began several months ago. The pain is located medial, anterior.  He describes the symptoms as aching, stabbing, and throbbing. Symptoms improve with rest, avoiding painful activities. The symptoms are worse with activity, deep knee bending, getting up from a chair, weight bearing. The knee has given out or felt unstable. The patient cannot bend and straighten the knee fully. Treatment to date has been ice, heat, Tylenol, without significant relief.    External Records Reviewed: office notes and radiology reports    Allergy:  Allergies   Allergen Reactions    Sudafed [Pseudoephedrine] Other (See Comments)     hyperactivity    Amoxicillin Rash    Penicillins Rash     Category: Allergy;      Medications:  all current active meds have been reviewed  Past Medical History:  Past Medical History:   Diagnosis Date    Allergic rhinitis     Anxiety disorder      Last assessed 2/7/2006     Arthritis     Cancer (HCC)     prostate    Carpal tunnel syndrome     Colon polyp     Diabetes mellitus (HCC)     Diabetes mellitus with neurological manifestation (HCC) 04/06/2012    Last assessed 3/29/2016     Diabetes type 2, uncontrolled     Last assessed 4/6/2016     Disease of thyroid gland     DM II (diabetes mellitus, type II), controlled (HCC)     Last assessed 10/20/2014     Essential hypertension     Last assesssed 2/7/2006     Hyperlipidemia     Hypertension     Insomnia     Last assessed 1/13/2011     Numbness and tingling of foot     Resolved 3/29/2016     Obesity     Old disruption of knee ligament     Last assessed 3/26/2008     Tarsal tunnel syndrome     Urinary frequency     Resolved 3/29/2016     Visual impairment      Past Surgical History:  Past Surgical History:   Procedure Laterality Date    COLONOSCOPY      FOOT SURGERY      AR AMPUTATION TOE INTERPHALANGEAL JOINT Right 11/23/2022    Procedure: AMPUTATION 3RD TOE;  Surgeon: Andrews Marquez DPM;  Location:  MAIN OR;  Service: Podiatry    PROSTATE BIOPSY      WISDOM TOOTH EXTRACTION       Family History:  Family History   Problem Relation Age of Onset    Heart attack Mother 67    Valvular heart disease Mother     Hypotension Mother     Pancreatic cancer Father 75    Diabetes Father     Lung cancer Sister 56        not a smoker    Cancer Brother         bile duct     Colon cancer Maternal Grandmother 62    Diabetes Maternal Grandmother     Pancreatic cancer Paternal Aunt     Pancreatic cancer Paternal Uncle     Hypertension Neg Hx     Prostate cancer Neg Hx      Social History:  Social History     Substance and Sexual Activity   Alcohol Use Yes    Alcohol/week: 9.0 standard drinks of alcohol    Types: 2 Cans of beer, 7 Standard drinks or equivalent per week     Social History     Substance and Sexual Activity   Drug Use Never     Social History     Tobacco Use   Smoking Status Never   Smokeless Tobacco Never        "    ROS:   Review of Systems   All other systems reviewed and are negative.       Objective:   BP Readings from Last 1 Encounters:   02/05/24 136/71      Wt Readings from Last 1 Encounters:   02/05/24 113 kg (250 lb)      Pulse Readings from Last 1 Encounters:   02/05/24 100      BMI: Estimated body mass index is 31.25 kg/m² as calculated from the following:    Height as of this encounter: 6' 3\" (1.905 m).    Weight as of this encounter: 113 kg (250 lb).        Physical Exam  Constitutional:       General: He is not in acute distress.  HENT:      Head: Normocephalic and atraumatic.   Eyes:      Conjunctiva/sclera: Conjunctivae normal.   Cardiovascular:      Comments: Extremities well perfused   No LE edema    Pulmonary:      Effort: Pulmonary effort is normal.   Abdominal:      General: Abdomen is flat. Bowel sounds are normal.   Neurological:      Mental Status: He is alert. Mental status is at baseline.          Gait and Station:   antalgic    Bilateral Lower Extremity:    Inspection:  normal color, temperature, turgor and moisture    Overall limb alignment: varus, partially correctable    Effusion: minimal    ROM 0 to 100 degrees with pain on right, 5 to 100 degrees with pain on left    Extensor Lag: Absent    Palpation: No joint line tenderness to palpation    stable to AP translation at 90 deg    Coronal plane stable in full extension    Coronal plane stable in mid-flexion     Motor: 5/5 EHL/FHL/TA/GS/Qd/Hs    Vascular: Toes WWP with BCR    Sensory: SILT DP/SP/Mike/Saph/Tib    Images:    I personally reviewed relevant images in the PACS system and my interpretation is as follows:  X-rays of the bilateral knee reveal moderate degenerative changes with subchondral sclerosis, joint space narrowing, and osteophyte formation    Large joint arthrocentesis: R knee  Universal Protocol:  Consent: Verbal consent obtained.  Site marked: the operative site was marked  Supporting Documentation  Indications: pain and " diagnostic evaluation   Procedure Details  Location: knee - R knee  Preparation: Patient was prepped and draped in the usual sterile fashion  Needle size: 22 G  Ultrasound guidance: no  Approach: anterolateral  Medications administered: 1 mL bupivacaine 0.25 %; 12 mg betamethasone acetate-betamethasone sodium phosphate 6 (3-3) mg/mL; 1 mL lidocaine (PF) 1 %    Patient tolerance: patient tolerated the procedure well with no immediate complications  Dressing:  Sterile dressing applied      Large joint arthrocentesis: L knee  Universal Protocol:  Consent: Verbal consent obtained.  Site marked: the operative site was marked  Supporting Documentation  Indications: pain and diagnostic evaluation   Procedure Details  Location: knee - L knee  Preparation: Patient was prepped and draped in the usual sterile fashion  Needle size: 22 G  Ultrasound guidance: no  Approach: anterolateral  Medications administered: 1 mL bupivacaine 0.25 %; 12 mg betamethasone acetate-betamethasone sodium phosphate 6 (3-3) mg/mL; 1 mL lidocaine (PF) 1 %    Patient tolerance: patient tolerated the procedure well with no immediate complications  Dressing:  Sterile dressing applied           Tye Rodriguez MD  Adult Reconstruction Specialist   [facility]

## 2024-02-05 NOTE — ASSESSMENT & PLAN NOTE
Patient has a history, physical examination and radiographic evaluation consistent with moderate bilateral knee osteoarthritis    -WBAT  -Activity modification to limit strain or impact on the joint  -ibuprofen (Motrin) as needed  -Tylenol 1000mg up to three times daily as needed. Do not exceed 3000mg daily  -Supervised physical therapy. Script provided   -Home exercise program directed by PT  -Weight loss discussed   -Knee sleeve or brace for comfort  -Cane or walker recommended to offload joint  -Corticosteroid injection was offered, accepted, and administered.  Risk benefits and alternatives were discussed prior to injection.  Patient tolerated the procedure well.  -Patient may follow up in 3 month(s) for further evaluation and treatment

## 2024-02-20 ENCOUNTER — EVALUATION (OUTPATIENT)
Dept: PHYSICAL THERAPY | Facility: CLINIC | Age: 69
End: 2024-02-20
Payer: MEDICARE

## 2024-02-20 DIAGNOSIS — M17.0 BILATERAL PRIMARY OSTEOARTHRITIS OF KNEE: ICD-10-CM

## 2024-02-20 PROCEDURE — 97161 PT EVAL LOW COMPLEX 20 MIN: CPT

## 2024-02-20 NOTE — PROGRESS NOTES
PT Evaluation     Today's date: 2024  Patient name: Marco Richard  : 1955  MRN: [id number]  Referring provider: Tye Rodriguez MD  Dx:   Encounter Diagnosis     ICD-10-CM    1. Bilateral primary osteoarthritis of knee  M17.0 Ambulatory Referral to Physical Therapy          Start Time: 0700  Stop Time: 0745  Total time in clinic (min): 45 minutes    Assessment  Assessment details: Marco Richard is a pleasant 68 y.o. male who presents with B knee pain.  The patient's greatest concerns are worry over not knowing what's wrong, concern at no signs of improvement, fear of not being able to keep active, and future ill health (and wanting to prevent it).      No further referral appears necessary at this time based upon examination results.    Primary movement impairment diagnosis of decreased ROM resulting in pathoanatomical symptoms of decreased flexibility and limiting his ability to don/doff socks, dress independently, drive, exercise or recreation, get off the toilet, get out of a chair, go to work, lift, perform household chores, perform yard work, sit, sleep, squat to  objects from the floor, stand, and walk.    Primary Impairments:  1) decreased LE flexibility  2) decreased hip strength    Etiologic factors include poor lower extremity strength.    Impairments: abnormal coordination, abnormal gait, abnormal muscle firing, abnormal muscle tone, abnormal or restricted ROM, abnormal movement, activity intolerance, impaired balance, impaired physical strength, lacks appropriate home exercise program, pain with function and poor body mechanics    Symptom irritability: moderateUnderstanding of Dx/Px/POC: good   Prognosis: good  Prognosis details: Positive prognostic indicators include good understanding of diagnosis and treatment plan options.  Negative prognostic indicators include chronicity of symptoms, high symptom irritability, minimal changes in pain with movement, and multiple  concurrent orthopedic problems.      Goals  Impairment Goals 4-6 weeks  - Decrease pain to 2/10  - Improve knee AROM to equal to the unaffected lower extremity  - Increase knee strength to 5/5 throughout  - Increase hip strength to 5/5 throughout    Functional Goals 6-8 weeks  - Return to Prior Level of Function  - Patient will be independent with HEP  - Patient will be able to squat without increased pain/compensation/difficulty  - Patient will be able to perform sit to stand without increased pain/compensation/difficulty   - Patient will be able to ascend and descend stairs without increased pain/compensation/difficulty   - Patient will be able to lift >30 pounds with proper mechanics       Plan  Patient would benefit from: skilled physical therapy  Planned modality interventions: Modalities PRN.  Planned therapy interventions: activity modification, manual therapy, neuromuscular re-education, patient education, therapeutic activities, therapeutic exercise, graded activity, home exercise program, behavior modification, self care, joint mobilization, IASTM, body mechanics training, flexibility and functional ROM exercises  Frequency: 2x week  Duration in weeks: 8  Treatment plan discussed with: patient        Subjective Evaluation    History of Present Illness  Mechanism of injury: WORK/SCHOOL: Retired,   HOME LIFE: Lives with others, 13 steps (landing within)  HOBBIES/EXERCISE: living history organization, senior [place] [facility], walking  PLOF:  Physical therapy in past for L knee,   HISTORY OF CURRENT INJURY:  He now has pain in B knees.  Had a cortisone shot about 2 weeks ago in B knees and felt great.  The injections helped for a while and the pain has began.  His discomfort began again about 1 week after the injection. He reports it as stiffness rather than pain.   PAIN LOCATION/DESCRIPTORS: Anterior knee, B  AGGRAVATING FACTORS:  exercises, in and out of car (L), steps (must use railing), sit to  stand, 10 min standing before feeling wobbly,   EASES: rest,   DAY PATTERN: no day pattern  IMAGING:  x-ray CARMITA Lara denies a new onset of Bladder incontinence, Bowel dysfunction, Dizziness, Dysphagia, Dysarthria, Drop attacks, Diplopia, Nausea, Ataxia, Recent unexplained weight loss, Clumsy or unsteadiness, Constant night pain, History of cancer, Tingling, Numbness, and Saddle anesthesia .  PATIENT GOALS: steady on feet, more flexible,     Patient Goals  Patient goals for therapy: increased motion, return to sport/leisure activities, independence with ADLs/IADLs, increased strength, improved balance, decreased pain and decreased edema    Pain  Current pain rating: 3  At best pain ratin  At worst pain ratin  Quality: dull ache, discomfort, sharp, knife-like and tight  Relieving factors: relaxation and rest  Aggravating factors: stair climbing, walking, standing, sitting, running and lifting          Objective     Palpation     Additional Palpation Details  Tightness noted through B quad, HS, calf B    Neurological Testing     Additional Neurological Details  No new neurological changes to note.      Active Range of Motion   Left Knee   Flexion: 106 degrees   Extension: 1 degrees     Right Knee   Flexion: 104 degrees with pain  Extension: 2 degrees     Passive Range of Motion     Additional Passive Range of Motion Details  Equal to AROM    Mobility     Additional Mobility Details  Hypomobility noted through all motions B of patella    Strength/Myotome Testing     Left Knee   Flexion: 4+  Extension: 4+    Right Knee   Flexion: 4+  Extension: 4+    Additional Strength Details  Hip Strength    Flex:  R  4-/5, L  4-/5   AB:   R  3+/5, L  3+/5   Ext:   R  3+/5, L  3+/5       General Comments:      Knee Comments  Decreased flexibility noted through B HS, B quad, B gastroc              POC Expires Auth Status Start Date Expiration Date PT Visit Limit    3/20 N/A 3/20 N/A As needed   Date        Used 1        Remaining          Diagnosis: B knee pain   Precautions:    Primary Goals: flexibility in LE musculature, hip strenth   *asterisks by exercise = given for HEP   Manuals 2/20- IE                                               There Ex        Heel slides        HS stretch        Calf stretch        Richard stretch                                        Bike        Neuro Re-Ed        Quad sets        Glute sets        SLR        Clamshell        bridges        HS curl                                                         Re-evaluation              Ther Act              Education                           Modalities

## 2024-02-21 DIAGNOSIS — E78.5 HYPERLIPIDEMIA LDL GOAL <70: ICD-10-CM

## 2024-02-21 RX ORDER — ROSUVASTATIN CALCIUM 5 MG/1
5 TABLET, COATED ORAL DAILY
Qty: 90 TABLET | Refills: 1 | Status: SHIPPED | OUTPATIENT
Start: 2024-02-21

## 2024-02-23 ENCOUNTER — OFFICE VISIT (OUTPATIENT)
Dept: PHYSICAL THERAPY | Facility: CLINIC | Age: 69
End: 2024-02-23
Payer: MEDICARE

## 2024-02-23 DIAGNOSIS — M17.0 BILATERAL PRIMARY OSTEOARTHRITIS OF KNEE: Primary | ICD-10-CM

## 2024-02-23 PROCEDURE — 97110 THERAPEUTIC EXERCISES: CPT

## 2024-02-23 PROCEDURE — 97140 MANUAL THERAPY 1/> REGIONS: CPT

## 2024-02-23 PROCEDURE — 97112 NEUROMUSCULAR REEDUCATION: CPT

## 2024-02-23 NOTE — PROGRESS NOTES
"Daily Note     Today's date: 2024  Patient name: Marco Richard  : 1955  MRN: [id number]  Referring provider: Tye Rodriguez MD  Dx:   Encounter Diagnosis     ICD-10-CM    1. Bilateral primary osteoarthritis of knee  M17.0           Start Time: 0700  Stop Time: 0745  Total time in clinic (min): 45 minutes    Subjective: More pain in the R side today as compared to the L.        Objective: See treatment diary below      Assessment: Tolerated treatment well. Trailed bike today with R knee pain preventing full revolution.  Heel slides were tolerated well and added manual interventions. Continued with stretching activities for calf and HS.  Added nerve glides to allow increased HS length.   Patient would benefit from continued PT      Plan: Continue per plan of care.       POC Expires Auth Status Start Date Expiration Date PT Visit Limit    3/20 N/A 320 N/A As needed   Date       Used 1 2      Remaining          Diagnosis: B knee pain   Precautions:    Primary Goals: flexibility in LE musculature, hip strenth   *asterisks by exercise = given for HEP   Manuals - IE                                               There Ex        Heel slides 1x10       HS stretch 10x10\"       Calf stretch 10x10\"       Richard stretch                                        Bike Trialed, painful today       Neuro Re-Ed        Quad sets        Glute sets        SLR 1x10 B       Clamshell        bridges        HS curl        Sciaatic nerve glies 1x10 B EOT                                                Re-evaluation              Ther Act              Education                           Modalities                                                                                          "

## 2024-02-28 ENCOUNTER — OFFICE VISIT (OUTPATIENT)
Dept: PHYSICAL THERAPY | Facility: CLINIC | Age: 69
End: 2024-02-28
Payer: MEDICARE

## 2024-02-28 DIAGNOSIS — M17.0 BILATERAL PRIMARY OSTEOARTHRITIS OF KNEE: Primary | ICD-10-CM

## 2024-02-28 PROCEDURE — 97110 THERAPEUTIC EXERCISES: CPT

## 2024-02-28 PROCEDURE — 97112 NEUROMUSCULAR REEDUCATION: CPT

## 2024-02-28 NOTE — PROGRESS NOTES
"Daily Note     Today's date: 2024  Patient name: Marco Richard  : 1955  MRN: [id number]  Referring provider: Tye Rodriguez MD  Dx:   Encounter Diagnosis     ICD-10-CM    1. Bilateral primary osteoarthritis of knee  M17.0           Start Time: 0800  Stop Time: 0845  Total time in clinic (min): 45 minutes    Subjective: Pt reports that he feels as though the stretches have been helping.       Objective: See treatment diary below      Assessment: Tolerated treatment well. Treatment today began with warm up on upright bike with good tolerance. Moved onto stretching for LE musculature. Pt required cuing to ensure proper technique during stretching to ensure proper positioning.  Added mini squats today with good tolerance. Patient would benefit from continued PT      Plan: Continue per plan of care.       POC Expires Auth Status Start Date Expiration Date PT Visit Limit    3/20 N/A 3/20 N/A As needed   Date      Used 1 2 3     Remaining          Diagnosis: B knee pain   Precautions:    Primary Goals: flexibility in LE musculature, hip strenth   *asterisks by exercise = given for HEP   Manuals                                               There Ex        Heel slides 1x10       HS stretch 10x10\" 10x10\"      Calf stretch 10x10\" 10x10\"      Richard stretch  10x10\"                                      Bike Trialed, painful today 5' upright bike      Neuro Re-Ed        Quad sets        Glute sets        SLR 1x10 B       Clamshell        bridges        HS curl        Sciaatic nerve glies 1x10 B EOT       Mini squats  1x10                                       Re-evaluation              Ther Act              Education                           Modalities                                                                                            "

## 2024-03-01 ENCOUNTER — OFFICE VISIT (OUTPATIENT)
Dept: PHYSICAL THERAPY | Facility: CLINIC | Age: 69
End: 2024-03-01
Payer: MEDICARE

## 2024-03-01 DIAGNOSIS — M17.0 BILATERAL PRIMARY OSTEOARTHRITIS OF KNEE: Primary | ICD-10-CM

## 2024-03-01 PROCEDURE — 97112 NEUROMUSCULAR REEDUCATION: CPT

## 2024-03-01 PROCEDURE — 97110 THERAPEUTIC EXERCISES: CPT

## 2024-03-01 NOTE — PROGRESS NOTES
"Daily Note     Today's date: 3/1/2024  Patient name: Marco Richard  : 1955  MRN: [id number]  Referring provider: Tye Rodriguez MD  Dx:   Encounter Diagnosis     ICD-10-CM    1. Bilateral primary osteoarthritis of knee  M17.0           Start Time: 0700  Stop Time: 0745  Total time in clinic (min): 45 minutes    Subjective: Pt reports that he felt good after last treatment session.  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        Objective: See treatment diary below      Assessment: Tolerated treatment well. Treatment today began with bike to decrease stiffness and improve blood flow.  Moved into strengthening with noted decrease in stiffness afterward.  Was able to move into strengthening activities as well today with good tolerance.   Patient would benefit from continued PT      Plan: Continue per plan of care.       POC Expires Auth Status Start Date Expiration Date PT Visit Limit    320 N/A 320 N/A As needed   Date 2/20 2/23 2/28 3/1    Used 1 2 3 4    Remaining          Diagnosis: B knee pain   Precautions:    Primary Goals: flexibility in LE musculature, hip strenth   *asterisks by exercise = given for HEP   Manuals 2/23 2/28 3/1                                             There Ex        Heel slides 1x10       HS stretch 10x10\" 10x10\" 10x10\"     Calf stretch 10x10\" 10x10\" 10x10\"     Richard stretch  10x10\" 10x10\"                                     Bike Trialed, painful today 5' upright bike 5' upright bike     Neuro Re-Ed        Quad sets        Glute sets        SLR 1x10 B  2x10 B     Clamshell        bridges        HS curl        Sciaatic nerve glies 1x10 B EOT       Mini squats  1x10      LAQ   2x10x5\" B                               Re-evaluation              Ther Act              Education                           Modalities                                                                                              "

## 2024-03-05 ENCOUNTER — OFFICE VISIT (OUTPATIENT)
Dept: PHYSICAL THERAPY | Facility: CLINIC | Age: 69
End: 2024-03-05
Payer: MEDICARE

## 2024-03-05 DIAGNOSIS — M17.0 BILATERAL PRIMARY OSTEOARTHRITIS OF KNEE: Primary | ICD-10-CM

## 2024-03-05 PROCEDURE — 97110 THERAPEUTIC EXERCISES: CPT

## 2024-03-05 PROCEDURE — 97112 NEUROMUSCULAR REEDUCATION: CPT

## 2024-03-05 NOTE — PROGRESS NOTES
"Daily Note     Today's date: 3/5/2024  Patient name: Marco Richard  : 1955  MRN: [id number]  Referring provider: Tye Rodriguez MD  Dx:   Encounter Diagnosis     ICD-10-CM    1. Bilateral primary osteoarthritis of knee  M17.0           Start Time: 0700  Stop Time: 0745  Total time in clinic (min): 45 minutes    Subjective: Pt reports that he is finding that his R knee feels tighter than his L.        Objective: See treatment diary below      Assessment: Tolerated treatment well. Began today with bike to warm up and decrease stiffness within B knees.  Moved onto stretching to LE musculature with good tolerance as well. Added strengthening for glutes and HS today with good tolerance.   Patient would benefit from continued PT      Plan: Continue per plan of care.       POC Expires Auth Status Start Date Expiration Date PT Visit Limit    3/20 N/A 3/20 N/A As needed   Date 2/20 2/23 2/28 3/1 3/5   Used 1 2 3 4 5   Remaining          Diagnosis: B knee pain   Precautions:    Primary Goals: flexibility in LE musculature, hip strenth   *asterisks by exercise = given for HEP   Manuals 2/23 2/28 3/1 3/5                                            There Ex        Heel slides 1x10       HS stretch 10x10\" 10x10\" 10x10\" 10x10\"    Calf stretch 10x10\" 10x10\" 10x10\" 10x10\"    Richard stretch  10x10\" 10x10\" 10x10\"                                    Bike Trialed, painful today 5' upright bike 5' upright bike 5' upright bike    Neuro Re-Ed        Quad sets        Glute sets        SLR 1x10 B  2x10 B     Clamshell        bridges        HS curl        Sciaatic nerve glies 1x10 B EOT       Mini squats  1x10      LAQ   2x10x5\" B      Standing 3 way    2x10 GTB B                     Re-evaluation              Ther Act              Education                           Modalities                                                                                                "

## 2024-03-07 ENCOUNTER — OFFICE VISIT (OUTPATIENT)
Dept: PHYSICAL THERAPY | Facility: CLINIC | Age: 69
End: 2024-03-07
Payer: MEDICARE

## 2024-03-07 DIAGNOSIS — M17.0 BILATERAL PRIMARY OSTEOARTHRITIS OF KNEE: Primary | ICD-10-CM

## 2024-03-07 PROCEDURE — 97110 THERAPEUTIC EXERCISES: CPT

## 2024-03-07 PROCEDURE — 97112 NEUROMUSCULAR REEDUCATION: CPT

## 2024-03-07 NOTE — PROGRESS NOTES
"Daily Note     Today's date: 3/7/2024  Patient name: aMrco Richard  : 1955  MRN: [id number]  Referring provider: Tye Rodriguez MD  Dx:   Encounter Diagnosis     ICD-10-CM    1. Bilateral primary osteoarthritis of knee  M17.0           Start Time: 0700  Stop Time: 0745  Total time in clinic (min): 45 minutes    Subjective: PT reports his pain has decreased significantly.  He reports that he still continues with tightness and weakness.       Objective: See treatment diary below      Assessment: Tolerated treatment well. Patient began with bike to decrease stiffness.  Moved into stretching activities as well as nerve glides with continued good tolerance.  Pt required some Vcs to ensure proper positioning during richard stretch.  Added leg press today as well as x walks all tolerated well.   would benefit from continued PT      Plan: Continue per plan of care.       POC Expires Auth Status Start Date Expiration Date PT Visit Limit    3/20 N/A 3/20 N/A As needed   Date  2/23 2/28 3/1 3/5   Used  2 3 4 5   Remaining          Diagnosis: B knee pain   Precautions:    Primary Goals: flexibility in LE musculature, hip strenth   *asterisks by exercise = given for HEP   Manuals 2/23 2/28 3/1 3/5 3/7                                           There Ex        Heel slides 1x10       HS stretch 10x10\" 10x10\" 10x10\" 10x10\" 10x10\"   Calf stretch 10x10\" 10x10\" 10x10\" 10x10\" 10x10\"   Richard stretch  10x10\" 10x10\" 10x10\" 10x10\"                                   Bike Trialed, painful today 5' upright bike 5' upright bike 5' upright bike 5' elliptical    Neuro Re-Ed        Quad sets        Glute sets        SLR 1x10 B  2x10 B     Clamshell        bridges        HS curl        Sciaatic nerve glies 1x10 B EOT       Mini squats  1x10      LAQ   2x10x5\" B      Standing 3 way    2x10 GTB B    Leg press     2x10 100#   X walks     2 laps RTB                    Re-evaluation              Ther Act              Education           "                 Modalities

## 2024-03-08 ENCOUNTER — TELEPHONE (OUTPATIENT)
Age: 69
End: 2024-03-08

## 2024-03-08 NOTE — TELEPHONE ENCOUNTER
The patient called again and reported that he went to the pharmacy to differentiate the medications and was informed that he probably had the lables exchanged in the containers. The call was transferred to the office for more information.

## 2024-03-08 NOTE — TELEPHONE ENCOUNTER
Pt was filling his pill case and when he was filling his glipizide he came across a few pills that have a different imprint on the pill.  All of the glipizide have a G5 on it but a few of them have a B on the pill.  They are both the same size and color.  I asked patient if it was aspirin, as noted in his chart and stated it was not.  Pt was instructed to take them back to the pharmacy for identification.

## 2024-03-11 LAB
ALBUMIN/CREAT UR: 71 MCG/MG CREAT
BUN SERPL-MCNC: 31 MG/DL (ref 7–25)
BUN/CREAT SERPL: 27 (CALC) (ref 6–22)
CALCIUM SERPL-MCNC: 9.4 MG/DL (ref 8.6–10.3)
CHLORIDE SERPL-SCNC: 101 MMOL/L (ref 98–110)
CO2 SERPL-SCNC: 23 MMOL/L (ref 20–32)
CREAT SERPL-MCNC: 1.13 MG/DL (ref 0.7–1.35)
CREAT UR-MCNC: 35 MG/DL (ref 20–320)
EST. AVERAGE GLUCOSE BLD GHB EST-MCNC: 126 MG/DL
EST. AVERAGE GLUCOSE BLD GHB EST-SCNC: 7 MMOL/L
GFR/BSA.PRED SERPLBLD CYS-BASED-ARV: 72 ML/MIN/1.73M2
GLUCOSE SERPL-MCNC: 110 MG/DL (ref 65–99)
HBA1C MFR BLD: 6 % OF TOTAL HGB
MICROALBUMIN UR-MCNC: 2.5 MG/DL
POTASSIUM SERPL-SCNC: 4.5 MMOL/L (ref 3.5–5.3)
SODIUM SERPL-SCNC: 136 MMOL/L (ref 135–146)

## 2024-03-12 ENCOUNTER — OFFICE VISIT (OUTPATIENT)
Dept: PHYSICAL THERAPY | Facility: CLINIC | Age: 69
End: 2024-03-12
Payer: MEDICARE

## 2024-03-12 ENCOUNTER — RA CDI HCC (OUTPATIENT)
Dept: OTHER | Facility: HOSPITAL | Age: 69
End: 2024-03-12

## 2024-03-12 DIAGNOSIS — M17.0 BILATERAL PRIMARY OSTEOARTHRITIS OF KNEE: Primary | ICD-10-CM

## 2024-03-12 PROCEDURE — 97110 THERAPEUTIC EXERCISES: CPT

## 2024-03-12 PROCEDURE — 97112 NEUROMUSCULAR REEDUCATION: CPT

## 2024-03-12 NOTE — PROGRESS NOTES
HCC coding opportunities          Chart Reviewed number of suggestions sent to Provider: 2  E11.51  E11.22     Patients Insurance     Medicare Insurance: Medicare

## 2024-03-12 NOTE — PROGRESS NOTES
"Daily Note     Today's date: 3/12/2024  Patient name: Marco Richard  : 1955  MRN: [id number]  Referring provider: Tye Rodriguez MD  Dx:   Encounter Diagnosis     ICD-10-CM    1. Bilateral primary osteoarthritis of knee  M17.0           Start Time: 0700  Stop Time: 0745  Total time in clinic (min): 45 minutes    Subjective: Pt reports he continues with decreased pain, and feels as though he continues to get stronger.        Objective: See treatment diary below      Assessment: Tolerated treatment well. Began today with warm up on bike to improve blood flow and stiffness.  Followed up with stretching for the LE with good tolerance. Continued with strengthening activities to pt's tolerance.  Pt presents with challenge isolating glute vs HS with hip extension and HS curls.   Patient would benefit from continued PT      Plan: Continue per plan of care.       POC Expires Auth Status Start Date Expiration Date PT Visit Limit    3/20 N/A 3/20 N/A As needed   Date 3/7 3/12  3/1 3/5   Used 6   4 5   Remaining          Diagnosis: B knee pain   Precautions:    Primary Goals: flexibility in LE musculature, hip strenth   *asterisks by exercise = given for HEP   Manuals 3/12  3/1 3/5 3/7                                           There Ex        Heel slides        HS stretch 10x10\"  10x10\" 10x10\" 10x10\"   Calf stretch 10x10\"  10x10\" 10x10\" 10x10\"   Richard stretch 10x10\"  10x10\" 10x10\" 10x10\"                                   Bike 5'  5' upright bike 5' upright bike 5' elliptical    Neuro Re-Ed        Quad sets        Glute sets        SLR   2x10 B     Clamshell        bridges        HS curl        Sciaatic nerve glies        Mini squats        LAQ   2x10x5\" B      Standing 3 way    2x10 GTB B    Leg press     2x10 100#   X walks     2 laps RTB                    Re-evaluation            Ther Act            Education                       Modalities                                                                     "

## 2024-03-14 ENCOUNTER — OFFICE VISIT (OUTPATIENT)
Dept: PHYSICAL THERAPY | Facility: CLINIC | Age: 69
End: 2024-03-14
Payer: MEDICARE

## 2024-03-14 DIAGNOSIS — M17.0 BILATERAL PRIMARY OSTEOARTHRITIS OF KNEE: Primary | ICD-10-CM

## 2024-03-14 PROCEDURE — 97112 NEUROMUSCULAR REEDUCATION: CPT

## 2024-03-14 PROCEDURE — 97110 THERAPEUTIC EXERCISES: CPT

## 2024-03-14 NOTE — PROGRESS NOTES
"Daily Note     Today's date: 3/14/2024  Patient name: Marco Richard  : 1955  MRN: [id number]  Referring provider: Tye Rodriguez MD  Dx:   Encounter Diagnosis     ICD-10-CM    1. Bilateral primary osteoarthritis of knee  M17.0           Start Time: 0700  Stop Time: 0745  Total time in clinic (min): 45 minutes    Subjective: Pt reports that his R side has been tighter than his L.        Objective: See treatment diary below      Assessment: Tolerated treatment well. Treatment today began with warm up on bike to decrease stiffness.  Followed up with stretching as well as nerve glides to continue to promote improved motion.  Continued to strengthening activities with good tolerance.  Patient would benefit from continued PT      Plan: Continue per plan of care.       POC Expires Auth Status Start Date Expiration Date PT Visit Limit    3/20 N/A 3/20 N/A As needed   Date 3/7 3/12 3/14 3/1 3/5   Used 6 7 8 4 5   Remaining          Diagnosis: B knee pain   Precautions:    Primary Goals: flexibility in LE musculature, hip strenth   *asterisks by exercise = given for HEP   Manuals 3/12 3/14 3/1 3/5 3/7                                           There Ex        Heel slides        HS stretch 10x10\" 10x10\" 10x10\" 10x10\" 10x10\"   Calf stretch 10x10\" 10x10\" 10x10\" 10x10\" 10x10\"   Richard stretch 10x10\" 10x10\" 10x10\" 10x10\" 10x10\"                                   Bike 5' 5' 5' upright bike 5' upright bike 5' elliptical    Neuro Re-Ed        Quad sets        Glute sets        SLR   2x10 B     Clamshell        bridges        HS curl        Sciaatic nerve glies        Mini squats        LAQ   2x10x5\" B      Standing 3 way    2x10 GTB B    Leg press     2x10 100#   X walks     2 laps RTB                    Re-evaluation            Ther Act            Education                       Modalities                                                                                                "

## 2024-03-19 ENCOUNTER — OFFICE VISIT (OUTPATIENT)
Dept: PHYSICAL THERAPY | Facility: CLINIC | Age: 69
End: 2024-03-19
Payer: MEDICARE

## 2024-03-19 ENCOUNTER — OFFICE VISIT (OUTPATIENT)
Dept: FAMILY MEDICINE CLINIC | Facility: CLINIC | Age: 69
End: 2024-03-19
Payer: MEDICARE

## 2024-03-19 VITALS
WEIGHT: 254 LBS | DIASTOLIC BLOOD PRESSURE: 76 MMHG | BODY MASS INDEX: 31.58 KG/M2 | HEART RATE: 107 BPM | OXYGEN SATURATION: 97 % | HEIGHT: 75 IN | SYSTOLIC BLOOD PRESSURE: 124 MMHG | RESPIRATION RATE: 16 BRPM

## 2024-03-19 DIAGNOSIS — Z28.21 PNEUMOCOCCAL VACCINATION DECLINED BY PATIENT: ICD-10-CM

## 2024-03-19 DIAGNOSIS — E78.5 HYPERLIPIDEMIA LDL GOAL <70: ICD-10-CM

## 2024-03-19 DIAGNOSIS — D12.6 HIGH GRADE DYSPLASIA IN COLONIC ADENOMA: Primary | ICD-10-CM

## 2024-03-19 DIAGNOSIS — G89.29 CHRONIC PAIN OF BOTH KNEES: ICD-10-CM

## 2024-03-19 DIAGNOSIS — E11.42 TYPE 2 DIABETES MELLITUS WITH DIABETIC POLYNEUROPATHY, WITHOUT LONG-TERM CURRENT USE OF INSULIN (HCC): ICD-10-CM

## 2024-03-19 DIAGNOSIS — L97.511 SKIN ULCER OF TOE OF RIGHT FOOT, LIMITED TO BREAKDOWN OF SKIN (HCC): ICD-10-CM

## 2024-03-19 DIAGNOSIS — M25.562 CHRONIC PAIN OF BOTH KNEES: ICD-10-CM

## 2024-03-19 DIAGNOSIS — E03.9 HYPOTHYROIDISM, UNSPECIFIED TYPE: ICD-10-CM

## 2024-03-19 DIAGNOSIS — N18.31 STAGE 3A CHRONIC KIDNEY DISEASE (HCC): ICD-10-CM

## 2024-03-19 DIAGNOSIS — I10 PRIMARY HYPERTENSION: ICD-10-CM

## 2024-03-19 DIAGNOSIS — M17.0 BILATERAL PRIMARY OSTEOARTHRITIS OF KNEE: Primary | ICD-10-CM

## 2024-03-19 DIAGNOSIS — C61 PROSTATE CANCER (HCC): ICD-10-CM

## 2024-03-19 DIAGNOSIS — M86.171 OTHER ACUTE OSTEOMYELITIS, RIGHT ANKLE AND FOOT (HCC): ICD-10-CM

## 2024-03-19 DIAGNOSIS — Z28.21 TETANUS, DIPHTHERIA, AND ACELLULAR PERTUSSIS (TDAP) VACCINATION DECLINED: ICD-10-CM

## 2024-03-19 DIAGNOSIS — M25.561 CHRONIC PAIN OF BOTH KNEES: ICD-10-CM

## 2024-03-19 PROCEDURE — 99214 OFFICE O/P EST MOD 30 MIN: CPT | Performed by: FAMILY MEDICINE

## 2024-03-19 PROCEDURE — 97110 THERAPEUTIC EXERCISES: CPT

## 2024-03-19 PROCEDURE — 97112 NEUROMUSCULAR REEDUCATION: CPT

## 2024-03-19 PROCEDURE — G2211 COMPLEX E/M VISIT ADD ON: HCPCS | Performed by: FAMILY MEDICINE

## 2024-03-19 RX ORDER — LISINOPRIL 20 MG/1
20 TABLET ORAL DAILY
Qty: 90 TABLET | Refills: 0 | Status: SHIPPED | OUTPATIENT
Start: 2024-03-19

## 2024-03-19 RX ORDER — GLIPIZIDE 5 MG/1
5 TABLET, FILM COATED, EXTENDED RELEASE ORAL DAILY
Qty: 90 TABLET | Refills: 0 | Status: SHIPPED | OUTPATIENT
Start: 2024-03-19 | End: 2024-06-17

## 2024-03-19 RX ORDER — ROSUVASTATIN CALCIUM 5 MG/1
5 TABLET, COATED ORAL DAILY
Qty: 90 TABLET | Refills: 0 | Status: SHIPPED | OUTPATIENT
Start: 2024-03-19

## 2024-03-19 NOTE — PROGRESS NOTES
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  Instead of repeating his colonoscopy in 3 years this was changed to 4 months.\"   - referral given for GI as Dr Puri has since then retired     Chronic pain of both knees  - s/p 1st CSI in b/l knees as per Ortho - has been doing PT     Type 2 diabetes mellitus with diabetic polyneuropathy, without long-term current use of insulin (HCC)  -     Cancel: Diabetic foot exam; Future  -     Empagliflozin 25 MG TABS; Take 1 tablet (25 mg total) by mouth daily  -     glipiZIDE (GLUCOTROL XL) 5 mg 24 hr tablet; Take 1 tablet (5 mg total) by mouth daily  -     Hemoglobin A1C; Future  -     Albumin / creatinine urine ratio; Future  - A1c = 6.0 <-- 6.6  - cont Jardiance 25mg QD and Glipizide 5mg QD   - declined PCV20 and Tdap in the office today  - follows with Podiatry Q9wks   - diet/exercise - caution with carbs   - RTO in 3months, with labs, for f/u - pt aware and agreeable   Pneumococcal vaccination declined by patient  Tetanus, diphtheria, and acellular pertussis (Tdap) vaccination declined    Prostate cancer (HCC)  - management as per Uro     Primary hypertension  -     lisinopril (ZESTRIL) 20 mg tablet; Take 1 tablet (20 mg total) by mouth daily  -     Basic metabolic panel; Future  - BP stable in office   - did see Nephro 12/11/2023 - was advised to decrease ACEI from 40mg QD to 20mg QD, STOP chlorthalidone, and started on Nifedipine 30mg QD   - nml kidneys on renal US   - cont with hydration   - caution/avoidance of NSAIDs  - RTO in 3months, with labs, for f/u - pt aware and agreeable   Stage 3a chronic kidney disease " "(HCC)    Hypothyroidism, unspecified type  -     TSH, 3rd generation with Free T4 reflex; Future    Hyperlipidemia LDL goal <70  -     rosuvastatin (CRESTOR) 5 mg tablet; Take 1 tablet (5 mg total) by mouth daily  -     Lipid panel; Future    Skin ulcer of toe of right foot, limited to breakdown of skin (HCC)  Other acute osteomyelitis, right ankle and foot (HCC)          Subjective:    Patient ID: Marco Richard is a 68 y.o. male.  HPI  - s/p 1st CSI in b/l knees - has been doing PT   - last Ccsope was 9/6/2023 - per Dr Puri - \"I personally informed patient via phone call regarding the focus of 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.  Instead of repeating his colonoscopy in 3 years this was changed to 4 months.\"   - there was a mix up at Pharmacy and pt was given antipsychotics in place of glipizide -- pt noted that his pills looked different (\"B\" vs \"G5\" on small round white pill)   - reviewed labs done 3/11/2024   - follows with Podiatry Q9wks         The following portions of the patient's history were reviewed and updated as appropriate: allergies, current medications, past family history, past medical history, past social history, past surgical history and problem list.    Review of Systems  as per HPI    Objective:  /76 (BP Location: Left arm, Patient Position: Sitting, Cuff Size: Large)   Pulse (!) 107   Resp 16   Ht 6' 3\" (1.905 m)   Wt 115 kg (254 lb)   SpO2 97%   BMI 31.75 kg/m²    Physical Exam  Vitals reviewed.   Constitutional:       General: He is not in acute distress.     Appearance: Normal appearance. He is not ill-appearing, toxic-appearing or diaphoretic.   HENT:      Head: Normocephalic and atraumatic.      Right Ear: External ear normal.      Left Ear: External ear normal.      Nose: Nose normal.   Eyes:      General: No scleral icterus.        Right eye: No discharge.         Left eye: No discharge.      Extraocular Movements: Extraocular movements intact.      " Conjunctiva/sclera: Conjunctivae normal.   Cardiovascular:      Rate and Rhythm: Normal rate and regular rhythm.      Heart sounds: Normal heart sounds.   Pulmonary:      Effort: Pulmonary effort is normal. No respiratory distress.      Breath sounds: Normal breath sounds. No stridor. No wheezing, rhonchi or rales.   Abdominal:      Palpations: Abdomen is soft.   Musculoskeletal:         General: Normal range of motion.      Cervical back: Normal range of motion.      Right lower leg: No edema.      Left lower leg: No edema.   Skin:     General: Skin is warm.   Neurological:      General: No focal deficit present.      Mental Status: He is alert and oriented to person, place, and time.   Psychiatric:         Mood and Affect: Mood normal.         Behavior: Behavior normal.         BMI Counseling: Body mass index is 31.75 kg/m². The BMI is above normal. Nutrition recommendations include 3-5 servings of fruits/vegetables daily. Exercise recommendations include exercising 3-5 times per week.

## 2024-03-19 NOTE — PROGRESS NOTES
"Daily Note     Today's date: 3/19/2024  Patient name: Marco Richard  : 1955  MRN: [id number]  Referring provider: Tye Rodriguez MD  Dx:   Encounter Diagnosis     ICD-10-CM    1. Bilateral primary osteoarthritis of knee  M17.0           Start Time: 0700  Stop Time: 0745  Total time in clinic (min): 45 minutes    Subjective: Pt reports that he has been feeling okay in the knees, just some discomfort noted.        Objective: See treatment diary below      Assessment: Tolerated treatment well. Began today with warm up on bike to improve stiffness in the knees.  Continued with stretching activities for the LE with good tolerance.  Continued to strengthening activities for the LE with good tolerance to squats.  Added balance activities as well today on wobble board. Pt had trouble maintaining balance, though had good challenge noted.   Patient would benefit from continued PT      Plan: Continue per plan of care.       POC Expires Auth Status Start Date Expiration Date PT Visit Limit    3/20 N/A 3/20 N/A As needed   Date 3/7 3/12 3/14 3/19 RE-EVAL**   Used 6 7 8 9    Remaining          Diagnosis: B knee pain   Precautions:    Primary Goals: flexibility in LE musculature, hip strenth   *asterisks by exercise = given for HEP   Manuals 3/12 3/14 3/19  3/7                                           There Ex        Heel slides        HS stretch 10x10\" 10x10\" 10x10\"  10x10\"   Calf stretch 10x10\" 10x10\" 10x10\"  10x10\"   Richard stretch 10x10\" 10x10\" 10x10\"  10x10\"                                   Bike 5' 5' 5'  5' elliptical    Neuro Re-Ed        Quad sets        Glute sets        SLR        Clamshell        bridges        HS curl        Sciaatic nerve glies        Mini squats   1x10 TRX     LAQ        Standing 3 way        Leg press     2x10 100#   X walks     2 laps RTB   Wobble board   x20              Re-evaluation    **      Ther Act          Education                   Modalities                              "

## 2024-03-20 ENCOUNTER — TELEPHONE (OUTPATIENT)
Age: 69
End: 2024-03-20

## 2024-03-20 NOTE — TELEPHONE ENCOUNTER
Pt was calling in stating that he wasn't able to get in until August for Gastroenterology. Pt is asking if there is anyway Dr. Vee can try to get him in sooner. Pt stated he's been trying to get in since the begin of February but needed the referral and now has to wait until August. Please advise with the pt if needed thank you.

## 2024-03-21 ENCOUNTER — EVALUATION (OUTPATIENT)
Dept: PHYSICAL THERAPY | Facility: CLINIC | Age: 69
End: 2024-03-21
Payer: MEDICARE

## 2024-03-21 DIAGNOSIS — M17.0 BILATERAL PRIMARY OSTEOARTHRITIS OF KNEE: Primary | ICD-10-CM

## 2024-03-21 PROCEDURE — 97112 NEUROMUSCULAR REEDUCATION: CPT

## 2024-03-21 PROCEDURE — 97530 THERAPEUTIC ACTIVITIES: CPT

## 2024-03-21 NOTE — PROGRESS NOTES
PT Re-Evaluation     Today's date: 3/21/2024  Patient name: Marco Richard  : 1955  MRN: [id number]  Referring provider: Tye Rodriguez MD  Dx:   Encounter Diagnosis     ICD-10-CM    1. Bilateral primary osteoarthritis of knee  M17.0           Start Time: 0700  Stop Time: 0745  Total time in clinic (min): 45 minutes    Assessment  Assessment details: Marco Richard has been compliant with attending PT and home exercise program since initial eval.  Marco  has made improvements in objective data since initial eval but continues to have limitations compared to prior level of function. Pt presents with improving strength in hip and knee musculature.  He also presents with improving RO in B knees.  His patellar mobility has improved medially and laterally, though continues with some hypomobility superior and inferiorly.  He has improved in his ability to transition from seated to standing, though continues with some pain and discomfort.  He continues with challenge walking and standing for longer periods of time.  Marco continues to have deficits in the above listed impairments and would benefit from additional skilled PT to address these deficits to return to prior level of function.    Impairments: abnormal coordination, abnormal gait, abnormal muscle firing, abnormal muscle tone, abnormal or restricted ROM, abnormal movement, activity intolerance, impaired balance, impaired physical strength, lacks appropriate home exercise program, pain with function and poor body mechanics    Symptom irritability: moderateUnderstanding of Dx/Px/POC: good   Prognosis: good  Prognosis details: Positive prognostic indicators include good understanding of diagnosis and treatment plan options.  Negative prognostic indicators include chronicity of symptoms, high symptom irritability, minimal changes in pain with movement, and multiple concurrent orthopedic problems.      Goals  Impairment Goals 4-6 weeks  - Decrease  pain to 2/10 -progressing  - Improve knee AROM to equal to the unaffected lower extremity -progressing  - Increase knee strength to 5/5 throughout -progressing  - Increase hip strength to 5/5 throughout -progressing    Functional Goals 6-8 weeks  - Return to Prior Level of Function -progressing  - Patient will be independent with HEP -progressing  - Patient will be able to squat without increased pain/compensation/difficulty -progressing  - Patient will be able to perform sit to stand without increased pain/compensation/difficulty -progressing  - Patient will be able to ascend and descend stairs without increased pain/compensation/difficulty -progressing  - Patient will be able to lift >30 pounds with proper mechanics -progressing      Plan  Patient would benefit from: skilled physical therapy  Planned modality interventions: Modalities PRN.  Planned therapy interventions: activity modification, manual therapy, neuromuscular re-education, patient education, therapeutic activities, therapeutic exercise, graded activity, home exercise program, behavior modification, self care, joint mobilization, IASTM, body mechanics training, flexibility and functional ROM exercises  Frequency: 2x week  Duration in weeks: 8  Treatment plan discussed with: patient        Subjective Evaluation    History of Present Illness  Mechanism of injury: Re-Eval 3/21:  Pt reports that he feels as though he is about 70% better since starting physical therapy.  He reports that he feels as though he has more strength when he is getting up.  He also reports that he is also seeing better balance.  Both he reports would be beneficial to continue working on though.  He reports that he feels his L is improving in pain, though continues with some discomfort and has more lately in the R.  He reports having improved with getting into and out of the car.  He reports that it would be beneficial to continue strengthening as well as improve balance.       IE:  WORK/SCHOOL: Retired,   HOME LIFE: Lives with others, 13 steps (landing within)  HOBBIES/EXERCISE: living history organization, senior [employer] [place], walking  PLOF:  Physical therapy in past for L knee,   HISTORY OF CURRENT INJURY:  He now has pain in B knees.  Had a cortisone shot about 2 weeks ago in B knees and felt great.  The injections helped for a while and the pain has began.  His discomfort began again about 1 week after the injection. He reports it as stiffness rather than pain.   PAIN LOCATION/DESCRIPTORS: Anterior knee, B  AGGRAVATING FACTORS:  exercises, in and out of car (L), steps (must use railing), sit to stand, 10 min standing before feeling wobbly,   EASES: rest,   DAY PATTERN: no day pattern  IMAGING:  x-ray CARIMTA  Marco denies a new onset of Bladder incontinence, Bowel dysfunction, Dizziness, Dysphagia, Dysarthria, Drop attacks, Diplopia, Nausea, Ataxia, Recent unexplained weight loss, Clumsy or unsteadiness, Constant night pain, History of cancer, Tingling, Numbness, and Saddle anesthesia .  PATIENT GOALS: steady on feet, more flexible,     Patient Goals  Patient goals for therapy: increased motion, return to sport/leisure activities, independence with ADLs/IADLs, increased strength, improved balance and decreased pain    Pain  Current pain ratin  At best pain ratin  At worst pain ratin  Quality: dull ache, discomfort and tight  Relieving factors: rest  Aggravating factors: stair climbing, walking, standing, running and lifting          Objective     Palpation     Additional Palpation Details  Tightness noted through B quad, HS, calf B    Neurological Testing     Additional Neurological Details  No new neurological changes to note.      Active Range of Motion   Left Knee   Flexion: 110 degrees   Extension: 1 degrees     Right Knee   Flexion: 106 degrees with pain  Extension: 1 degrees     Passive Range of Motion     Additional Passive Range of Motion Details  Equal  "to AROM    Mobility   Patellar Mobility:   Left Knee   Hypomobile: left superior and left inferior    Right Knee   Hypomobile: superior and inferior     Strength/Myotome Testing     Left Knee   Flexion: 5  Prone flexion: 4+  Extension: 4+    Right Knee   Flexion: 5  Prone flexion: 4+  Extension: 4+    Additional Strength Details  Hip Strength    Flex:  R  4/5, L  4/5   AB:   R  4-/5, L  4-/5   Ext:   R  4/5, L  4/5       General Comments:      Knee Comments  Continues with decreased flexibility noted through B HS, B quad, B gastroc                 POC Expires Auth Status Start Date Expiration Date PT Visit Limit    3/20 N/A 3/20 N/A As needed   Date 3/7 3/12 3/14 3/19 RE-EVAL**   Used 6 7 8 9    Remaining          Diagnosis: B knee pain   Precautions:    Primary Goals: flexibility in LE musculature, hip strenth   *asterisks by exercise = given for HEP   Manuals 3/12 3/14 3/19 3/21                                            There Ex        Heel slides        HS stretch 10x10\" 10x10\" 10x10\"     Calf stretch 10x10\" 10x10\" 10x10\"     Richard stretch 10x10\" 10x10\" 10x10\"                                     Bike 5' 5' 5'     Neuro Re-Ed        Quad sets        Glute sets        SLR        Clamshell        bridges        HS curl        Sciaatic nerve glies        Mini squats   1x10 TRX     LAQ        Standing 3 way        Leg press        X walks        Wobble board   x20              Re-evaluation    AK     Ther Act         Education                 Modalities                                                                                                  "

## 2024-03-26 ENCOUNTER — OFFICE VISIT (OUTPATIENT)
Dept: PHYSICAL THERAPY | Facility: CLINIC | Age: 69
End: 2024-03-26
Payer: MEDICARE

## 2024-03-26 DIAGNOSIS — M17.0 BILATERAL PRIMARY OSTEOARTHRITIS OF KNEE: Primary | ICD-10-CM

## 2024-03-26 PROCEDURE — 97110 THERAPEUTIC EXERCISES: CPT

## 2024-03-26 PROCEDURE — 97112 NEUROMUSCULAR REEDUCATION: CPT

## 2024-03-28 ENCOUNTER — ANESTHESIA EVENT (OUTPATIENT)
Dept: ANESTHESIOLOGY | Facility: AMBULATORY SURGERY CENTER | Age: 69
End: 2024-03-28

## 2024-03-28 ENCOUNTER — ANESTHESIA (OUTPATIENT)
Dept: ANESTHESIOLOGY | Facility: AMBULATORY SURGERY CENTER | Age: 69
End: 2024-03-28

## 2024-03-28 ENCOUNTER — OFFICE VISIT (OUTPATIENT)
Dept: PHYSICAL THERAPY | Facility: CLINIC | Age: 69
End: 2024-03-28
Payer: MEDICARE

## 2024-03-28 DIAGNOSIS — M17.0 BILATERAL PRIMARY OSTEOARTHRITIS OF KNEE: Primary | ICD-10-CM

## 2024-03-28 PROCEDURE — 97110 THERAPEUTIC EXERCISES: CPT

## 2024-03-28 PROCEDURE — 97112 NEUROMUSCULAR REEDUCATION: CPT

## 2024-03-28 NOTE — PROGRESS NOTES
"Daily Note     Today's date: 3/28/2024  Patient name: Marco Richard  : 1955  MRN: [id number]  Referring provider: Tye Rodriguez MD  Dx:   Encounter Diagnosis     ICD-10-CM    1. Bilateral primary osteoarthritis of knee  M17.0           Start Time: 0700  Stop Time: 0745  Total time in clinic (min): 45 minutes    Subjective: Pt reports that he has been working on his stretches at home and they have been going well.       Objective: See treatment diary below      Assessment: Tolerated treatment well. Treatment today began with warm up on bike followed by stretching.  Pt tolerated all stretching well today.  Continued to strengthening activities.  Pt required cuing to decrease ER with SLR today. Pt was able to tolerate increased challenge during glute bridge today, able to add marches as well.   Patient would benefit from continued PT      Plan: Continue per plan of care.       POC Expires Auth Status Start Date Expiration Date PT Visit Limit    3/20 N/A 3/20 N/A As needed   Date 3/26 3/28 3/14 3/19 3/21   Used 11 12 8 9 10   Remaining          Diagnosis: B knee pain   Precautions:    Primary Goals: flexibility in LE musculature, hip strenth   *asterisks by exercise = given for HEP   Manuals 3/28 3/14 3/19 3/21 3/26                                           There Ex        Heel slides        HS stretch 10x10\" 10x10\" 10x10\"  10x10\"   Calf stretch 10x10\" 10x10\" 10x10\"  10x10\"   Richard stretch 10x10\" 10x10\" 10x10\"  10x10\"                                   Bike 5' 5' 5'  5'   Neuro Re-Ed        Quad sets        Glute sets        SLR 2x10 B    2x10 B   Clamshell        bridges        HS curl        Sciaatic nerve glies        Mini squats   1x10 TRX     LAQ        Standing 3 way        Leg press        X walks        Wobble board   x20              Re-evaluation    AK     Ther Act         Education                 Modalities                                                                "

## 2024-04-03 ENCOUNTER — OFFICE VISIT (OUTPATIENT)
Dept: PHYSICAL THERAPY | Facility: CLINIC | Age: 69
End: 2024-04-03
Payer: MEDICARE

## 2024-04-03 DIAGNOSIS — M17.0 BILATERAL PRIMARY OSTEOARTHRITIS OF KNEE: Primary | ICD-10-CM

## 2024-04-03 PROCEDURE — 97110 THERAPEUTIC EXERCISES: CPT

## 2024-04-03 PROCEDURE — 97112 NEUROMUSCULAR REEDUCATION: CPT

## 2024-04-03 NOTE — PROGRESS NOTES
"Daily Note     Today's date: 4/3/2024  Patient name: Marco Richard  : 1955  MRN: [id number]  Referring provider: Tye Rodriguez MD  Dx:   Encounter Diagnosis     ICD-10-CM    1. Bilateral primary osteoarthritis of knee  M17.0           Start Time: 0800  Stop Time: 0845  Total time in clinic (min): 45 minutes    Subjective: Pt reports that he has been continuing to feel a little better.        Objective: See treatment diary below      Assessment: Tolerated treatment well. Treatment began with warm up on bike to improve stiffness and improve blood flow.  Moved into stretching for the musculature of the LEs. Added leg press today with good tolerance followed up with standing 3 way with good muscular fatigue by the end.   Patient would benefit from continued PT      Plan: Continue per plan of care.       POC Expires Auth Status Start Date Expiration Date PT Visit Limit    3/20 N/A 3/20 N/A As needed   Date 3/26 3/28 4/3 3/19 3/21   Used 11 12 13 9 10   Remaining          Diagnosis: B knee pain   Precautions:    Primary Goals: flexibility in LE musculature, hip strenth   *asterisks by exercise = given for HEP   Manuals 3/28 4/3 3/19 3/21 3/26                                           There Ex        Heel slides        HS stretch 10x10\" 10x10\" 10x10\"  10x10\"   Calf stretch 10x10\" 10x10\" 10x10\"  10x10\"   Richard stretch 10x10\" 10x10\" 10x10\"  10x10\"                                   Bike 5' 5' 5'  5'   Neuro Re-Ed        Quad sets        Glute sets        SLR 2x10 B    2x10 B   Clamshell        bridges        HS curl        Sciaatic nerve glies        Mini squats   1x10 TRX     LAQ        Standing 3 way        Leg press  2x10 80#      X walks        Wobble board   x20              Re-evaluation    AK     Ther Act         Education                 Modalities                                                                                               "

## 2024-04-04 ENCOUNTER — EVALUATION (OUTPATIENT)
Dept: PHYSICAL THERAPY | Facility: CLINIC | Age: 69
End: 2024-04-04
Payer: MEDICARE

## 2024-04-04 DIAGNOSIS — M17.0 BILATERAL PRIMARY OSTEOARTHRITIS OF KNEE: Primary | ICD-10-CM

## 2024-04-04 PROCEDURE — 97530 THERAPEUTIC ACTIVITIES: CPT

## 2024-04-04 PROCEDURE — 97110 THERAPEUTIC EXERCISES: CPT

## 2024-04-04 PROCEDURE — 97112 NEUROMUSCULAR REEDUCATION: CPT

## 2024-04-04 NOTE — PROGRESS NOTES
"Daily Note     Today's date: 2024  Patient name: Marco Richard  : 1955  MRN: [id number]  Referring provider: Tye Rodriguez MD  Dx:   Encounter Diagnosis     ICD-10-CM    1. Bilateral primary osteoarthritis of knee  M17.0           Start Time: 0700  Stop Time: 0745  Total time in clinic (min): 45 minutes    Subjective: Pt reports that his knees are doing better overall.        Objective: See treatment diary below      Assessment: Tolerated treatment well. Began today with bike with pt presenting with some tightness at beginning though was able to continue to smoother revolutions afterward with decreased tightness.  Continued to stretching for the LE. He was able to perform step ups on 8\" step today with good tolerance.  Continued with balance activities next with good tolerance though challenge noted.   Patient would benefit from continued PT      Plan: Continue per plan of care.       POC Expires Auth Status Start Date Expiration Date PT Visit Limit    3/20 N/A 3/20 N/A As needed   Date 3/26 3/28 4/3 4/4 3/21   Used 11 12 13 14 10   Remaining          Diagnosis: B knee pain   Precautions:    Primary Goals: flexibility in LE musculature, hip strenth   *asterisks by exercise = given for HEP   Manuals 3/28 4/3 4/4 3/21 3/26                                           There Ex        Heel slides        HS stretch 10x10\" 10x10\" 10x10\"  10x10\"   Calf stretch 10x10\" 10x10\" 10x10\"  10x10\"   Richard stretch 10x10\" 10x10\" 10x10\"  10x10\"                                   Bike 5' 5' 5'  5'   Neuro Re-Ed        Quad sets        Glute sets        SLR 2x10 B    2x10 B   Clamshell        bridges        HS curl        Sciaatic nerve glies   1x10 B     Mini squats        LAQ        Standing 3 way        Leg press  2x10 80#      X walks        Wobble board   X20 ea              Re-evaluation    AK     Ther Act         Education                Step ups   8\" 2x10 B                      Modalities                       "

## 2024-04-10 ENCOUNTER — EVALUATION (OUTPATIENT)
Dept: PHYSICAL THERAPY | Facility: CLINIC | Age: 69
End: 2024-04-10
Payer: MEDICARE

## 2024-04-10 DIAGNOSIS — M17.0 BILATERAL PRIMARY OSTEOARTHRITIS OF KNEE: Primary | ICD-10-CM

## 2024-04-10 PROCEDURE — 97110 THERAPEUTIC EXERCISES: CPT

## 2024-04-10 PROCEDURE — 97530 THERAPEUTIC ACTIVITIES: CPT

## 2024-04-10 PROCEDURE — 97112 NEUROMUSCULAR REEDUCATION: CPT

## 2024-04-10 RX ORDER — SODIUM CHLORIDE, SODIUM LACTATE, POTASSIUM CHLORIDE, CALCIUM CHLORIDE 600; 310; 30; 20 MG/100ML; MG/100ML; MG/100ML; MG/100ML
75 INJECTION, SOLUTION INTRAVENOUS CONTINUOUS
Status: CANCELLED | OUTPATIENT
Start: 2024-04-10

## 2024-04-10 NOTE — PROGRESS NOTES
"Daily Note     Today's date: 4/10/2024  Patient name: Marco Richard  : 1955  MRN: [id number]  Referring provider: Tye Rodriguez MD  Dx:   Encounter Diagnosis     ICD-10-CM    1. Bilateral primary osteoarthritis of knee  M17.0           Start Time: 0800  Stop Time: 0845  Total time in clinic (min): 45 minutes    Subjective: Pt reports that his knee continues to improve with activities throughout the day.       Objective: See treatment diary below      Assessment: Tolerated treatment well. Began with warm up on bike to improve ROM and muscular stiffness. Continued into stretching for the LE musculature with good tolerance. Added strengthening activities to pt tolerance.    Patient would benefit from continued PT      Plan: Continue per plan of care.       POC Expires Auth Status Start Date Expiration Date PT Visit Limit    3/20 N/A 3/20 N/A As needed   Date 3/26 3/28 4/3 4/4 4/10   Used 11 12 13 14 15   Remaining          Diagnosis: B knee pain   Precautions:    Primary Goals: flexibility in LE musculature, hip strenth   *asterisks by exercise = given for HEP   Manuals 3/28 4/3 4/4 4/10                                            There Ex        Heel slides        HS stretch 10x10\" 10x10\" 10x10\" 10x10\"    Calf stretch 10x10\" 10x10\" 10x10\" 10x10\"    Richard stretch 10x10\" 10x10\" 10x10\" 10x10\"                                    Bike 5' 5' 5' 5'    Neuro Re-Ed        Quad sets        Glute sets        SLR 2x10 B       Clamshell        bridges        HS curl        Sciaatic nerve glies   1x10 B 1x10 B    Mini squats        LAQ        Standing 3 way        Leg press  2x10 80#      X walks    1 lap GTB    Wobble board   X20 ea              Re-evaluation         Ther Act         Education                Step ups   8\" 2x10 B     TKE walks    1x5 B             Modalities                                                                                                   "

## 2024-04-11 ENCOUNTER — ANESTHESIA (OUTPATIENT)
Dept: GASTROENTEROLOGY | Facility: AMBULATORY SURGERY CENTER | Age: 69
End: 2024-04-11

## 2024-04-11 ENCOUNTER — ANESTHESIA EVENT (OUTPATIENT)
Dept: GASTROENTEROLOGY | Facility: AMBULATORY SURGERY CENTER | Age: 69
End: 2024-04-11

## 2024-04-11 ENCOUNTER — HOSPITAL ENCOUNTER (OUTPATIENT)
Dept: GASTROENTEROLOGY | Facility: AMBULATORY SURGERY CENTER | Age: 69
Discharge: HOME | End: 2024-04-11
Attending: INTERNAL MEDICINE
Payer: MEDICARE

## 2024-04-11 VITALS
TEMPERATURE: 97.8 F | SYSTOLIC BLOOD PRESSURE: 141 MMHG | HEIGHT: 75 IN | DIASTOLIC BLOOD PRESSURE: 73 MMHG | WEIGHT: 250 LBS | OXYGEN SATURATION: 99 % | BODY MASS INDEX: 31.08 KG/M2 | HEART RATE: 87 BPM | RESPIRATION RATE: 18 BRPM

## 2024-04-11 DIAGNOSIS — D12.6 HIGH GRADE DYSPLASIA IN COLONIC ADENOMA: ICD-10-CM

## 2024-04-11 PROBLEM — N17.9 ACUTE RENAL FAILURE (HCC): Status: RESOLVED | Noted: 2023-12-11 | Resolved: 2024-04-11

## 2024-04-11 PROBLEM — E03.9 HYPOTHYROIDISM: Status: ACTIVE | Noted: 2024-04-11

## 2024-04-11 PROCEDURE — 45385 COLONOSCOPY W/LESION REMOVAL: CPT | Performed by: INTERNAL MEDICINE

## 2024-04-11 PROCEDURE — 88305 TISSUE EXAM BY PATHOLOGIST: CPT | Performed by: PATHOLOGY

## 2024-04-11 RX ORDER — LIDOCAINE HYDROCHLORIDE 10 MG/ML
INJECTION, SOLUTION EPIDURAL; INFILTRATION; INTRACAUDAL; PERINEURAL AS NEEDED
Status: DISCONTINUED | OUTPATIENT
Start: 2024-04-11 | End: 2024-04-11

## 2024-04-11 RX ORDER — PROPOFOL 10 MG/ML
INJECTION, EMULSION INTRAVENOUS AS NEEDED
Status: DISCONTINUED | OUTPATIENT
Start: 2024-04-11 | End: 2024-04-11

## 2024-04-11 RX ORDER — SODIUM CHLORIDE, SODIUM LACTATE, POTASSIUM CHLORIDE, CALCIUM CHLORIDE 600; 310; 30; 20 MG/100ML; MG/100ML; MG/100ML; MG/100ML
75 INJECTION, SOLUTION INTRAVENOUS CONTINUOUS
Status: DISCONTINUED | OUTPATIENT
Start: 2024-04-11 | End: 2024-04-15 | Stop reason: HOSPADM

## 2024-04-11 RX ADMIN — PROPOFOL 100 MG: 10 INJECTION, EMULSION INTRAVENOUS at 07:28

## 2024-04-11 RX ADMIN — PROPOFOL 50 MG: 10 INJECTION, EMULSION INTRAVENOUS at 07:31

## 2024-04-11 RX ADMIN — PROPOFOL 40 MG: 10 INJECTION, EMULSION INTRAVENOUS at 07:33

## 2024-04-11 RX ADMIN — PROPOFOL 30 MG: 10 INJECTION, EMULSION INTRAVENOUS at 07:44

## 2024-04-11 RX ADMIN — PROPOFOL 30 MG: 10 INJECTION, EMULSION INTRAVENOUS at 07:48

## 2024-04-11 RX ADMIN — SODIUM CHLORIDE, SODIUM LACTATE, POTASSIUM CHLORIDE, CALCIUM CHLORIDE: 600; 310; 30; 20 INJECTION, SOLUTION INTRAVENOUS at 07:09

## 2024-04-11 RX ADMIN — LIDOCAINE HYDROCHLORIDE 50 MG: 10 INJECTION, SOLUTION EPIDURAL; INFILTRATION; INTRACAUDAL; PERINEURAL at 07:28

## 2024-04-11 RX ADMIN — PROPOFOL 30 MG: 10 INJECTION, EMULSION INTRAVENOUS at 07:36

## 2024-04-11 RX ADMIN — PROPOFOL 30 MG: 10 INJECTION, EMULSION INTRAVENOUS at 07:40

## 2024-04-11 NOTE — ANESTHESIA POSTPROCEDURE EVALUATION
Post-Op Assessment Note    CV Status:  Stable    Pain management: adequate       Mental Status:  Alert   Hydration Status:  Stable   PONV Controlled:  Controlled   Airway Patency:  Patent     Post Op Vitals Reviewed: Yes    No anethesia notable event occurred.    Staff: Anesthesiologist, CRNA               BP   124/73   Temp      Pulse  73   Resp   18   SpO2   98

## 2024-04-11 NOTE — H&P
History and Physical - [facility]  Marco Richard 68 y.o. male MRN: [id number]    HPI: Marco Richard is a 68 y.o. year old male who presents for evaluation of colon polyps.  Previously underwent colonoscopy 6 months ago which was notable for tubular adenoma with focus of high-grade dysplasia and a transverse colon polyp.      Review of Systems    Historical Information   Past Medical History:   Diagnosis Date    Allergic rhinitis     Anxiety disorder     Last assessed 2/7/2006     Arthritis     Cancer (HCC)     prostate    Carpal tunnel syndrome     Colon polyp     Diabetes mellitus (HCC)     Diabetes mellitus with neurological manifestation (HCC) 04/06/2012    Last assessed 3/29/2016     Diabetes type 2, uncontrolled     Last assessed 4/6/2016     Disease of thyroid gland     DM II (diabetes mellitus, type II), controlled (HCC)     Last assessed 10/20/2014     Essential hypertension     Last assesssed 2/7/2006     Hyperlipidemia     Hypertension     Insomnia     Last assessed 1/13/2011     Numbness and tingling of foot     Resolved 3/29/2016     Obesity     Old disruption of knee ligament     Last assessed 3/26/2008     Tarsal tunnel syndrome     Urinary frequency     Resolved 3/29/2016     Visual impairment      Past Surgical History:   Procedure Laterality Date    COLONOSCOPY      FOOT SURGERY      [place] AMPUTATION TOE INTERPHALANGEAL JOINT Right 11/23/2022    Procedure: AMPUTATION 3RD TOE;  Surgeon: Andrews Marquez DPM;  Location:  MAIN OR;  Service: Podiatry    PROSTATE BIOPSY      WISDOM TOOTH EXTRACTION       Social History   Social History     Substance and Sexual Activity   Alcohol Use Yes    Alcohol/week: 9.0 standard drinks of alcohol    Types: 2 Cans of beer, 7 Standard drinks or equivalent per week     Social History     Substance and Sexual Activity   Drug Use Never     Social History     Tobacco Use   Smoking Status Never   Smokeless Tobacco Never     Family History   Problem  Relation Age of Onset    Heart attack Mother 67    Valvular heart disease Mother     Hypotension Mother     Pancreatic cancer Father 75    Diabetes Father     Lung cancer Sister 56        not a smoker    Cancer Brother         bile duct     Colon cancer Maternal Grandmother 62    Diabetes Maternal Grandmother     Pancreatic cancer Paternal Aunt     Pancreatic cancer Paternal Uncle     Hypertension Neg Hx     Prostate cancer Neg Hx        Meds/Allergies     (Not in a hospital admission)      Allergies   Allergen Reactions    Sudafed [Pseudoephedrine] Other (See Comments)     hyperactivity    Amoxicillin Rash    Penicillins Rash     Category: Allergy;        Objective     There were no vitals taken for this visit.      PHYSICAL EXAM    Gen: NAD  CV: RRR  CHEST: Clear  ABD: soft, NT/ND  EXT: no edema  Neuro: AAO      ASSESSMENT/PLAN:  This is a 68 y.o. year old male here for evaluation of history of polyps, previously noted to have tubular adenoma with high-grade dysplasia and a colonoscopy 6 months ago.    PLAN:   Procedure: Colonoscopy.

## 2024-04-11 NOTE — ANESTHESIA PREPROCEDURE EVALUATION
Procedure:  COLONOSCOPY    Relevant Problems   CARDIO   (+) Essential hypertension   (+) Hyperlipidemia LDL goal <70      ENDO   (+) Hypothyroidism   (+) Type 2 diabetes mellitus (HCC)   (+) Type II or unspecified type diabetes mellitus without mention of complication, uncontrolled      /RENAL   (+) Acute renal failure (HCC) (Resolved)   (+) Prostate cancer (HCC)   (+) Stage 3a chronic kidney disease (HCC)      NEURO/PSYCH   (+) Depression with anxiety   (+) Diabetic polyneuropathy (HCC)      Other   (+) Other acute osteomyelitis, right ankle and foot (HCC)        Physical Exam    Airway    Mallampati score: II  TM Distance: >3 FB  Neck ROM: full     Dental       Cardiovascular  Rhythm: regular, Rate: normal    Pulmonary   Breath sounds clear to auscultation    Other Findings        Anesthesia Plan  ASA Score- 3     Anesthesia Type- IV sedation with anesthesia with ASA Monitors.         Additional Monitors:     Airway Plan:            Plan Factors-    Chart reviewed.        Patient is not a current smoker.              Induction- intravenous.    Postoperative Plan-     Informed Consent- Anesthetic plan and risks discussed with patient.  I personally reviewed this patient with the CRNA. Discussed and agreed on the Anesthesia Plan with the CRNA..

## 2024-04-15 PROCEDURE — 88305 TISSUE EXAM BY PATHOLOGIST: CPT | Performed by: PATHOLOGY

## 2024-04-16 ENCOUNTER — EVALUATION (OUTPATIENT)
Dept: PHYSICAL THERAPY | Facility: CLINIC | Age: 69
End: 2024-04-16
Payer: MEDICARE

## 2024-04-16 DIAGNOSIS — M17.0 BILATERAL PRIMARY OSTEOARTHRITIS OF KNEE: Primary | ICD-10-CM

## 2024-04-16 PROCEDURE — 97110 THERAPEUTIC EXERCISES: CPT

## 2024-04-16 PROCEDURE — 97112 NEUROMUSCULAR REEDUCATION: CPT

## 2024-04-16 NOTE — PROGRESS NOTES
"Daily Note     Today's date: 2024  Patient name: Marco Richard  : 1955  MRN: [id number]  Referring provider: Tye Rodriguez MD  Dx:   Encounter Diagnosis     ICD-10-CM    1. Bilateral primary osteoarthritis of knee  M17.0           Start Time: 0700  Stop Time: 0745  Total time in clinic (min): 45 minutes    Subjective: Pt reports that he is continuing to feel better.        Objective: See treatment diary below      Assessment: Tolerated treatment well. Began with warm up on bike with pt able to perform full revolutions earlier into his 5 min warm up. Moved onto stretching for the LE with good tolerance.  Added strengthening for glutes and quads with good tolerance.   Patient would benefit from continued PT      Plan: Continue per plan of care.       POC Expires Auth Status Start Date Expiration Date PT Visit Limit    3/20 N/A 3/20 N/A As needed   Date 4/16 3/28 4/3 4/4 4/10   Used 16 12 13 14 15   Remaining          Diagnosis: B knee pain   Precautions:    Primary Goals: flexibility in LE musculature, hip strenth   *asterisks by exercise = given for HEP   Manuals 3/28 4/3 4/4 4/10 4/16                                           There Ex        Heel slides        HS stretch 10x10\" 10x10\" 10x10\" 10x10\" 10x10\"   Calf stretch 10x10\" 10x10\" 10x10\" 10x10\" 10x10\"   Richard stretch 10x10\" 10x10\" 10x10\" 10x10\" 10x10\"                                   Bike 5' 5' 5' 5' 5'   Neuro Re-Ed        Quad sets        Glute sets        SLR        Clamshell        bridges     1x10 B single leg   HS curl        Sciaatic nerve glies   1x10 B 1x10 B 1x10 B   Mini squats        LAQ        Standing 3 way        Leg press  2x10 80#      X walks    1 lap GTB    Wobble board   X20 ea     SL AB     2x10   Heel stomps     2x10            Re-evaluation         Ther Act         Education                Step ups   8\" 2x10 B     TKE walks    1x5 B             Modalities                                                                "

## 2024-04-18 ENCOUNTER — OFFICE VISIT (OUTPATIENT)
Dept: PHYSICAL THERAPY | Facility: CLINIC | Age: 69
End: 2024-04-18
Payer: MEDICARE

## 2024-04-18 DIAGNOSIS — M17.0 BILATERAL PRIMARY OSTEOARTHRITIS OF KNEE: Primary | ICD-10-CM

## 2024-04-18 PROCEDURE — 97110 THERAPEUTIC EXERCISES: CPT

## 2024-04-18 PROCEDURE — 97112 NEUROMUSCULAR REEDUCATION: CPT

## 2024-04-18 NOTE — PROGRESS NOTES
"Daily Note     Today's date: 2024  Patient name: Marco Richard  : 1955  MRN: [id number]  Referring provider: Tye Rodriguez MD  Dx:   Encounter Diagnosis     ICD-10-CM    1. Bilateral primary osteoarthritis of knee  M17.0           Start Time: 0700  Stop Time: 0745  Total time in clinic (min): 45 minutes    Subjective: Pt reports that his knees have been a little sore.       Objective: See treatment diary below      Assessment: Tolerated treatment well. Began today with warm up on recumbent bike with pt experiencing some discomfort after a few revolutions.  Moved onto stretching for the LE musculature and returned to upright bike to work on muscular endurance as well and improving blood flow.  Moved onto strengthening activities as well as balance activities.  He continues with challenge during wobble board.  Patient would benefit from continued PT      Plan: Continue per plan of care.       POC Expires Auth Status Start Date Expiration Date PT Visit Limit    3/20 N/A 320 N/A As needed   Date 4/16 3/28 4/3 4/4 4/10   Used 16 12 13 14 15   Remaining          Diagnosis: B knee pain   Precautions:    Primary Goals: flexibility in LE musculature, hip strenth   *asterisks by exercise = given for HEP   Manuals 4/18  4/4 4/10 4/16                                           There Ex        Heel slides        HS stretch 10x10\"  10x10\" 10x10\" 10x10\"   Calf stretch 10x10\"  10x10\" 10x10\" 10x10\"   Richard stretch 10x10\"  10x10\" 10x10\" 10x10\"                                   Bike 5'  5' 5' 5'   Neuro Re-Ed        Quad sets        Glute sets        SLR        Clamshell        bridges     1x10 B single leg   HS curl        Sciaatic nerve glies   1x10 B 1x10 B 1x10 B   Mini squats        LAQ        Standing 3 way        Leg press        X walks    1 lap GTB    Wobble board 1x2' ea  X20 ea     SL AB     2x10   Heel stomps     2x10   TRX squats 2x10                        Re-evaluation         Ther Act         " "Education                Step ups   8\" 2x10 B     TKE walks    1x5 B    Tap down 1x10 4\"       Modalities                                                                                                       "

## 2024-04-23 ENCOUNTER — OFFICE VISIT (OUTPATIENT)
Dept: PHYSICAL THERAPY | Facility: CLINIC | Age: 69
End: 2024-04-23
Payer: MEDICARE

## 2024-04-23 DIAGNOSIS — M17.0 BILATERAL PRIMARY OSTEOARTHRITIS OF KNEE: Primary | ICD-10-CM

## 2024-04-23 PROCEDURE — 97110 THERAPEUTIC EXERCISES: CPT

## 2024-04-23 PROCEDURE — 97112 NEUROMUSCULAR REEDUCATION: CPT

## 2024-04-23 NOTE — PROGRESS NOTES
PT Re-Evaluation     Today's date: 2024  Patient name: Marco Richard  : 1955  MRN: [id number]  Referring provider: Tye Rodriguez MD  Dx:   Encounter Diagnosis     ICD-10-CM    1. Bilateral primary osteoarthritis of knee  M17.0           Start Time: 0700  Stop Time: 0745  Total time in clinic (min): 45 minutes    Assessment  Assessment details: Marco Richard has been compliant with attending PT and home exercise program since initial eval.  Marco  has made improvements in objective data since initial eval but continues to have limitations compared to prior level of function. He presents with improving strength in B Les as well as decreased tightness noted.  He continues with challenge moving into knee flexion B, though has since improved with feeling of pain as well.  He reports having decreased pain during activities, and reports pain coming on with longer periods of stitting as well as with getting into and out of the car.  He is having decreased intensity of pain though does continue with challenge with his balance.  Marco continues to have deficits in the above listed impairments and would benefit from additional skilled PT to address these deficits to return to prior level of function.      Impairments: impaired balance    Symptom irritability: moderateUnderstanding of Dx/Px/POC: good   Prognosis: good  Prognosis details: Positive prognostic indicators include good understanding of diagnosis and treatment plan options.  Negative prognostic indicators include chronicity of symptoms, high symptom irritability, minimal changes in pain with movement, and multiple concurrent orthopedic problems.      Goals  Impairment Goals 4-6 weeks  - Decrease pain to 2/10 -met  - Improve knee AROM to equal to the unaffected lower extremity -met  - Increase knee strength to 5/5 throughout -progressing  - Increase hip strength to 5/5 throughout -progressing    Functional Goals 6-8 weeks  - Return to Prior  Level of Function -progressing  - Patient will be independent with HEP -progressing  - Patient will be able to squat without increased pain/compensation/difficulty -met  - Patient will be able to perform sit to stand without increased pain/compensation/difficulty -met  - Patient will be able to ascend and descend stairs without increased pain/compensation/difficulty -met  - Patient will be able to lift >30 pounds with proper mechanics -met      Plan  Patient would benefit from: skilled physical therapy  Planned modality interventions: Modalities PRN.  Planned therapy interventions: manual therapy, neuromuscular re-education, patient education, therapeutic activities, therapeutic exercise, graded activity, home exercise program, behavior modification, self care, body mechanics training, flexibility, activity modification and functional ROM exercises  Frequency: 2x week  Duration in weeks: 4  Treatment plan discussed with: patient        Subjective Evaluation    History of Present Illness  Mechanism of injury: Re-Eval 4/23:  Pt reports that he feels as though he is about 85% better since starting physical therapy. He reports that he has some minimal pain in the R and no pain in the L lately.  He reports that he continues to have some discomfort when sitting for longer periods of time prior to getting up.  He also reports that he has some discomfort when getting into and out of the car.  He no longer reports pain with walking activities, reporting it feeling more of a stiffness instead. He reports that his R knee hurts when ascending and descending steps.  Though he no longer reports pain with moving sitting to standing, he repots decreased strength and balance during. He reports that he continues with some feeling of imbalance       Re-Eval 3/21:  Pt reports that he feels as though he is about 70% better since starting physical therapy.  He reports that he feels as though he has more strength when he is getting up.   He also reports that he is also seeing better balance.  Both he reports would be beneficial to continue working on though.  He reports that he feels his L is improving in pain, though continues with some discomfort and has more lately in the R.  He reports having improved with getting into and out of the car.  He reports that it would be beneficial to continue strengthening as well as improve balance.      IE:  WORK/SCHOOL: Retired,   HOME LIFE: Lives with others, 13 [place] (landing within)  HOBBIES/EXERCISE: living history organization, senior [place] [facility], walking  PLOF:  Physical therapy in past for L knee,   HISTORY OF CURRENT INJURY:  He now has pain in B knees.  Had a cortisone shot about 2 weeks ago in B knees and felt great.  The injections helped for a while and the pain has began.  His discomfort began again about 1 week after the injection. He reports it as stiffness rather than pain.   PAIN LOCATION/DESCRIPTORS: Anterior knee, B  AGGRAVATING FACTORS:  exercises, in and out of car (L), steps (must use railing), sit to stand, 10 min standing before feeling wobbly,   EASES: rest,   DAY PATTERN: no day pattern  IMAGING:  x-ray B  Marco denies a new onset of Bladder incontinence, Bowel dysfunction, Dizziness, Dysphagia, Dysarthria, Drop attacks, Diplopia, Nausea, Ataxia, Recent unexplained weight loss, Clumsy or unsteadiness, Constant night pain, History of cancer, Tingling, Numbness, and Saddle anesthesia .  PATIENT GOALS: steady on feet, more flexible,     Patient Goals  Patient goals for therapy: increased strength, improved balance and decreased pain    Pain  Current pain ratin  At best pain ratin  At worst pain ratin  Quality: discomfort and tight  Relieving factors: rest  Aggravating factors: stair climbing, standing and sitting          Objective     Palpation     Additional Palpation Details  Tightness noted through B quad, HS, calf B, improved though continues with some  "tightness    Neurological Testing     Additional Neurological Details  No new neurological changes to note.      Active Range of Motion   Left Knee   Flexion: 110 degrees   Extension: 1 degrees     Right Knee   Flexion: 106 degrees with pain  Extension: 1 degrees     Passive Range of Motion     Additional Passive Range of Motion Details  Equal to AROM    Strength/Myotome Testing     Left Knee   Flexion: 5  Prone flexion: 4+  Extension: 4+    Right Knee   Flexion: 5  Prone flexion: 4+  Extension: 4+    Additional Strength Details  Hip Strength    Flex:  R  4/5, L  4/5   AB:   R  4-/5, L  4-/5   Ext:   R  4/5, L  4/5                    POC Expires Auth Status Start Date Expiration Date PT Visit Limit    3/20 N/A 3/20 N/A As needed   Date 4/16 4/23 4/3 4/4 4/10   Used 16 17 13 14 15   Remaining          Diagnosis: B knee pain   Precautions:    Primary Goals: flexibility in LE musculature, hip strenth   *asterisks by exercise = given for HEP   Manuals 4/18 4/23 4/4 4/10 4/16                                           There Ex        Heel slides        HS stretch 10x10\" 10x10\" 10x10\" 10x10\" 10x10\"   Calf stretch 10x10\"  10x10\" 10x10\" 10x10\"   Richard stretch 10x10\"  10x10\" 10x10\" 10x10\"                                   Bike 5' 5' 5' 5' 5'   Neuro Re-Ed        Quad sets        Glute sets        SLR        Clamshell        bridges     1x10 B single leg   HS curl        Sciaatic nerve glies   1x10 B 1x10 B 1x10 B   Mini squats        LAQ        Standing 3 way        Leg press        X walks    1 lap GTB    Wobble board 1x2' ea  X20 ea     SL AB     2x10   Heel stomps     2x10   TRX squats 2x10                        Re-evaluation  AK       Ther Act         Education                Step ups   8\" 2x10 B     TKE walks    1x5 B    Tap down 1x10 4\"       Modalities                                                                                                  "

## 2024-04-25 ENCOUNTER — APPOINTMENT (OUTPATIENT)
Dept: PHYSICAL THERAPY | Facility: CLINIC | Age: 69
End: 2024-04-25
Payer: MEDICARE

## 2024-04-30 ENCOUNTER — EVALUATION (OUTPATIENT)
Dept: PHYSICAL THERAPY | Facility: CLINIC | Age: 69
End: 2024-04-30
Payer: MEDICARE

## 2024-04-30 DIAGNOSIS — R26.89 BALANCE PROBLEM: Primary | ICD-10-CM

## 2024-04-30 DIAGNOSIS — M17.0 BILATERAL PRIMARY OSTEOARTHRITIS OF KNEE: ICD-10-CM

## 2024-04-30 PROCEDURE — 97112 NEUROMUSCULAR REEDUCATION: CPT

## 2024-04-30 PROCEDURE — 97110 THERAPEUTIC EXERCISES: CPT

## 2024-04-30 NOTE — PROGRESS NOTES
PT Evaluation     Today's date: 2024  Patient name: Marco Richard  : 1955  MRN: [id number]  Referring provider: Tye Rodriguez MD  Dx:   Encounter Diagnosis     ICD-10-CM    1. Balance problem  R26.89       2. Bilateral primary osteoarthritis of knee  M17.0           Start Time: 0700  Stop Time: 0745  Total time in clinic (min): 45 minutes    Assessment  Assessment details: Marco Richard is a pleasant 68 y.o. male who presents with impaired balance.  The patient's greatest concerns are worry over not knowing what's wrong, concern at no signs of improvement, fear of not being able to keep active, and future ill health (and wanting to prevent it).      No further referral appears necessary at this time based upon examination results.    Primary movement impairment diagnosis of decreased ankle/foot strength and stability resulting in pathoanatomical symptoms of impaired balance and limiting his ability to care for self, carry, exercise or recreation, lift, perform household chores, perform yard work, shop, socialize with friends, stand, and walk.    Primary Impairments:  1) balance  2) calf strength/stability    Etiologic factors include foot surgery aprrox. 1 year ago.    Impairments: abnormal coordination, abnormal gait, abnormal muscle firing, abnormal movement, activity intolerance, impaired balance, impaired physical strength, lacks appropriate home exercise program and safety issue    Symptom irritability: moderateUnderstanding of Dx/Px/POC: good   Prognosis: good  Prognosis details: Positive prognostic indicators include positive attitude toward recovery and good understanding of diagnosis and treatment plan options.  Negative prognostic indicators include chronicity of symptoms and multiple concurrent orthopedic problems.      Goals  Impairment Goals 4-6 weeks  - Improve knee AROM to equal to the unaffected lower extremity  - Increase ankle strength to 4/5 throughout  - Increase hip  strength to 5/5 throughout    Functional Goals 6-8 weeks  - Return to Prior Level of Function  - Patient will be independent with HEP  - Patient will be able to squat without increased pain/compensation/difficulty  - Patient will be able to walk without increased pain/compensation/difficulty   - Patient will be able to ascend and descend stairs without increased pain/compensation/difficulty   - Patient will be able to perform 25 single leg heel raises without increased  pain/compensation/difficulty            Plan  Patient would benefit from: skilled physical therapy  Planned modality interventions: Modalities PRN.  Planned therapy interventions: manual therapy, neuromuscular re-education, patient education, therapeutic activities, therapeutic exercise, graded activity, home exercise program, behavior modification, self care, body mechanics training, flexibility, activity modification and functional ROM exercises  Frequency: 2x week  Duration in weeks: 8  Treatment plan discussed with: patient        Subjective Evaluation    History of Present Illness  Mechanism of injury: WORK/SCHOOL: Retired,   HOME LIFE: Lives with others, [place] (landing within)  HOBBIES/EXERCISE: living history organization, senior [place] senior [place], walking  PLOF:  Physical therapy in past for L knee,   HISTORY OF CURRENT INJURY:  He reports that he has felt off with his balance since his surgery around 1 year ago.  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  Since then his balance has been off.    PAIN LOCATION/DESCRIPTORS:   AGGRAVATING FACTORS:  standing greater than 5 min, movement of head with standing/walking, walking, ascending/descending steps with/without carrying items  EASES:  rest  DAY PATTERN: no day pattern  IMAGING:    Marco denies a new onset of Bladder incontinence, Bowel dysfunction, Dizziness, Dysphagia, Dysarthria, Drop attacks, Diplopia, Nausea, Ataxia, Recent unexplained weight loss, Clumsy or  unsteadiness, Constant night pain, History of cancer, Tingling, Numbness, and Saddle anesthesia .  PATIENT GOALS:  regain balance,         Re-Eval 4/23:  Pt reports that he feels as though he is about 85% better since starting physical therapy. He reports that he has some minimal pain in the R and no pain in the L lately.  He reports that he continues to have some discomfort when sitting for longer periods of time prior to getting up.  He also reports that he has some discomfort when getting into and out of the car.  He no longer reports pain with walking activities, reporting it feeling more of a stiffness instead. He reports that his R knee hurts when ascending and descending steps.  Though he no longer reports pain with moving sitting to standing, he repots decreased strength and balance during. He reports that he continues with some feeling of imbalance       Re-Eval 3/21:  Pt reports that he feels as though he is about 70% better since starting physical therapy.  He reports that he feels as though he has more strength when he is getting up.  He also reports that he is also seeing better balance.  Both he reports would be beneficial to continue working on though.  He reports that he feels his L is improving in pain, though continues with some discomfort and has more lately in the R.  He reports having improved with getting into and out of the car.  He reports that it would be beneficial to continue strengthening as well as improve balance.      IE:  WORK/SCHOOL: Retired,   HOME LIFE: Lives with others, [place]landing within)  HOBBIES/EXERCISE: living history organization, senior [place] [employer] senior [place], walking  PLOF:  Physical therapy in past for L knee,   HISTORY OF CURRENT INJURY:  He now has pain in B knees.  Had a cortisone shot about 2 weeks ago in B knees and felt great.  The injections helped for a while and the pain has began.  His discomfort began again about 1 week after the injection. He reports  it as stiffness rather than pain.   PAIN LOCATION/DESCRIPTORS: Anterior knee, B  AGGRAVATING FACTORS:  exercises, in and out of car (L), steps (must use railing), sit to stand, 10 min standing before feeling wobbly,   EASES: rest,   DAY PATTERN: no day pattern  IMAGING:  x-ray B  Marco denies a new onset of Bladder incontinence, Bowel dysfunction, Dizziness, Dysphagia, Dysarthria, Drop attacks, Diplopia, Nausea, Ataxia, Recent unexplained weight loss, Clumsy or unsteadiness, Constant night pain, History of cancer, Tingling, Numbness, and Saddle anesthesia .  PATIENT GOALS: steady on feet, more flexible,     Patient Goals  Patient goals for therapy: increased strength, improved balance and decreased pain    Pain  Current pain ratin  At best pain ratin  At worst pain ratin  Quality: discomfort and tight  Relieving factors: rest  Aggravating factors: stair climbing, standing, sitting, walking and lifting          Objective     Palpation     Additional Palpation Details  Tightness noted through B quad, HS, calf B, improved though continues with some tightness    Neurological Testing     Additional Neurological Details  No new neurological changes to note.      Active Range of Motion   Left Knee   Flexion: 110 degrees   Extension: 1 degrees     Right Knee   Flexion: 106 degrees with pain  Extension: 1 degrees     Passive Range of Motion     Additional Passive Range of Motion Details  Equal to AROM    Strength/Myotome Testing     Left Hip   Planes of Motion   Flexion: 4+  Extension: 4+  Abduction: 4-    Isolated Muscles   Gluteus yuko: 4    Right Hip   Planes of Motion   Flexion: 4+  Extension: 4+  Abduction: 4-    Isolated Muscles   Gluteus maximums: 4    Left Knee   Flexion: 5  Prone flexion: 4+  Extension: 4+    Right Knee   Flexion: 5  Prone flexion: 4+  Extension: 4+    Additional Strength Details  R heel raise: 1  L heel raise: 2  Hip Strength    Flex:  R  4/5, L  4/5   AB:   R  4-/5, L  4-/5   Ext:   " R  4/5, L  4/5       General Comments:      Hip Comments   mCTSIB:    FTEO: 30 sec   FTEC: 30 sec mod sway  FTEO foam: 30 sec min sway  FTEC foam:12 sec LOB    SLS R: <2  SLS L: <2                 POC Expires Auth Status Start Date Expiration Date PT Visit Limit    5/30 N/A 3/20 N/A As needed   Date 4/16 4/23 4/30-eval  4/10   Used 16 17 18  15   Remaining          Diagnosis: B knee pain   Precautions:    Primary Goals: flexibility in LE musculature, hip strenth   *asterisks by exercise = given for HEP   Manuals 4/18 4/23 4/30- balance eval 4/10 4/16                                           There Ex        Heel slides        HS stretch 10x10\" 10x10\"  10x10\" 10x10\"   Calf stretch 10x10\"   10x10\" 10x10\"   Richard stretch 10x10\"   10x10\" 10x10\"                                   Bike 5' 5' 5' 5' 5'   Neuro Re-Ed        Quad sets        Glute sets        SLR        Clamshell        bridges     1x10 B single leg   HS curl        Sciaatic nerve glies    1x10 B 1x10 B   Mini squats        LAQ        Standing 3 way        Leg press        X walks    1 lap GTB    Wobble board 1x2' ea       SL AB     2x10   Heel stomps     2x10   TRX squats 2x10                        Re-evaluation  AK AK      Ther Act         Education                Step ups        TKE walks    1x5 B    Tap down 1x10 4\"       Modalities                                                                                                  "

## 2024-05-02 ENCOUNTER — OFFICE VISIT (OUTPATIENT)
Dept: PHYSICAL THERAPY | Facility: CLINIC | Age: 69
End: 2024-05-02
Payer: MEDICARE

## 2024-05-02 DIAGNOSIS — R26.89 BALANCE PROBLEM: Primary | ICD-10-CM

## 2024-05-02 DIAGNOSIS — M17.0 BILATERAL PRIMARY OSTEOARTHRITIS OF KNEE: ICD-10-CM

## 2024-05-02 PROCEDURE — 97530 THERAPEUTIC ACTIVITIES: CPT

## 2024-05-02 PROCEDURE — 97110 THERAPEUTIC EXERCISES: CPT

## 2024-05-02 PROCEDURE — 97112 NEUROMUSCULAR REEDUCATION: CPT

## 2024-05-02 NOTE — PROGRESS NOTES
"Daily Note     Today's date: 2024  Patient name: Marco Richard  : 1955  MRN: [id number]  Referring provider: Tye Rodriguez MD  Dx:   Encounter Diagnosis     ICD-10-CM    1. Balance problem  R26.89       2. Bilateral primary osteoarthritis of knee  M17.0           Start Time: 0700  Stop Time: 0745  Total time in clinic (min): 45 minutes    Subjective: Pt reports that his knees are feeling okay today.        Objective: See treatment diary below      Assessment: Tolerated treatment well. Began with bike to improve blood flow prior to stretching. Transitioned focus to more balance oriented activities today.  Aide stepping on foam tolerated well, pt showed some challenge, requiring intermittent HHA and minimal LOB. Tandem walking presented as very challenging for pt though was able to perform a few steps without need for HHA.  SLS activities showed improvement with increased reps.   Patient would benefit from continued PT      Plan: Continue per plan of care.          POC Expires Auth Status Start Date Expiration Date PT Visit Limit     N/A 3/20 N/A As needed   Date -eval     Used 16 17 18 19    Remaining          Diagnosis: B knee pain   Precautions:    Primary Goals: flexibility in LE musculature, hip strenth   *asterisks by exercise = given for HEP   Manuals - balance eval                                             There Ex        Heel slides        HS stretch 10x10\" 10x10\"  10x10\"    Calf stretch 10x10\"       Richard stretch 10x10\"   10x10\"                                    Bike 5' 5' 5' 5'    Neuro Re-Ed        Quad sets        Glute sets        SLR        Clamshell        bridges        HS curl        Sciaatic nerve glies        Mini squats        LAQ        Standing 3 way        Leg press        X walks        Wobble board 1x2' ea       SL AB        Heel stomps        TRX squats 2x10       SLS cone taps    1x5 B              Re-evaluation  AK AK      Ther " "Act         Education                Side stepping foam    3 laps    Tandem walking    3 laps    Step ups        TKE walks        Tap down 1x10 4\"       Modalities                                                                                           "

## 2024-05-09 ENCOUNTER — OFFICE VISIT (OUTPATIENT)
Dept: PHYSICAL THERAPY | Facility: CLINIC | Age: 69
End: 2024-05-09
Payer: MEDICARE

## 2024-05-09 DIAGNOSIS — R26.89 BALANCE PROBLEM: Primary | ICD-10-CM

## 2024-05-09 DIAGNOSIS — M17.0 BILATERAL PRIMARY OSTEOARTHRITIS OF KNEE: ICD-10-CM

## 2024-05-09 PROCEDURE — 97110 THERAPEUTIC EXERCISES: CPT

## 2024-05-09 PROCEDURE — 97112 NEUROMUSCULAR REEDUCATION: CPT

## 2024-05-09 PROCEDURE — 97530 THERAPEUTIC ACTIVITIES: CPT

## 2024-05-09 NOTE — PROGRESS NOTES
"Daily Note     Today's date: 2024  Patient name: Marco Richard  : 1955  MRN: [id number]  Referring provider: Tye Rodriguez MD  Dx:   Encounter Diagnosis     ICD-10-CM    1. Balance problem  R26.89       2. Bilateral primary osteoarthritis of knee  M17.0           Start Time: 0700  Stop Time: 0745  Total time in clinic (min): 45 minutes    Subjective: Pt reports that he had a challenge during last treatment session, though nothing too bad afterward.       Objective: See treatment diary below      Assessment: Tolerated treatment well. Treatment began with warm up on bike and followed up with some stretching for HS and calf.  Continued onto balance activities.  Pt presented with more challenge standing on the L as compared to standing on the R with single leg activities.  Patient demonstrated fatigue post treatment, exhibited good technique with therapeutic exercises, and would benefit from continued PT      Plan: Continue per plan of care.       POC Expires Auth Status Start Date Expiration Date PT Visit Limit     N/A 3/20 N/A As needed   Date -eval    Used 16 17 18 19 20   Remaining          Diagnosis: B knee pain   Precautions:    Primary Goals: flexibility in LE musculature, hip strenth   *asterisks by exercise = given for HEP   Manuals - balance eval                                            There Ex        Heel slides        HS stretch 10x10\" 10x10\"  10x10\" 10x10\"   Calf stretch 10x10\"    10x10\"   Richard stretch 10x10\"   10x10\"                                    Bike 5' 5' 5' 5' 5'   Neuro Re-Ed        Quad sets        Glute sets        SLR        Clamshell        bridges        HS curl        Sciaatic nerve glies        Mini squats        LAQ        Standing 3 way        Leg press        X walks        Wobble board 1x2' ea       SL AB        Heel stomps        TRX squats 2x10       SLS cone taps    1x5 B  1x10 B   Cone 3 way     1x10             " "       Re-evaluation  AK AK      Ther Act         Education                        Hurdles     3 laps forward/lat   Side stepping foam    3 laps 3 laps foam   Tandem walking    3 laps 3 laps   Step ups        TKE walks        Tap down 1x10 4\"       Modalities                                                                                             "

## 2024-05-16 ENCOUNTER — OFFICE VISIT (OUTPATIENT)
Dept: PHYSICAL THERAPY | Facility: CLINIC | Age: 69
End: 2024-05-16
Payer: MEDICARE

## 2024-05-16 DIAGNOSIS — R26.89 BALANCE PROBLEM: Primary | ICD-10-CM

## 2024-05-16 DIAGNOSIS — M17.0 BILATERAL PRIMARY OSTEOARTHRITIS OF KNEE: ICD-10-CM

## 2024-05-16 PROCEDURE — 97140 MANUAL THERAPY 1/> REGIONS: CPT

## 2024-05-16 PROCEDURE — 97110 THERAPEUTIC EXERCISES: CPT

## 2024-05-16 NOTE — PROGRESS NOTES
"Daily Note     Today's date: 2024  Patient name: Marco Richard  : 1955  MRN: [id number]  Referring provider: Tye Rodriguez MD  Dx:   Encounter Diagnosis     ICD-10-CM    1. Balance problem  R26.89       2. Bilateral primary osteoarthritis of knee  M17.0           Start Time: 0700  Stop Time: 0745  Total time in clinic (min): 45 minutes    Subjective: Pt reports that his knee suddenly had some pain come 4 days ago.  It has been slowly decreasing in pain, though it was with every step.        Objective: See treatment diary below      Assessment: Tolerated treatment well. Began today with trial of bike with pt experiencing intense pain in his knee and was unable to complete full revolution.  Moved onto knee bends with yoga ball to work on gentle movement.  Added patellar mobs as well as stretching.  Pt continues with some discomfort after stretching so followed up with nerve glides. PT had no pain afterward and moved onto a strengthening activity.  Patient demonstrated fatigue post treatment, exhibited good technique with therapeutic exercises, and would benefit from continued PT      Plan: Continue per plan of care.       POC Expires Auth Status Start Date Expiration Date PT Visit Limit     N/A 3/20 N/A As needed   Date -eval    Used 21  18 19 20   Remaining          Diagnosis: B knee pain   Precautions:    Primary Goals: flexibility in LE musculature, hip strenth   *asterisks by exercise = given for HEP   Manuals - balance eval                                            There Ex        Heel slides        HS stretch 10x10\"   10x10\" 10x10\"   Calf stretch 10x10\"    10x10\"   Richard stretch 10x10\"   10x10\"    DKTC x20                               Bike 5'  5' 5' 5'   Neuro Re-Ed        Quad sets        Glute sets        SLR        Clamshell        bridges        HS curl        Sciaatic nerve glies        Mini squats        LAQ 2x10x5\"       Standing 3 way      "   Leg press        X walks        Wobble board        SL AB        Heel stomps        TRX squats        SLS cone taps    1x5 B  1x10 B   Cone 3 way     1x10                    Re-evaluation   AK      Ther Act         Education                        Hurdles     3 laps forward/lat   Side stepping foam    3 laps 3 laps foam   Tandem walking    3 laps 3 laps   Step ups        TKE walks        Tap down        Modalities

## 2024-05-23 ENCOUNTER — OFFICE VISIT (OUTPATIENT)
Dept: PHYSICAL THERAPY | Facility: CLINIC | Age: 69
End: 2024-05-23
Payer: MEDICARE

## 2024-05-23 DIAGNOSIS — R26.89 BALANCE PROBLEM: Primary | ICD-10-CM

## 2024-05-23 DIAGNOSIS — M17.0 BILATERAL PRIMARY OSTEOARTHRITIS OF KNEE: ICD-10-CM

## 2024-05-23 PROCEDURE — 97112 NEUROMUSCULAR REEDUCATION: CPT

## 2024-05-23 PROCEDURE — 97530 THERAPEUTIC ACTIVITIES: CPT

## 2024-05-23 PROCEDURE — 97110 THERAPEUTIC EXERCISES: CPT

## 2024-05-23 NOTE — PROGRESS NOTES
"Daily Note     Today's date: 2024  Patient name: Marco Richard  : 1955  MRN: [id number]  Referring provider: Tye Rodriguez MD  Dx:   Encounter Diagnosis     ICD-10-CM    1. Balance problem  R26.89       2. Bilateral primary osteoarthritis of knee  M17.0           Start Time: 0700  Stop Time: 0745  Total time in clinic (min): 45 minutes    Subjective: Pt reports that his R knee has been feeling painful out of nowhere.        Objective: See treatment diary below      Assessment: Tolerated treatment well. Began today with warm up on bike with pt having some discomfort that loosened up after a few revolutions.  Moved onto stretching next with decreased tightness noted afterward. Moved onto balance activities next.  Pt continues with challenge during tandem walking, though shows improvement with stair taps as well as wobble board today.   Patient demonstrated fatigue post treatment, exhibited good technique with therapeutic exercises, and would benefit from continued PT      Plan: Continue per plan of care.       POC Expires Auth Status Start Date Expiration Date PT Visit Limit     N/A 320 N/A As needed   Date    Used 21 22  19 20   Remaining          Diagnosis: B knee pain   Precautions:    Primary Goals: flexibility in LE musculature, hip strenth   *asterisks by exercise = given for HEP   Manuals                                            There Ex        Heel slides        HS stretch 10x10\" 10x10\"  10x10\" 10x10\"   Calf stretch 10x10\" 10x10\"   10x10\"   Richard stretch 10x10\" 10x10\"  10x10\"    DKTC x20                               Bike 5' 5'  5' 5'   Neuro Re-Ed        Quad sets        Glute sets        SLR        Clamshell        bridges        HS curl        Sciaatic nerve glies        Mini squats        LAQ 2x10x5\"       Standing 3 way        Leg press        X walks        Wobble board  X20 ea      SL AB        Heel stomps        TRX squats        SLS " cone taps    1x5 B  1x10 B   Cone 3 way     1x10                    Re-evaluation         Ther Act         Education                        Hurdles     3 laps forward/lat   Side stepping foam    3 laps 3 laps foam   Tandem walking  4 laps mirror  3 laps 3 laps   Step ups        TKE walks        Tap down        Modalities

## 2024-05-25 DIAGNOSIS — I10 ESSENTIAL HYPERTENSION: ICD-10-CM

## 2024-05-27 RX ORDER — NIFEDIPINE 30 MG/1
30 TABLET, EXTENDED RELEASE ORAL DAILY
Qty: 90 TABLET | Refills: 1 | Status: SHIPPED | OUTPATIENT
Start: 2024-05-27

## 2024-05-30 ENCOUNTER — OFFICE VISIT (OUTPATIENT)
Dept: PHYSICAL THERAPY | Facility: CLINIC | Age: 69
End: 2024-05-30
Payer: MEDICARE

## 2024-05-30 DIAGNOSIS — R26.89 BALANCE PROBLEM: Primary | ICD-10-CM

## 2024-05-30 DIAGNOSIS — M17.0 BILATERAL PRIMARY OSTEOARTHRITIS OF KNEE: ICD-10-CM

## 2024-05-30 PROCEDURE — 97112 NEUROMUSCULAR REEDUCATION: CPT

## 2024-05-30 NOTE — PROGRESS NOTES
"Daily Note     Today's date: 2024  Patient name: Marco Richard  : 1955  MRN: [id number]  Referring provider: Tye Rodriguez MD  Dx:   Encounter Diagnosis     ICD-10-CM    1. Balance problem  R26.89       2. Bilateral primary osteoarthritis of knee  M17.0           Start Time: 0730  Stop Time: 0815  Total time in clinic (min): 45 minutes    Subjective: Pt reports that he has some tightness within his knee but not a ton of pain otherwise.        Objective: See treatment diary below      Assessment: Tolerated treatment well. Began treatment today with stretching for the LEs with good tolerance and good stretch with no pain noted.  Continued onto balance activities.  Pt presents with improvements in wobble board though continues with most challenge during SLS activities.  He has more challenge standing on the R as compared to L. Patient demonstrated fatigue post treatment, exhibited good technique with therapeutic exercises, and would benefit from continued PT      Plan: Continue per plan of care.       POC Expires Auth Status Start Date Expiration Date PT Visit Limit     N/A 3/20 N/A As needed   Date    Used 21 22 23 19 20   Remaining          Diagnosis: B knee pain   Precautions:    Primary Goals: flexibility in LE musculature, hip strenth   *asterisks by exercise = given for HEP   Manuals                                            There Ex        Heel slides        HS stretch 10x10\" 10x10\" 10x10\" 10x10\" 10x10\"   Calf stretch 10x10\" 10x10\" 10x10\"  10x10\"   Richard stretch 10x10\" 10x10\" 10x10\" 10x10\"    DKTC x20                               Bike 5' 5' 5' 5' 5'   Neuro Re-Ed        Quad sets        Glute sets        SLR        Clamshell        bridges        HS curl        Sciaatic nerve glies        Mini squats        LAQ 2x10x5\"       Standing 3 way        Leg press        X walks        Wobble board  X20 ea x20     SL AB        Heel stomps      "   TRX squats        SLS cone taps   x20 1x5 B  1x10 B   Cone 3 way     1x10   Foam marches   x20              Re-evaluation         Ther Act         Education                        Hurdles   4 1/2 laps forward, lat  3 laps forward/lat   Side stepping foam    3 laps 3 laps foam   Tandem walking  4 laps mirror  3 laps 3 laps   Step ups        TKE walks        Tap down        Modalities

## 2024-05-31 ENCOUNTER — APPOINTMENT (OUTPATIENT)
Dept: LAB | Facility: CLINIC | Age: 69
End: 2024-05-31
Payer: MEDICARE

## 2024-05-31 DIAGNOSIS — C61 PROSTATE CANCER (HCC): ICD-10-CM

## 2024-05-31 LAB — PSA SERPL-MCNC: 8.79 NG/ML (ref 0–4)

## 2024-05-31 PROCEDURE — 36415 COLL VENOUS BLD VENIPUNCTURE: CPT

## 2024-05-31 PROCEDURE — 84153 ASSAY OF PSA TOTAL: CPT

## 2024-06-04 ENCOUNTER — OFFICE VISIT (OUTPATIENT)
Dept: PHYSICAL THERAPY | Facility: CLINIC | Age: 69
End: 2024-06-04
Payer: MEDICARE

## 2024-06-04 DIAGNOSIS — M17.0 BILATERAL PRIMARY OSTEOARTHRITIS OF KNEE: ICD-10-CM

## 2024-06-04 DIAGNOSIS — R26.89 BALANCE PROBLEM: Primary | ICD-10-CM

## 2024-06-04 PROCEDURE — 97110 THERAPEUTIC EXERCISES: CPT

## 2024-06-04 PROCEDURE — 97112 NEUROMUSCULAR REEDUCATION: CPT

## 2024-06-04 NOTE — PROGRESS NOTES
PT Re-Evaluation     Today's date: 2024  Patient name: Marco Richard  : 1955  MRN: [id number]  Referring provider: Tye Rodriguez MD  Dx:   Encounter Diagnosis     ICD-10-CM    1. Balance problem  R26.89       2. Bilateral primary osteoarthritis of knee  M17.0           Start Time: 0700  Stop Time: 0745  Total time in clinic (min): 45 minutes    Assessment  Impairments: abnormal coordination, abnormal gait, abnormal muscle firing, abnormal movement, activity intolerance, impaired balance, impaired physical strength, lacks appropriate home exercise program and safety issue  Symptom irritability: moderate    Assessment details: Marco Richard has been compliant with attending PT and home exercise program since initial eval.  Marco  has made improvements in objective data since initial eval but continues to have limitations compared to prior level of function. He continues with R knee discomfort that does well with stretching, though can have some decrease in tightness afterward.  He has been working on his balance though continues with challenge during SLS activities.  He has been improving in static balance and adapts well on unstable surfaces, though does continue with challenge.    Marco continues to have deficits in the above listed impairments and would benefit from additional skilled PT to address these deficits to return to prior level of function.      Understanding of Dx/Px/POC: good     Prognosis: good  Prognosis details: Positive prognostic indicators include positive attitude toward recovery and good understanding of diagnosis and treatment plan options.  Negative prognostic indicators include chronicity of symptoms and multiple concurrent orthopedic problems.      Goals  Impairment Goals 4-6 weeks  - Improve knee AROM to equal to the unaffected lower extremity- progressing  - Increase ankle strength to 4/5 throughout- progressing  - Increase hip strength to 5/5 throughout-  progressing    Functional Goals 6-8 weeks  - Return to Prior Level of Function- progressing  - Patient will be independent with HEP- progressing  - Patient will be able to squat without increased pain/compensation/difficulty- progressing  - Patient will be able to walk without increased pain/compensation/difficulty - progressing  - Patient will be able to ascend and descend stairs without increased pain/compensation/difficulty - progressing  - Patient will be able to perform 25 single leg heel raises without increased  pain/compensation/difficulty- progressing            Plan  Patient would benefit from: skilled physical therapy  Planned modality interventions: Modalities PRN.    Planned therapy interventions: manual therapy, neuromuscular re-education, patient education, therapeutic activities, therapeutic exercise, graded activity, home exercise program, behavior modification, self care, body mechanics training, flexibility, activity modification and functional ROM exercises    Frequency: 1-2x week  Duration in weeks: 4  Treatment plan discussed with: patient        Subjective Evaluation    History of Present Illness  Mechanism of injury: Re-Eval:  Pt reports that he feels as though he is about 10% better since starting physical therapy for his balance.  He continues with discomfort in his R knee and is continuing stretching at home as he can tolerate. He reports feeling as though his balance with activities throughout his day.  He reports that his knee can complicate his balance.        WORK/SCHOOL: Retired,   HOME LIFE: Lives with others, 13 steps (landing within)  HOBBIES/EXERCISE: living history organization, senior [place] [facility], walking  PLOF:  Physical therapy in past for L knee,   HISTORY OF CURRENT INJURY:  He reports that he has felt off with his balance since his surgery around 1 year ago.  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  Since then his balance has been off.    PAIN  LOCATION/DESCRIPTORS:   AGGRAVATING FACTORS:  standing greater than 5 min, movement of head with standing/walking, walking, ascending/descending steps with/without carrying items  EASES:  rest  DAY PATTERN: no day pattern  IMAGING:    Marco denies a new onset of Bladder incontinence, Bowel dysfunction, Dizziness, Dysphagia, Dysarthria, Drop attacks, Diplopia, Nausea, Ataxia, Recent unexplained weight loss, Clumsy or unsteadiness, Constant night pain, History of cancer, Tingling, Numbness, and Saddle anesthesia .  PATIENT GOALS:  regain balance,         Re-Eval 4/23:  Pt reports that he feels as though he is about 85% better since starting physical therapy. He reports that he has some minimal pain in the R and no pain in the L lately.  He reports that he continues to have some discomfort when sitting for longer periods of time prior to getting up.  He also reports that he has some discomfort when getting into and out of the car.  He no longer reports pain with walking activities, reporting it feeling more of a stiffness instead. He reports that his R knee hurts when ascending and descending steps.  Though he no longer reports pain with moving sitting to standing, he repots decreased strength and balance during. He reports that he continues with some feeling of imbalance       Re-Eval 3/21:  Pt reports that he feels as though he is about 70% better since starting physical therapy.  He reports that he feels as though he has more strength when he is getting up.  He also reports that he is also seeing better balance.  Both he reports would be beneficial to continue working on though.  He reports that he feels his L is improving in pain, though continues with some discomfort and has more lately in the R.  He reports having improved with getting into and out of the car.  He reports that it would be beneficial to continue strengthening as well as improve balance.      IE:  WORK/SCHOOL: Retired,   HOME LIFE: Lives with  others, 13 steps (landing within)  HOBBIES/EXERCISE: living history organization, senior [place] [facility] senior [place], walking  PLOF:  Physical therapy in past for L knee,   HISTORY OF CURRENT INJURY:  He now has pain in B knees.  Had a cortisone shot about 2 weeks ago in B knees and felt great.  The injections helped for a while and the pain has began.  His discomfort began again about 1 week after the injection. He reports it as stiffness rather than pain.   PAIN LOCATION/DESCRIPTORS: Anterior knee, B  AGGRAVATING FACTORS:  exercises, in and out of car (L), steps (must use railing), sit to stand, 10 min standing before feeling wobbly,   EASES: rest,   DAY PATTERN: no day pattern  IMAGING:  x-ray CARMITA Lara denies a new onset of Bladder incontinence, Bowel dysfunction, Dizziness, Dysphagia, Dysarthria, Drop attacks, Diplopia, Nausea, Ataxia, Recent unexplained weight loss, Clumsy or unsteadiness, Constant night pain, History of cancer, Tingling, Numbness, and Saddle anesthesia .  PATIENT GOALS: steady on feet, more flexible,     Patient Goals  Patient goals for therapy: increased strength, improved balance and decreased pain    Pain  Current pain ratin  At best pain ratin  At worst pain ratin  Quality: discomfort and tight  Relieving factors: rest  Aggravating factors: stair climbing, standing, sitting, walking and lifting          Objective     Palpation     Additional Palpation Details  Tightness noted through B quad, HS, calf B, improved though continues with some tightness    Neurological Testing     Additional Neurological Details  No new neurological changes to note.      Active Range of Motion   Left Knee   Flexion: 110 degrees   Extension: 1 degrees     Right Knee   Flexion: 106 degrees with pain  Extension: 1 degrees     Passive Range of Motion     Additional Passive Range of Motion Details  Equal to AROM    Strength/Myotome Testing     Left Hip   Planes of Motion   Flexion: 4+  Extension:  "4+  Abduction: 4-    Isolated Muscles   Gluteus yuko: 4    Right Hip   Planes of Motion   Flexion: 4+  Extension: 4+  Abduction: 4-    Isolated Muscles   Gluteus maximums: 4    Left Knee   Flexion: 5  Prone flexion: 4+  Extension: 4+    Right Knee   Flexion: 5  Prone flexion: 4+  Extension: 4+    Additional Strength Details  R heel raise: 1  L heel raise: 2  Hip Strength    Flex:  R  4/5, L  4/5   AB:   R  4-/5, L  4-/5   Ext:   R  4/5, L  4/5       General Comments:      Hip Comments   mCTSIB:    FTEO: 30 sec   FTEC: 30 sec mod sway  FTEO foam: 30 sec min sway  FTEC foam:12 sec LOB    SLS R: <2  SLS L: <2    Semi-tandem foam: 30 sec B                 POC Expires Auth Status Start Date Expiration Date PT Visit Limit    5/30 N/A 3/20 N/A As needed   Date 5/16 5/23 5/30 6/4 re-eval    Used 21 22 23 24    Remaining          Diagnosis: B knee pain   Precautions:    Primary Goals: flexibility in LE musculature, hip strenth   *asterisks by exercise = given for HEP   Manuals 5/16 5/23 5/30 6/4                                            There Ex        Heel slides        HS stretch 10x10\" 10x10\" 10x10\" 10x10\"    Calf stretch 10x10\" 10x10\" 10x10\" 10x10\"    Richard stretch 10x10\" 10x10\" 10x10\" 10x10\"    DKTC x20                               Bike 5' 5' 5' 5'    Neuro Re-Ed        Quad sets        Glute sets        SLR        Clamshell        bridges        HS curl        Sciaatic nerve glies        Mini squats        LAQ 2x10x5\"       Standing 3 way        Leg press        X walks        Wobble board  X20 ea x20 X20 ea    SL AB        Heel stomps        TRX squats        SLS cone taps   x20     Cone 3 way        Foam marches   x20              Re-evaluation    AK     Ther Act         Education                        Hurdles   4 1/2 laps forward, lat     Side stepping foam        Tandem walking  4 laps mirror  3 laps    Step ups        TKE walks        Tap down        Modalities                                                "

## 2024-06-07 ENCOUNTER — APPOINTMENT (OUTPATIENT)
Dept: PHYSICAL THERAPY | Facility: CLINIC | Age: 69
End: 2024-06-07
Payer: MEDICARE

## 2024-06-12 ENCOUNTER — OFFICE VISIT (OUTPATIENT)
Dept: PHYSICAL THERAPY | Facility: CLINIC | Age: 69
End: 2024-06-12
Payer: MEDICARE

## 2024-06-12 DIAGNOSIS — R26.89 BALANCE PROBLEM: Primary | ICD-10-CM

## 2024-06-12 PROCEDURE — 97112 NEUROMUSCULAR REEDUCATION: CPT

## 2024-06-12 PROCEDURE — 97110 THERAPEUTIC EXERCISES: CPT

## 2024-06-12 NOTE — PROGRESS NOTES
"Daily Note     Today's date: 2024  Patient name: Marco Richard  : 1955  MRN: [id number]  Referring provider: Tye Rodriguez MD  Dx:   Encounter Diagnosis     ICD-10-CM    1. Balance problem  R26.89           Start Time: 0800  Stop Time: 0845  Total time in clinic (min): 45 minutes    Subjective: Pt reports that his L knee has been feeling a bit better, though continues with R knee pain.  He reports that his balance has been slowly improving.       Objective: See treatment diary below      Assessment: Tolerated treatment well. Today began with warm up on treadmill in order to decrease tightness and stiffness within LE musculature.  Followed up with stretching for HS, calf, quad with good tolerance though stiffness in R noted. Moved to [place] balance activities to provide visual feedback to help with balance.  Moved onto tandem walking as well as side stepping on unstable surfaces.  Patient demonstrated fatigue post treatment, exhibited good technique with therapeutic exercises, and would benefit from continued PT      Plan: Continue per plan of care.          POC Expires Auth Status Start Date Expiration Date PT Visit Limit     N/A 3/20 N/A As needed   Date  re-eval    Used 21 22 23 24 25   Remaining          Diagnosis: B knee pain   Precautions:    Primary Goals: flexibility in LE musculature, hip strenth   *asterisks by exercise = given for HEP   Manuals                                            There Ex        Heel slides        HS stretch 10x10\" 10x10\" 10x10\" 10x10\" 10x10\"   Calf stretch 10x10\" 10x10\" 10x10\" 10x10\" 10x10\"   Richard stretch 10x10\" 10x10\" 10x10\" 10x10\" 10x10\"   DKTC x20                               Bike 5' 5' 5' 5' 5' TM   Neuro Re-Ed        Quad sets        Glute sets        SLR        Clamshell        bridges        HS curl        Sciaatic nerve glies        Mini squats        LAQ 2x10x5\"       Standing 3 way        Leg press  "       X walks        Wobble board  X20 ea x20 X20 ea    SL AB        Heel stomps        TRX squats        SLS cone taps   x20     Cone 3 way        Foam marches   x20              Re-evaluation    AK     Ther Act         Education                Biodex     2 rounds, maze, control, and limits   Hurdles   4 1/2 laps forward, lat     Side stepping foam     4 laps   Tandem walking  4 laps mirror  3 laps 3 laps   Step ups        TKE walks        Tap down        Modalities

## 2024-06-14 ENCOUNTER — APPOINTMENT (OUTPATIENT)
Dept: PHYSICAL THERAPY | Facility: CLINIC | Age: 69
End: 2024-06-14
Payer: MEDICARE

## 2024-06-14 DIAGNOSIS — I10 PRIMARY HYPERTENSION: ICD-10-CM

## 2024-06-14 RX ORDER — LISINOPRIL 20 MG/1
20 TABLET ORAL DAILY
Qty: 90 TABLET | Refills: 1 | Status: SHIPPED | OUTPATIENT
Start: 2024-06-14

## 2024-06-15 DIAGNOSIS — E03.9 HYPOTHYROIDISM, UNSPECIFIED TYPE: ICD-10-CM

## 2024-06-15 RX ORDER — LEVOTHYROXINE SODIUM 0.05 MG/1
50 TABLET ORAL
Qty: 90 TABLET | Refills: 1 | Status: SHIPPED | OUTPATIENT
Start: 2024-06-15

## 2024-06-18 ENCOUNTER — RA CDI HCC (OUTPATIENT)
Dept: OTHER | Facility: HOSPITAL | Age: 69
End: 2024-06-18

## 2024-06-18 NOTE — PROGRESS NOTES
HCC coding opportunities          Chart Reviewed number of suggestions sent to Provider: 2     Patients Insurance     Medicare Insurance: Medicare        E11.51  E11.22

## 2024-06-19 ENCOUNTER — OFFICE VISIT (OUTPATIENT)
Dept: PHYSICAL THERAPY | Facility: CLINIC | Age: 69
End: 2024-06-19
Payer: MEDICARE

## 2024-06-19 ENCOUNTER — APPOINTMENT (OUTPATIENT)
Dept: LAB | Facility: CLINIC | Age: 69
End: 2024-06-19
Payer: MEDICARE

## 2024-06-19 DIAGNOSIS — R97.20 ELEVATED PSA: ICD-10-CM

## 2024-06-19 DIAGNOSIS — M17.0 BILATERAL PRIMARY OSTEOARTHRITIS OF KNEE: ICD-10-CM

## 2024-06-19 DIAGNOSIS — E11.42 TYPE 2 DIABETES MELLITUS WITH DIABETIC POLYNEUROPATHY, WITHOUT LONG-TERM CURRENT USE OF INSULIN (HCC): ICD-10-CM

## 2024-06-19 DIAGNOSIS — I10 PRIMARY HYPERTENSION: ICD-10-CM

## 2024-06-19 DIAGNOSIS — R26.89 BALANCE PROBLEM: Primary | ICD-10-CM

## 2024-06-19 DIAGNOSIS — N17.9 AKI (ACUTE KIDNEY INJURY) (HCC): ICD-10-CM

## 2024-06-19 DIAGNOSIS — E78.5 HYPERLIPIDEMIA LDL GOAL <70: ICD-10-CM

## 2024-06-19 DIAGNOSIS — E03.9 HYPOTHYROIDISM, UNSPECIFIED TYPE: ICD-10-CM

## 2024-06-19 LAB
ANION GAP SERPL CALCULATED.3IONS-SCNC: 14 MMOL/L (ref 4–13)
BACTERIA UR QL AUTO: NORMAL /HPF
BILIRUB UR QL STRIP: NEGATIVE
BUN SERPL-MCNC: 27 MG/DL (ref 5–25)
CALCIUM SERPL-MCNC: 8.8 MG/DL (ref 8.4–10.2)
CHLORIDE SERPL-SCNC: 103 MMOL/L (ref 96–108)
CHOLEST SERPL-MCNC: 141 MG/DL
CLARITY UR: CLEAR
CO2 SERPL-SCNC: 21 MMOL/L (ref 21–32)
COLOR UR: ABNORMAL
CREAT SERPL-MCNC: 1.14 MG/DL (ref 0.6–1.3)
CREAT UR-MCNC: 78.6 MG/DL
EST. AVERAGE GLUCOSE BLD GHB EST-MCNC: 134 MG/DL
GFR SERPL CREATININE-BSD FRML MDRD: 66 ML/MIN/1.73SQ M
GLUCOSE P FAST SERPL-MCNC: 122 MG/DL (ref 65–99)
GLUCOSE UR STRIP-MCNC: ABNORMAL MG/DL
HBA1C MFR BLD: 6.3 %
HDLC SERPL-MCNC: 63 MG/DL
HGB UR QL STRIP.AUTO: ABNORMAL
KETONES UR STRIP-MCNC: ABNORMAL MG/DL
LDLC SERPL CALC-MCNC: 58 MG/DL (ref 0–100)
LEUKOCYTE ESTERASE UR QL STRIP: ABNORMAL
MICROALBUMIN UR-MCNC: 121.4 MG/L
MICROALBUMIN/CREAT 24H UR: 154 MG/G CREATININE (ref 0–30)
NITRITE UR QL STRIP: NEGATIVE
NON-SQ EPI CELLS URNS QL MICRO: NORMAL /HPF
NONHDLC SERPL-MCNC: 78 MG/DL
PH UR STRIP.AUTO: 5.5 [PH]
POTASSIUM SERPL-SCNC: 4.3 MMOL/L (ref 3.5–5.3)
PROT UR STRIP-MCNC: ABNORMAL MG/DL
PSA SERPL-MCNC: 7.5 NG/ML (ref 0–4)
RBC #/AREA URNS AUTO: NORMAL /HPF
SODIUM SERPL-SCNC: 138 MMOL/L (ref 135–147)
SP GR UR STRIP.AUTO: 1.02 (ref 1–1.03)
TRIGL SERPL-MCNC: 99 MG/DL
TSH SERPL DL<=0.05 MIU/L-ACNC: 2.58 UIU/ML (ref 0.45–4.5)
UROBILINOGEN UR STRIP-ACNC: <2 MG/DL
WBC #/AREA URNS AUTO: NORMAL /HPF

## 2024-06-19 PROCEDURE — 97530 THERAPEUTIC ACTIVITIES: CPT

## 2024-06-19 PROCEDURE — 97112 NEUROMUSCULAR REEDUCATION: CPT

## 2024-06-19 PROCEDURE — 83036 HEMOGLOBIN GLYCOSYLATED A1C: CPT

## 2024-06-19 PROCEDURE — 80048 BASIC METABOLIC PNL TOTAL CA: CPT

## 2024-06-19 PROCEDURE — 97110 THERAPEUTIC EXERCISES: CPT

## 2024-06-19 PROCEDURE — 36415 COLL VENOUS BLD VENIPUNCTURE: CPT

## 2024-06-19 PROCEDURE — 80061 LIPID PANEL: CPT

## 2024-06-19 PROCEDURE — 84153 ASSAY OF PSA TOTAL: CPT

## 2024-06-19 PROCEDURE — 82570 ASSAY OF URINE CREATININE: CPT

## 2024-06-19 PROCEDURE — 82043 UR ALBUMIN QUANTITATIVE: CPT

## 2024-06-19 PROCEDURE — 81001 URINALYSIS AUTO W/SCOPE: CPT

## 2024-06-19 NOTE — PROGRESS NOTES
"Daily Note     Today's date: 2024  Patient name: Marco Richard  : 1955  MRN: [id number]  Referring provider: Tye Rodriguez MD  Dx:   Encounter Diagnosis     ICD-10-CM    1. Balance problem  R26.89       2. Bilateral primary osteoarthritis of knee  M17.0           Start Time: 0800  Stop Time: 0845  Total time in clinic (min): 45 minutes    Subjective: Pt reports that he is a bit stiff in the morning but feeling okay today overall.       Objective: See treatment diary below      Assessment: Tolerated treatment well. Began today with warm up on bike with pt requiring some pendulums prior to continued onto full revolutions.  Continued onto some stretching for the lower extremity.  Moved onto tandem walking.  Pt had some difficulty with first round, though with continuation improved with his balance.  Added balance throws today with pt having good reactive and anticipatory balance as well.   Patient demonstrated fatigue post treatment, exhibited good technique with therapeutic exercises, and would benefit from continued PT      Plan: Continue per plan of care.       POC Expires Auth Status Start Date Expiration Date PT Visit Limit     N/A 3/20 N/A As needed   Date  re-eval    Used 26 22 23 24 25   Remaining          Diagnosis: B knee pain   Precautions:    Primary Goals: flexibility in LE musculature, hip strenth   *asterisks by exercise = given for HEP   Manuals                                            There Ex        Heel slides        HS stretch 10x10\"  10x10\" 10x10\" 10x10\"   Calf stretch 10x10\"  10x10\" 10x10\" 10x10\"   Richard stretch   10x10\" 10x10\" 10x10\"   DKTC                                Bike 5'  5' 5' 5' TM   Neuro Re-Ed        Quad sets        Glute sets        SLR        Clamshell        bridges        HS curl        Sciaatic nerve glies        Mini squats        LAQ        Standing 3 way        Leg press        X walks        Wobble board   " x20 X20 ea    SL AB        Heel stomps        TRX squats        SLS cone taps   x20     Cone 3 way        Foam marches   x20     Balance throws 1x10, feet together, semi tandem, tandem foam feet together                        Re-evaluation    AK     Ther Act         Education                Biodex 2 rounds, maze, control, and limits    2 rounds, maze, control, and limits   Hurdles   4 1/2 laps forward, lat     Side stepping foam     4 laps   Tandem walking 4 laps mirror   3 laps 3 laps   Step ups        TKE walks        Tap down        Modalities

## 2024-06-21 ENCOUNTER — APPOINTMENT (OUTPATIENT)
Dept: PHYSICAL THERAPY | Facility: CLINIC | Age: 69
End: 2024-06-21
Payer: MEDICARE

## 2024-06-25 ENCOUNTER — OFFICE VISIT (OUTPATIENT)
Dept: FAMILY MEDICINE CLINIC | Facility: CLINIC | Age: 69
End: 2024-06-25
Payer: MEDICARE

## 2024-06-25 VITALS
HEIGHT: 75 IN | BODY MASS INDEX: 31.83 KG/M2 | HEART RATE: 102 BPM | DIASTOLIC BLOOD PRESSURE: 70 MMHG | WEIGHT: 256 LBS | RESPIRATION RATE: 18 BRPM | SYSTOLIC BLOOD PRESSURE: 144 MMHG | OXYGEN SATURATION: 98 %

## 2024-06-25 DIAGNOSIS — I10 PRIMARY HYPERTENSION: ICD-10-CM

## 2024-06-25 DIAGNOSIS — E11.42 TYPE 2 DIABETES MELLITUS WITH DIABETIC POLYNEUROPATHY, WITHOUT LONG-TERM CURRENT USE OF INSULIN (HCC): Primary | ICD-10-CM

## 2024-06-25 DIAGNOSIS — Z28.21 PNEUMOCOCCAL VACCINATION DECLINED BY PATIENT: ICD-10-CM

## 2024-06-25 DIAGNOSIS — E03.9 HYPOTHYROIDISM, UNSPECIFIED TYPE: ICD-10-CM

## 2024-06-25 DIAGNOSIS — E78.5 HYPERLIPIDEMIA LDL GOAL <70: ICD-10-CM

## 2024-06-25 DIAGNOSIS — Z28.21 TETANUS, DIPHTHERIA, AND ACELLULAR PERTUSSIS (TDAP) VACCINATION DECLINED: ICD-10-CM

## 2024-06-25 DIAGNOSIS — C61 PROSTATE CANCER (HCC): ICD-10-CM

## 2024-06-25 PROCEDURE — G2211 COMPLEX E/M VISIT ADD ON: HCPCS | Performed by: FAMILY MEDICINE

## 2024-06-25 PROCEDURE — 99214 OFFICE O/P EST MOD 30 MIN: CPT | Performed by: FAMILY MEDICINE

## 2024-06-25 NOTE — PROGRESS NOTES
Assessment/Plan:   Diagnoses and all orders for this visit:    Type 2 diabetes mellitus with diabetic polyneuropathy, without long-term current use of insulin (HCC)  -     Hemoglobin A1C; Future  -     Albumin / creatinine urine ratio; Future  -     Comprehensive metabolic panel; Future  -     Empagliflozin 25 MG TABS; Take 1 tablet (25 mg total) by mouth daily  - A1c = 6.3 <-- 6.0   - follows with Podiatry Q9wks   - declined PCV20 and Tdap in the office again today   - cont Jardiance 25mg QD and Glipizide 5mg QD   - RTO in 6months, with labs, for f/u and AWV - pt aware and agreeable     Hypothyroidism, unspecified type  -     TSH, 3rd generation with Free T4 reflex; Future    Primary hypertension  - BP stable in office   - did see Nephro 12/11/2023 - was advised to decrease ACEI from 40mg QD to 20mg QD, STOP chlorthalidone, and started on Nifedipine 30mg QD   - nml kidneys on renal US   - (+) proteinuria - has appt scheduled with Nephro in 8/2024   - cont with hydration   - caution/avoidance of NSAIDs    Pneumococcal vaccination declined by patient  Tetanus, diphtheria, and acellular pertussis (Tdap) vaccination declined    Prostate cancer (HCC)  - follows with Uro     Hyperlipidemia LDL goal <70  - LDL 58   - cont statin   - The 10-year ASCVD risk score (Demetri DOYLE, et al., 2019) is: 24.7%    Values used to calculate the score:      Age: 68 years      Sex: Male      Is Non- : No      Diabetic: Yes      Tobacco smoker: No      Systolic Blood Pressure: 144 mmHg      Is BP treated: Yes      HDL Cholesterol: 63 mg/dL      Total Cholesterol: 141 mg/dL          Subjective:    Patient ID: Marco Richard is a 68 y.o. male.  HPI  - had repeat Cscope done 4/11/2024 - 3yr recall   - has f/u with Nephro scheduled -- goal BP is less than 140/90 -- BP in the office 144/70   - reviewed labs done 6/19/2024   - A1c = 6.3 <-- 6.0   - (+) proteinuria   - LDL 58   - denies  "F/C/N/V/CP/palpitations/SOB/wheezing/abd pain/LE edema  - almost done with PT   - follows with Podiatry Q9wks       The following portions of the patient's history were reviewed and updated as appropriate: allergies, current medications, past family history, past medical history, past social history, past surgical history and problem list.    Review of Systems  as per HPI    Objective:  /70 (BP Location: Left arm, Patient Position: Sitting, Cuff Size: Large)   Pulse 102   Resp 18   Ht 6' 3\" (1.905 m)   Wt 116 kg (256 lb)   SpO2 98%   BMI 32.00 kg/m²    Physical Exam  Vitals reviewed.   Constitutional:       General: He is not in acute distress.     Appearance: Normal appearance. He is not ill-appearing, toxic-appearing or diaphoretic.   HENT:      Head: Normocephalic and atraumatic.      Right Ear: External ear normal.      Left Ear: External ear normal.      Nose: Nose normal.   Eyes:      General: No scleral icterus.        Right eye: No discharge.         Left eye: No discharge.      Extraocular Movements: Extraocular movements intact.      Conjunctiva/sclera: Conjunctivae normal.   Cardiovascular:      Rate and Rhythm: Normal rate and regular rhythm.      Heart sounds: Normal heart sounds.   Pulmonary:      Effort: Pulmonary effort is normal.      Breath sounds: Normal breath sounds.   Abdominal:      Palpations: Abdomen is soft.   Musculoskeletal:         General: Normal range of motion.      Cervical back: Normal range of motion.      Right lower leg: No edema.      Left lower leg: No edema.   Skin:     General: Skin is warm.   Neurological:      General: No focal deficit present.      Mental Status: He is alert and oriented to person, place, and time.   Psychiatric:         Mood and Affect: Mood normal.         Behavior: Behavior normal.         "

## 2024-06-26 ENCOUNTER — OFFICE VISIT (OUTPATIENT)
Dept: PHYSICAL THERAPY | Facility: CLINIC | Age: 69
End: 2024-06-26
Payer: MEDICARE

## 2024-06-26 DIAGNOSIS — M17.0 BILATERAL PRIMARY OSTEOARTHRITIS OF KNEE: ICD-10-CM

## 2024-06-26 DIAGNOSIS — R26.89 BALANCE PROBLEM: Primary | ICD-10-CM

## 2024-06-26 PROCEDURE — 97112 NEUROMUSCULAR REEDUCATION: CPT

## 2024-06-26 PROCEDURE — 97530 THERAPEUTIC ACTIVITIES: CPT

## 2024-06-26 PROCEDURE — 97110 THERAPEUTIC EXERCISES: CPT

## 2024-06-26 NOTE — PROGRESS NOTES
"Daily Note     Today's date: 2024  Patient name: Marco Richard  : 1955  MRN: [id number]  Referring provider: Tye Rodriguez MD  Dx:   Encounter Diagnosis     ICD-10-CM    1. Balance problem  R26.89       2. Bilateral primary osteoarthritis of knee  M17.0                      Subjective: Pt reports that he has been feeling pretty good since the last time he was in.        Objective: See treatment diary below      Assessment: Tolerated treatment well. Began today with warm up on bike to work on LE muscular endurance.  Added some stretching for HS as well as calf.  Added onto pt's balance on foam with hurdles.  He had some challenge first round that quickly improved with continuation.  Added heel raises on unstable surface in order to challenge patient on smaller THAD.  Patient demonstrated fatigue post treatment, exhibited good technique with therapeutic exercises, and would benefit from continued PT      Plan: Continue per plan of care.       POC Expires Auth Status Start Date Expiration Date PT Visit Limit     N/A 3/20 N/A As needed   Date  re-eval    Used 26  23 24 25   Remaining          Diagnosis: B knee pain   Precautions:    Primary Goals: flexibility in LE musculature, hip strenth   *asterisks by exercise = given for HEP   Manuals                                            There Ex        Heel slides        HS stretch 10x10\" 10x10\" 10x10\" 10x10\" 10x10\"   Calf stretch 10x10\" 10x10\" 10x10\" 10x10\" 10x10\"   Richard stretch   10x10\" 10x10\" 10x10\"   DKTC                                Bike 5' 5' 5' 5' 5' TM   Neuro Re-Ed        Quad sets        Glute sets        SLR        Clamshell        bridges        HS curl        Sciaatic nerve glies        Mini squats        LAQ        Standing 3 way        Leg press        X walks        Wobble board  X20 ea x20 X20 ea    SL AB        Heel stomps        TRX squats        SLS cone taps   x20     Cone 3 way    "     Foam marches   x20     Balance throws 1x10, feet together, semi tandem, tandem foam feet together       Heel riases  2x10 foam                Re-evaluation    AK     Ther Act         Education                Biodex 2 rounds, maze, control, and limits    2 rounds, maze, control, and limits   Hurdles   4 1/2 laps forward, lat     Side stepping foam  4 laps foam, hurdles    4 laps   Tandem walking 4 laps mirror 4 laps foam, hurdles  3 laps 3 laps   Step ups        TKE walks        Tap down        Modalities

## 2024-06-28 ENCOUNTER — APPOINTMENT (OUTPATIENT)
Dept: PHYSICAL THERAPY | Facility: CLINIC | Age: 69
End: 2024-06-28
Payer: MEDICARE

## 2024-07-01 ENCOUNTER — OFFICE VISIT (OUTPATIENT)
Dept: PHYSICAL THERAPY | Facility: CLINIC | Age: 69
End: 2024-07-01
Payer: MEDICARE

## 2024-07-01 DIAGNOSIS — M17.0 BILATERAL PRIMARY OSTEOARTHRITIS OF KNEE: ICD-10-CM

## 2024-07-01 DIAGNOSIS — R26.89 BALANCE PROBLEM: Primary | ICD-10-CM

## 2024-07-01 PROCEDURE — 97112 NEUROMUSCULAR REEDUCATION: CPT

## 2024-07-01 PROCEDURE — 97110 THERAPEUTIC EXERCISES: CPT

## 2024-07-01 PROCEDURE — 97530 THERAPEUTIC ACTIVITIES: CPT

## 2024-07-01 NOTE — PROGRESS NOTES
"Daily Note     Today's date: 2024  Patient name: Marco Richard  : 1955  MRN: [id number]  Referring provider: Tye Rodriguez MD  Dx:   Encounter Diagnosis     ICD-10-CM    1. Balance problem  R26.89       2. Bilateral primary osteoarthritis of knee  M17.0                      Subjective: Pt reports that he feels as though things are improving overall.       Objective: See treatment diary below      Assessment: Tolerated treatment well. Began with stretching and moved into bike for warm up.  Pt was able to perform full revolutions with decreased pain today. Increased challenge during side stopping activities including cone taps as well as stepping over hurdles.  He continues with challenge during tandem walking, though is improving with wobble board.    Patient demonstrated fatigue post treatment, exhibited good technique with therapeutic exercises, and would benefit from continued PT      Plan: Continue per plan of care.       POC Expires Auth Status Start Date Expiration Date PT Visit Limit     N/A 3/20 N/A As needed   Date  re-eval    Used 26  23 24 25   Remaining          Diagnosis: B knee pain   Precautions:    Primary Goals: flexibility in LE musculature, hip strenth   *asterisks by exercise = given for HEP   Manuals                                            There Ex        Heel slides        HS stretch 10x10\" 10x10\" 10x10\" 10x10\" 10x10\"   Calf stretch 10x10\" 10x10\"  10x10\" 10x10\"   Richard stretch    10x10\" 10x10\"   DKTC                                Bike 5' 5' 5' 5' 5' TM   Neuro Re-Ed        Quad sets        Glute sets        SLR        Clamshell        bridges        HS curl        Sciaatic nerve glies        Mini squats        LAQ        Standing 3 way        Leg press        X walks        Wobble board  X20 ea X20 ea X20 ea    SL AB        Heel stomps        TRX squats        SLS cone taps        Cone 3 way   2 cones 2x10 B     Foam " marches        Balance throws 1x10, feet together, semi tandem, tandem foam feet together       Heel riases  2x10 foam                Re-evaluation    AK     Ther Act         Education                Biodex 2 rounds, maze, control, and limits    2 rounds, maze, control, and limits   Hurdles        Side stepping foam  4 laps foam, hurdles  3 laps. Hurdles, cone taps  4 laps   Tandem walking 4 laps mirror 4 laps foam, hurdles 4 laps mirror 3 laps 3 laps   Step ups        TKE walks        Tap down        Modalities

## 2024-07-09 ENCOUNTER — APPOINTMENT (OUTPATIENT)
Dept: PHYSICAL THERAPY | Facility: CLINIC | Age: 69
End: 2024-07-09
Payer: MEDICARE

## 2024-07-11 ENCOUNTER — APPOINTMENT (OUTPATIENT)
Dept: PHYSICAL THERAPY | Facility: CLINIC | Age: 69
End: 2024-07-11
Payer: MEDICARE

## 2024-07-15 ENCOUNTER — OFFICE VISIT (OUTPATIENT)
Dept: PHYSICAL THERAPY | Facility: CLINIC | Age: 69
End: 2024-07-15
Payer: MEDICARE

## 2024-07-15 DIAGNOSIS — R26.89 BALANCE PROBLEM: Primary | ICD-10-CM

## 2024-07-15 DIAGNOSIS — M17.0 BILATERAL PRIMARY OSTEOARTHRITIS OF KNEE: ICD-10-CM

## 2024-07-15 PROCEDURE — 97112 NEUROMUSCULAR REEDUCATION: CPT

## 2024-07-15 PROCEDURE — 97110 THERAPEUTIC EXERCISES: CPT

## 2024-07-15 NOTE — PROGRESS NOTES
PT Re-Evaluation     Today's date: 7/15/2024  Patient name: Marco Richard  : 1955  MRN: [id number]  Referring provider: Tye Rodriguez MD  Dx:   Encounter Diagnosis     ICD-10-CM    1. Balance problem  R26.89       2. Bilateral primary osteoarthritis of knee  M17.0             Start Time: 1345  Stop Time: 1430  Total time in clinic (min): 45 minutes    Assessment  Impairments: abnormal coordination, abnormal gait, abnormal muscle firing, abnormal movement, activity intolerance, impaired balance, impaired physical strength, lacks appropriate home exercise program and safety issue  Symptom irritability: moderate    Assessment details: Marco Richard has been compliant with attending PT and home exercise program since initial eval.  Marco  has made improvements in objective data since initial eval but continues to have limitations compared to prior level of function. He is showing improvements in his balance, though reports it will only last a short amount of time after the session rather than carrying over into everyday life.  He show improvements in standing balance, showing most challenge during dynamic activities.  He continues with slow improvements in muscular endurance, though continues with some strength deficits more in the hips.  Marco continues to have deficits in the above listed impairments and would benefit from additional skilled PT to address these deficits to return to prior level of function.        Understanding of Dx/Px/POC: good     Prognosis: good  Prognosis details: Positive prognostic indicators include positive attitude toward recovery and good understanding of diagnosis and treatment plan options.  Negative prognostic indicators include chronicity of symptoms and multiple concurrent orthopedic problems.      Goals  Impairment Goals 4-6 weeks  - Improve knee AROM to equal to the unaffected lower extremity- progressing  - Increase ankle strength to 4/5 throughout-  progressing  - Increase hip strength to 5/5 throughout- progressing    Functional Goals 6-8 weeks  - Return to Prior Level of Function- progressing  - Patient will be independent with HEP- progressing  - Patient will be able to squat without increased pain/compensation/difficulty- progressing  - Patient will be able to walk without increased pain/compensation/difficulty - progressing  - Patient will be able to ascend and descend stairs without increased pain/compensation/difficulty - progressing  - Patient will be able to perform 25 single leg heel raises without increased  pain/compensation/difficulty- progressing            Plan  Patient would benefit from: skilled physical therapy  Planned modality interventions: Modalities PRN.    Planned therapy interventions: manual therapy, neuromuscular re-education, patient education, therapeutic activities, therapeutic exercise, graded activity, home exercise program, behavior modification, self care, body mechanics training, flexibility, activity modification and functional ROM exercises    Frequency: 1-2x week  Duration in weeks: 4  Treatment plan discussed with: patient        Subjective Evaluation    History of Present Illness  Mechanism of injury: Pt reports that he feels as though he is about 20% better since starting physical therapy.  He feels as though she continues with most challenge with balance and strength.  He reports that he has an improvement mildly in balance activities when he is working around. He reports that he has been feeling improvements during times surrounding his treatment sessions, though has trouble recreating certain situations at home to continue to progress.          WORK/SCHOOL: Retired,   HOME LIFE: Lives with others, 13 steps (landing within)  HOBBIES/EXERCISE: living history organization, senior [place] [facility], walking  PLOF:  Physical therapy in past for L knee,   HISTORY OF CURRENT INJURY:  He reports that he has felt off  with his balance since his surgery around 1 year ago.  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  Since then his balance has been off.    PAIN LOCATION/DESCRIPTORS:   AGGRAVATING FACTORS:  standing greater than 5 min, movement of head with standing/walking, walking, ascending/descending steps with/without carrying items  EASES:  rest  DAY PATTERN: no day pattern  IMAGING:    Marco denies a new onset of Bladder incontinence, Bowel dysfunction, Dizziness, Dysphagia, Dysarthria, Drop attacks, Diplopia, Nausea, Ataxia, Recent unexplained weight loss, Clumsy or unsteadiness, Constant night pain, History of cancer, Tingling, Numbness, and Saddle anesthesia .  PATIENT GOALS:  regain balance,         Re-Eval 4/23:  Pt reports that he feels as though he is about 85% better since starting physical therapy. He reports that he has some minimal pain in the R and no pain in the L lately.  He reports that he continues to have some discomfort when sitting for longer periods of time prior to getting up.  He also reports that he has some discomfort when getting into and out of the car.  He no longer reports pain with walking activities, reporting it feeling more of a stiffness instead. He reports that his R knee hurts when ascending and descending steps.  Though he no longer reports pain with moving sitting to standing, he repots decreased strength and balance during. He reports that he continues with some feeling of imbalance       Re-Eval 3/21:  Pt reports that he feels as though he is about 70% better since starting physical therapy.  He reports that he feels as though he has more strength when he is getting up.  He also reports that he is also seeing better balance.  Both he reports would be beneficial to continue working on though.  He reports that he feels his L is improving in pain, though continues with some discomfort and has more lately in the R.  He reports having improved with getting into and out of the  car.  He reports that it would be beneficial to continue strengthening as well as improve balance.      IE:  WORK/SCHOOL: Retired,   HOME LIFE: Lives with others, 13 steps (landing within)  HOBBIES/EXERCISE: living history organization, senior [place] of senior [place], walking  PLOF:  Physical therapy in past for L knee,   HISTORY OF CURRENT INJURY:  He now has pain in B knees.  Had a cortisone shot about 2 weeks ago in B knees and felt great.  The injections helped for a while and the pain has began.  His discomfort began again about 1 week after the injection. He reports it as stiffness rather than pain.   PAIN LOCATION/DESCRIPTORS: Anterior knee, B  AGGRAVATING FACTORS:  exercises, in and out of car (L), steps (must use railing), sit to stand, 10 min standing before feeling wobbly,   EASES: rest,   DAY PATTERN: no day pattern  IMAGING:  x-ray CARMITA Lara denies a new onset of Bladder incontinence, Bowel dysfunction, Dizziness, Dysphagia, Dysarthria, Drop attacks, Diplopia, Nausea, Ataxia, Recent unexplained weight loss, Clumsy or unsteadiness, Constant night pain, History of cancer, Tingling, Numbness, and Saddle anesthesia .  PATIENT GOALS: steady on feet, more flexible,     Patient Goals  Patient goals for therapy: increased strength, improved balance and decreased pain    Pain  Current pain ratin  At best pain ratin  At worst pain rating: 3  Quality: discomfort and tight  Relieving factors: rest  Aggravating factors: stair climbing, standing, walking and lifting          Objective     Palpation     Additional Palpation Details  Tightness noted through B quad, HS, calf B, improved though continues with some tightness    Neurological Testing     Additional Neurological Details  No new neurological changes to note.      Active Range of Motion   Left Knee   Flexion: 110 degrees   Extension: 1 degrees     Right Knee   Flexion: 106 degrees with pain  Extension: 1 degrees     Passive Range of Motion  "    Additional Passive Range of Motion Details  Equal to AROM    Strength/Myotome Testing     Left Hip   Planes of Motion   Flexion: 4+  Extension: 4+  Abduction: 4-    Isolated Muscles   Gluteus yuko: 4    Right Hip   Planes of Motion   Flexion: 4+  Extension: 4+  Abduction: 4-    Isolated Muscles   Gluteus maximums: 4    Left Knee   Flexion: 5  Prone flexion: 4+  Extension: 4+    Right Knee   Flexion: 5  Prone flexion: 4+  Extension: 4+    Additional Strength Details  R heel raise: 1  L heel raise: 2  Hip Strength    Flex:  R  4/5, L  4/5   AB:   R  4/5, L  4/5   Ext:   R  4/5, L  4/5       General Comments:      Hip Comments   mCTSIB:    FTEO: 30 sec   FTEC: 30 sec mod sway  FTEO foam: 30 sec min sway  FTEC foam:12 sec LOB    SLS R: <2  SLS L: <2    Semi-tandem foam: 30 sec B                 POC Expires Auth Status Start Date Expiration Date PT Visit Limit    5/30 N/A 3/20 N/A As needed   Date 6/19 6/26 7/1 67/15- reeval    Used 26 27 28 29    Remaining          Diagnosis: B knee pain   Precautions:    Primary Goals: flexibility in LE musculature, hip strenth   *asterisks by exercise = given for HEP   Manuals 6/19 6/26 7/1 7/15                                            There Ex        Heel slides        HS stretch 10x10\" 10x10\" 10x10\" 10x10\" B    Calf stretch 10x10\" 10x10\"      Richard stretch    10x10\"    DKTC                                Bike 5' 5' 5' 5'    Neuro Re-Ed        Quad sets        Glute sets        SLR        Clamshell        bridges        HS curl        Sciaatic nerve glies        Mini squats        LAQ        Standing 3 way        Leg press    X20 90#    X walks        Wobble board  X20 ea X20 ea     SL AB        Heel stomps        TRX squats        SLS cone taps        Cone 3 way   2 cones 2x10 B     Foam marches        Balance throws 1x10, feet together, semi tandem, tandem foam feet together       Heel riases  2x10 foam                Re-evaluation    AK     Ther Act         Education     "            Biodex 2 rounds, maze, control, and limits       Hurdles        Side stepping foam  4 laps foam, hurdles  3 laps. Hurdles, cone taps     Tandem walking 4 laps mirror 4 laps foam, hurdles 4 laps mirror     Step ups        TKE walks        Tap down        Modalities

## 2024-07-17 ENCOUNTER — TELEPHONE (OUTPATIENT)
Dept: NEPHROLOGY | Facility: CLINIC | Age: 69
End: 2024-07-17

## 2024-07-17 NOTE — TELEPHONE ENCOUNTER
Left voicemail for the patient reminding to please complete labwork prior to 8/6 appointment with Dr. Reyes. Advised patient to call back with any questions or  Concerns

## 2024-07-18 ENCOUNTER — OFFICE VISIT (OUTPATIENT)
Dept: OBGYN CLINIC | Facility: CLINIC | Age: 69
End: 2024-07-18
Payer: MEDICARE

## 2024-07-18 VITALS
SYSTOLIC BLOOD PRESSURE: 131 MMHG | HEART RATE: 114 BPM | WEIGHT: 254 LBS | HEIGHT: 75 IN | BODY MASS INDEX: 31.58 KG/M2 | DIASTOLIC BLOOD PRESSURE: 78 MMHG

## 2024-07-18 DIAGNOSIS — M25.561 CHRONIC PAIN OF BOTH KNEES: Primary | ICD-10-CM

## 2024-07-18 DIAGNOSIS — M25.562 CHRONIC PAIN OF BOTH KNEES: Primary | ICD-10-CM

## 2024-07-18 DIAGNOSIS — M17.0 BILATERAL PRIMARY OSTEOARTHRITIS OF KNEE: ICD-10-CM

## 2024-07-18 DIAGNOSIS — G89.29 CHRONIC PAIN OF BOTH KNEES: Primary | ICD-10-CM

## 2024-07-18 PROCEDURE — 99214 OFFICE O/P EST MOD 30 MIN: CPT | Performed by: STUDENT IN AN ORGANIZED HEALTH CARE EDUCATION/TRAINING PROGRAM

## 2024-07-18 PROCEDURE — 20610 DRAIN/INJ JOINT/BURSA W/O US: CPT | Performed by: STUDENT IN AN ORGANIZED HEALTH CARE EDUCATION/TRAINING PROGRAM

## 2024-07-18 RX ORDER — BUPIVACAINE HYDROCHLORIDE 5 MG/ML
2 INJECTION, SOLUTION EPIDURAL; INTRACAUDAL
Status: COMPLETED | OUTPATIENT
Start: 2024-07-18 | End: 2024-07-18

## 2024-07-18 RX ORDER — BETAMETHASONE SODIUM PHOSPHATE AND BETAMETHASONE ACETATE 3; 3 MG/ML; MG/ML
12 INJECTION, SUSPENSION INTRA-ARTICULAR; INTRALESIONAL; INTRAMUSCULAR; SOFT TISSUE
Status: COMPLETED | OUTPATIENT
Start: 2024-07-18 | End: 2024-07-18

## 2024-07-18 RX ADMIN — BUPIVACAINE HYDROCHLORIDE 2 ML: 5 INJECTION, SOLUTION EPIDURAL; INTRACAUDAL at 16:15

## 2024-07-18 RX ADMIN — BETAMETHASONE SODIUM PHOSPHATE AND BETAMETHASONE ACETATE 12 MG: 3; 3 INJECTION, SUSPENSION INTRA-ARTICULAR; INTRALESIONAL; INTRAMUSCULAR; SOFT TISSUE at 16:15

## 2024-07-18 NOTE — PROGRESS NOTES
Date: 24  Marco Richard   MRN# [id number]  : 1955      Chief Complaint: Bilateral knee pain, right greater than left    Assessment and Plan:    -FWB  -Activity modification to limit strain or impact on the joint  -NSAID as needed  -Tylenol 1000mg up to three times daily as needed. Do not exceed 3000mg daily  -Cont with Supervised physical therapy.   -Home exercise program directed by PT  -Weight loss discussed   -Knee sleeve or brace for comfort  -Cane or walker recommended to offload joint  -Corticosteroid injection was offered, accepted, and administered.  Risk benefits and alternatives were discussed prior to injection.  Patient tolerated the procedure well.  -Patient may follow up prn for further evaluation and treatment          Subjective:     Knee Pain  Marco Richard is a 68 y o male who is being seen in a follow up of Bilateral knee pain. He was last seen 2024 and received bilateral knee CSI's.  Patient states that since last visit Left knee is doing ok. Right knee is on/off pain and depends on the day and activity. He does report pain with steps at times. Both knees fell stiff mostly  Still in Physical therapy       External Records Reviewed: office notes and radiology reports    Allergy:  Allergies   Allergen Reactions    Sudafed [Pseudoephedrine] Other (See Comments)     hyperactivity    Amoxicillin Rash    Penicillins Rash     Category: Allergy;      Medications:  all current active meds have been reviewed  Past Medical History:  Past Medical History:   Diagnosis Date    Allergic rhinitis     Anxiety disorder     Last assessed 2006     Arthritis     Cancer (HCC)     prostate    Carpal tunnel syndrome     Colon polyp     Diabetes mellitus (HCC)     Diabetes mellitus with neurological manifestation (HCC) 2012    Last assessed 3/29/2016     Diabetes type 2, uncontrolled     Last assessed 2016     Disease of thyroid gland     DM II (diabetes mellitus, type II),  controlled (HCC)     Last assessed 10/20/2014     Essential hypertension     Last assesssed 2/7/2006     Hyperlipidemia     Hypertension     Insomnia     Last assessed 1/13/2011     Numbness and tingling of foot     Resolved 3/29/2016     Obesity     Old disruption of knee ligament     Last assessed 3/26/2008     Tarsal tunnel syndrome     Urinary frequency     Resolved 3/29/2016     Visual impairment      Past Surgical History:  Past Surgical History:   Procedure Laterality Date    COLONOSCOPY      FOOT SURGERY      WI AMPUTATION TOE INTERPHALANGEAL JOINT Right 11/23/2022    Procedure: AMPUTATION 3RD TOE;  Surgeon: Andrews Marquez DPM;  Location:  MAIN OR;  Service: Podiatry    PROSTATE BIOPSY      WISDOM TOOTH EXTRACTION       Family History:  Family History   Problem Relation Age of Onset    Heart attack Mother 67    Valvular heart disease Mother     Hypotension Mother     Pancreatic cancer Father 75    Diabetes Father     Lung cancer Sister 56        not a smoker    Cancer Brother         bile duct     Colon cancer Maternal Grandmother 62    Diabetes Maternal Grandmother     Pancreatic cancer Paternal Aunt     Pancreatic cancer Paternal Uncle     Hypertension Neg Hx     Prostate cancer Neg Hx      Social History:  Social History     Substance and Sexual Activity   Alcohol Use Yes    Alcohol/week: 9.0 standard drinks of alcohol    Types: 2 Cans of beer, 7 Standard drinks or equivalent per week     Social History     Substance and Sexual Activity   Drug Use Never     Social History     Tobacco Use   Smoking Status Never   Smokeless Tobacco Never           ROS:   Review of Systems   Constitutional:  Negative for chills and fever.   HENT:  Negative for ear pain and sore throat.    Eyes:  Negative for pain and visual disturbance.   Respiratory:  Negative for cough and shortness of breath.    Cardiovascular:  Negative for chest pain and palpitations.   Gastrointestinal:  Negative for abdominal pain and vomiting.  "  Genitourinary:  Negative for dysuria and hematuria.   Musculoskeletal:  Positive for arthralgias. Negative for back pain.   Skin:  Negative for color change and rash.   Neurological:  Negative for seizures and syncope.   All other systems reviewed and are negative.       Objective:   BP Readings from Last 1 Encounters:   07/18/24 131/78      Wt Readings from Last 1 Encounters:   07/18/24 115 kg (254 lb)      Pulse Readings from Last 1 Encounters:   07/18/24 (!) 114      BMI: Estimated body mass index is 31.75 kg/m² as calculated from the following:    Height as of this encounter: 6' 3\" (1.905 m).    Weight as of this encounter: 115 kg (254 lb).        Physical Exam  Constitutional:       General: He is not in acute distress.  HENT:      Head: Normocephalic and atraumatic.   Eyes:      Conjunctiva/sclera: Conjunctivae normal.   Cardiovascular:      Comments: Extremities well perfused   No LE edema    Pulmonary:      Effort: Pulmonary effort is normal.   Abdominal:      General: Abdomen is flat. Bowel sounds are normal.   Neurological:      Mental Status: He is alert. Mental status is at baseline.          Gait and Station:   antalgic    Bilateral Lower Extremity:    Inspection:  normal color, temperature, turgor and moisture    Overall limb alignment: varus, partially correctable    Effusion: minimal    ROM 0 to 100 degrees with pain on right, 5 to 100 degrees with pain on left    Extensor Lag: Absent    Palpation: No joint line tenderness to palpation    stable to AP translation at 90 deg    Coronal plane stable in full extension    Coronal plane stable in mid-flexion     Motor: 5/5 EHL/FHL/TA/GS/Qd/Hs    Vascular: Toes WWP with BCR    Sensory: SILT DP/SP/Mike/Saph/Tib    Images:    No new images obtained    I personally reviewed relevant images in the PACS system and my interpretation is as follows:  X-rays of the bilateral knee reveal moderate degenerative changes with subchondral sclerosis, joint space " narrowing, and osteophyte formation    Large joint arthrocentesis: R knee  Universal Protocol:  Consent: Verbal consent obtained.  Risks and benefits: risks, benefits and alternatives were discussed  Consent given by: patient  Timeout called at: 7/18/2024 4:43 PM.  Patient understanding: patient states understanding of the procedure being performed  Site marked: the operative site was marked  Patient identity confirmed: verbally with patient  Supporting Documentation  Indications: pain   Procedure Details  Location: knee - R knee  Needle size: 22 G  Ultrasound guidance: no  Approach: superiorlateral.  Medications administered: 12 mg betamethasone acetate-betamethasone sodium phosphate 6 (3-3) mg/mL; 2 mL bupivacaine (PF) 0.5 %    Patient tolerance: patient tolerated the procedure well with no immediate complications  Dressing:  Sterile dressing applied      Large joint arthrocentesis: L knee  Universal Protocol:  Consent: Verbal consent obtained.  Risks and benefits: risks, benefits and alternatives were discussed  Consent given by: patient  Timeout called at: 7/18/2024 4:45 PM.  Patient understanding: patient states understanding of the procedure being performed  Site marked: the operative site was marked  Patient identity confirmed: verbally with patient  Supporting Documentation  Indications: pain   Procedure Details  Location: knee - L knee  Needle size: 22 G  Ultrasound guidance: no  Approach: superior lateral.  Medications administered: 12 mg betamethasone acetate-betamethasone sodium phosphate 6 (3-3) mg/mL; 2 mL bupivacaine (PF) 0.5 %    Patient tolerance: patient tolerated the procedure well with no immediate complications  Dressing:  Sterile dressing applied           Tye Rodriguez MD  Adult Reconstruction Specialist   [facility]

## 2024-07-19 ENCOUNTER — OFFICE VISIT (OUTPATIENT)
Dept: PHYSICAL THERAPY | Facility: CLINIC | Age: 69
End: 2024-07-19
Payer: MEDICARE

## 2024-07-19 DIAGNOSIS — R26.89 BALANCE PROBLEM: Primary | ICD-10-CM

## 2024-07-19 DIAGNOSIS — M17.0 BILATERAL PRIMARY OSTEOARTHRITIS OF KNEE: ICD-10-CM

## 2024-07-19 PROCEDURE — 97112 NEUROMUSCULAR REEDUCATION: CPT

## 2024-07-19 PROCEDURE — 97110 THERAPEUTIC EXERCISES: CPT

## 2024-07-19 NOTE — PROGRESS NOTES
"Daily Note     Today's date: 2024  Patient name: Marco Richard  : 1955  MRN: [id number]  Referring provider: Tye Rodriguez MD  Dx:   Encounter Diagnosis     ICD-10-CM    1. Balance problem  R26.89       2. Bilateral primary osteoarthritis of knee  M17.0                      Subjective: Pt reports that he got an injection in his knee and needs to begin strengthening more.        Objective: See treatment diary below      Assessment: Tolerated treatment well. Began with warm up on bike with stiffness noted that decreased with continuation.  Educated pt on importance of working on HEP at home to continue to see strength improvements.  PT reported good understanding and reports he will work more on his HEP consistently.  Added balance as well as review of strengthening activities for quad.  Pt tolerated activities well and was given new print out of HEP. Patient demonstrated fatigue post treatment, exhibited good technique with therapeutic exercises, and would benefit from continued PT      Plan: Continue per plan of care.          POC Expires Auth Status Start Date Expiration Date PT Visit Limit     N/A 3/20 N/A As needed   Date 6/19 6/26 7/1 7/15- reeval    Used 26 27 28 29 30   Remaining          Diagnosis: B knee pain   Precautions:    Primary Goals: flexibility in LE musculature, hip strenth   *asterisks by exercise = given for HEP   Manuals 6/19 6/26 7/1 7/15 7/19                                           There Ex        Heel slides        HS stretch 10x10\" 10x10\" 10x10\" 10x10\" B 10x10\"   Calf stretch 10x10\" 10x10\"      Richard stretch    10x10\"    DKTC                                Bike 5' 5' 5' 5' 5'   Neuro Re-Ed        Quad sets        Glute sets        SLR        Clamshell        bridges        HS curl     X20 B BTB   Sciaatic nerve glies        Mini squats        LAQ     X20 B BTB   Standing 3 way     X20 flex   Leg press    X20 90#    X walks        Wobble board  X20 ea X20 ea   " "  SL AB        Heel stomps        TRX squats        SLS cone taps        Cone 3 way   2 cones 2x10 B     Foam marches        Balance throws 1x10, feet together, semi tandem, tandem foam feet together       Heel riases  2x10 foam    X20 foam   Toe raises     X20 foam                    Re-evaluation    AK     Ther Act         Education                Biodex 2 rounds, maze, control, and limits       Hurdles        Side stepping foam  4 laps foam, hurdles  3 laps. Hurdles, cone taps     Tandem walking 4 laps mirror 4 laps foam, hurdles 4 laps mirror     Step ups        TKE walks        Tap down     2x10 B 4\"   Modalities                                                                                                 "

## 2024-07-22 ENCOUNTER — APPOINTMENT (OUTPATIENT)
Dept: LAB | Facility: CLINIC | Age: 69
End: 2024-07-22
Payer: MEDICARE

## 2024-07-22 DIAGNOSIS — N17.9 AKI (ACUTE KIDNEY INJURY) (HCC): ICD-10-CM

## 2024-07-22 DIAGNOSIS — R80.9 ALBUMINURIA: ICD-10-CM

## 2024-07-22 DIAGNOSIS — N17.9 ACUTE RENAL FAILURE, UNSPECIFIED ACUTE RENAL FAILURE TYPE (HCC): ICD-10-CM

## 2024-07-22 LAB
ANION GAP SERPL CALCULATED.3IONS-SCNC: 12 MMOL/L (ref 4–13)
BACTERIA UR QL AUTO: ABNORMAL /HPF
BILIRUB UR QL STRIP: NEGATIVE
BUN SERPL-MCNC: 31 MG/DL (ref 5–25)
CALCIUM SERPL-MCNC: 9.3 MG/DL (ref 8.4–10.2)
CHLORIDE SERPL-SCNC: 100 MMOL/L (ref 96–108)
CLARITY UR: CLEAR
CO2 SERPL-SCNC: 25 MMOL/L (ref 21–32)
COLOR UR: ABNORMAL
CREAT SERPL-MCNC: 1.13 MG/DL (ref 0.6–1.3)
CREAT UR-MCNC: 85.3 MG/DL
GFR SERPL CREATININE-BSD FRML MDRD: 67 ML/MIN/1.73SQ M
GLUCOSE P FAST SERPL-MCNC: 134 MG/DL (ref 65–99)
GLUCOSE UR STRIP-MCNC: ABNORMAL MG/DL
HGB UR QL STRIP.AUTO: ABNORMAL
KETONES UR STRIP-MCNC: ABNORMAL MG/DL
LEUKOCYTE ESTERASE UR QL STRIP: ABNORMAL
MICROALBUMIN UR-MCNC: 101.3 MG/L
MICROALBUMIN/CREAT 24H UR: 119 MG/G CREATININE (ref 0–30)
NITRITE UR QL STRIP: NEGATIVE
NON-SQ EPI CELLS URNS QL MICRO: ABNORMAL /HPF
PH UR STRIP.AUTO: 6 [PH]
POTASSIUM SERPL-SCNC: 4.7 MMOL/L (ref 3.5–5.3)
PROT UR STRIP-MCNC: ABNORMAL MG/DL
RBC #/AREA URNS AUTO: ABNORMAL /HPF
SODIUM SERPL-SCNC: 137 MMOL/L (ref 135–147)
SP GR UR STRIP.AUTO: 1.02 (ref 1–1.03)
UROBILINOGEN UR STRIP-ACNC: <2 MG/DL
WBC #/AREA URNS AUTO: ABNORMAL /HPF

## 2024-07-22 PROCEDURE — 81001 URINALYSIS AUTO W/SCOPE: CPT

## 2024-07-22 PROCEDURE — 80048 BASIC METABOLIC PNL TOTAL CA: CPT

## 2024-07-22 PROCEDURE — 82043 UR ALBUMIN QUANTITATIVE: CPT

## 2024-07-22 PROCEDURE — 36415 COLL VENOUS BLD VENIPUNCTURE: CPT

## 2024-07-22 PROCEDURE — 82570 ASSAY OF URINE CREATININE: CPT

## 2024-07-23 ENCOUNTER — OFFICE VISIT (OUTPATIENT)
Dept: PHYSICAL THERAPY | Facility: CLINIC | Age: 69
End: 2024-07-23
Payer: MEDICARE

## 2024-07-23 DIAGNOSIS — M17.0 BILATERAL PRIMARY OSTEOARTHRITIS OF KNEE: Primary | ICD-10-CM

## 2024-07-23 PROCEDURE — 97530 THERAPEUTIC ACTIVITIES: CPT

## 2024-07-23 PROCEDURE — 97112 NEUROMUSCULAR REEDUCATION: CPT

## 2024-07-23 NOTE — PROGRESS NOTES
"Daily Note     Today's date: 2024  Patient name: Marco Richard  : 1955  MRN: [id number]  Referring provider: Tye Rodriguez MD  Dx:   Encounter Diagnosis     ICD-10-CM    1. Bilateral primary osteoarthritis of knee  M17.0           Start Time: 0745  Stop Time: 0830  Total time in clinic (min): 45 minutes    Subjective: Pt reports that his knee is feeling pretty good and his exercises went well.        Objective: See treatment diary below      Assessment: Tolerated treatment well. Patient demonstrated fatigue post treatment, exhibited good technique with therapeutic exercises, and would benefit from continued PT      Plan: Continue per plan of care.       POC Expires Auth Status Start Date Expiration Date PT Visit Limit     N/A 3/20 N/A As needed   Date 6/19 6/26 7/1 7/15- reeval    Used 26 27 28 29 30   Remaining          Diagnosis: B knee pain   Precautions:    Primary Goals: flexibility in LE musculature, hip strenth   *asterisks by exercise = given for HEP   Manuals 7/23  7/1 7/15 7/19                                           There Ex        Heel slides        HS stretch 10x10\" B  10x10\" 10x10\" B 10x10\"   Calf stretch        Richard stretch 10x10\"   10x10\"    DKTC                                Bike 5'  5' 5' 5'   Neuro Re-Ed        Quad sets        Glute sets        SLR        Clamshell        bridges        HS curl     X20 B BTB   Sciaatic nerve glies        Mini squats        LAQ     X20 B BTB   Standing 3 way     X20 flex   Leg press    X20 90#    X walks        Wobble board   X20 ea     SL AB        Heel stomps        TRX squats x20       SLS cone taps        Cone 3 way   2 cones 2x10 B     Foam marches        Balance throws        Heel riases     X20 foam   Toe raises     X20 foam   Stool pulls 1 lap ea       TKE 2x10 BTB                        Re-evaluation    AK     Ther Act         Education                Biodex        Hurdles        Side stepping foam   3 laps. Hurdles, " "cone taps     Tandem walking   4 laps mirror     Step ups 2x10 biodex       TKE walks        Tap down     2x10 B 4\"   Modalities                                                                                                   "

## 2024-07-25 ENCOUNTER — OFFICE VISIT (OUTPATIENT)
Dept: PHYSICAL THERAPY | Facility: CLINIC | Age: 69
End: 2024-07-25
Payer: MEDICARE

## 2024-07-25 ENCOUNTER — OFFICE VISIT (OUTPATIENT)
Dept: UROLOGY | Facility: CLINIC | Age: 69
End: 2024-07-25
Payer: MEDICARE

## 2024-07-25 VITALS
DIASTOLIC BLOOD PRESSURE: 58 MMHG | WEIGHT: 252 LBS | SYSTOLIC BLOOD PRESSURE: 108 MMHG | OXYGEN SATURATION: 97 % | HEART RATE: 99 BPM | BODY MASS INDEX: 31.5 KG/M2

## 2024-07-25 DIAGNOSIS — C61 PROSTATE CANCER (HCC): Primary | ICD-10-CM

## 2024-07-25 DIAGNOSIS — R26.89 BALANCE PROBLEM: ICD-10-CM

## 2024-07-25 DIAGNOSIS — M17.0 BILATERAL PRIMARY OSTEOARTHRITIS OF KNEE: Primary | ICD-10-CM

## 2024-07-25 PROCEDURE — 99213 OFFICE O/P EST LOW 20 MIN: CPT | Performed by: PHYSICIAN ASSISTANT

## 2024-07-25 PROCEDURE — 97110 THERAPEUTIC EXERCISES: CPT

## 2024-07-25 PROCEDURE — 97112 NEUROMUSCULAR REEDUCATION: CPT

## 2024-07-25 NOTE — PROGRESS NOTES
UROLOGY PROGRESS NOTE   Patient Identifiers: Marco Richard (MRN [id number])  Date of Service: 7/25/2024    Subjective:   68-year-old man history of [place] 6 low risk prostate cancer.  Diagnosed in November 2021.  Current PSA 7.5 and stable.    Reason for visit: Low risk prostate cancer follow-up    Objective:     VITALS:    There were no vitals filed for this visit.        LABS:  Lab Results   Component Value Date    HGB 14.3 06/27/2022    HCT 40.1 06/27/2022    WBC 7.0 06/27/2022     06/27/2022   ]    Lab Results   Component Value Date     03/15/2016    K 4.7 07/22/2024     07/22/2024    CO2 25 07/22/2024    BUN 31 (H) 07/22/2024    CREATININE 1.13 07/22/2024    CALCIUM 9.3 07/22/2024   ]        INPATIENT MEDS:    Current Outpatient Medications:     aspirin 81 MG tablet, Take 1 tablet by mouth daily, Disp: , Rfl:     Empagliflozin 25 MG TABS, Take 1 tablet (25 mg total) by mouth daily, Disp: 90 tablet, Rfl: 1    gabapentin (NEURONTIN) 100 mg capsule, TAKE 2 CAPSULES BY MOUTH 3 TIMES A DAY, Disp: 540 capsule, Rfl: 1    glipiZIDE (GLUCOTROL XL) 5 mg 24 hr tablet, Take 1 tablet (5 mg total) by mouth daily, Disp: 90 tablet, Rfl: 0    glucose blood test strip, Test, Disp: , Rfl:     levothyroxine 50 mcg tablet, TAKE 1 TABLET (50 MCG TOTAL) BY MOUTH DAILY IN THE EARLY MORNING, Disp: 90 tablet, Rfl: 1    lisinopril (ZESTRIL) 20 mg tablet, TAKE 1 TABLET BY MOUTH EVERY DAY, Disp: 90 tablet, Rfl: 1    Misc Natural Products (Lutein 20) CAPS, Take 20 capsules by mouth daily Eye health, Disp: , Rfl:     mupirocin (BACTROBAN) 2 % ointment, APPLY TO THE AFFECTED AREA BY TOPICAL ROUTE ONCE DAILY, Disp: , Rfl:     NIFEdipine (PROCARDIA XL) 30 mg 24 hr tablet, TAKE 1 TABLET BY MOUTH EVERY DAY, Disp: 90 tablet, Rfl: 1    rosuvastatin (CRESTOR) 5 mg tablet, Take 1 tablet (5 mg total) by mouth daily, Disp: 90 tablet, Rfl: 0    tadalafil (CIALIS) 20 MG tablet, Take 1 tablet (20 mg total) by mouth daily as needed  for erectile dysfunction, Disp: 10 tablet, Rfl: 1    traMADol (ULTRAM) 50 mg tablet, every 6 (six) hours, Disp: , Rfl:       Physical Exam:   There were no vitals taken for this visit.  GEN: no acute distress    RESP: breathing comfortably with no accessory muscle use    ABD: soft, non-tender, non-distended   INCISION:    EXT: no significant peripheral edema   (Male): Penis circumcised, phallus normal, meatus patent.  Testicles descended into scrotum bilaterally without masses nor tenderness.  No inguinal hernias bilaterally.  ONELIA: Prostate is enlarged at 40 grams. The prostate is not boggy. The prostate is not tender.  No nodules noted      RADIOLOGY:   none     Assessment:   #1.  [place] 6 low risk prostate cancer    Plan:   -PSA has been stable  -Follow-up in 1 year with PSA in 6 months and prior to visit  -  -

## 2024-07-25 NOTE — PROGRESS NOTES
"Daily Note     Today's date: 2024  Patient name: Marco Richard  : 1955  MRN: [id number]  Referring provider: Tye Rodriguez MD  Dx:   Encounter Diagnosis     ICD-10-CM    1. Bilateral primary osteoarthritis of knee  M17.0       2. Balance problem  R26.89                      Subjective: Pt reports that he has been working on his strengthening exercises at home.        Objective: See treatment diary below      Assessment: Tolerated treatment well. Pt arrived late to treatment session today.  Began today with leg press with pt able to perform with increased weight today.  Pt was also able to perform single leg with good tolerance.  Added in x walks with pt having fatiuge within glute med as well as lateral lunges. Patient demonstrated fatigue post treatment, exhibited good technique with therapeutic exercises, and would benefit from continued PT      Plan: Continue per plan of care.       POC Expires Auth Status Start Date Expiration Date PT Visit Limit    5/30 N/A 3/20 N/A As needed   Date 7/23 7/25 7/1 7/15- reeval    Used   23 23 24   Remaining          Diagnosis: B knee pain   Precautions:    Primary Goals: flexibility in LE musculature, hip strenth   *asterisks by exercise = given for HEP   Manuals 7/23 7/25 7/1 7/15 7/19                                           There Ex        Heel slides        HS stretch 10x10\" B  10x10\" 10x10\" B 10x10\"   Calf stretch  10x10\"      Richard stretch 10x10\"   10x10\"    DKTC                                Bike 5' 5' 5' 5' 5'   Neuro Re-Ed        Quad sets        Glute sets        SLR        Clamshell        bridges        HS curl     X20 B BTB   Sciaatic nerve glies        Mini squats        LAQ     X20 B BTB   Standing 3 way     X20 flex   Leg press    X20 90#    X walks  2 laps GTB      Wobble board   X20 ea     SL AB        Heel stomps        TRX squats x20       SLS cone taps        Cone 3 way   2 cones 2x10 B     Foam marches        Balance throws      " "  Heel riases     X20 foam   Toe raises     X20 foam   Stool pulls 1 lap ea       TKE 2x10 BTB                        Re-evaluation    AK     Ther Act         Education                Biodex        Hurdles        Side stepping foam   3 laps. Hurdles, cone taps     Tandem walking   4 laps mirror     Step ups 2x10 biodex 2x10 8\"      TKE walks        Lateral lunges  1x10 TRK      Tap down     2x10 B 4\"   Modalities                                                                                                     "

## 2024-07-26 DIAGNOSIS — E11.42 TYPE 2 DIABETES MELLITUS WITH DIABETIC POLYNEUROPATHY, WITHOUT LONG-TERM CURRENT USE OF INSULIN (HCC): ICD-10-CM

## 2024-07-26 RX ORDER — GLIPIZIDE 5 MG/1
5 TABLET, FILM COATED, EXTENDED RELEASE ORAL DAILY
Qty: 90 TABLET | Refills: 1 | Status: SHIPPED | OUTPATIENT
Start: 2024-07-26 | End: 2025-01-22

## 2024-07-30 ENCOUNTER — OFFICE VISIT (OUTPATIENT)
Dept: PHYSICAL THERAPY | Facility: CLINIC | Age: 69
End: 2024-07-30
Payer: MEDICARE

## 2024-07-30 DIAGNOSIS — R26.89 BALANCE PROBLEM: ICD-10-CM

## 2024-07-30 DIAGNOSIS — M17.0 BILATERAL PRIMARY OSTEOARTHRITIS OF KNEE: Primary | ICD-10-CM

## 2024-07-30 PROCEDURE — 97110 THERAPEUTIC EXERCISES: CPT

## 2024-07-30 PROCEDURE — 97112 NEUROMUSCULAR REEDUCATION: CPT

## 2024-07-30 NOTE — PROGRESS NOTES
"Daily Note     Today's date: 2024  Patient name: Marco Richard  : 1955  MRN: [id number]  Referring provider: Tye Rodriguez MD  Dx:   Encounter Diagnosis     ICD-10-CM    1. Bilateral primary osteoarthritis of knee  M17.0       2. Balance problem  R26.89                      Subjective: Pt reports that he continues with stiffness, though decreased pain since his injection.       Objective: See treatment diary below      Assessment: Tolerated treatment well. Began with leg press today and followed up with some bike for ROM and to decrease stiffness.  Was able to perform step ups on biodex with increased height today.  Able to work on increased challenge during leg press today with single leg as well.  Patient demonstrated fatigue post treatment, exhibited good technique with therapeutic exercises, and would benefit from continued PT      Plan: Continue per plan of care.       POC Expires Auth Status Start Date Expiration Date PT Visit Limit    8/15 N/A 3/20 N/A As needed   Date 7/23 7/25 7/30 7/15- reeval    Remaining          Diagnosis: B knee pain   Precautions:    Primary Goals: flexibility in LE musculature, hip strenth   *asterisks by exercise = given for HEP   Manuals 7/23 7/25 7/30 7/15 7/19                                           There Ex        Heel slides        HS stretch 10x10\" B  10x10\" 10x10\" B 10x10\"   Calf stretch  10x10\"      Richard stretch 10x10\"   10x10\"    DKTC                                Bike 5' 5' 5' 5' 5'   Neuro Re-Ed        Quad sets        Glute sets        SLR        Clamshell        bridges        HS curl   2x10 GTB  X20 B BTB   Sciaatic nerve glies        Mini squats        LAQ     X20 B BTB   Standing 3 way   2x10 GTB  X20 flex   Leg press   X20 130# DL, 2x10 B SL 60# X20 90#    X walks  2 laps GTB      Wobble board        SL AB        Heel stomps        TRX squats x20       SLS cone taps        Cone 3 way        Foam marches        Balance " "throws        Heel riases     X20 foam   Toe raises     X20 foam   Stool pulls 1 lap ea       TKE 2x10 BTB                        Re-evaluation    AK     Ther Act         Education                Biodex        Hurdles        Side stepping foam        Tandem walking        Step ups 2x10 biodex 2x10 8\" 2x10 forward biodex     TKE walks        Lateral lunges  1x10 TRK      Tap down   2x10 lat biodex  2x10 B 4\"   Modalities                                                                                                       "

## 2024-07-31 ENCOUNTER — OFFICE VISIT (OUTPATIENT)
Dept: PHYSICAL THERAPY | Facility: CLINIC | Age: 69
End: 2024-07-31
Payer: MEDICARE

## 2024-07-31 DIAGNOSIS — M17.0 BILATERAL PRIMARY OSTEOARTHRITIS OF KNEE: Primary | ICD-10-CM

## 2024-07-31 DIAGNOSIS — R26.89 BALANCE PROBLEM: ICD-10-CM

## 2024-07-31 PROCEDURE — 97530 THERAPEUTIC ACTIVITIES: CPT

## 2024-07-31 PROCEDURE — 97110 THERAPEUTIC EXERCISES: CPT

## 2024-07-31 PROCEDURE — 97112 NEUROMUSCULAR REEDUCATION: CPT

## 2024-07-31 NOTE — PROGRESS NOTES
"Daily Note     Today's date: 2024  Patient name: Marco Richard  : 1955  MRN: [id number]  Referring provider: Tye Rodriguez MD  Dx:   Encounter Diagnosis     ICD-10-CM    1. Bilateral primary osteoarthritis of knee  M17.0       2. Balance problem  R26.89                      Subjective: Pt reports he just had some muscle soreness today.        Objective: See treatment diary below      Assessment: Tolerated treatment well. Today's treatment focused on balance today.  Began with bike with pt requiring a few pendulums prior to full revolutions today.  Added stretching and some glute activation through marches.  Marches were performed with bridges to begin to promote SLS activities.  Added standing balance as well as muscular endurance.   Patient demonstrated fatigue post treatment, exhibited good technique with therapeutic exercises, and would benefit from continued PT      Plan: Continue per plan of care.       POC Expires Auth Status Start Date Expiration Date PT Visit Limit    8/15 N/A 3/20 N/A As needed   Date 7/23 7/25 7/30 7/15- reeval    Remaining          Diagnosis: B knee pain   Precautions:    Primary Goals: flexibility in LE musculature, hip strenth   *asterisks by exercise = given for HEP   Manuals                                             There Ex        Heel slides        HS stretch 10x10\" B  10x10\" 10x10\"    Calf stretch  10x10\"      Richard stretch 10x10\"       DKTC                                Bike 5' 5' 5' 5'    Neuro Re-Ed        Quad sets        Glute sets        SLR        Clamshell        bridges    2x10 c marches    HS curl   2x10 GTB     Sciaatic nerve glies        Mini squats        LAQ        Standing 3 way   2x10 GTB     Leg press   X20 130# DL, 2x10 B SL 60#     X walks  2 laps GTB      Wobble board        SL AB        Heel stomps        TRX squats x20   x20    SLS cone taps        Cone 3 way    2x5 swapping, foam    Foam  " "       Balance throws        Heel riases    x20    Toe raises        Stool pulls 1 lap ea       TKE 2x10 BTB                        Re-evaluation         Ther Act         Education                Biodex    X2 each    Hurdles        Side stepping foam        Tandem walking    4 laps    Step ups 2x10 biodex 2x10 8\" 2x10 forward biodex     TKE walks        Lateral lunges  1x10 TRK      Tap down   2x10 lat biodex     Modalities                                                                                                         "

## 2024-08-01 ENCOUNTER — APPOINTMENT (OUTPATIENT)
Dept: PHYSICAL THERAPY | Facility: CLINIC | Age: 69
End: 2024-08-01
Payer: MEDICARE

## 2024-08-06 ENCOUNTER — OFFICE VISIT (OUTPATIENT)
Dept: NEPHROLOGY | Facility: CLINIC | Age: 69
End: 2024-08-06
Payer: MEDICARE

## 2024-08-06 ENCOUNTER — OFFICE VISIT (OUTPATIENT)
Dept: PHYSICAL THERAPY | Facility: CLINIC | Age: 69
End: 2024-08-06
Payer: MEDICARE

## 2024-08-06 VITALS
WEIGHT: 256 LBS | BODY MASS INDEX: 31.83 KG/M2 | SYSTOLIC BLOOD PRESSURE: 144 MMHG | HEIGHT: 75 IN | HEART RATE: 108 BPM | DIASTOLIC BLOOD PRESSURE: 82 MMHG

## 2024-08-06 DIAGNOSIS — R80.9 TYPE 2 DIABETES MELLITUS WITH MICROALBUMINURIA, WITHOUT LONG-TERM CURRENT USE OF INSULIN (HCC): ICD-10-CM

## 2024-08-06 DIAGNOSIS — R26.89 BALANCE PROBLEM: ICD-10-CM

## 2024-08-06 DIAGNOSIS — E11.42 DIABETIC POLYNEUROPATHY ASSOCIATED WITH TYPE 2 DIABETES MELLITUS (HCC): ICD-10-CM

## 2024-08-06 DIAGNOSIS — C61 PROSTATE CANCER (HCC): ICD-10-CM

## 2024-08-06 DIAGNOSIS — M17.0 BILATERAL PRIMARY OSTEOARTHRITIS OF KNEE: Primary | ICD-10-CM

## 2024-08-06 DIAGNOSIS — R80.9 ALBUMINURIA: ICD-10-CM

## 2024-08-06 DIAGNOSIS — E11.29 TYPE 2 DIABETES MELLITUS WITH MICROALBUMINURIA, WITHOUT LONG-TERM CURRENT USE OF INSULIN (HCC): ICD-10-CM

## 2024-08-06 DIAGNOSIS — E66.09 CLASS 1 OBESITY DUE TO EXCESS CALORIES WITH SERIOUS COMORBIDITY AND BODY MASS INDEX (BMI) OF 32.0 TO 32.9 IN ADULT: ICD-10-CM

## 2024-08-06 DIAGNOSIS — I10 ESSENTIAL HYPERTENSION: Primary | ICD-10-CM

## 2024-08-06 DIAGNOSIS — E87.1 HYPONATREMIA: ICD-10-CM

## 2024-08-06 PROCEDURE — 97110 THERAPEUTIC EXERCISES: CPT

## 2024-08-06 PROCEDURE — 99214 OFFICE O/P EST MOD 30 MIN: CPT | Performed by: STUDENT IN AN ORGANIZED HEALTH CARE EDUCATION/TRAINING PROGRAM

## 2024-08-06 NOTE — PATIENT INSTRUCTIONS
Thank you for coming to your visit today. As we discussed you kidney function is back to normal, your creatinine is 1.1mg/dL Your electrolytes are normal. Please follow the recommendations below       Recommend low sodium (salt) food    Avoid nonsteroidal anti-inflammatory drugs such as Naprosyn, ibuprofen, Aleve, Advil, Celebrex, Meloxicam (Mobic) etc.  You can use Tylenol as needed if you do not have any liver condition to be concerned about    Try to exercise at least 30 minutes 3 days a week to begin with with an ultimate goal of 5 days a week for at least 30 minutes      Next Visit in 12 months with results   If you need to see us earlier we can change the appointment for you      Joselyn Reyes Bahamonde, MD  Nephrology Attending           Joselyn Reyes Bahamonde, MD  Nephrology Attending

## 2024-08-06 NOTE — PROGRESS NOTES
NEPHROLOGY OUTPATIENT PROGRESS NOTE   Marco Richard 68 y.o. male MRN: [id number]  DATE: 8/6/2024    Reason for visit: No chief complaint on file.       There are no Patient Instructions on file for this visit.      There are no diagnoses linked to this encounter.    Assessment/Plan:  65 yo man with PMH of DM with albuminuria, HTN, HLD, obesity. Patient presents for initial consultation for albuminuria.      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  Patient went to the [place] office and is a little bit overwhelmed   Home /70, well-controlled  Continue lisinopril 20 mg daily   nifedipine 30 mg    Low sodium diet  Advised to maintain a good BP control to prevent progression of CKD         # Hyponatremia  Serum sodium 137mEq/L   Resolved    #Anemia:  Current hemoglobin: 15.1 mg/dL  At goal     #DM  hbA1c 6.3  Advised to maintain a good DM control to prevent progression of kidney disease     # Prostate cancer  On surveillance biopsy  No PVR     #Obesity   BMI 32  Gained weight, cannot do exercise due to knee pain   Lifestyle modification       #Diabetic polyneuropathy  On gabapentin  Avoid NSAIDs     SUBJECTIVE / INTERVAL HISTORY:  68 y.o. male presents in follow up of MACI. Feels well, no SOB, no CP, no recent hospitalizations. No issues  with medication        Marco Richard denies any recent illness/hospitalizations/medication changes since last office visit.      Review of Systems   Constitutional:  Negative for activity change and appetite change.   HENT:  Negative for congestion.    Eyes:  Negative for discharge.   Respiratory:  Negative for shortness of breath.    Cardiovascular:  Negative for chest pain.   Gastrointestinal:  Negative for abdominal distention.   Endocrine: Negative for cold intolerance.   Genitourinary:  Negative for dysuria.   Musculoskeletal:  Positive for arthralgias.   Skin:  Negative for color change and pallor.   Neurological:  Negative for dizziness.   Psychiatric/Behavioral:  Negative for agitation.        OBJECTIVE:  There were no vitals taken for this visit. There is no height or weight on file to calculate BMI.    Physical exam:  Physical Exam  General:  no acute distress at this time  Skin:  No acute rash  Eyes:  No scleral icterus and noninjected  ENT:  mucous membranes moist  Neck:  no carotid bruits  Chest:  Clear to auscultation percussion, good respiratory effort, no use of accessory respiratory muscles  CVS:  Regular rate and rhythm without rub   Abdomen:  soft and nontender   Extremities: no significant lower extremity edema  Neuro:  No gross focality  Psych:  Alert , cooperative       Medications:    Current Outpatient Medications:     aspirin 81 MG tablet, Take 1 tablet by mouth daily, Disp: , Rfl:     Empagliflozin 25 MG TABS, Take 1 tablet (25 mg total) by mouth daily, Disp: 90 tablet, Rfl: 1    gabapentin (NEURONTIN) 100 mg capsule, TAKE 2 CAPSULES BY MOUTH 3 TIMES A DAY, Disp: 540 capsule, Rfl: 1    glipiZIDE (GLUCOTROL XL) 5 mg 24 hr tablet, TAKE 1 TABLET (5 MG TOTAL) BY MOUTH DAILY., Disp: 90 tablet, Rfl: 1    glucose blood test strip, Test (Patient not taking: Reported on 7/25/2024), Disp: , Rfl:     levothyroxine 50 mcg tablet, TAKE 1 TABLET (50 MCG TOTAL) BY MOUTH DAILY IN THE EARLY MORNING,  "Disp: 90 tablet, Rfl: 1    lisinopril (ZESTRIL) 20 mg tablet, TAKE 1 TABLET BY MOUTH EVERY DAY, Disp: 90 tablet, Rfl: 1    Misc Natural Products (Lutein 20) CAPS, Take 20 capsules by mouth daily Eye health, Disp: , Rfl:     mupirocin (BACTROBAN) 2 % ointment, APPLY TO THE AFFECTED AREA BY TOPICAL ROUTE ONCE DAILY, Disp: , Rfl:     NIFEdipine (PROCARDIA XL) 30 mg 24 hr tablet, TAKE 1 TABLET BY MOUTH EVERY DAY, Disp: 90 tablet, Rfl: 1    rosuvastatin (CRESTOR) 5 mg tablet, Take 1 tablet (5 mg total) by mouth daily, Disp: 90 tablet, Rfl: 0    tadalafil (CIALIS) 20 MG tablet, Take 1 tablet (20 mg total) by mouth daily as needed for erectile dysfunction, Disp: 10 tablet, Rfl: 1    traMADol (ULTRAM) 50 mg tablet, every 6 (six) hours, Disp: , Rfl:     Allergies:  Allergies as of 08/06/2024 - Reviewed 07/25/2024   Allergen Reaction Noted    Sudafed [pseudoephedrine] Other (See Comments) 12/17/2018    Amoxicillin Rash 11/18/2022    Penicillins Rash 01/02/2008       The following portions of the patient's history were reviewed and updated as appropriate: past family history, past surgical history and problem list.    Laboratory Results:  Lab Results   Component Value Date    SODIUM 137 07/22/2024    K 4.7 07/22/2024     07/22/2024    CO2 25 07/22/2024    BUN 31 (H) 07/22/2024    CREATININE 1.13 07/22/2024    GLUC 110 (H) 03/11/2024    CALCIUM 9.3 07/22/2024        Lab Results   Component Value Date    CALCIUM 9.3 07/22/2024       Portions of the record may have been created with voice recognition software.  Occasional wrong word or \"sound a like\" substitutions may have occurred due to the inherent limitations of voice recognition software.  Read the chart carefully and recognize, using context, where substitutions have occurred.    "

## 2024-08-06 NOTE — PROGRESS NOTES
"Daily Note     Today's date: 2024  Patient name: Marco Richard  : 1955  MRN: [id number]  Referring provider: Tye Rodriguez MD  Dx:   Encounter Diagnosis     ICD-10-CM    1. Bilateral primary osteoarthritis of knee  M17.0       2. Balance problem  R26.89                      Subjective: Pt reports he had gone on a trip where he was on uneven surfaces and maneuvering more challenging places so he is more sore today.         Objective: See treatment diary below      Assessment: Tolerated treatment well. Focus today on mobility and stretching for the knee due to pt's increase in pain.  Added patellar mobs today with good decrease in symptoms with knee extension.  Pt will continue with strengthening later today as well.  Patient exhibited good technique with therapeutic exercises and would benefit from continued PT      Plan: Continue per plan of care.       POC Expires Auth Status Start Date Expiration Date PT Visit Limit    15 N/A 320 N/A As needed   Date 7/23 7/25 7/30 7/15- reeval    Remaining          Diagnosis: B knee pain   Precautions:    Primary Goals: flexibility in LE musculature, hip strenth   *asterisks by exercise = given for HEP   Manuals  86   Patellar mobs     AK                                   There Ex        Heel slides        HS stretch 10x10\" B  10x10\" 10x10\" 10x10\"   Calf stretch  10x10\"   10x10\"   Richard stretch 10x10\"    10x10\"   DKTC                                Bike 5' 5' 5' 5' 5'   Neuro Re-Ed        Quad sets        Glute sets        SLR     X20 B   Clamshell        bridges    2x10 c marches    HS curl   2x10 GTB     Sciaatic nerve glies        Mini squats        LAQ        Standing 3 way   2x10 GTB     Leg press   X20 130# DL, 2x10 B SL 60#     X walks  2 laps GTB      Wobble board        SL AB     X20 B   Heel stomps        TRX squats x20   x20    SLS cone taps        Cone 3 way    2x5 swapping, foam    Foam marches      " "  Balance throws        Heel riases    x20    Toe raises        Stool pulls 1 lap ea       TKE 2x10 BTB       SL AD     X20 B            Re-evaluation         Ther Act         Education                Biodex    X2 each    Hurdles        Side stepping foam        Tandem walking    4 laps    Step ups 2x10 biodex 2x10 8\" 2x10 forward biodex     TKE walks        Lateral lunges  1x10 TRK      Tap down   2x10 lat biodex     Modalities                                                                                                           "

## 2024-08-08 ENCOUNTER — OFFICE VISIT (OUTPATIENT)
Dept: PHYSICAL THERAPY | Facility: CLINIC | Age: 69
End: 2024-08-08
Payer: MEDICARE

## 2024-08-08 DIAGNOSIS — R26.89 BALANCE PROBLEM: ICD-10-CM

## 2024-08-08 DIAGNOSIS — M17.0 BILATERAL PRIMARY OSTEOARTHRITIS OF KNEE: Primary | ICD-10-CM

## 2024-08-08 PROCEDURE — 97110 THERAPEUTIC EXERCISES: CPT | Performed by: PHYSICAL THERAPIST

## 2024-08-08 PROCEDURE — 97112 NEUROMUSCULAR REEDUCATION: CPT | Performed by: PHYSICAL THERAPIST

## 2024-08-08 NOTE — PROGRESS NOTES
"Daily Note     Today's date: 2024  Patient name: Marco Richard  : 1955  MRN: [id number]  Referring provider: Tye Rodriguez MD  Dx:   Encounter Diagnosis     ICD-10-CM    1. Bilateral primary osteoarthritis of knee  M17.0       2. Balance problem  R26.89           Start Time: 0745  Stop Time: 0830  Total time in clinic (min): 45 minutes    Subjective: Pt reports some tightness in his knees upon arrival.         Objective: See treatment diary below      Assessment: Tolerated treatment well. Began session with stretching since patient reports that this does help his symptoms. Also utilized patellar mobilizations to improve available ROM. Noted decreased medial/lateral mobility on R side. Was able to end with some strengthening exercises, focusing on quad. Had some discomfort with TKE on the L sided after 1 set. Overall did well. Patient exhibited good technique with therapeutic exercises and would benefit from continued PT      Plan: Continue per plan of care.       POC Expires Auth Status Start Date Expiration Date PT Visit Limit    8/15 N/A 3/20 N/A As needed   Date    Used        Remaining          Diagnosis: B knee pain   Precautions:    Primary Goals: flexibility in LE musculature, hip strenth   *asterisks by exercise = given for HEP   Manuals    Patellar mobs LB    AK                                   There Ex        Heel slides        HS stretch 10x10\"  10x10\" 10x10\" 10x10\"   Calf stretch 10x10\" 10x10\"   10x10\"   Richard stretch 10x10\"    10x10\"   DKTC                                Bike 5' 5' 5' 5' 5'   Neuro Re-Ed        Quad sets        Glute sets        SLR X20 B    X20 B   Clamshell        bridges    2x10 c marches    HS curl   2x10 GTB     Sciaatic nerve glies        Mini squats        LAQ        Standing 3 way   2x10 GTB     Leg press   X20 130# DL, 2x10 B SL 60#     X walks  2 laps GTB      Wobble board        SL AB     X20 B   Heel stomps   " "     TRX squats    x20    SLS cone taps        Cone 3 way    2x5 swapping, foam    Foam marches        Balance throws        Heel riases    x20    Toe raises        Stool pulls        TKE 18# 2x10 R, 10x L (pain)       SL AD     X20 B            Re-evaluation         Ther Act         Education                Biodex    X2 each    Hurdles        Side stepping foam        Tandem walking    4 laps    Step ups  2x10 8\" 2x10 forward biodex     TKE walks        Lateral lunges  1x10 TRK      Tap down   2x10 lat biodex     Modalities                                                                                                           "

## 2024-08-13 ENCOUNTER — OFFICE VISIT (OUTPATIENT)
Dept: PHYSICAL THERAPY | Facility: CLINIC | Age: 69
End: 2024-08-13
Payer: MEDICARE

## 2024-08-13 DIAGNOSIS — R26.89 BALANCE PROBLEM: ICD-10-CM

## 2024-08-13 DIAGNOSIS — M17.0 BILATERAL PRIMARY OSTEOARTHRITIS OF KNEE: Primary | ICD-10-CM

## 2024-08-13 PROCEDURE — 97112 NEUROMUSCULAR REEDUCATION: CPT

## 2024-08-13 NOTE — PROGRESS NOTES
"Daily Note     Today's date: 2024  Patient name: Marco Richard  : 1955  MRN: [id number]  Referring provider: Tye Rodriguez MD  Dx:   Encounter Diagnosis     ICD-10-CM    1. Bilateral primary osteoarthritis of knee  M17.0       2. Balance problem  R26.89           Start Time: 0745  Stop Time: 0830  Total time in clinic (min): 45 minutes    Subjective: Pt reports that he is feeling stiff today, but not too bad.       Objective: See treatment diary below      Assessment: Tolerated treatment well. Today's focus on balance.  Began with bike to decrease stiffness and moved to balance activities.  Pt had some knee pain after biodex exercises today, though decreased after richard stretch.  Patient demonstrated fatigue post treatment, exhibited good technique with therapeutic exercises, and would benefit from continued PT      Plan: Continue per plan of care.       POC Expires Auth Status Start Date Expiration Date PT Visit Limit    8/15 N/A 3/20 N/A As needed   Date    Used        Remaining          Diagnosis: B knee pain   Precautions:    Primary Goals: flexibility in LE musculature, hip strenth   *asterisks by exercise = given for HEP   Manuals    Patellar mobs LB    AK                                   There Ex        Heel slides        HS stretch 10x10\"  10x10\" 10x10\" 10x10\"   Calf stretch 10x10\"    10x10\"   Richard stretch 10x10\" 10x10\"   10x10\"   DKTC                                Bike 5' 5' 5' 5' 5'   Neuro Re-Ed        Quad sets        Glute sets        SLR X20 B    X20 B   Clamshell        bridges    2x10 c marches    HS curl   2x10 GTB     Sciaatic nerve glies        Mini squats        LAQ        Standing 3 way   2x10 GTB     Leg press   X20 130# DL, 2x10 B SL 60#     X walks        Wobble board        SL AB     X20 B   Heel stomps        TRX squats    x20    SLS cone taps        Cone 3 way    2x5 swapping, foam    Foam marches        Balance throws  "       Heel riases    x20    Toe raises        Stool pulls        TKE 18# 2x10 R, 10x L (pain)       SL AD     X20 B            Re-evaluation         Ther Act         Education                Biodex  2 rounds   X2 each    Hurdles  2 laps for/lat      Side stepping foam        Tandem walking    4 laps    Step ups   2x10 forward biodex     TKE walks        Lateral lunges        Tap down   2x10 lat biodex     Modalities

## 2024-08-15 ENCOUNTER — EVALUATION (OUTPATIENT)
Dept: PHYSICAL THERAPY | Facility: CLINIC | Age: 69
End: 2024-08-15
Payer: MEDICARE

## 2024-08-15 DIAGNOSIS — M17.0 BILATERAL PRIMARY OSTEOARTHRITIS OF KNEE: Primary | ICD-10-CM

## 2024-08-15 PROCEDURE — 97112 NEUROMUSCULAR REEDUCATION: CPT

## 2024-08-15 NOTE — PROGRESS NOTES
"Daily Note     Today's date: 8/15/2024  Patient name: Marco Richard  : 1955  MRN: [id number]  Referring provider: Tye Rodriguez MD  Dx:   Encounter Diagnosis     ICD-10-CM    1. Bilateral primary osteoarthritis of knee  M17.0                      Subjective: Pt reports he a little stiff today.       Objective: See treatment diary below      Assessment: Tolerated treatment well. Pt had some stiffness starting out, though decreased after stretching added.  He was able to perform step ups on higher step without pain occurring today. Patient demonstrated fatigue post treatment, exhibited good technique with therapeutic exercises, and would benefit from continued PT      Plan: Continue per plan of care.       POC Expires Auth Status Start Date Expiration Date PT Visit Limit    8/15 N/A 3/20 N/A As needed   Date    Used        Remaining          Diagnosis: B knee pain   Precautions:    Primary Goals: flexibility in LE musculature, hip strenth   *asterisks by exercise = given for HEP   Manuals 8/8 8/13 8/15 7/31 8/6   Patellar mobs LB    AK                                   There Ex        Heel slides        HS stretch 10x10\"  10x10\" 10x10\" 10x10\"   Calf stretch 10x10\"  10x10\" standing  10x10\"   Richard stretch 10x10\" 10x10\" 10x10\"  10x10\"   DKTC                                Bike 5' 5' 5' 5' 5'   Neuro Re-Ed        Quad sets        Glute sets        SLR X20 B    X20 B   Clamshell        bridges    2x10 c marches    HS curl   2x10 GTB     Sciaatic nerve glies        Mini squats        LAQ        Standing 3 way   2x10 GTB     Leg press   X20 130# DL, 2x10 B SL 60#     X walks        Wobble board        SL AB     X20 B   Heel stomps        TRX squats    x20    SLS cone taps        Cone 3 way    2x5 swapping, foam    Foam marches        Balance throws        Heel riases    x20    Toe raises        Stool pulls        TKE 18# 2x10 R, 10x L (pain)       SL AD     X20 B            " Re-evaluation         Ther Act         Education                Biodex  2 rounds   X2 each    Hurdles  2 laps for/lat      Side stepping foam        Tandem walking    4 laps    Step ups   2x10 forward biodex     TKE walks        Lateral lunges        Tap down   2x10 lat biodex     Modalities

## 2024-08-20 ENCOUNTER — OFFICE VISIT (OUTPATIENT)
Dept: PHYSICAL THERAPY | Facility: CLINIC | Age: 69
End: 2024-08-20
Payer: MEDICARE

## 2024-08-20 DIAGNOSIS — M17.0 BILATERAL PRIMARY OSTEOARTHRITIS OF KNEE: Primary | ICD-10-CM

## 2024-08-20 DIAGNOSIS — R26.89 BALANCE PROBLEM: ICD-10-CM

## 2024-08-20 PROCEDURE — 97110 THERAPEUTIC EXERCISES: CPT

## 2024-08-20 PROCEDURE — 97112 NEUROMUSCULAR REEDUCATION: CPT

## 2024-08-20 NOTE — PROGRESS NOTES
"Daily Note     Today's date: 2024  Patient name: Marco Richard  : 1955  MRN: [id number]  Referring provider: Tye Rodriguez MD  Dx:   Encounter Diagnosis     ICD-10-CM    1. Bilateral primary osteoarthritis of knee  M17.0       2. Balance problem  R26.89                      Subjective: Pt reports that he hasn't had much pain since last treatment session.       Objective: See treatment diary below      Assessment: Tolerated treatment well. Began with bike today with pt able to get full revolutions earlier in warm up today.  Continued to some quad stretching due to pt reporting some cramping yesterday.  Added strengthening with LE adding  step ups. Patient demonstrated fatigue post treatment, exhibited good technique with therapeutic exercises, and would benefit from continued PT      Plan: Continue per plan of care.       POC Expires Auth Status Start Date Expiration Date PT Visit Limit    8/15 N/A 3/20 N/A As needed   Date 8/8 8/13 8/15 7/31 8/6   Used        Remaining          Diagnosis: B knee pain   Precautions:    Primary Goals: flexibility in LE musculature, hip strenth   *asterisks by exercise = given for HEP   Manuals 8/8 8/13 8/15 8/20 8   Patellar mobs LB    AK                                   There Ex        Heel slides        HS stretch 10x10\"  10x10\" 10x10\" 10x10\"   Calf stretch 10x10\"  10x10\" standing 10x10\" 10x10\"   Richard stretch 10x10\" 10x10\" 10x10\" 10x10\" 10x10\"   DKTC                                Bike 5' 5' 5' 5' 5'   Neuro Re-Ed        Quad sets        Glute sets        SLR X20 B    X20 B   Clamshell        bridges        HS curl   2x10 GTB     Sciaatic nerve glies        Mini squats        LAQ        Standing 3 way   2x10 GTB 2x10    Leg press   X20 130# DL, 2x10 B SL 60# X20 130# DL, 2x10 B SL 70#    X walks        Wobble board        SL AB     X20 B   Heel stomps        TRX squats        SLS cone taps        Cone 3 way        Foam marches        Balance throws      "   Heel riases        Toe raises        Stool pulls        TKE 18# 2x10 R, 10x L (pain)       SL AD     X20 B            Re-evaluation         Ther Act         Education                Biodex  2 rounds       Hurdles  2 laps for/lat      Side stepping foam        Tandem walking        Step ups   2x10 forward biodex 2x10 for, lat    TKE walks        Lateral lunges        Tap down   2x10 lat biodex     Modalities

## 2024-08-22 ENCOUNTER — EVALUATION (OUTPATIENT)
Dept: PHYSICAL THERAPY | Facility: CLINIC | Age: 69
End: 2024-08-22
Payer: MEDICARE

## 2024-08-22 DIAGNOSIS — R26.89 BALANCE PROBLEM: ICD-10-CM

## 2024-08-22 DIAGNOSIS — M17.0 BILATERAL PRIMARY OSTEOARTHRITIS OF KNEE: Primary | ICD-10-CM

## 2024-08-22 PROCEDURE — 97112 NEUROMUSCULAR REEDUCATION: CPT

## 2024-08-22 PROCEDURE — 97530 THERAPEUTIC ACTIVITIES: CPT

## 2024-08-22 NOTE — PROGRESS NOTES
PT Re-Evaluation     Today's date: 2024  Patient name: Marco Richard  : 1955  MRN: [id number]  Referring provider: Tye Rodriguez MD  Dx:   Encounter Diagnosis     ICD-10-CM    1. Bilateral primary osteoarthritis of knee  M17.0       2. Balance problem  R26.89                        Assessment  Impairments: abnormal coordination, abnormal gait, abnormal muscle firing, abnormal movement, activity intolerance, impaired balance, impaired physical strength, lacks appropriate home exercise program and safety issue  Symptom irritability: moderate    Assessment details: Marco Richard has been compliant with attending PT and home exercise program since initial eval.  Marco  has made improvements in objective data since initial eval but continues to have limitations compared to prior level of function. He presents with improved strength for knee and hip musculature.  Pt will work toward independence with HEP in upcoming treatments.   Marco continues to have deficits in the above listed impairments and would benefit from additional skilled PT to address these deficits to return to prior level of function.        Understanding of Dx/Px/POC: good     Prognosis: good  Prognosis details: Positive prognostic indicators include positive attitude toward recovery and good understanding of diagnosis and treatment plan options.  Negative prognostic indicators include chronicity of symptoms and multiple concurrent orthopedic problems.      Goals  Impairment Goals 4-6 weeks  - Improve knee AROM to equal to the unaffected lower extremity- progressing  - Increase ankle strength to 4/5 throughout- progressing  - Increase hip strength to 5/5 throughout- progressing    Functional Goals 6-8 weeks  - Return to Prior Level of Function- progressing  - Patient will be independent with HEP- progressing  - Patient will be able to squat without increased pain/compensation/difficulty- progressing  - Patient will be able to  walk without increased pain/compensation/difficulty - progressing  - Patient will be able to ascend and descend stairs without increased pain/compensation/difficulty - progressing  - Patient will be able to perform 25 single leg heel raises without increased  pain/compensation/difficulty- progressing            Plan  Patient would benefit from: skilled physical therapy  Planned modality interventions: Modalities PRN.    Planned therapy interventions: manual therapy, neuromuscular re-education, patient education, therapeutic activities, therapeutic exercise, graded activity, home exercise program, behavior modification, self care, body mechanics training, flexibility, activity modification and functional ROM exercises    Frequency: 1-2x week  Duration in weeks: 4  Treatment plan discussed with: patient        Subjective Evaluation    History of Present Illness  Mechanism of injury: Pt reports that he feels as though he is about 40% better since starting physical therapy.  Pt reports that he continues with most discomfort and pain when performing activities for long periods of time.          WORK/SCHOOL: Retired,   HOME LIFE: Lives with others, 13 steps (landing within)  HOBBIES/EXERCISE: living history organization, senior [place] [facility], walking  PLOF:  Physical therapy in past for L knee,   HISTORY OF CURRENT INJURY:  He reports that he has felt off with his balance since his surgery around 1 year ago.  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  Since then his balance has been off.    PAIN LOCATION/DESCRIPTORS:   AGGRAVATING FACTORS:  standing greater than 5 min, movement of head with standing/walking, walking, ascending/descending steps with/without carrying items  EASES:  rest  DAY PATTERN: no day pattern  IMAGING:    Marco denies a new onset of Bladder incontinence, Bowel dysfunction, Dizziness, Dysphagia, Dysarthria, Drop attacks, Diplopia, Nausea, Ataxia, Recent unexplained weight loss,  Clumsy or unsteadiness, Constant night pain, History of cancer, Tingling, Numbness, and Saddle anesthesia .  PATIENT GOALS:  regain balance,         Re-Eval 4/23:  Pt reports that he feels as though he is about 85% better since starting physical therapy. He reports that he has some minimal pain in the R and no pain in the L lately.  He reports that he continues to have some discomfort when sitting for longer periods of time prior to getting up.  He also reports that he has some discomfort when getting into and out of the car.  He no longer reports pain with walking activities, reporting it feeling more of a stiffness instead. He reports that his R knee hurts when ascending and descending steps.  Though he no longer reports pain with moving sitting to standing, he repots decreased strength and balance during. He reports that he continues with some feeling of imbalance       Re-Eval 3/21:  Pt reports that he feels as though he is about 70% better since starting physical therapy.  He reports that he feels as though he has more strength when he is getting up.  He also reports that he is also seeing better balance.  Both he reports would be beneficial to continue working on though.  He reports that he feels his L is improving in pain, though continues with some discomfort and has more lately in the R.  He reports having improved with getting into and out of the car.  He reports that it would be beneficial to continue strengthening as well as improve balance.      IE:  WORK/SCHOOL: Retired,   HOME LIFE: Lives with others, [employer] [place] (landing within)  HOBBIES/EXERCISE: living history organization, senior [employer] [facility] senior [place], walking  PLOF:  Physical therapy in past for L knee,   HISTORY OF CURRENT INJURY:  He now has pain in B knees.  Had a cortisone shot about 2 weeks ago in B knees and felt great.  The injections helped for a while and the pain has began.  His discomfort began again about 1 week after the injection.  He reports it as stiffness rather than pain.   PAIN LOCATION/DESCRIPTORS: Anterior knee, B  AGGRAVATING FACTORS:  exercises, in and out of car (L), steps (must use railing), sit to stand, 10 min standing before feeling wobbly,   EASES: rest,   DAY PATTERN: no day pattern  IMAGING:  x-ray B  Marco denies a new onset of Bladder incontinence, Bowel dysfunction, Dizziness, Dysphagia, Dysarthria, Drop attacks, Diplopia, Nausea, Ataxia, Recent unexplained weight loss, Clumsy or unsteadiness, Constant night pain, History of cancer, Tingling, Numbness, and Saddle anesthesia .  PATIENT GOALS: steady on feet, more flexible,     Patient Goals  Patient goals for therapy: increased strength, improved balance and decreased pain    Pain  Current pain ratin  At best pain ratin  At worst pain ratin  Quality: discomfort and tight  Relieving factors: rest  Aggravating factors: stair climbing, standing, walking and lifting          Objective     Palpation     Additional Palpation Details  Improving tightness with HS and B quads    Neurological Testing     Additional Neurological Details  No new neurological changes to note.      Active Range of Motion   Left Knee   Flexion: 97 degrees   Extension: 1 degrees     Right Knee   Flexion: 111 degrees   Extension: 1 degrees     Passive Range of Motion     Additional Passive Range of Motion Details  Equal to AROM    Strength/Myotome Testing     Left Hip   Planes of Motion   Flexion: 4+  Extension: 4+  Abduction: 4-    Isolated Muscles   Gluteus yuko: 4    Right Hip   Planes of Motion   Flexion: 4+  Extension: 4+  Abduction: 4-    Isolated Muscles   Gluteus maximums: 4    Left Knee   Flexion: 5  Prone flexion: 4+  Extension: 5    Right Knee   Flexion: 5  Prone flexion: 4+  Extension: 5    Additional Strength Details  R heel raise: 1  L heel raise: 2  Hip Strength    Flex:  R  4/5, L  4/5   AB:   R  4+/5, L  4+/5   Ext:   R  4+/5, L  4+/5       General Comments:      Hip Comments  "  mCTSIB:    FTEO: 30 sec   FTEC: 30 sec mod sway  FTEO foam: 30 sec min sway  FTEC foam:12 sec LOB    SLS R: <2  SLS L: <2    Semi-tandem foam: 30 sec B                 POC Expires Auth Status Start Date Expiration Date PT Visit Limit    9/22 N/A 3/20 N/A As needed   Date 8/8 8/13 8/15 8/20 8/22   Used        Remaining          Diagnosis: B knee pain   Precautions:    Primary Goals: flexibility in LE musculature, hip strenth   *asterisks by exercise = given for HEP   Manuals 8/8 8/13 8/15 8/20 8/22   Patellar mobs LB                                       There Ex        Heel slides        HS stretch 10x10\"  10x10\" 10x10\"    Calf stretch 10x10\"  10x10\" standing 10x10\"    Richard stretch 10x10\" 10x10\" 10x10\" 10x10\"    DKTC                                Bike 5' 5' 5' 5' 5'   Neuro Re-Ed        Quad sets        Glute sets        SLR X20 B       Clamshell        bridges        HS curl   2x10 GTB     Sciaatic nerve glies        Mini squats        LAQ        Standing 3 way   2x10 GTB 2x10    Leg press   X20 130# DL, 2x10 B SL 60# X20 130# DL, 2x10 B SL 70#    X walks        Wobble board        SL AB        Heel stomps        TRX squats        SLS cone taps        Cone 3 way        Foam marches        Balance throws        Heel riases        Toe raises        Stool pulls        TKE 18# 2x10 R, 10x L (pain)       SL AD                 Re-evaluation     AK    Ther Act         Education                Biodex  2 rounds       Hurdles  2 laps for/lat      Side stepping foam        Tandem walking        Step ups   2x10 forward biodex 2x10 for, lat    TKE walks        Lateral lunges        Tap down   2x10 lat biodex     Modalities                                                                                                                      "

## 2024-08-27 ENCOUNTER — OFFICE VISIT (OUTPATIENT)
Dept: PHYSICAL THERAPY | Facility: CLINIC | Age: 69
End: 2024-08-27
Payer: MEDICARE

## 2024-08-27 DIAGNOSIS — R26.89 BALANCE PROBLEM: ICD-10-CM

## 2024-08-27 DIAGNOSIS — M17.0 BILATERAL PRIMARY OSTEOARTHRITIS OF KNEE: Primary | ICD-10-CM

## 2024-08-27 PROCEDURE — 97112 NEUROMUSCULAR REEDUCATION: CPT

## 2024-08-27 PROCEDURE — 97110 THERAPEUTIC EXERCISES: CPT

## 2024-08-27 PROCEDURE — 97530 THERAPEUTIC ACTIVITIES: CPT

## 2024-08-27 NOTE — PROGRESS NOTES
"Daily Note     Today's date: 2024  Patient name: Marco Richard  : 1955  MRN: [id number]  Referring provider: Tye Rodriguez MD  Dx:   Encounter Diagnosis     ICD-10-CM    1. Bilateral primary osteoarthritis of knee  M17.0       2. Balance problem  R26.89           Start Time: 745  Stop Time: 828  Total time in clinic (min): 43 minutes    Subjective: The R knee is a little painful with walking today, the L knee no pain, balance is still the same      Objective: See treatment diary below      Assessment: Tolerated treatment well. Progressed strengthen and balance today. Pt challenged with retro tandem walking. Pt unable to perform all reps for sidestepping today dude to fatigue. Fatigue post session. Patient demonstrated fatigue post treatment, exhibited good technique with therapeutic exercises, and would benefit from continued PT      Plan: Continue per plan of care.  Progress treatment as tolerated.           POC Expires Auth Status Start Date Expiration Date PT Visit Limit     N/A 3/20 N/A As needed   Date 8/27 8/13 8/15 8/20 8/22   Used        Remaining          Diagnosis: B knee pain   Precautions:    Primary Goals: flexibility in LE musculature, hip strenth   *asterisks by exercise = given for HEP   Manuals 8/27 8/13 8/15 8/20 8/22   Patellar mobs                                        There Ex        Heel slides        HS stretch 10x10\"  10x10\" 10x10\"    Calf stretch 10\"10x  10x10\" standing 10x10\"    Richard stretch  10x10\" 10x10\" 10x10\"    DKTC                                Bike 5' 5' 5' 5' 5'   Neuro Re-Ed        Quad sets        Glute sets        SLR        Clamshell        bridges        HS curl 2x10 GTB  2x10 GTB     Sciaatic nerve glies        Mini squats        LAQ        Standing 3 way   2x10 GTB 2x10    Leg press X20 130# DL, 2x10 B SL 70#  X20 130# DL, 2x10 B SL 60# X20 130# DL, 2x10 B SL 70#    X walks        Wobble board        SL AB        Heel stomps        TRX squats   "      SLS cone taps Airex 2x fingertips       Cone 3 way        Foam marches Airex 20xea       Balance throws        Heel riases        Toe raises        Stool pulls        TKE        SL AD                 Re-evaluation     AK    Ther Act         Education                Biodex  2 rounds       Hurdles  2 laps for/lat      Side stepping foam GTB 3 laps       Tandem walking Foam beam 2 laps       Step ups Lat 8 in 20x R 10x L  2x10 forward biodex 2x10 for, lat    TKE walks        Lateral lunges        Tap down   2x10 lat biodex     Modalities

## 2024-08-29 ENCOUNTER — OFFICE VISIT (OUTPATIENT)
Dept: PHYSICAL THERAPY | Facility: CLINIC | Age: 69
End: 2024-08-29
Payer: MEDICARE

## 2024-08-29 DIAGNOSIS — M17.0 BILATERAL PRIMARY OSTEOARTHRITIS OF KNEE: Primary | ICD-10-CM

## 2024-08-29 DIAGNOSIS — R26.89 BALANCE PROBLEM: ICD-10-CM

## 2024-08-29 PROCEDURE — 97110 THERAPEUTIC EXERCISES: CPT

## 2024-08-29 PROCEDURE — 97530 THERAPEUTIC ACTIVITIES: CPT

## 2024-08-29 PROCEDURE — 97112 NEUROMUSCULAR REEDUCATION: CPT

## 2024-08-29 NOTE — PROGRESS NOTES
"DISCHARGE     Today's date: 2024  Patient name: Marco Richard  : 1955  MRN: [id number]  Referring provider: Tye Rodriguez MD  Dx:   Encounter Diagnosis     ICD-10-CM    1. Bilateral primary osteoarthritis of knee  M17.0       2. Balance problem  R26.89           Start Time: 0747  Stop Time: 830  Total time in clinic (min): 43 minutes    Subjective: no issues coming in today      Objective: See treatment diary below      Assessment: Tolerated treatment well. Pt challenged with R knee pain today with step ups. Educated pt on the importance of following through with HEP, and given Blue TB to progress exercises at home.  Patient demonstrated fatigue post treatment, exhibited good technique with therapeutic exercises, and would benefit from continued PT      Plan:  Pt will be discharged and should continue to maintain his HEP at home in order to maintain current gains in function. See most recent progress note regarding pt goals.       POC Expires Auth Status Start Date Expiration Date PT Visit Limit     N/A 3/20 N/A As needed   Date 8/27 8/29 8/15 8/20 8/22   Used        Remaining          Diagnosis: B knee pain   Precautions:    Primary Goals: flexibility in LE musculature, hip strenth   *asterisks by exercise = given for HEP   Manuals 8/27 8/29 8/15 8/20 8/22   Patellar mobs                                        There Ex        Heel slides        HS stretch 10x10\" 10x10\" 10x10\" 10x10\"    Calf stretch 10\"10x 10\"10x 10x10\" standing 10x10\"    Richard stretch   10x10\" 10x10\"    DKTC                                Bike 5' 5' 5' 5' 5'   Neuro Re-Ed        Quad sets        Glute sets        SLR        Clamshell        bridges        HS curl 2x10 GTB 2x10 GTB 2x10 GTB     Sciaatic nerve glies        Mini squats        LAQ        Standing 3 way   2x10 GTB 2x10    Leg press X20 130# DL, 2x10 B SL 70# X20 130# DL, 2x10 B SL 60# X20 130# DL, 2x10 B SL 60# X20 130# DL, 2x10 B SL 70#    X walks      "   Wobble board        SL AB        Heel stomps        TRX squats        SLS cone taps Airex 2x fingertips       Cone 3 way        Foam marches Airex 20xea Airex 15xea      Balance throws        Heel riases  Airex 20x      Toe raises  Airex 20x      Stool pulls        TKE        SL AD                 Re-evaluation     AK    Ther Act         Education                Biodex        Hurdles        Side stepping foam GTB 3 laps GTB 3 laps      Tandem walking Foam beam 2 laps Foam beam 2 laps      Step ups Lat 8 in 20x R 10x L 15x ea fwd/lat 6in L 10x deffered R due to knee pain 2x10 forward biodex 2x10 for, lat    TKE walks        Lateral lunges        Tap down   2x10 lat biodex     Modalities

## 2024-10-07 DIAGNOSIS — E11.42 TYPE 2 DIABETES MELLITUS WITH DIABETIC POLYNEUROPATHY, WITHOUT LONG-TERM CURRENT USE OF INSULIN (HCC): ICD-10-CM

## 2024-10-07 NOTE — TELEPHONE ENCOUNTER
Reason for call:   [x] Refill   [] Prior Auth  [] Other:     Office:   [x] PCP/Provider - Dr Vee  [] Specialty/Provider -     Medication:   Gabapentin 100 mg, 2 caps tid, 570       Pharmacy:   [facility]    Does the patient have enough for 3 days?   [x] Yes   [] No - Send as HP to POD

## 2024-10-08 RX ORDER — GABAPENTIN 100 MG/1
200 CAPSULE ORAL 3 TIMES DAILY
Qty: 540 CAPSULE | Refills: 1 | Status: SHIPPED | OUTPATIENT
Start: 2024-10-08

## 2024-10-12 DIAGNOSIS — I10 ESSENTIAL HYPERTENSION: ICD-10-CM

## 2024-10-12 DIAGNOSIS — E03.9 HYPOTHYROIDISM, UNSPECIFIED TYPE: ICD-10-CM

## 2024-10-12 DIAGNOSIS — E78.5 HYPERLIPIDEMIA LDL GOAL <70: ICD-10-CM

## 2024-10-12 DIAGNOSIS — I10 PRIMARY HYPERTENSION: ICD-10-CM

## 2024-10-12 DIAGNOSIS — E11.42 TYPE 2 DIABETES MELLITUS WITH DIABETIC POLYNEUROPATHY, WITHOUT LONG-TERM CURRENT USE OF INSULIN (HCC): ICD-10-CM

## 2024-10-12 RX ORDER — ROSUVASTATIN CALCIUM 5 MG/1
5 TABLET, COATED ORAL DAILY
Qty: 90 TABLET | Refills: 0 | Status: SHIPPED | OUTPATIENT
Start: 2024-10-12

## 2024-10-12 RX ORDER — LISINOPRIL 20 MG/1
20 TABLET ORAL DAILY
Qty: 90 TABLET | Refills: 1 | Status: SHIPPED | OUTPATIENT
Start: 2024-10-12

## 2024-10-12 RX ORDER — LEVOTHYROXINE SODIUM 50 UG/1
50 TABLET ORAL
Qty: 90 TABLET | Refills: 1 | Status: SHIPPED | OUTPATIENT
Start: 2024-10-12

## 2024-10-12 RX ORDER — EMPAGLIFLOZIN 25 MG/1
25 TABLET, FILM COATED ORAL DAILY
Qty: 90 TABLET | Refills: 1 | Status: SHIPPED | OUTPATIENT
Start: 2024-10-12

## 2024-10-14 RX ORDER — NIFEDIPINE 30 MG/1
30 TABLET, EXTENDED RELEASE ORAL DAILY
Qty: 90 TABLET | Refills: 1 | Status: SHIPPED | OUTPATIENT
Start: 2024-10-14

## 2024-11-01 ENCOUNTER — OFFICE VISIT (OUTPATIENT)
Dept: OBGYN CLINIC | Facility: CLINIC | Age: 69
End: 2024-11-01
Payer: MEDICARE

## 2024-11-01 VITALS
BODY MASS INDEX: 31.83 KG/M2 | HEIGHT: 75 IN | SYSTOLIC BLOOD PRESSURE: 119 MMHG | WEIGHT: 256 LBS | DIASTOLIC BLOOD PRESSURE: 67 MMHG | HEART RATE: 94 BPM

## 2024-11-01 DIAGNOSIS — M17.0 BILATERAL PRIMARY OSTEOARTHRITIS OF KNEE: Primary | ICD-10-CM

## 2024-11-01 PROCEDURE — 20610 DRAIN/INJ JOINT/BURSA W/O US: CPT

## 2024-11-01 PROCEDURE — 99214 OFFICE O/P EST MOD 30 MIN: CPT | Performed by: STUDENT IN AN ORGANIZED HEALTH CARE EDUCATION/TRAINING PROGRAM

## 2024-11-01 RX ADMIN — BETAMETHASONE SODIUM PHOSPHATE AND BETAMETHASONE ACETATE 12 MG: 3; 3 INJECTION, SUSPENSION INTRA-ARTICULAR; INTRALESIONAL; INTRAMUSCULAR; SOFT TISSUE at 13:15

## 2024-11-01 RX ADMIN — LIDOCAINE HYDROCHLORIDE 2 ML: 10 INJECTION, SOLUTION INFILTRATION; PERINEURAL at 13:15

## 2024-11-01 NOTE — PROGRESS NOTES
Date: 24  Marco Richard   MRN# [id number]  : 1955      Chief Complaint: Bilateral Knee Pain    Assessment and Plan:  The patient verbalized understanding of exam findings and treatment plan. We engaged in the shared decision-making process and treatment options were discussed at length with the patient. Surgical and conservative management discussed today along with risks and benefits. Patient was agreeable with the plan and all questions were answered to satisfaction.     Bilateral primary osteoarthritis of knee  Patient has a history, physical examination and radiographic evaluation consistent with moderate bilateral knee osteoarthritis     -WBAT  -Activity modification to limit strain or impact on the joint  -ibuprofen (Motrin) as needed  -Tylenol 1000mg up to three times daily as needed. Do not exceed 3000mg daily  -Supervised physical therapy. Script provided   -Home exercise program directed by PT  -Weight loss discussed   -Knee sleeve or brace for comfort  -Cane or walker recommended to offload joint  -Corticosteroid injection was offered, accepted, and administered.  Risk benefits and alternatives were discussed prior to injection.  Patient tolerated the procedure well.  -Patient may follow up in 3 month(s) for further evaluation and treatment          Subjective:   Marco Richard is a 69 y.o. male who is being seen in follow-up for Bilateral knee pain. Patient states he had good relief from his previous injection. When we last saw he we recommended WBAT, no restrictions.  Pain has improved, than returned.. No other orthopedic complaints or concerns.       Prior treatment:  NSAIDs Yes    Bracing Yes   Physical Therapy Yes   Cortisone Injections Yes   Viscosupplementation No     Allergy:  Allergies   Allergen Reactions    Sudafed [Pseudoephedrine] Other (See Comments)     hyperactivity    Amoxicillin Rash    Penicillins Rash     Category: Allergy;      Medications:  All current  active meds have been reviewed   Past Medical History:  Past Medical History:   Diagnosis Date    Allergic rhinitis     Anxiety disorder     Last assessed 2/7/2006     Arthritis     Cancer (HCC)     prostate    Carpal tunnel syndrome     Colon polyp     Diabetes mellitus (HCC)     Diabetes mellitus with neurological manifestation (HCC) 04/06/2012    Last assessed 3/29/2016     Diabetes type 2, uncontrolled     Last assessed 4/6/2016     Disease of thyroid gland     DM II (diabetes mellitus, type II), controlled (HCC)     Last assessed 10/20/2014     Essential hypertension     Last assesssed 2/7/2006     Hyperlipidemia     Hypertension     Insomnia     Last assessed 1/13/2011     Numbness and tingling of foot     Resolved 3/29/2016     Obesity     Old disruption of knee ligament     Last assessed 3/26/2008     Tarsal tunnel syndrome     Urinary frequency     Resolved 3/29/2016     Visual impairment      Past Surgical History:  Past Surgical History:   Procedure Laterality Date    COLONOSCOPY      FOOT SURGERY      ID AMPUTATION TOE INTERPHALANGEAL JOINT Right 11/23/2022    Procedure: AMPUTATION 3RD TOE;  Surgeon: Andrews Marquez DPM;  Location:  MAIN OR;  Service: Podiatry    PROSTATE BIOPSY      WISDOM TOOTH EXTRACTION       Family History:  Family History   Problem Relation Age of Onset    Heart attack Mother 67    Valvular heart disease Mother     Hypotension Mother     Pancreatic cancer Father 75    Diabetes Father     Lung cancer Sister 56        not a smoker    Cancer Brother         bile duct     Colon cancer Maternal Grandmother 62    Diabetes Maternal Grandmother     Pancreatic cancer Paternal Aunt     Pancreatic cancer Paternal Uncle     Hypertension Neg Hx     Prostate cancer Neg Hx      Social History:  Social History     Substance and Sexual Activity   Alcohol Use Yes    Alcohol/week: 9.0 standard drinks of alcohol    Types: 2 Cans of beer, 7 Standard drinks or equivalent per week     Social History  "    Substance and Sexual Activity   Drug Use Never     Social History     Tobacco Use   Smoking Status Never   Smokeless Tobacco Never           Review of Systems:  General- denies fever/chills  HEENT- denies hearing loss or sore throat  Eyes- denies eye pain or visual disturbances, denies red eyes  Respiratory- denies cough or SOB  Cardio- denies chest pain or palpitations  GI- denies abdominal pain  Endocrine- denies urinary frequency  Urinary- denies pain with urination  Musculoskeletal- Negative except noted above  Skin- denies rashes or wounds  Neurological- denies dizziness or headache  Psychiatric- denies anxiety or difficulty concentrating    Objective:   BP Readings from Last 1 Encounters:   11/01/24 119/67      Wt Readings from Last 1 Encounters:   11/01/24 116 kg (256 lb)      Pulse Readings from Last 1 Encounters:   11/01/24 94        BMI: Estimated body mass index is 32 kg/m² as calculated from the following:    Height as of this encounter: 6' 3\" (1.905 m).    Weight as of this encounter: 116 kg (256 lb).    Physical Exam  /67   Pulse 94   Ht 6' 3\" (1.905 m)   Wt 116 kg (256 lb)   BMI 32.00 kg/m²   General/Constitutional: No apparent distress: well-nourished and well developed.  Eyes: normal ocular motion  Cardio: RRR, Normal S1S2, No m/r/g.   Lymphatic: No appreciable lymphadenopathy  Respiratory: Non-labored breathing, CTA b/l no w/c/r  Vascular: No edema, swelling or tenderness, except as noted in detailed exam. Extremities well perfused. No LE edema  Integumentary: No impressive skin lesions present, except as noted in detailed exam.  Neuro: No ataxia or tremors noted  Psych: Normal mood and affect, oriented to person, place and time. Appropriate affect.  Musculoskeletal: Normal, except as noted in detailed exam and in HPI.    Gait and Station:   normal and antalgic    Bilateral Lower Extremity:    Inspection:  normal color, temperature, turgor and moisture    Overall limb alignment: " varus, partially correctable    Effusion: minimal    ROM 0 to 100 degrees with pain on right, 5 to 100 degrees with pain on left    Extensor Lag: Absent    Palpation: No joint line tenderness to palpation    stable to AP translation at 90 deg    Coronal plane stable in full extension    Coronal plane stable in mid-flexion     Motor: 5/5 EHL/FHL/TA/GS/Qd/Hs    Vascular: Toes WWP with BCR    Sensory: SILT DP/SP/Mike/Saph/Tib      Images:  No new imaging      Large joint arthrocentesis: R knee  Universal Protocol:  Consent: Verbal consent obtained.  Consent given by: patient  Timeout called at: 11/1/2024 1:44 PM.  Patient understanding: patient states understanding of the procedure being performed  Patient identity confirmed: verbally with patient  Supporting Documentation  Indications: pain and diagnostic evaluation   Procedure Details  Location: knee - R knee  Needle size: 22 G  Ultrasound guidance: no  Approach: anterolateral  Medications administered: 12 mg betamethasone acetate-betamethasone sodium phosphate 6 (3-3) mg/mL; 2 mL lidocaine 1 %    Aspirate amount: 20 mL  Patient tolerance: patient tolerated the procedure well with no immediate complications  Dressing:  Sterile dressing applied      Large joint arthrocentesis: L knee  Universal Protocol:  Consent: Verbal consent obtained.  Consent given by: patient  Timeout called at: 11/1/2024 1:44 PM.  Patient understanding: patient states understanding of the procedure being performed  Patient identity confirmed: verbally with patient  Supporting Documentation  Indications: pain and diagnostic evaluation   Procedure Details  Location: knee - L knee  Needle size: 22 G  Ultrasound guidance: no  Approach: anterolateral  Medications administered: 12 mg betamethasone acetate-betamethasone sodium phosphate 6 (3-3) mg/mL; 2 mL lidocaine 1 %    Aspirate amount: 40 mL  Patient tolerance: patient tolerated the procedure well with no immediate complications  Dressing:  Sterile  dressing applied              Scribe Attestation      I,:  Juan Carlos Hernández PA-C am acting as a scribe while in the presence of the attending physician.:       I,:  Tye Rodriguez MD personally performed the services described in this documentation    as scribed in my presence.:               Tye Rodriguez MD  Adult Reconstruction Specialist   [facility]

## 2024-11-03 RX ORDER — LIDOCAINE HYDROCHLORIDE 10 MG/ML
2 INJECTION, SOLUTION INFILTRATION; PERINEURAL
Status: COMPLETED | OUTPATIENT
Start: 2024-11-01 | End: 2024-11-01

## 2024-11-03 RX ORDER — BETAMETHASONE SODIUM PHOSPHATE AND BETAMETHASONE ACETATE 3; 3 MG/ML; MG/ML
12 INJECTION, SUSPENSION INTRA-ARTICULAR; INTRALESIONAL; INTRAMUSCULAR; SOFT TISSUE
Status: COMPLETED | OUTPATIENT
Start: 2024-11-01 | End: 2024-11-01

## 2025-01-09 ENCOUNTER — OFFICE VISIT (OUTPATIENT)
Dept: OBGYN CLINIC | Facility: CLINIC | Age: 70
End: 2025-01-09
Payer: MEDICARE

## 2025-01-09 VITALS — HEIGHT: 75 IN | BODY MASS INDEX: 31.83 KG/M2 | WEIGHT: 256 LBS

## 2025-01-09 DIAGNOSIS — E78.5 HYPERLIPIDEMIA LDL GOAL <70: ICD-10-CM

## 2025-01-09 DIAGNOSIS — M17.0 BILATERAL PRIMARY OSTEOARTHRITIS OF KNEE: Primary | ICD-10-CM

## 2025-01-09 PROCEDURE — 99214 OFFICE O/P EST MOD 30 MIN: CPT | Performed by: STUDENT IN AN ORGANIZED HEALTH CARE EDUCATION/TRAINING PROGRAM

## 2025-01-09 PROCEDURE — 20610 DRAIN/INJ JOINT/BURSA W/O US: CPT | Performed by: STUDENT IN AN ORGANIZED HEALTH CARE EDUCATION/TRAINING PROGRAM

## 2025-01-09 RX ORDER — LIDOCAINE HYDROCHLORIDE 10 MG/ML
2 INJECTION, SOLUTION INFILTRATION; PERINEURAL
Status: COMPLETED | OUTPATIENT
Start: 2025-01-09 | End: 2025-01-09

## 2025-01-09 RX ORDER — ROSUVASTATIN CALCIUM 5 MG/1
5 TABLET, COATED ORAL DAILY
Qty: 90 TABLET | Refills: 1 | Status: SHIPPED | OUTPATIENT
Start: 2025-01-09

## 2025-01-09 RX ORDER — BETAMETHASONE SODIUM PHOSPHATE AND BETAMETHASONE ACETATE 3; 3 MG/ML; MG/ML
6 INJECTION, SUSPENSION INTRA-ARTICULAR; INTRALESIONAL; INTRAMUSCULAR; SOFT TISSUE
Status: COMPLETED | OUTPATIENT
Start: 2025-01-09 | End: 2025-01-09

## 2025-01-09 RX ADMIN — BETAMETHASONE SODIUM PHOSPHATE AND BETAMETHASONE ACETATE 6 MG: 3; 3 INJECTION, SUSPENSION INTRA-ARTICULAR; INTRALESIONAL; INTRAMUSCULAR; SOFT TISSUE at 09:15

## 2025-01-09 RX ADMIN — LIDOCAINE HYDROCHLORIDE 2 ML: 10 INJECTION, SOLUTION INFILTRATION; PERINEURAL at 09:15

## 2025-01-09 NOTE — PROGRESS NOTES
Date: 25  Marco Richard   MRN# [id number]  : 1955      Chief Complaint: Bilateral Knee Pain    Assessment and Plan:  The patient verbalized understanding of exam findings and treatment plan. We engaged in the shared decision-making process and treatment options were discussed at length with the patient. Surgical and conservative management discussed today along with risks and benefits. Patient was agreeable with the plan and all questions were answered to satisfaction.     Bilateral primary osteoarthritis of knee  Patient has a history, physical examination and radiographic evaluation consistent with moderate bilateral knee osteoarthritis     -WBAT  -Activity modification to limit strain or impact on the joint  -ibuprofen (Motrin) as needed  -Tylenol 1000mg up to three times daily as needed. Do not exceed 3000mg daily  -Home exercise program directed by PT  -Weight loss discussed   -Knee sleeve or brace for comfort  -Cane or walker recommended to offload joint  -Corticosteroid injection was offered, accepted, and administered.  Risk benefits and alternatives were discussed prior to injection.  Patient tolerated the procedure well.  -Patient understands that he would be a candidate for either right or left partial versus total knee arthroplasty should he decide to pursue surgical treatment the future.  -Patient may follow up in 3 month(s) for further evaluation and treatment       - Discussed balance issues is result of the disease and the bodies response to diminish pain  - Discussed TKA vs. Uniknee and the risk and benefits of both.   - Discussed CSI and would have to wait for 3 months to have surgery. Offered, administered, and tolerated well with no immediate complications. Aspiration of left knee also performed.  - Copies of x-rays provided  - Follow up when ready to schedule surgery or repeat injections.    Subjective:   Marco Richard is a 69 y.o. male who is being seen in follow-up  for Bilateral knee pain. Patient states that his cortisone injections have worn off approximately 2 weeks ago, but believes that he is still experiencing some relief as the pain is noticeable, but not as significant. When we last saw he we recommended cortisone injections ,lifestyle modifications, physical therapy, and assistive ambulatory devices to offload the joint.  Pain has stayed the same. No other orthopedic complaints or concerns.       Prior treatment:  NSAIDs Yes    Bracing No   Physical Therapy Yes   Cortisone Injections Yes   Viscosupplementation No     Allergy:  Allergies   Allergen Reactions    Sudafed [Pseudoephedrine] Other (See Comments)     hyperactivity    Amoxicillin Rash    Penicillins Rash     Category: Allergy;      Medications:  All current active meds have been reviewed   Past Medical History:  Past Medical History:   Diagnosis Date    Allergic rhinitis     Anxiety disorder     Last assessed 2/7/2006     Arthritis     Cancer ([facility])     prostate    Carpal tunnel syndrome     Colon polyp     Diabetes mellitus (HCC)     Diabetes mellitus with neurological manifestation ([facility]) 04/06/2012    Last assessed 3/29/2016     Diabetes type 2, uncontrolled     Last assessed 4/6/2016     Disease of thyroid gland     DM II (diabetes mellitus, type II), controlled ([facility])     Last assessed 10/20/2014     Essential hypertension     Last assesssed 2/7/2006     Hyperlipidemia     Hypertension     Insomnia     Last assessed 1/13/2011     Numbness and tingling of foot     Resolved 3/29/2016     Obesity     Old disruption of knee ligament     Last assessed 3/26/2008     Tarsal tunnel syndrome     Urinary frequency     Resolved 3/29/2016     Visual impairment      Past Surgical History:  Past Surgical History:   Procedure Laterality Date    COLONOSCOPY      FOOT SURGERY      AK AMPUTATION TOE INTERPHALANGEAL JOINT Right 11/23/2022    Procedure: AMPUTATION 3RD TOE;  Surgeon: Andrews Marquez DPM;  Location:  MAIN OR;   "Service: Podiatry    PROSTATE BIOPSY      WISDOM TOOTH EXTRACTION       Family History:  Family History   Problem Relation Age of Onset    Heart attack Mother 67    Valvular heart disease Mother     Hypotension Mother     Pancreatic cancer Father 75    Diabetes Father     Lung cancer Sister 56        not a smoker    Cancer Brother         bile duct     Colon cancer Maternal Grandmother 62    Diabetes Maternal Grandmother     Pancreatic cancer Paternal Aunt     Pancreatic cancer Paternal Uncle     Hypertension Neg Hx     Prostate cancer Neg Hx      Social History:  Social History     Substance and Sexual Activity   Alcohol Use Yes    Alcohol/week: 9.0 standard drinks of alcohol    Types: 2 Cans of beer, 7 Standard drinks or equivalent per week     Social History     Substance and Sexual Activity   Drug Use Never     Social History     Tobacco Use   Smoking Status Never   Smokeless Tobacco Never           Review of Systems:  General- denies fever/chills  HEENT- denies hearing loss or sore throat  Eyes- denies eye pain or visual disturbances, denies red eyes  Respiratory- denies cough or SOB  Cardio- denies chest pain or palpitations  GI- denies abdominal pain  Endocrine- denies urinary frequency  Urinary- denies pain with urination  Musculoskeletal- Negative except noted above  Skin- denies rashes or wounds  Neurological- denies dizziness or headache  Psychiatric- denies anxiety or difficulty concentrating    Objective:   BP Readings from Last 1 Encounters:   11/01/24 119/67      Wt Readings from Last 1 Encounters:   01/09/25 116 kg (256 lb)      Pulse Readings from Last 1 Encounters:   11/01/24 94        BMI: Estimated body mass index is 32 kg/m² as calculated from the following:    Height as of this encounter: 6' 3\" (1.905 m).    Weight as of this encounter: 116 kg (256 lb).    Physical Exam  Ht 6' 3\" (1.905 m)   Wt 116 kg (256 lb)   BMI 32.00 kg/m²   General/Constitutional: No apparent distress: well-nourished " "and well developed.  Eyes: normal ocular motion  Cardio: RRR, Normal S1S2, No m/r/g.   Lymphatic: No appreciable lymphadenopathy  Respiratory: Non-labored breathing, CTA b/l no w/c/r  Vascular: No edema, swelling or tenderness, except as noted in detailed exam. Extremities well perfused. No LE edema  Integumentary: No impressive skin lesions present, except as noted in detailed exam.  Neuro: No ataxia or tremors noted  Psych: Normal mood and affect, oriented to person, place and time. Appropriate affect.  Musculoskeletal: Normal, except as noted in detailed exam and in HPI.    Gait and Station:   normal and antalgic     Bilateral Lower Extremity:    Inspection:  normal color, temperature, turgor and moisture    Overall limb alignment: varus, partially correctable    Effusion: minimal    ROM 0 to 100 degrees with pain on right, 5 to 100 degrees with pain on left    Extensor Lag: Absent    Palpation: No joint line tenderness to palpation    stable to AP translation at 90 deg    Coronal plane stable in full extension    Coronal plane stable in mid-flexion     Motor: 5/5 EHL/FHL/TA/GS/Qd/Hs    Vascular: Toes WWP with BCR    Sensory: SILT DP/SP/Mike/Saph/Tib      Images:  No new imaging    Large joint arthrocentesis: bilateral knee  Universal Protocol:  procedure performed by consultantConsent: Verbal consent obtained.  Risks and benefits: risks, benefits and alternatives were discussed  Consent given by: patient  Time out: Immediately prior to procedure a \"time out\" was called to verify the correct patient, procedure, equipment, support staff and site/side marked as required.  Patient understanding: patient states understanding of the procedure being performed  Patient consent: the patient's understanding of the procedure matches consent given  Site marked: the operative site was marked  Patient identity confirmed: verbally with patient  Supporting Documentation  Indications: pain   Procedure Details  Location: knee - " bilateral knee  Preparation: Patient was prepped and draped in the usual sterile fashion  Needle size: 22 G  Ultrasound guidance: no  Approach: superior (superolateral)    Medications (Right): 2 mL lidocaine 1 %; 6 mg betamethasone acetate-betamethasone sodium phosphate 6 (3-3) mg/mLAspirate amount (Right): 35 mL  Aspirate (Right): yellow  Medications (Left): 2 mL lidocaine 1 %; 6 mg betamethasone acetate-betamethasone sodium phosphate 6 (3-3) mg/mL   Patient tolerance: patient tolerated the procedure well with no immediate complications  Dressing:  Sterile dressing applied           Scribe Attestation      I,:  Walter Chavez am acting as a scribe while in the presence of the attending physician.:       I,:  Tye Rodriguez MD personally performed the services described in this documentation    as scribed in my presence.:               Tye Rodriguez MD  Adult Reconstruction Specialist   [facility]

## 2025-01-09 NOTE — ASSESSMENT & PLAN NOTE
Patient has a history, physical examination and radiographic evaluation consistent with moderate bilateral knee osteoarthritis     -WBAT  -Activity modification to limit strain or impact on the joint  -ibuprofen (Motrin) as needed  -Tylenol 1000mg up to three times daily as needed. Do not exceed 3000mg daily  -Home exercise program directed by PT  -Weight loss discussed   -Knee sleeve or brace for comfort  -Cane or walker recommended to offload joint  -Corticosteroid injection was offered, accepted, and administered.  Risk benefits and alternatives were discussed prior to injection.  Patient tolerated the procedure well.  -Patient understands that he would be a candidate for either right or left partial versus total knee arthroplasty should he decide to pursue surgical treatment the future.  -Patient may follow up in 3 month(s) for further evaluation and treatment

## 2025-01-10 ENCOUNTER — TELEPHONE (OUTPATIENT)
Age: 70
End: 2025-01-10

## 2025-01-10 NOTE — TELEPHONE ENCOUNTER
Caller: Marco     Doctor: Michael    Reason for call: Patient is calling to schedule surgery with Dr Rodriguez for his R knee surgery. Patient also a few questions about surgery location, as well as if its in patient or outpatient and what other protocol is involved in the surgery. Please return call to schedule.     Call back#: [phone]

## 2025-01-10 NOTE — TELEPHONE ENCOUNTER
Called with patient on the line transferred to  line at [facility]. He was going to leave a message.

## 2025-01-10 NOTE — TELEPHONE ENCOUNTER
Spoke with patient. Agreed upon 4/16/25 for surgery. Related appointments made.   Patient will stop into office next week to sign consent and get packet/soap.

## 2025-01-13 ENCOUNTER — PREP FOR PROCEDURE (OUTPATIENT)
Dept: OBGYN CLINIC | Facility: CLINIC | Age: 70
End: 2025-01-13

## 2025-01-13 DIAGNOSIS — T14.8XXA JOINT INJURY: ICD-10-CM

## 2025-01-13 DIAGNOSIS — M17.11 PRIMARY OSTEOARTHRITIS OF RIGHT KNEE: Primary | ICD-10-CM

## 2025-01-13 RX ORDER — MULTIVIT-MIN/IRON FUM/FOLIC AC 7.5 MG-4
1 TABLET ORAL DAILY
Qty: 30 TABLET | Refills: 0 | Status: SHIPPED | OUTPATIENT
Start: 2025-01-13 | End: 2025-02-12

## 2025-01-13 RX ORDER — CHLORHEXIDINE GLUCONATE 40 MG/ML
SOLUTION TOPICAL DAILY PRN
OUTPATIENT
Start: 2025-01-13

## 2025-01-13 RX ORDER — MUPIROCIN 20 MG/G
OINTMENT TOPICAL 3 TIMES DAILY
Qty: 22 G | Refills: 0 | Status: SHIPPED | OUTPATIENT
Start: 2025-01-13

## 2025-01-13 RX ORDER — FERROUS SULFATE 324(65)MG
324 TABLET, DELAYED RELEASE (ENTERIC COATED) ORAL EVERY OTHER DAY
Qty: 15 TABLET | Refills: 0 | Status: SHIPPED | OUTPATIENT
Start: 2025-01-13 | End: 2025-02-12

## 2025-01-13 RX ORDER — CHLORHEXIDINE GLUCONATE ORAL RINSE 1.2 MG/ML
15 SOLUTION DENTAL ONCE
OUTPATIENT
Start: 2025-01-13 | End: 2025-01-13

## 2025-01-13 RX ORDER — FOLIC ACID 1 MG/1
1 TABLET ORAL DAILY
Qty: 30 TABLET | Refills: 0 | Status: SHIPPED | OUTPATIENT
Start: 2025-01-13 | End: 2025-02-12

## 2025-01-13 RX ORDER — ACETAMINOPHEN 325 MG/1
975 TABLET ORAL ONCE
OUTPATIENT
Start: 2025-01-13 | End: 2025-01-13

## 2025-01-13 RX ORDER — ASCORBIC ACID 500 MG
500 TABLET ORAL 2 TIMES DAILY
Qty: 60 TABLET | Refills: 0 | Status: SHIPPED | OUTPATIENT
Start: 2025-01-13 | End: 2025-02-12

## 2025-01-14 ENCOUNTER — RA CDI HCC (OUTPATIENT)
Dept: OTHER | Facility: HOSPITAL | Age: 70
End: 2025-01-14

## 2025-01-14 PROBLEM — E11.22 TYPE 2 DIABETES MELLITUS WITH DIABETIC CHRONIC KIDNEY DISEASE (HCC): Status: ACTIVE | Noted: 2025-01-14

## 2025-01-15 ENCOUNTER — APPOINTMENT (OUTPATIENT)
Dept: LAB | Facility: CLINIC | Age: 70
End: 2025-01-15
Payer: MEDICARE

## 2025-01-15 DIAGNOSIS — E11.42 TYPE 2 DIABETES MELLITUS WITH DIABETIC POLYNEUROPATHY, WITHOUT LONG-TERM CURRENT USE OF INSULIN (HCC): ICD-10-CM

## 2025-01-15 DIAGNOSIS — E03.9 HYPOTHYROIDISM, UNSPECIFIED TYPE: ICD-10-CM

## 2025-01-15 LAB
ALBUMIN SERPL BCG-MCNC: 4.3 G/DL (ref 3.5–5)
ALP SERPL-CCNC: 46 U/L (ref 34–104)
ALT SERPL W P-5'-P-CCNC: 42 U/L (ref 7–52)
ANION GAP SERPL CALCULATED.3IONS-SCNC: 12 MMOL/L (ref 4–13)
AST SERPL W P-5'-P-CCNC: 61 U/L (ref 13–39)
BILIRUB SERPL-MCNC: 1.2 MG/DL (ref 0.2–1)
BUN SERPL-MCNC: 26 MG/DL (ref 5–25)
CALCIUM SERPL-MCNC: 9.4 MG/DL (ref 8.4–10.2)
CHLORIDE SERPL-SCNC: 93 MMOL/L (ref 96–108)
CO2 SERPL-SCNC: 29 MMOL/L (ref 21–32)
CREAT SERPL-MCNC: 1.2 MG/DL (ref 0.6–1.3)
CREAT UR-MCNC: 71.8 MG/DL
EST. AVERAGE GLUCOSE BLD GHB EST-MCNC: 143 MG/DL
GFR SERPL CREATININE-BSD FRML MDRD: 62 ML/MIN/1.73SQ M
GLUCOSE P FAST SERPL-MCNC: 201 MG/DL (ref 65–99)
HBA1C MFR BLD: 6.6 %
MICROALBUMIN UR-MCNC: 52.6 MG/L
MICROALBUMIN/CREAT 24H UR: 73 MG/G CREATININE (ref 0–30)
POTASSIUM SERPL-SCNC: 4.5 MMOL/L (ref 3.5–5.3)
PROT SERPL-MCNC: 7.4 G/DL (ref 6.4–8.4)
SODIUM SERPL-SCNC: 134 MMOL/L (ref 135–147)
TSH SERPL DL<=0.05 MIU/L-ACNC: 3.65 UIU/ML (ref 0.45–4.5)

## 2025-01-15 PROCEDURE — 83036 HEMOGLOBIN GLYCOSYLATED A1C: CPT

## 2025-01-15 PROCEDURE — 36415 COLL VENOUS BLD VENIPUNCTURE: CPT

## 2025-01-15 PROCEDURE — 82043 UR ALBUMIN QUANTITATIVE: CPT

## 2025-01-15 PROCEDURE — 82570 ASSAY OF URINE CREATININE: CPT

## 2025-01-15 PROCEDURE — 80053 COMPREHEN METABOLIC PANEL: CPT

## 2025-01-15 PROCEDURE — 84443 ASSAY THYROID STIM HORMONE: CPT

## 2025-01-16 ENCOUNTER — RESULTS FOLLOW-UP (OUTPATIENT)
Dept: FAMILY MEDICINE CLINIC | Facility: CLINIC | Age: 70
End: 2025-01-16

## 2025-01-18 ENCOUNTER — OFFICE VISIT (OUTPATIENT)
Dept: URGENT CARE | Facility: CLINIC | Age: 70
End: 2025-01-18
Payer: MEDICARE

## 2025-01-18 VITALS
DIASTOLIC BLOOD PRESSURE: 58 MMHG | OXYGEN SATURATION: 98 % | SYSTOLIC BLOOD PRESSURE: 116 MMHG | TEMPERATURE: 97.5 F | HEART RATE: 115 BPM | RESPIRATION RATE: 16 BRPM

## 2025-01-18 DIAGNOSIS — E11.42 TYPE 2 DIABETES MELLITUS WITH DIABETIC POLYNEUROPATHY, WITHOUT LONG-TERM CURRENT USE OF INSULIN (HCC): ICD-10-CM

## 2025-01-18 DIAGNOSIS — M54.50 ACUTE RIGHT-SIDED LOW BACK PAIN WITHOUT SCIATICA: Primary | ICD-10-CM

## 2025-01-18 PROCEDURE — 99213 OFFICE O/P EST LOW 20 MIN: CPT | Performed by: NURSE PRACTITIONER

## 2025-01-18 PROCEDURE — G0463 HOSPITAL OUTPT CLINIC VISIT: HCPCS | Performed by: NURSE PRACTITIONER

## 2025-01-18 RX ORDER — CYCLOBENZAPRINE HCL 10 MG
10 TABLET ORAL 3 TIMES DAILY PRN
Qty: 12 TABLET | Refills: 0 | Status: SHIPPED | OUTPATIENT
Start: 2025-01-18 | End: 2025-01-22 | Stop reason: SDUPTHER

## 2025-01-18 NOTE — PROGRESS NOTES
[facility]        NAME: Marco Richard is a 69 y.o. male  : 1955    MRN: [id number]  DATE: 2025  TIME: 2:15 PM    Assessment and Plan   Acute right-sided low back pain without sciatica [M54.50]  1. Acute right-sided low back pain without sciatica  cyclobenzaprine (FLEXERIL) 10 mg tablet        Medical Decision Making     PROBLEM: 1 acute, uncomplicated illness or injury    DATA: Note(s) Reviewed: no  Lab(s) Reviewed: no no  Test(s) Ordered: no  Independent Historian Involved: no  Imaging independently reviewed today: no  Case was discussed with another provider: no    RISK: Prescription drug management    Patient Instructions   Flexeril every 8 hours as needed- for home use only, may cause drowsiness  Alternate ice and heat   After heat gentle stretches  Consider physical therapy if no improvement in symptoms  Follow up with your PCP for worsening or persistent pain     Follow up with PCP in 3-5 days.  Proceed to  ER if symptoms worsen.    Chief Complaint     Chief Complaint   Patient presents with    Back Pain     BACK PAIN STARTED 3 DAYS AGO WITH JUST A TWINGE. PAIN IS IN LOWER BACK dOES NOT RADIATE. PAIN IS NOW CONSTANT USING TYLENOL AND HEAT.         History of Present Illness       Patient is a 69-year-old male presenting with right low back pain.  He states pain is a spasm feeling.  Denies any known exacerbating factors.  Denies radiation of pain.  Denies numbness, tingling, or weakness.  Denies bowel or bladder trouble.  He has been taking Tylenol and using heat with no relief.    Back Pain  Pertinent negatives include no abdominal pain, fever, numbness or weakness.       Review of Systems   Review of Systems   Constitutional:  Negative for activity change, chills and fever.   Gastrointestinal:  Negative for abdominal pain, nausea and vomiting.   Musculoskeletal:  Positive for back pain.   Neurological:  Negative for weakness and numbness.         Current Medications        Current Outpatient Medications:     ascorbic acid (VITAMIN C) 500 MG tablet, Take 1 tablet (500 mg total) by mouth 2 (two) times a day, Disp: 60 tablet, Rfl: 0    aspirin 81 MG tablet, Take 1 tablet by mouth daily, Disp: , Rfl:     cyclobenzaprine (FLEXERIL) 10 mg tablet, Take 1 tablet (10 mg total) by mouth 3 (three) times a day as needed for muscle spasms, Disp: 12 tablet, Rfl: 0    ferrous sulfate 324 (65 Fe) mg, Take 1 tablet (324 mg total) by mouth every other day, Disp: 15 tablet, Rfl: 0    folic acid (FOLVITE) 1 mg tablet, Take 1 tablet (1 mg total) by mouth daily, Disp: 30 tablet, Rfl: 0    gabapentin (NEURONTIN) 100 mg capsule, Take 2 capsules (200 mg total) by mouth 3 (three) times a day, Disp: 540 capsule, Rfl: 1    glipiZIDE (GLUCOTROL XL) 5 mg 24 hr tablet, TAKE 1 TABLET (5 MG TOTAL) BY MOUTH DAILY., Disp: 90 tablet, Rfl: 1    Jardiance 25 MG TABS, TAKE 1 TABLET BY MOUTH EVERY DAY, Disp: 90 tablet, Rfl: 1    levothyroxine 50 mcg tablet, TAKE 1 TABLET (50 MCG TOTAL) BY MOUTH DAILY IN THE EARLY MORNING, Disp: 90 tablet, Rfl: 1    lisinopril (ZESTRIL) 20 mg tablet, TAKE 1 TABLET BY MOUTH EVERY DAY, Disp: 90 tablet, Rfl: 1    Misc Natural Products (Lutein 20) CAPS, Take 20 capsules by mouth daily Eye health, Disp: , Rfl:     Multiple Vitamins-Minerals (multivitamin with minerals) tablet, Take 1 tablet by mouth daily, Disp: 30 tablet, Rfl: 0    mupirocin (BACTROBAN) 2 % ointment, Apply topically 3 (three) times a day, Disp: 22 g, Rfl: 0    NIFEdipine (PROCARDIA XL) 30 mg 24 hr tablet, TAKE 1 TABLET BY MOUTH EVERY DAY, Disp: 90 tablet, Rfl: 1    rosuvastatin (CRESTOR) 5 mg tablet, TAKE 1 TABLET (5 MG TOTAL) BY MOUTH DAILY., Disp: 90 tablet, Rfl: 1    glucose blood test strip, Test (Patient not taking: Reported on 7/25/2024), Disp: , Rfl:     tadalafil (CIALIS) 20 MG tablet, Take 1 tablet (20 mg total) by mouth daily as needed for erectile dysfunction (Patient not taking: Reported on 11/1/2024), Disp: 10  tablet, Rfl: 1    Current Allergies     Allergies as of 01/18/2025 - Reviewed 01/18/2025   Allergen Reaction Noted    Sudafed [pseudoephedrine] Other (See Comments) 12/17/2018    Amoxicillin Rash 11/18/2022    Penicillins Rash 01/02/2008            The following portions of the patient's history were reviewed and updated as appropriate: allergies, current medications, past family history, past medical history, past social history, past surgical history and problem list.     Past Medical History:   Diagnosis Date    Allergic rhinitis     Anxiety disorder     Last assessed 2/7/2006     Arthritis     Cancer ([facility])     prostate    Carpal tunnel syndrome     Colon polyp     Diabetes mellitus (HCC)     Diabetes mellitus with neurological manifestation ([facility]) 04/06/2012    Last assessed 3/29/2016     Diabetes type 2, uncontrolled     Last assessed 4/6/2016     Disease of thyroid gland     DM II (diabetes mellitus, type II), controlled ([facility])     Last assessed 10/20/2014     Essential hypertension     Last assesssed 2/7/2006     Hyperlipidemia     Hypertension     Insomnia     Last assessed 1/13/2011     Numbness and tingling of foot     Resolved 3/29/2016     Obesity     Old disruption of knee ligament     Last assessed 3/26/2008     Tarsal tunnel syndrome     Urinary frequency     Resolved 3/29/2016     Visual impairment        Past Surgical History:   Procedure Laterality Date    COLONOSCOPY      FOOT SURGERY      SC AMPUTATION TOE INTERPHALANGEAL JOINT Right 11/23/2022    Procedure: AMPUTATION 3RD TOE;  Surgeon: Andrews Marquez DPM;  Location:  MAIN OR;  Service: Podiatry    PROSTATE BIOPSY      WISDOM TOOTH EXTRACTION         Family History   Problem Relation Age of Onset    Heart attack Mother 67    Valvular heart disease Mother     Hypotension Mother     Pancreatic cancer Father 75    Diabetes Father     Lung cancer Sister 56        not a smoker    Cancer Brother         bile duct     Colon cancer Maternal Grandmother  62    Diabetes Maternal Grandmother     Pancreatic cancer Paternal Aunt     Pancreatic cancer Paternal Uncle     Hypertension Neg Hx     Prostate cancer Neg Hx          Medications have been verified.        Objective   /58   Pulse (!) 115   Temp 97.5 °F (36.4 °C) (Temporal)   Resp 16   SpO2 98%        Physical Exam     Physical Exam  Vitals reviewed.   Constitutional:       General: He is awake. He is not in acute distress.     Appearance: Normal appearance. He is normal weight.   Cardiovascular:      Rate and Rhythm: Tachycardia present.   Pulmonary:      Effort: Pulmonary effort is normal.   Musculoskeletal:        Back:    Skin:     General: Skin is warm and moist.   Neurological:      General: No focal deficit present.      Mental Status: He is alert and oriented to person, place, and time.   Psychiatric:         Behavior: Behavior is cooperative.                    no

## 2025-01-18 NOTE — PATIENT INSTRUCTIONS
"Flexeril every 8 hours as needed- for home use only, may cause drowsiness  Alternate ice and heat   After heat gentle stretches  Consider physical therapy if no improvement in symptoms  Follow up with your PCP for worsening or persistent pain     Patient Education     Low back pain - Discharge instructions   The Basics   Written by the doctors and editors at [facility]   What are discharge instructions? -- Discharge instructions are information about how to take care of yourself after getting medical care for a health problem.  What is low back pain? -- Low back pain is pain or discomfort in the lower part of your back. Many people have low back pain at some point, and it most often gets better on its own. Many different things can cause low back pain. Most of the time, doctors do not know the exact cause.  Back pain can happen if you strain a muscle. This is often what has happened when a person \"throws out\" their back. This refers to pain that starts suddenly after physical activity, like lifting something heavy or bending the back.  Back pain can also happen if you have (figure 1):   Damaged, bulging, or torn discs   Arthritis affecting the joints of the spine   Bony growths on the vertebrae that crowd nearby nerves   A \"compression fracture\" due to osteoporosis (a condition that makes your bones weak)   A vertebra out of place   Narrowing in the spinal canal   A tumor or infection (but this is very rare)  How do I care for myself at home? -- Ask the doctor or nurse what you should do when you go home. Make sure that you understand exactly what you need to do to care for yourself. Ask questions if there is anything you do not understand.  You should also:   Use heat on your back for short periods of time, if it helps with pain. Put a heating pad (on the low setting) on your back for 20 minutes at a time a few times each day. Never go to sleep with heat on your back.   Try to stay as active as you can without " "causing too much pain, if your doctor or nurse said that it is OK. If your pain is severe, you might need to rest for a day or 2. But it's important to get back to walking and moving as soon as possible. Try to keep doing your normal daily activities. Get up and move around gently during the day as you are able.   Slowly start to increase your activity level as you are able to. If something causes your pain to come back or get worse, stop and go back to doing easier activities that did not hurt.   Avoid sitting or standing in 1 position for a long time. You might want to sleep with a pillow under or between your knees if this eases your pain.   Take a medicine like ibuprofen (sample brand names: Advil, Motrin) or naproxen (brand name: Aleve) for pain, if needed. These are nonsteroidal antiinflammatory drugs (\"NSAIDs\"). They might work better than acetaminophen for low back pain.   Talk to your doctor or nurse before trying any of the following. These treatments might help you feel better for a little while:   Spinal manipulation - This is when a chiropractor, physical therapist, or other professional moves or \"adjusts\" the joints of your back.   Acupuncture - This is when someone who knows traditional Chinese medicine inserts tiny needles through your skin to block pain signals.   Massage therapy - The massage therapist manipulates your muscles and soft tissues to decrease muscle tension and increase relaxation.  What follow-up care do I need? -- Your doctor or nurse will tell you if you need to make a follow-up appointment. If so, make sure that you know when and where to go. The doctor might suggest that you see a physical therapist to learn exercises to help with your back pain.  When should I call the doctor? -- Call for emergency help right away (in the [place] and [place], call 9-1-1) if:   You are unable to walk, or cannot control your bowels or bladder.   You develop a fever of 100.4°F (38°C) or higher, chills, or " night sweats.  Call your doctor for advice if:   Your legs are numb, weak, or tingly.   Your pain is getting worse, even with medicines and rest.   You develop a new rash.  All topics are updated as new evidence becomes available and our peer review process is complete.  This topic retrieved from [employer] on: May 15, 2024.  Topic 027455 Version 1.0  Release: 32.4.3 - C32.134  © 2024 UpToDate, Inc. and/or its affiliates. All rights reserved.  figure 1: Anatomy of the back     This drawing shows the different parts of the back. Back pain can be caused by problems with the muscles, ligaments, discs, bones (vertebrae), or nerves.  Graphic 87336 Version 6.0  Consumer Information Use and Disclaimer   Disclaimer: This generalized information is a limited summary of diagnosis, treatment, and/or medication information. It is not meant to be comprehensive and should be used as a tool to help the user understand and/or assess potential diagnostic and treatment options. It does NOT include all information about conditions, treatments, medications, side effects, or risks that may apply to a specific patient. It is not intended to be medical advice or a substitute for the medical advice, diagnosis, or treatment of a health care provider based on the health care provider's examination and assessment of a patient's specific and unique circumstances. Patients must speak with a health care provider for complete information about their health, medical questions, and treatment options, including any risks or benefits regarding use of medications. This information does not endorse any treatments or medications as safe, effective, or approved for treating a specific patient. UpToDate, Inc. and its affiliates disclaim any warranty or liability relating to this information or the use thereof.The use of this information is governed by the Terms of Use, available at https://www.woltersiPixCeluwer.com/en/know/clinical-effectiveness-terms. 2024©  [employer] and its affiliates and/or licensors. All rights reserved.  Copyright   © 2024 [employer] and/or its affiliates. All rights reserved.

## 2025-01-20 RX ORDER — GLIPIZIDE 5 MG/1
5 TABLET, FILM COATED, EXTENDED RELEASE ORAL DAILY
Qty: 90 TABLET | Refills: 1 | Status: SHIPPED | OUTPATIENT
Start: 2025-01-20 | End: 2025-07-19

## 2025-01-20 RX ORDER — MULTIVITAMIN WITH FOLIC ACID 400 MCG
1 TABLET ORAL DAILY
COMMUNITY
Start: 2025-01-13

## 2025-01-21 ENCOUNTER — OFFICE VISIT (OUTPATIENT)
Dept: FAMILY MEDICINE CLINIC | Facility: CLINIC | Age: 70
End: 2025-01-21
Payer: MEDICARE

## 2025-01-21 VITALS
HEIGHT: 75 IN | WEIGHT: 251 LBS | HEART RATE: 111 BPM | DIASTOLIC BLOOD PRESSURE: 70 MMHG | BODY MASS INDEX: 31.21 KG/M2 | RESPIRATION RATE: 16 BRPM | SYSTOLIC BLOOD PRESSURE: 130 MMHG | OXYGEN SATURATION: 98 %

## 2025-01-21 DIAGNOSIS — D12.6 HIGH GRADE DYSPLASIA IN COLONIC ADENOMA: ICD-10-CM

## 2025-01-21 DIAGNOSIS — Z00.00 MEDICARE ANNUAL WELLNESS VISIT, SUBSEQUENT: Primary | ICD-10-CM

## 2025-01-21 DIAGNOSIS — Z28.21 INFLUENZA VACCINATION DECLINED BY PATIENT: ICD-10-CM

## 2025-01-21 DIAGNOSIS — N18.31 STAGE 3A CHRONIC KIDNEY DISEASE (HCC): ICD-10-CM

## 2025-01-21 DIAGNOSIS — M54.50 ACUTE RIGHT-SIDED LOW BACK PAIN WITHOUT SCIATICA: ICD-10-CM

## 2025-01-21 DIAGNOSIS — L97.511 SKIN ULCER OF TOE OF RIGHT FOOT, LIMITED TO BREAKDOWN OF SKIN (HCC): ICD-10-CM

## 2025-01-21 DIAGNOSIS — C61 PROSTATE CANCER (HCC): ICD-10-CM

## 2025-01-21 DIAGNOSIS — E11.42 TYPE 2 DIABETES MELLITUS WITH DIABETIC POLYNEUROPATHY, WITHOUT LONG-TERM CURRENT USE OF INSULIN (HCC): ICD-10-CM

## 2025-01-21 DIAGNOSIS — Z28.21 TETANUS, DIPHTHERIA, AND ACELLULAR PERTUSSIS (TDAP) VACCINATION DECLINED: ICD-10-CM

## 2025-01-21 DIAGNOSIS — E03.9 HYPOTHYROIDISM, UNSPECIFIED TYPE: ICD-10-CM

## 2025-01-21 DIAGNOSIS — E78.5 HYPERLIPIDEMIA LDL GOAL <70: ICD-10-CM

## 2025-01-21 DIAGNOSIS — M25.561 CHRONIC PAIN OF BOTH KNEES: ICD-10-CM

## 2025-01-21 DIAGNOSIS — Z28.21 PNEUMOCOCCAL VACCINATION DECLINED BY PATIENT: ICD-10-CM

## 2025-01-21 DIAGNOSIS — G89.29 CHRONIC PAIN OF BOTH KNEES: ICD-10-CM

## 2025-01-21 DIAGNOSIS — M86.171 OTHER ACUTE OSTEOMYELITIS, RIGHT ANKLE AND FOOT (HCC): ICD-10-CM

## 2025-01-21 DIAGNOSIS — M25.562 CHRONIC PAIN OF BOTH KNEES: ICD-10-CM

## 2025-01-21 DIAGNOSIS — I10 PRIMARY HYPERTENSION: ICD-10-CM

## 2025-01-21 PROCEDURE — G0439 PPPS, SUBSEQ VISIT: HCPCS | Performed by: FAMILY MEDICINE

## 2025-01-21 PROCEDURE — G2211 COMPLEX E/M VISIT ADD ON: HCPCS | Performed by: FAMILY MEDICINE

## 2025-01-21 PROCEDURE — 99214 OFFICE O/P EST MOD 30 MIN: CPT | Performed by: FAMILY MEDICINE

## 2025-01-21 NOTE — ASSESSMENT & PLAN NOTE
Lab Results   Component Value Date    EGFR 62 01/15/2025    EGFR 67 07/22/2024    EGFR 66 06/19/2024    CREATININE 1.20 01/15/2025    CREATININE 1.13 07/22/2024    CREATININE 1.14 06/19/2024

## 2025-01-21 NOTE — ASSESSMENT & PLAN NOTE
- follows with Endo  - follows with Podiatry Q9wks   - declined annual Flu in the office today  - declined PCV20 in the office today  - cont Jardiance 25mg QD and Glipizide 5mg QD   - RTO in 6months, with labs, for f/u - pt aware and agreeable   Lab Results   Component Value Date    HGBA1C 6.6 (H) 01/15/2025       Orders:    Hemoglobin A1C; Future    Comprehensive metabolic panel; Future

## 2025-01-21 NOTE — ASSESSMENT & PLAN NOTE
- stable TSH   - cont current regimen   - RTO in 6months, with labs, for f/u - pt aware and agreeable   Orders:    TSH, 3rd generation with Free T4 reflex; Future

## 2025-01-21 NOTE — ASSESSMENT & PLAN NOTE
- LDL 58   - cont statin   - RTO in 6months, with labs, for f/u - pt aware and agreeable   Orders:    Lipid panel; Future  - The 10-year ASCVD risk score (Demetri DOYLE, et al., 2019) is: 22.9%    Values used to calculate the score:      Age: 69 years      Sex: Male      Is Non- : No      Diabetic: Yes      Tobacco smoker: No      Systolic Blood Pressure: 130 mmHg      Is BP treated: Yes      HDL Cholesterol: 63 mg/dL      Total Cholesterol: 141 mg/dL

## 2025-01-21 NOTE — PATIENT INSTRUCTIONS
Medicare Preventive Visit Patient Instructions  Thank you for completing your Welcome to Medicare Visit or Medicare Annual Wellness Visit today. Your next wellness visit will be due in one year (1/22/2026).  The screening/preventive services that you may require over the next 5-10 years are detailed below. Some tests may not apply to you based off risk factors and/or age. Screening tests ordered at today's visit but not completed yet may show as past due. Also, please note that scanned in results may not display below.  Preventive Screenings:  Service Recommendations Previous Testing/Comments   Colorectal Cancer Screening  Colonoscopy    Fecal Occult Blood Test (FOBT)/Fecal Immunochemical Test (FIT)  Fecal DNA/Cologuard Test  Flexible Sigmoidoscopy Age: 45-75 years old   Colonoscopy: every 10 years (May be performed more frequently if at higher risk)  OR  FOBT/FIT: every 1 year  OR  Cologuard: every 3 years  OR  Sigmoidoscopy: every 5 years  Screening may be recommended earlier than age 45 if at higher risk for colorectal cancer. Also, an individualized decision between you and your healthcare provider will decide whether screening between the ages of 76-85 would be appropriate. Colonoscopy: 04/11/2024  FOBT/FIT: Not on file  Cologuard: Not on file  Sigmoidoscopy: Not on file    Screening Current     Prostate Cancer Screening Individualized decision between patient and health care provider in men between ages of 55-69   Medicare will cover every 12 months beginning on the day after your 50th birthday PSA: 7.500 ng/mL     History Prostate Cancer  Due for PSA     Hepatitis C Screening Once for adults born between 1945 and 1965  More frequently in patients at high risk for Hepatitis C Hep C Antibody: 06/25/2020    Screening Current   Diabetes Screening 1-2 times per year if you're at risk for diabetes or have pre-diabetes Fasting glucose: 201 mg/dL (1/15/2025)  A1C: 6.6 % (1/15/2025)  Screening Not Indicated  History  Diabetes  Due for Blood Glucose   Cholesterol Screening Once every 5 years if you don't have a lipid disorder. May order more often based on risk factors. Lipid panel: 06/19/2024  Screening Not Indicated  History Lipid Disorder  Due for Lipid Panel      Other Preventive Screenings Covered by Medicare:  Abdominal Aortic Aneurysm (AAA) Screening: covered once if your at risk. You're considered to be at risk if you have a family history of AAA or a male between the age of 65-75 who smoking at least 100 cigarettes in your lifetime.  Lung Cancer Screening: covers low dose CT scan once per year if you meet all of the following conditions: (1) Age 55-77; (2) No signs or symptoms of lung cancer; (3) Current smoker or have quit smoking within the last 15 years; (4) You have a tobacco smoking history of at least 20 pack years (packs per day x number of years you smoked); (5) You get a written order from a healthcare provider.  Glaucoma Screening: covered annually if you're considered high risk: (1) You have diabetes OR (2) Family history of glaucoma OR (3)  aged 50 and older OR (4)  American aged 65 and older  Osteoporosis Screening: covered every 2 years if you meet one of the following conditions: (1) Have a vertebral abnormality; (2) On glucocorticoid therapy for more than 3 months; (3) Have primary hyperparathyroidism; (4) On osteoporosis medications and need to assess response to drug therapy.  HIV Screening: covered annually if you're between the age of 15-65. Also covered annually if you are younger than 15 and older than 65 with risk factors for HIV infection. For pregnant patients, it is covered up to 3 times per pregnancy.    Immunizations:  Immunization Recommendations   Influenza Vaccine Annual influenza vaccination during flu season is recommended for all persons aged >= 6 months who do not have contraindications   Pneumococcal Vaccine   * Pneumococcal conjugate vaccine = PCV13 (Prevnar  13), PCV15 (Vaxneuvance), PCV20 (Prevnar 20)  * Pneumococcal polysaccharide vaccine = PPSV23 (Pneumovax) Adults 19-65 yo with certain risk factors or if 65+ yo  If never received any pneumonia vaccine: recommend Prevnar 20 (PCV20)  Give PCV20 if previously received 1 dose of PCV13 or PPSV23   Hepatitis B Vaccine 3 dose series if at intermediate or high risk (ex: diabetes, end stage renal disease, liver disease)   Respiratory syncytial virus (RSV) Vaccine - COVERED BY MEDICARE PART D  * RSVPreF3 (Arexvy) CDC recommends that adults 60 years of age and older may receive a single dose of RSV vaccine using shared clinical decision-making (SCDM)   Tetanus (Td) Vaccine - COST NOT COVERED BY MEDICARE PART B Following completion of primary series, a booster dose should be given every 10 years to maintain immunity against tetanus. Td may also be given as tetanus wound prophylaxis.   Tdap Vaccine - COST NOT COVERED BY MEDICARE PART B Recommended at least once for all adults. For pregnant patients, recommended with each pregnancy.   Shingles Vaccine (Shingrix) - COST NOT COVERED BY MEDICARE PART B  2 shot series recommended in those 19 years and older who have or will have weakened immune systems or those 50 years and older     Health Maintenance Due:      Topic Date Due   • Colorectal Cancer Screening  04/11/2027   • Hepatitis C Screening  Completed     Immunizations Due:      Topic Date Due   • Pneumococcal Vaccine: 65+ Years (1 of 2 - PCV) Never done   • Influenza Vaccine (1) Never done   • COVID-19 Vaccine (5 - 2024-25 season) 09/01/2024     Advance Directives   What are advance directives?  Advance directives are legal documents that state your wishes and plans for medical care. These plans are made ahead of time in case you lose your ability to make decisions for yourself. Advance directives can apply to any medical decision, such as the treatments you want, and if you want to donate organs.   What are the types of  advance directives?  There are many types of advance directives, and each state has rules about how to use them. You may choose a combination of any of the following:  Living will:  This is a written record of the treatment you want. You can also choose which treatments you do not want, which to limit, and which to stop at a certain time. This includes surgery, medicine, IV fluid, and tube feedings.   Durable power of  for healthcare (DPAHC):  This is a written record that states who you want to make healthcare choices for you when you are unable to make them for yourself. This person, called a proxy, is usually a family member or a friend. You may choose more than 1 proxy.  Do not resuscitate (DNR) order:  A DNR order is used in case your heart stops beating or you stop breathing. It is a request not to have certain forms of treatment, such as CPR. A DNR order may be included in other types of advance directives.  Medical directive:  This covers the care that you want if you are in a coma, near death, or unable to make decisions for yourself. You can list the treatments you want for each condition. Treatment may include pain medicine, surgery, blood transfusions, dialysis, IV or tube feedings, and a ventilator (breathing machine).  Values history:  This document has questions about your views, beliefs, and how you feel and think about life. This information can help others choose the care that you would choose.  Why are advance directives important?  An advance directive helps you control your care. Although spoken wishes may be used, it is better to have your wishes written down. Spoken wishes can be misunderstood, or not followed. Treatments may be given even if you do not want them. An advance directive may make it easier for your family to make difficult choices about your care.   Weight Management   Why it is important to manage your weight:  Being overweight increases your risk of health conditions  such as heart disease, high blood pressure, type 2 diabetes, and certain types of cancer. It can also increase your risk for osteoarthritis, sleep apnea, and other respiratory problems. Aim for a slow, steady weight loss. Even a small amount of weight loss can lower your risk of health problems.  How to lose weight safely:  A safe and healthy way to lose weight is to eat fewer calories and get regular exercise. You can lose up about 1 pound a week by decreasing the number of calories you eat by 500 calories each day.   Healthy meal plan for weight management:  A healthy meal plan includes a variety of foods, contains fewer calories, and helps you stay healthy. A healthy meal plan includes the following:  Eat whole-grain foods more often.  A healthy meal plan should contain fiber. Fiber is the part of grains, fruits, and vegetables that is not broken down by your body. Whole-grain foods are healthy and provide extra fiber in your diet. Some examples of whole-grain foods are whole-wheat breads and pastas, oatmeal, brown rice, and bulgur.  Eat a variety of vegetables every day.  Include dark, leafy greens such as spinach, kale, jerrica greens, and mustard greens. Eat yellow and orange vegetables such as carrots, sweet potatoes, and winter squash.   Eat a variety of fruits every day.  Choose fresh or canned fruit (canned in its own juice or light syrup) instead of juice. Fruit juice has very little or no fiber.  Eat low-fat dairy foods.  Drink fat-free (skim) milk or 1% milk. Eat fat-free yogurt and low-fat cottage cheese. Try low-fat cheeses such as mozzarella and other reduced-fat cheeses.  Choose meat and other protein foods that are low in fat.  Choose beans or other legumes such as split peas or lentils. Choose fish, skinless poultry (chicken or turkey), or lean cuts of red meat (beef or pork). Before you cook meat or poultry, cut off any visible fat.   Use less fat and oil.  Try baking foods instead of frying  them. Add less fat, such as margarine, sour cream, regular salad dressing and mayonnaise to foods. Eat fewer high-fat foods. Some examples of high-fat foods include french fries, doughnuts, ice cream, and cakes.  Eat fewer sweets.  Limit foods and drinks that are high in sugar. This includes candy, cookies, regular soda, and sweetened drinks.  Exercise:  Exercise at least 30 minutes per day on most days of the week. Some examples of exercise include walking, biking, dancing, and swimming. You can also fit in more physical activity by taking the stairs instead of the elevator or parking farther away from stores. Ask your healthcare provider about the best exercise plan for you.      © Copyright [employer] 2018 Information is for End User's use only and may not be sold, redistributed or otherwise used for commercial purposes. All illustrations and images included in CareNotes® are the copyrighted property of [employer].D.A[employer]M., Inc. or [employer]

## 2025-01-21 NOTE — PROGRESS NOTES
"Name: Marco Richard      : 1955      MRN: [id number]  Encounter Provider: Amira Vee DO  Encounter Date: 2025   Encounter department: [facility]  Medicare annual wellness visit, subsequent  - reviewed PMHx, PSHx, meds, allergies, FHx, Soc Hx   - UTD with COVID IMMs and Booster x2  - declined Tdap in the office today  - declined annual Flu in the office today  - declined PCV20 in the office today  - reviewed labs done 1/15/2025 - see below   - follows with Uro for treatment of Prostate Ca  - UTD with screening Cscope (2024):  \"6 subcentimeter polyps were removed with cold snare.  Diverticulosis of mild severity in the sigmoid colon and rectosigmoid.  Small hemorrhoids\" - 3yr recall   - UTD with AAA screen   - Lung Ca screening not indicated   - RTO in 1yr for AWV - pt aware and agreeable       Acute right-sided low back pain without sciatica  - saw UC for eval of low back pain   - advised to cont Flexeril   - caution with NSAIDs given h/o DM/HTN and CKD  - caution with Tylenol as noted elevated AST        Type 2 diabetes mellitus with diabetic polyneuropathy, without long-term current use of insulin (HCC)  - follows with Endo  - follows with Podiatry Q9wks   - declined annual Flu in the office today  - declined PCV20 in the office today  - cont Jardiance 25mg QD and Glipizide 5mg QD   - RTO in 6months, with labs, for f/u - pt aware and agreeable   Lab Results   Component Value Date    HGBA1C 6.6 (H) 01/15/2025       Orders:    Hemoglobin A1C; Future    Comprehensive metabolic panel; Future    Hyperlipidemia LDL goal <70  - LDL 58   - cont statin   - RTO in 6months, with labs, for f/u - pt aware and agreeable   Orders:    Lipid panel; Future  - The 10-year ASCVD risk score (Demetri DOYLE, et al., 2019) is: 22.9%    Values used to calculate the score:      Age: 69 years      Sex: Male      Is Non- : No      Diabetic: Yes      Tobacco " "smoker: No      Systolic Blood Pressure: 130 mmHg      Is BP treated: Yes      HDL Cholesterol: 63 mg/dL      Total Cholesterol: 141 mg/dL    Primary hypertension  - BP stable in office   - did see Nephro 12/11/2023 - was advised to decrease ACEI from 40mg QD to 20mg QD, STOP chlorthalidone, and started on Nifedipine 30mg QD   - nml kidneys on renal US   - (+) proteinuria - has appt scheduled with Nephro in 3/2025   - cont with hydration   - caution/avoidance of NSAIDs  - RTO in 6months, with labs, for f/u - pt aware and agreeable   Orders:    Albumin / creatinine urine ratio; Future    Hypothyroidism, unspecified type  - stable TSH   - cont current regimen   - RTO in 6months, with labs, for f/u - pt aware and agreeable   Orders:    TSH, 3rd generation with Free T4 reflex; Future    Pneumococcal vaccination declined by patient         Tetanus, diphtheria, and acellular pertussis (Tdap) vaccination declined         Influenza vaccination declined by patient         Prostate cancer (HCC)  - follows with Uro        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  Diverticulosis of mild severity in the sigmoid colon and rectosigmoid.  Small hemorrhoids\" - 3yr recall        Chronic pain of both knees  - pt scheduled with Ortho for R-knee arthroplasty in 4/2025       Skin ulcer of toe of right foot, limited to breakdown of skin (HCC)  - follows with Podiatry Q9wks        Other acute osteomyelitis, right ankle and foot (HCC)  - follows with Podiatry Q9wks        Stage 3a chronic kidney disease (HCC)  Lab Results   Component Value Date    EGFR 62 01/15/2025    EGFR 67 07/22/2024    EGFR 66 06/19/2024    CREATININE 1.20 01/15/2025    CREATININE 1.13 07/22/2024    CREATININE 1.14 06/19/2024               Preventive health issues were discussed with patient, and age appropriate screening tests were ordered as noted in patient's After Visit " Summary. Personalized health advice and appropriate referrals for health education or preventive services given if needed, as noted in patient's After Visit Summary.    History of Present Illness     HPI see below     Patient Care Team:  Amira Vee DO as PCP - General (Family Medicine)  Brett Gibson, MD Joselyn Reyes Bahamonde, MD (Nephrology)    Review of Systems as per HPI     Medical History Reviewed by provider this encounter:  Tobacco  Allergies  Meds  Problems  Med Hx  Surg Hx  Fam Hx       Annual Wellness Visit Questionnaire   Marco is here for his Subsequent Wellness visit. Last Medicare Wellness visit information reviewed, patient interviewed and updates made to the record today.      Health Risk Assessment:   Patient rates overall health as good. Patient feels that their physical health rating is same. Patient is very satisfied with their life. Eyesight was rated as same. Hearing was rated as same. Patient feels that their emotional and mental health rating is same. Patients states they are never, rarely angry. Patient states they are never, rarely unusually tired/fatigued. Pain experienced in the last 7 days has been none. Patient states that he has experienced no weight loss or gain in last 6 months.     Depression Screening:   PHQ-9 Score: 0      Fall Risk Screening:   In the past year, patient has experienced: no history of falling in past year      Home Safety:  Patient does not have trouble with stairs inside or outside of their home. Patient has working smoke alarms and has working carbon monoxide detector. Home safety hazards include: none.     Nutrition:   Current diet is Regular.     Medications:   Patient is currently taking over-the-counter supplements. OTC medications include: see medication list. Patient is able to manage medications.     Activities of Daily Living (ADLs)/Instrumental Activities of Daily Living (IADLs):   Walk and transfer into and out of bed and chair?:  Yes  Dress and groom yourself?: Yes    Bathe or shower yourself?: Yes    Feed yourself? Yes  Do your laundry/housekeeping?: Yes  Manage your money, pay your bills and track your expenses?: Yes  Make your own meals?: Yes    Do your own shopping?: Yes    Previous Hospitalizations:   Any hospitalizations or ED visits within the last 12 months?: No      Advance Care Planning:   Living will: No    Durable POA for healthcare: No    Advanced directive: No      Cognitive Screening:   Provider or family/friend/caregiver concerned regarding cognition?: No    PREVENTIVE SCREENINGS      Cardiovascular Screening:    General: History Lipid Disorder and Risks and Benefits Discussed    Due for: Lipid Panel      Diabetes Screening:     General: Screening Not Indicated, History Diabetes, Screening Current and Risks and Benefits Discussed    Due for: Blood Glucose      Colorectal Cancer Screening:     General: Screening Current      Prostate Cancer Screening:    General: History Prostate Cancer    Due for: PSA      Osteoporosis Screening:    General: Screening Not Indicated      Abdominal Aortic Aneurysm (AAA) Screening:    Risk factors include: age between 65-74 yo        General: Risks and Benefits Discussed and Screening Current      Lung Cancer Screening:     General: Screening Not Indicated      Hepatitis C Screening:    General: Screening Current      Preventive Screening Comments: Marco Richard is a 69 y.o. male who presents to the office for initial AWV and f/u of chronic conditions   1) AWV  - PMHx: DM2 with neuropathy, HTN, HL, CTS/TTS, obesity (BMI 31.4), h/o colonic polyps (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) and internal hemorrhoids   - Specialists: Uro, Optho, GI, Podiatry (Q9wks), Nephro (annually)   - allergies: NKDA  - Meds: see med rec  - PSHx: see surg rec   - FHx: M (MI at 68yo, hypotension, valvular heart disease), F (DM, pancreatic ca - dx at 74yo), Sister (lung ca - dx at 57yo), B (bile  "duct ca - dx at 57yo), denies FHx of HTN/prostate ca   - Immunizations: UTD with COVID vaccines and Boosters x2; declined Tdap, Flu and Pneumovax in the office today    - Hm: Cscope in 4/2024: \"6 subcentimeter polyps were removed with cold snare.  Diverticulosis of mild severity in the sigmoid colon and rectosigmoid.  Small hemorrhoids\" - 3yr recall   - diet/exercise: currently not exercising, not watching his diet   - social: denies tob/illicits, 2-3drinks Qnight, 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   - sexual Hx: not currently sexually active, declined STD screening   - last vision: wears glasses, [facility] - goes annually   - last dental: goes Q6months  2) DM2 with polyneuropathy/HL/HTN  - reviewed labs done 1/15/2025  - follows with Podiatry Q9wks   - UTD with COVID IMMs and Boosters x2  - declined Pneumovax, annual Flu and Tdap in the office today   - follows with Uro re: Prostate Ca  - today in the office pt denies F/C/N/V/CP/palpitations/SOB/wheezing/cough/abd pain/D/LE edema     Screening, Brief Intervention, and Referral to Treatment (SBIRT)    Screening  Typical number of drinks in a day: 0  Typical number of drinks in a week: 0  Interpretation: Low risk drinking behavior.    Single Item Drug Screening:  How often have you used an illegal drug (including marijuana) or a prescription medication for non-medical reasons in the past year? never    Single Item Drug Screen Score: 0  Interpretation: Negative screen for possible drug use disorder    Social Drivers of Health     Financial Resource Strain: Low Risk  (11/2/2023)    Overall Financial Resource Strain (CARDIA)     Difficulty of Paying Living Expenses: Not hard at all   Food Insecurity: No Food Insecurity (1/21/2025)    Hunger Vital Sign     Worried About Running Out of Food in the Last Year: Never true     Ran Out of Food in the Last Year: Never true   Transportation Needs: No Transportation Needs (1/21/2025)    PRAPARE - Transportation     Lack of Transportation " "(Medical): No     Lack of Transportation (Non-Medical): No   Housing Stability: Unknown (1/21/2025)    Housing Stability Vital Sign     Unable to Pay for Housing in the Last Year: No     Homeless in the Last Year: No   Utilities: Not At Risk (1/21/2025)    [facility] Utilities     Threatened with loss of utilities: No     No results found.    Objective   /70   Pulse (!) 111   Resp 16   Ht 6' 3\" (1.905 m)   Wt 114 kg (251 lb)   SpO2 98%   BMI 31.37 kg/m²     Physical Exam  Vitals reviewed.   Constitutional:       General: He is not in acute distress.     Appearance: Normal appearance. He is not ill-appearing, toxic-appearing or diaphoretic.   HENT:      Head: Normocephalic and atraumatic.      Right Ear: External ear normal.      Left Ear: External ear normal.      Nose: Nose normal.   Eyes:      General: No scleral icterus.        Right eye: No discharge.         Left eye: No discharge.      Extraocular Movements: Extraocular movements intact.      Conjunctiva/sclera: Conjunctivae normal.   Cardiovascular:      Rate and Rhythm: Normal rate and regular rhythm.      Heart sounds: Normal heart sounds.   Pulmonary:      Effort: Pulmonary effort is normal.      Breath sounds: Normal breath sounds.   Abdominal:      Palpations: Abdomen is soft.   Musculoskeletal:         General: Tenderness present.      Right lower leg: No edema.      Left lower leg: No edema.      Comments: Ambulating with cane    Skin:     General: Skin is warm.   Neurological:      General: No focal deficit present.      Mental Status: He is alert and oriented to person, place, and time.   Psychiatric:         Mood and Affect: Mood normal.         Behavior: Behavior normal.         "

## 2025-01-22 ENCOUNTER — TELEPHONE (OUTPATIENT)
Age: 70
End: 2025-01-22

## 2025-01-22 DIAGNOSIS — M54.50 ACUTE RIGHT-SIDED LOW BACK PAIN WITHOUT SCIATICA: ICD-10-CM

## 2025-01-22 RX ORDER — CYCLOBENZAPRINE HCL 10 MG
10 TABLET ORAL 3 TIMES DAILY PRN
Qty: 12 TABLET | Refills: 0 | Status: SHIPPED | OUTPATIENT
Start: 2025-01-22

## 2025-01-22 NOTE — TELEPHONE ENCOUNTER
Patient called and stated he was seen by Dr Vee yesterday and she was supposed to send him a new script for his Flexeril but it was not sent. Please have Dr Vee send to the [facility] asap.

## 2025-01-24 ENCOUNTER — TELEPHONE (OUTPATIENT)
Age: 70
End: 2025-01-24

## 2025-01-24 NOTE — TELEPHONE ENCOUNTER
[place] cyclobenzaprine (FLEXERIL) 10 mg APPROVED     Date(s) approved from 1/24/25 until further notice    Case # [id number]    Patient advised by          []Quote Rollerhart Message  []Phone call   []LMOM  []L/M to call office as no active Communication consent on file  []Unable to leave detailed message as VM not approved on Communication consent       Pharmacy advised by    [x]Fax  [x]Phone call

## 2025-01-24 NOTE — TELEPHONE ENCOUNTER
PA cyclobenzaprine (FLEXERIL) 10 mg SUBMITTED     to [facility]    via    [x]CMM-KEY: [id number]  []Surescripts-Case ID #    []Availity-Auth ID #  NDC #    []Faxed to plan   []Other website    []Phone call Case ID #      []PA sent as URGENT    All office notes, labs and other pertaining documents and studies sent. Clinical questions answered. Awaiting determination from insurance company.     Turnaround time for your insurance to make a decision on your Prior Authorization can take 7-21 business days.

## 2025-02-04 DIAGNOSIS — M17.11 PRIMARY OSTEOARTHRITIS OF RIGHT KNEE: ICD-10-CM

## 2025-02-05 RX ORDER — FERROUS SULFATE 324(65)MG
324 TABLET, DELAYED RELEASE (ENTERIC COATED) ORAL EVERY OTHER DAY
Qty: 45 TABLET | Refills: 1 | Status: SHIPPED | OUTPATIENT
Start: 2025-02-05 | End: 2025-03-07

## 2025-02-05 RX ORDER — ASCORBIC ACID 500 MG
500 TABLET ORAL 2 TIMES DAILY
Qty: 180 TABLET | Refills: 1 | Status: SHIPPED | OUTPATIENT
Start: 2025-02-05

## 2025-02-05 RX ORDER — FOLIC ACID 1 MG/1
1000 TABLET ORAL DAILY
Qty: 90 TABLET | Refills: 1 | Status: SHIPPED | OUTPATIENT
Start: 2025-02-05

## 2025-02-09 DIAGNOSIS — M17.11 UNILATERAL PRIMARY OSTEOARTHRITIS, RIGHT KNEE: ICD-10-CM

## 2025-02-11 RX ORDER — MULTIVITAMIN WITH FOLIC ACID 400 MCG
1 TABLET ORAL DAILY
Qty: 30 TABLET | Refills: 2 | Status: SHIPPED | OUTPATIENT
Start: 2025-02-11

## 2025-03-10 ENCOUNTER — TELEPHONE (OUTPATIENT)
Dept: OBGYN CLINIC | Facility: HOSPITAL | Age: 70
End: 2025-03-10

## 2025-03-10 NOTE — TELEPHONE ENCOUNTER
Preoperative Elective Admission Assessment    EKG/LAB/MRSA SWAB/CXR date: going 3/17    Living Situation:    Who does pt live with: spouse and daughter   What kind of home: multi-level  How do they enter the home: garage   How many levels in home: 2  # of steps to enter home: 0  # of steps to second floor: 13  Are there handrails: Yes  Are there landings: Yes  Sleeping arrangement: first/entry floor  Where is Bathroom: entry level   Where is the tub or shower: walk in shower   Toilet height? Concerns for low toilets - standard   Dogs or ther pets: parakeet, rat      First Floor Setup:   Is there a bathroom: Yes  Where would pt sleep: bed     DME: ordering shower chair; pt has RW; advised to bring dos   We discussed clearing pathways in the home and making sure there is accessibly to use the walker, for example, removing throw rugs.      Patient's Current Level of Function: Ambulates: Independently    Post-op Caregiver: spouse and daughter   Caregiver Name and phone number for Inpatient discharge needs: baylee   Currently receive any HHC/aides/community supports: No     Post-op Transport: spouse  To/from hospital: spouse  To/from PT 2-3x/week: surgeon preference   Uses community transport now: No     Outpatient Physical Therapy Site:  Site: N/A  pre and post-op appts scheduled? Yes     Medication Management: self  Preferred Pharmacy for Post-op Medications:[id number] [place] - [address]. [[id number]]   Blood Management Vitamin Regimen: Pt confirms taking as prescribed- will start 30 days pre op   Post-op anticoagulant: to be determined by surgical team postoperatively  Has Bactroban for 5 days preop: Yes  Educated on Preoperative Bathing Instructions, and use of Soap for 5 days before surgery.      DC Plan: Pt plans to be discharged home      Barriers to DC identified preoperatively: none identified    BMI: 31.37    Patient Education:  Pt educated on post-op pain, early mobilization (POD0), LOS  goals, OP PT goals, and preoperative bathing. Patient educated that our goal is to appropriately discharge patient based off their post-op function while striving to maintain maximal independence. The goal is to discharge patient to home and for them to attend outpatient physical therapy.    Assigned to care team? Yes

## 2025-03-12 ENCOUNTER — TELEPHONE (OUTPATIENT)
Dept: NEPHROLOGY | Facility: CLINIC | Age: 70
End: 2025-03-12

## 2025-03-12 NOTE — TELEPHONE ENCOUNTER
Called patient reminding to please complete  blood and urine tests  prior to 3/18 appointment with Dr. Reyes.

## 2025-03-13 LAB
DME PARACHUTE DELIVERY DATE ACTUAL: NORMAL
DME PARACHUTE DELIVERY DATE REQUESTED: NORMAL
DME PARACHUTE ITEM DESCRIPTION: NORMAL
DME PARACHUTE ORDER STATUS: NORMAL
DME PARACHUTE SUPPLIER NAME: NORMAL
DME PARACHUTE SUPPLIER PHONE: NORMAL

## 2025-03-14 NOTE — TELEPHONE ENCOUNTER
LVM to patient reminding to please complete labwork prior to 3/18 appointment with Dr. Reyes. Advised patient to call back with any questions or concerns.

## 2025-03-18 ENCOUNTER — OFFICE VISIT (OUTPATIENT)
Dept: NEPHROLOGY | Facility: CLINIC | Age: 70
End: 2025-03-18
Payer: MEDICARE

## 2025-03-18 VITALS
BODY MASS INDEX: 31.71 KG/M2 | DIASTOLIC BLOOD PRESSURE: 72 MMHG | HEART RATE: 109 BPM | HEIGHT: 75 IN | WEIGHT: 255 LBS | SYSTOLIC BLOOD PRESSURE: 134 MMHG

## 2025-03-18 DIAGNOSIS — E87.1 HYPONATREMIA: ICD-10-CM

## 2025-03-18 DIAGNOSIS — E66.811 CLASS 1 OBESITY DUE TO EXCESS CALORIES WITH SERIOUS COMORBIDITY AND BODY MASS INDEX (BMI) OF 32.0 TO 32.9 IN ADULT: ICD-10-CM

## 2025-03-18 DIAGNOSIS — I10 ESSENTIAL HYPERTENSION: Primary | ICD-10-CM

## 2025-03-18 DIAGNOSIS — E11.29 TYPE 2 DIABETES MELLITUS WITH MICROALBUMINURIA, WITHOUT LONG-TERM CURRENT USE OF INSULIN (HCC): ICD-10-CM

## 2025-03-18 DIAGNOSIS — R80.9 TYPE 2 DIABETES MELLITUS WITH MICROALBUMINURIA, WITHOUT LONG-TERM CURRENT USE OF INSULIN (HCC): ICD-10-CM

## 2025-03-18 DIAGNOSIS — Z01.818 PREOPERATIVE CLEARANCE: ICD-10-CM

## 2025-03-18 DIAGNOSIS — R80.9 ALBUMINURIA: ICD-10-CM

## 2025-03-18 DIAGNOSIS — E66.09 CLASS 1 OBESITY DUE TO EXCESS CALORIES WITH SERIOUS COMORBIDITY AND BODY MASS INDEX (BMI) OF 32.0 TO 32.9 IN ADULT: ICD-10-CM

## 2025-03-18 PROCEDURE — G2211 COMPLEX E/M VISIT ADD ON: HCPCS | Performed by: STUDENT IN AN ORGANIZED HEALTH CARE EDUCATION/TRAINING PROGRAM

## 2025-03-18 PROCEDURE — 99214 OFFICE O/P EST MOD 30 MIN: CPT | Performed by: STUDENT IN AN ORGANIZED HEALTH CARE EDUCATION/TRAINING PROGRAM

## 2025-03-18 NOTE — PROGRESS NOTES
Name: Marco Richard      : 1955      MRN: [id number]  Encounter Provider: Joselyn Reyes Bahamonde, MD  Encounter Date: 3/18/2025   Encounter department: [facility] [place]  :  Assessment & Plan  Type 2 diabetes mellitus with microalbuminuria, without long-term current use of insulin (HCC)    Lab Results   Component Value Date    HGBA1C 6.6 (H) 01/15/2025     HbA1c 6.6  Complicated with albuminuria, UACR 73 mg/g  Advised to maintain a good DM control to prevent progression of CKD   Maintain healthy diet (vegetables, fruits, whole grains, nonfat or low fat)  Weight loss  Physical activity (5 to 10 minutes to start the increase to 30 min a day)       Essential hypertension  Volume: Euvolemic on exam   Blood pressure: Normotensive, /72  Continue with low-sodium diet   Recommend goal less than 140/90   Continue with lisinopril 20 mg daily   nifedipine 30 mg  Advised to maintain a good BP control to prevent progression of CKD        Albuminuria  Uacr 73mg/g   Continue with lisinopril       Class 1 obesity due to excess calories with serious comorbidity and body mass index (BMI) of 32.0 to 32.9 in adult    BMI 31.87  Recommend weight loss , heathy diet  Lifestyle modification             Preoperative clearance  I have personally discussed the risks and benefits of the surgery from a renal standpoint with the patient in depth, and advised the patient about the risk of MACI and the course of MACI if it occurs with the small probability of the need for renal replacement therapy in the worse case scenario. Patient voiced understanding.    From a renal standpoint the patient is renally optimized for surgery with the following recommendations:  Fluids to administer: Normal Saline 500 cc total over 2 hours and continue 100ml/h   Medication Recommendations:  Minimize any IV contrast use (If IV contrast is used, please check BMP POD # 1)  No NSAIDs for at least 10 days prior to surgery  Hold  Lisinopril  starting on the day of the surgery  Hold  Jardiance   starting 3 days before surgery   Hemodynamic Recommendations:  Ideally, target MAPs greater than 65 mmHg in the perioperative period, and minimize operative time with MAPs < 65 mmHg.   Avoid intraop hemodynamic instability to decrease risk for MACI occurrence.  Other Recommendations:  Please check a BMP POD day # 1 and call if any questions or if Creatinine is rising or electrolyte abnormalities present             History of Present Illness   HPI  Marco Richard is a 69 y.o. male PMH of DM with albuminuria, HTN, HLD, obesity. Patient presents for initial consultation for albuminuria.    History obtained from: patient    Review of Systems   Constitutional:  Negative for activity change and appetite change.   HENT:  Negative for congestion and dental problem.    Eyes:  Negative for discharge.   Respiratory:  Negative for apnea.    Cardiovascular:  Negative for chest pain and leg swelling.   Gastrointestinal:  Negative for abdominal distention and abdominal pain.   Endocrine: Negative for cold intolerance.   Genitourinary:  Negative for dysuria.   Musculoskeletal:  Negative for arthralgias and back pain.   Skin:  Negative for rash.   Neurological:  Negative for dizziness.   Psychiatric/Behavioral:  Negative for agitation.      Pertinent Medical History         Medical History Reviewed by provider this encounter:     .  Current Outpatient Medications on File Prior to Visit   Medication Sig Dispense Refill    ascorbic acid (VITAMIN C) 500 MG tablet TAKE 1 TABLET BY MOUTH TWICE A  tablet 1    aspirin 81 MG tablet Take 1 tablet by mouth daily      cyclobenzaprine (FLEXERIL) 10 mg tablet Take 1 tablet (10 mg total) by mouth 3 (three) times a day as needed for muscle spasms 12 tablet 0    ferrous sulfate 324 (65 Fe) mg TAKE 1 TABLET (324 MG TOTAL) BY MOUTH EVERY OTHER DAY 45 tablet 1    folic acid (FOLVITE) 1 mg tablet TAKE 1 TABLET BY MOUTH EVERY  DAY 90 tablet 1    gabapentin (NEURONTIN) 100 mg capsule Take 2 capsules (200 mg total) by mouth 3 (three) times a day 540 capsule 1    glipiZIDE (GLUCOTROL XL) 5 mg 24 hr tablet TAKE 1 TABLET (5 MG TOTAL) BY MOUTH DAILY. 90 tablet 1    glucose blood test strip Test (Patient not taking: Reported on 7/25/2024)      Jardiance 25 MG TABS TAKE 1 TABLET BY MOUTH EVERY DAY 90 tablet 1    levothyroxine 50 mcg tablet TAKE 1 TABLET (50 MCG TOTAL) BY MOUTH DAILY IN THE EARLY MORNING 90 tablet 1    lisinopril (ZESTRIL) 20 mg tablet TAKE 1 TABLET BY MOUTH EVERY DAY 90 tablet 1    Misc Natural Products (Lutein 20) CAPS Take 20 capsules by mouth daily Eye health      Multiple Vitamin (Daily-Tony Multivitamin) TABS TAKE 1 TABLET BY MOUTH EVERY DAY 30 tablet 2    Multiple Vitamins-Minerals (multivitamin with minerals) tablet Take 1 tablet by mouth daily 30 tablet 0    mupirocin (BACTROBAN) 2 % ointment Apply topically 3 (three) times a day 22 g 0    NIFEdipine (PROCARDIA XL) 30 mg 24 hr tablet TAKE 1 TABLET BY MOUTH EVERY DAY 90 tablet 1    rosuvastatin (CRESTOR) 5 mg tablet TAKE 1 TABLET (5 MG TOTAL) BY MOUTH DAILY. 90 tablet 1    tadalafil (CIALIS) 20 MG tablet Take 1 tablet (20 mg total) by mouth daily as needed for erectile dysfunction (Patient not taking: Reported on 11/1/2024) 10 tablet 1     No current facility-administered medications on file prior to visit.      Social History     Tobacco Use    Smoking status: Never    Smokeless tobacco: Never   Vaping Use    Vaping status: Never Used   Substance and Sexual Activity    Alcohol use: Yes     Alcohol/week: 9.0 standard drinks of alcohol     Types: 2 Cans of beer, 7 Standard drinks or equivalent per week    Drug use: Never    Sexual activity: Not Currently     Comment: declined STD screening in the office today         Objective   There were no vitals taken for this visit.     Physical Exam  General:  no acute distress at this time  Skin:  No acute rash  Eyes:  No scleral  icterus and noninjected  ENT:  mucous membranes moist  Neck:  no carotid bruits  Chest:  Clear to auscultation percussion, good respiratory effort, no use of accessory respiratory muscles  CVS:  Regular rate and rhythm without rub   Abdomen:  soft and nontender   Extremities: no significant lower extremity edema  Neuro:  No gross focality  Psych:  Alert , cooperative

## 2025-03-18 NOTE — TELEPHONE ENCOUNTER
Patient called in , appointment is later today, he states that he did labs back in Jan for PCP which included same lab tests for Dr Reyes, but he also has labs to do for ortho surgeon which he cannot do for 2 or 3 more days. He will still  come for today's appointment.

## 2025-03-18 NOTE — ASSESSMENT & PLAN NOTE
Lab Results   Component Value Date    HGBA1C 6.6 (H) 01/15/2025     HbA1c 6.6  Complicated with albuminuria, UACR 73 mg/g  Advised to maintain a good DM control to prevent progression of CKD   Maintain healthy diet (vegetables, fruits, whole grains, nonfat or low fat)  Weight loss  Physical activity (5 to 10 minutes to start the increase to 30 min a day)

## 2025-03-18 NOTE — ASSESSMENT & PLAN NOTE
Volume: Euvolemic on exam   Blood pressure: Normotensive, /72  Continue with low-sodium diet   Recommend goal less than 140/90   Continue with lisinopril 20 mg daily   nifedipine 30 mg  Advised to maintain a good BP control to prevent progression of CKD

## 2025-03-18 NOTE — PATIENT INSTRUCTIONS
Thank you for coming to your visit today. As we discussed you kidney function is normal.  Your sodium is low. Please follow the recommendations below       Avoid nonsteroidal anti-inflammatory drugs such as Naprosyn, ibuprofen, Aleve, Advil, Celebrex, Meloxicam (Mobic) etc.  You can use Tylenol as needed if you do not have any liver condition to be concerned about    Try to exercise at least 30 minutes 3 days a week to begin with with an ultimate goal of 5 days a week for at least 30 minutes    Hold Jardiance 3 days before surgery   Hold Lisinopril the day of the surgery     Next Visit in 6-8 months with results   If you need to see us earlier we can change the appointment for you      Joselyn Reyes Bahamonde, MD  Nephrology Attending

## 2025-03-19 ENCOUNTER — TELEPHONE (OUTPATIENT)
Dept: OBGYN CLINIC | Facility: MEDICAL CENTER | Age: 70
End: 2025-03-19

## 2025-03-19 NOTE — TELEPHONE ENCOUNTER
PROVIDED [employer] NUMBER TO CONTACT: [phone]     I sent a direct message to [employer] regarding this order, and provided patient with [employer] health number to discuss. It does appear that the shower chair is marked as delivered from my perspective as well.

## 2025-03-19 NOTE — TELEPHONE ENCOUNTER
Caller: Marco    Doctor: Dr. Rodriguez    Reason for call:  The patient is calling to see where his  shower chair is. He has not received it yet? Looks like is say delivered on 3/11, he has not received it.    Thank you     Call back#: [phone]

## 2025-03-25 ENCOUNTER — APPOINTMENT (OUTPATIENT)
Dept: LAB | Facility: CLINIC | Age: 70
End: 2025-03-25
Payer: MEDICARE

## 2025-03-25 ENCOUNTER — LAB REQUISITION (OUTPATIENT)
Dept: LAB | Facility: HOSPITAL | Age: 70
End: 2025-03-25
Payer: MEDICARE

## 2025-03-25 DIAGNOSIS — M17.11 PRIMARY OSTEOARTHRITIS OF RIGHT KNEE: ICD-10-CM

## 2025-03-25 DIAGNOSIS — T14.8XXA JOINT INJURY: ICD-10-CM

## 2025-03-25 DIAGNOSIS — M17.11 UNILATERAL PRIMARY OSTEOARTHRITIS, RIGHT KNEE: ICD-10-CM

## 2025-03-25 LAB
ABO GROUP BLD: NORMAL
ALBUMIN SERPL BCG-MCNC: 4.5 G/DL (ref 3.5–5)
ALP SERPL-CCNC: 52 U/L (ref 34–104)
ALT SERPL W P-5'-P-CCNC: 27 U/L (ref 7–52)
ANION GAP SERPL CALCULATED.3IONS-SCNC: 14 MMOL/L (ref 4–13)
APTT PPP: 28 SECONDS (ref 23–34)
AST SERPL W P-5'-P-CCNC: 38 U/L (ref 13–39)
ATRIAL RATE: 101 BPM
BASOPHILS # BLD AUTO: 0.06 THOUSANDS/ÂΜL (ref 0–0.1)
BASOPHILS NFR BLD AUTO: 1 % (ref 0–1)
BILIRUB SERPL-MCNC: 0.94 MG/DL (ref 0.2–1)
BLD GP AB SCN SERPL QL: NEGATIVE
BUN SERPL-MCNC: 26 MG/DL (ref 5–25)
CALCIUM SERPL-MCNC: 9.2 MG/DL (ref 8.4–10.2)
CHLORIDE SERPL-SCNC: 100 MMOL/L (ref 96–108)
CO2 SERPL-SCNC: 21 MMOL/L (ref 21–32)
CREAT SERPL-MCNC: 1.09 MG/DL (ref 0.6–1.3)
EOSINOPHIL # BLD AUTO: 0.14 THOUSAND/ÂΜL (ref 0–0.61)
EOSINOPHIL NFR BLD AUTO: 2 % (ref 0–6)
ERYTHROCYTE [DISTWIDTH] IN BLOOD BY AUTOMATED COUNT: 11.8 % (ref 11.6–15.1)
EST. AVERAGE GLUCOSE BLD GHB EST-MCNC: 146 MG/DL
GFR SERPL CREATININE-BSD FRML MDRD: 69 ML/MIN/1.73SQ M
GLUCOSE P FAST SERPL-MCNC: 178 MG/DL (ref 65–99)
HBA1C MFR BLD: 6.7 %
HCT VFR BLD AUTO: 45.9 % (ref 36.5–49.3)
HGB BLD-MCNC: 15.9 G/DL (ref 12–17)
IMM GRANULOCYTES # BLD AUTO: 0.05 THOUSAND/UL (ref 0–0.2)
IMM GRANULOCYTES NFR BLD AUTO: 1 % (ref 0–2)
INR PPP: 1.04 (ref 0.85–1.19)
LYMPHOCYTES # BLD AUTO: 1.67 THOUSANDS/ÂΜL (ref 0.6–4.47)
LYMPHOCYTES NFR BLD AUTO: 26 % (ref 14–44)
MCH RBC QN AUTO: 33.8 PG (ref 26.8–34.3)
MCHC RBC AUTO-ENTMCNC: 34.6 G/DL (ref 31.4–37.4)
MCV RBC AUTO: 98 FL (ref 82–98)
MONOCYTES # BLD AUTO: 0.65 THOUSAND/ÂΜL (ref 0.17–1.22)
MONOCYTES NFR BLD AUTO: 10 % (ref 4–12)
NEUTROPHILS # BLD AUTO: 3.98 THOUSANDS/ÂΜL (ref 1.85–7.62)
NEUTS SEG NFR BLD AUTO: 60 % (ref 43–75)
NRBC BLD AUTO-RTO: 0 /100 WBCS
P AXIS: 62 DEGREES
PLATELET # BLD AUTO: 192 THOUSANDS/UL (ref 149–390)
PMV BLD AUTO: 10 FL (ref 8.9–12.7)
POTASSIUM SERPL-SCNC: 4.3 MMOL/L (ref 3.5–5.3)
PR INTERVAL: 168 MS
PROT SERPL-MCNC: 7.4 G/DL (ref 6.4–8.4)
PROTHROMBIN TIME: 14.4 SECONDS (ref 12.3–15)
QRS AXIS: -57 DEGREES
QRSD INTERVAL: 108 MS
QT INTERVAL: 334 MS
QTC INTERVAL: 433 MS
RBC # BLD AUTO: 4.7 MILLION/UL (ref 3.88–5.62)
RH BLD: POSITIVE
SODIUM SERPL-SCNC: 135 MMOL/L (ref 135–147)
SPECIMEN EXPIRATION DATE: NORMAL
T WAVE AXIS: 72 DEGREES
VENTRICULAR RATE: 101 BPM
WBC # BLD AUTO: 6.55 THOUSAND/UL (ref 4.31–10.16)

## 2025-03-25 PROCEDURE — 85610 PROTHROMBIN TIME: CPT

## 2025-03-25 PROCEDURE — 87081 CULTURE SCREEN ONLY: CPT

## 2025-03-25 PROCEDURE — 83036 HEMOGLOBIN GLYCOSYLATED A1C: CPT

## 2025-03-25 PROCEDURE — 93010 ELECTROCARDIOGRAM REPORT: CPT | Performed by: INTERNAL MEDICINE

## 2025-03-25 PROCEDURE — 86900 BLOOD TYPING SEROLOGIC ABO: CPT | Performed by: STUDENT IN AN ORGANIZED HEALTH CARE EDUCATION/TRAINING PROGRAM

## 2025-03-25 PROCEDURE — 86850 RBC ANTIBODY SCREEN: CPT | Performed by: STUDENT IN AN ORGANIZED HEALTH CARE EDUCATION/TRAINING PROGRAM

## 2025-03-25 PROCEDURE — 86901 BLOOD TYPING SEROLOGIC RH(D): CPT | Performed by: STUDENT IN AN ORGANIZED HEALTH CARE EDUCATION/TRAINING PROGRAM

## 2025-03-25 PROCEDURE — 93005 ELECTROCARDIOGRAM TRACING: CPT

## 2025-03-25 PROCEDURE — 36415 COLL VENOUS BLD VENIPUNCTURE: CPT

## 2025-03-25 PROCEDURE — 85730 THROMBOPLASTIN TIME PARTIAL: CPT

## 2025-03-25 PROCEDURE — 80053 COMPREHEN METABOLIC PANEL: CPT

## 2025-03-25 PROCEDURE — 85025 COMPLETE CBC W/AUTO DIFF WBC: CPT

## 2025-03-25 NOTE — PRE-PROCEDURE INSTRUCTIONS
Pre-Surgery Instructions:   Medication Instructions    ascorbic acid (VITAMIN C) 500 MG tablet Hold day of surgery.    aspirin 81 MG tablet Stop taking 7 days prior to surgery.    cyclobenzaprine (FLEXERIL) 10 mg tablet Uses PRN- OK to take day of surgery    ferrous sulfate 324 (65 Fe) mg Hold day of surgery.    folic acid (FOLVITE) 1 mg tablet Hold day of surgery.    gabapentin (NEURONTIN) 100 mg capsule Take day of surgery.    glipiZIDE (GLUCOTROL XL) 5 mg 24 hr tablet Hold day of surgery.    Jardiance 25 MG TABS Stop taking 4 days prior to surgery.    levothyroxine 50 mcg tablet Take day of surgery.    lisinopril (ZESTRIL) 20 mg tablet Hold day of surgery.    Misc Natural Products (Lutein 20) CAPS Stop taking 7 days prior to surgery.    Multiple Vitamin (Daily-Tony Multivitamin) TABS Stop taking 7 days prior to surgery.    mupirocin (BACTROBAN) 2 % ointment Take day of surgery.    NIFEdipine (PROCARDIA XL) 30 mg 24 hr tablet Take day of surgery.    rosuvastatin (CRESTOR) 5 mg tablet Take night before surgery    Medication instructions for day of surgery reviewed. Please take all instructed medications with only a sip of water.       You will receive a call one business day prior to surgery with an arrival time and hospital directions. If your surgery is scheduled on a Monday, the hospital will be calling you on the Friday prior to your surgery. If you have not heard from anyone by 8pm, please call the hospital supervisor through the hospital  at [phone]. ([place] [phone] or [place] [phone]).    Do not eat or drink anything after midnight the night before your surgery, including candy, mints, lifesavers, or chewing gum. Do not drink alcohol 24hrs before your surgery. Try not to smoke at least 24hrs before your surgery.       Follow the pre surgery showering instructions as listed in the “My Surgical Experience Booklet” or otherwise provided by your surgeon's office. Do not use a blade to  shave the surgical area 1 week before surgery. It is okay to use a clean electric clippers up to 24 hours before surgery. Do not apply any lotions, creams, including makeup, cologne, deodorant, or perfumes after showering on the day of your surgery. Do not use dry shampoo, hair spray, hair gel, or any type of hair products.     No contact lenses, eye make-up, or artificial eyelashes. Remove nail polish, including gel polish, and any artificial, gel, or acrylic nails if possible. Remove all jewelry including rings and body piercing jewelry.     Wear causal clothing that is easy to take on and off. Consider your type of surgery.    Keep any valuables, jewelry, piercings at home. Please bring any specially ordered equipment (sling, braces) if indicated.    Arrange for a responsible person to drive you to and from the hospital on the day of your surgery. Please confirm the visitor policy for the day of your procedure when you receive your phone call with an arrival time.     Call the surgeon's office with any new illnesses, exposures, or additional questions prior to surgery.    Please reference your “My Surgical Experience Booklet” for additional information to prepare for your upcoming surgery.

## 2025-03-26 LAB — MRSA NOSE QL CULT: NORMAL

## 2025-03-27 ENCOUNTER — TELEPHONE (OUTPATIENT)
Dept: OBGYN CLINIC | Facility: HOSPITAL | Age: 70
End: 2025-03-27

## 2025-03-27 NOTE — TELEPHONE ENCOUNTER
Very unlikely our  mentioned that over our recommendation. We will hold off for now. Begin PT at 2 weeks if we need. He is sore swollen and will not benefit from immediate initiation of PT

## 2025-03-27 NOTE — TELEPHONE ENCOUNTER
Caller: pt     Doctor: Dr. Rodriguez     Reason for call: pt is request PO PT script to be placed. Pt will be going to  PT.    Call back#: [phone]

## 2025-03-27 NOTE — TELEPHONE ENCOUNTER
Called patient back and confirmed what I had discussed with him before, that Dr. Gary does not start PT right away post op. They will assess for need at first post op apt.

## 2025-04-02 ENCOUNTER — ANESTHESIA EVENT (OUTPATIENT)
Age: 70
End: 2025-04-02
Payer: MEDICARE

## 2025-04-07 PROBLEM — R11.10 NON-INTRACTABLE VOMITING: Status: RESOLVED | Noted: 2020-12-28 | Resolved: 2025-04-07

## 2025-04-07 PROBLEM — M17.11 PRIMARY OSTEOARTHRITIS OF RIGHT KNEE: Status: ACTIVE | Noted: 2025-04-07

## 2025-04-07 PROBLEM — R79.89 ELEVATED LACTIC ACID LEVEL: Status: RESOLVED | Noted: 2021-05-24 | Resolved: 2025-04-07

## 2025-04-07 PROBLEM — L25.9 CONTACT DERMATITIS: Status: RESOLVED | Noted: 2017-08-07 | Resolved: 2025-04-07

## 2025-04-07 NOTE — PATIENT INSTRUCTIONS
Hold jardiance 4 days prior to surgery=last dose 4/12/2025    BEFORE SURGERY    Contact your surgical nurse navigator or surgical provider with any questions regarding preoperative plan or schedule.  Stop all over the counter supplements, herbal, naturopathic  medications for 1 week prior to surgery UNLESS prescribed by your surgeon  Hold NSAIDS (i.e. advil, alleve, motrin, ibuprofen, celebrex) minimum 5 days prior to surgery  Follow presurgical medication instructions provided by preadmission nursing team reviewed during your presurgery phone call  Strategies for optimizing your surgery through breathing exercises, nutrition and physical activity can be found at [email or web address]/best  Call [phone] with any presurgical concerns or medications questions or use the messaging feature in your [employer] jose j to contact your provider    AFTER SURGERY    Recommend using Tylenol ( acetaminophen ) 1000 mg every eight hours during the first week post discharge along with icing the area for 20 mins every 3-4 hours while awake can be helpful in reducing your need for post operative opioid use. This opioid sparing plan can be used along side your surgeons pain plan.  Use stool softener over the counter (colace) daily after surgery during the first 1-2 weeks to avoid post operative constipation issues  If no bowel movement within 3 days after surgery then use over the counter Miralax in addition to your stool softener   If cleared by your surgical team for activity then early and often walking is encouraged and can be important in prevention of post surgical blood clots. Additionally spend as much time out of bed as possible and allowed by your surgical team  Use your incentive spirometer twice per hour in the first seven days after surgery to help prevent post surgery lung complications and infections  It is very important you follow the instructions from your surgeon regarding any medications for after surgery blood clot  prevention. Compliance with these medications or interventions is very important.  Call [phone] with any post discharge concerns or medical issues or use the messaging feature in your [employer] jose j to contact your provider

## 2025-04-07 NOTE — ASSESSMENT & PLAN NOTE
Lab Results   Component Value Date    HGBA1C 6.7 (H) 03/25/2025   Hold medications as instructed by PAT  Adhere to strict DM diet in the post operative phase   Continue ADA diet  Hold jardiance 4 days prior to surgery

## 2025-04-07 NOTE — ASSESSMENT & PLAN NOTE
Lab Results   Component Value Date    EGFR 69 03/25/2025    EGFR 62 01/15/2025    EGFR 67 07/22/2024    CREATININE 1.09 03/25/2025    CREATININE 1.20 01/15/2025    CREATININE 1.13 07/22/2024   Baseline creatinine as above  f/b nephrology Yes, and cleared  Cont to avoid NSAIDS/nephrotoxic medications  Avoid hypotension in the post operative setting  Hold all ACE/ARB/Diuretics as directed by PAT

## 2025-04-07 NOTE — H&P (VIEW-ONLY)
Internal Medicine Pre-Operative Evaluation:     Reason for Visit: Pre-operative Evaluation for Risk Stratification and Optimization    Patient ID: Marco Richard is a 69 y.o. male.     Case: [id number] Date/Time: 04/16/25 0905   Procedure: ARTHROPLASTY KNEE UNICOMPARTMENTAL, RIGHT. SAME DAY (Right: Knee)   Anesthesia type: Spinal   Diagnosis: Primary osteoarthritis of right knee [M17.11]   Pre-op diagnosis: Primary osteoarthritis of right knee [M17.11]   Location: WE OR ROOM 04 / [facility]   Surgeons: Tye Rodriguez MD          Recommendations to Proceed withSurgery    Patient is considered to be Low risk for Medium risk procedure.     After evaluation and discussion with patient with emphasis that all surgery has some degree of inherent risk it is acknowledged by patient this risk is Acceptable.    Patient is optimized and may proceed with planned procedure.     Assessment    Pre-operative Medical Evaluation for planned surgery  Recommendations as listed in PLAN section below  Contact surgical nurse  navigator with any questions regarding preoperative plan or schedule.      Assessment & Plan  Preoperative clearance    Primary osteoarthritis of right knee  Failed outpatient conservative measures  Electing to undergo surgical procedure as stated above    Stage 3a chronic kidney disease (HCC)  Lab Results   Component Value Date    EGFR 69 03/25/2025    EGFR 62 01/15/2025    EGFR 67 07/22/2024    CREATININE 1.09 03/25/2025    CREATININE 1.20 01/15/2025    CREATININE 1.13 07/22/2024   Baseline creatinine as above  f/b nephrology Yes, and cleared  Cont to avoid NSAIDS/nephrotoxic medications  Avoid hypotension in the post operative setting  Hold all ACE/ARB/Diuretics as directed by PAT    Type 2 diabetes mellitus with stage 3a chronic kidney disease, without long-term current use of insulin ([facility])    Lab Results   Component Value Date    HGBA1C 6.7 (H) 03/25/2025   Hold  medications as instructed by PAT  Adhere to strict DM diet in the post operative phase   Continue ADA diet  Hold jardiance 4 days prior to surgery  Essential hypertension  Stable   Refer to PAT instructions regarding medication administration the morning of surgery             Plan:     1. Further preoperative workup as follows:   - none no further testing required may proceed with surgery    2. Preoperative Medication Management Review performed by PAT nursing  YES    3. Patient requires further consultation with:   No Consults Required    4. Discharge Planning / Barriers to Discharge  none identified - patients has post discharge therapy plan in place, transportation arranged for discharge day, adequate family support at home to assist with discharge to home.        Subjective:           History of Present Illness:     Marco Richard is a 69 y.o. male who presents to the office today for a preoperative consultation at the request of surgeon. The patient understands this is an elective procedure and not emergent. They are electing to undergo planned procedure with an understanding that all surgery has inherent risk. They have worked with their surgeon and failed conservative treatment measures. Today they present for preoperative risk assessment and recommendations for optimization in preparation for surgery.    Pt seen in center for perioperative medicine for upcoming proposed surgery. They have failed previous conservative measures and have elected surgical intervention.     Pt meets presurgical lab and BMI optimization goals, except Gfr <60, pt is f/b nephrology and was cleared. Their overall trend remains stable.  We will avoid NSAIDS as well as large shifts in BP in the post operative setting. Pt was encouraged to ensure adequate hydration.     Marco Richard has an IN HOSPITAL cardiac risk of RCI RISK CLASS I (0 risk factors, risk of major cardiac compl. appr. 0.5%) based on RCRI calculator    Cardiac  "Risk Estimation: per the Revised Cardiac Risk Index (Circ. 100:1043, 1999),         Pre-op Exam    Previous history of bleeding disorders or clots?: No  Previous Anesthesia reaction?: No  Prolonged steroid use in the last 6 months?: No    Assessment of Cardiac Risk:   - Unstable or severe angina or MI in the last 6 weeks or history of stent placement in the last year?: No   - Decompensated heart failure (e.g. New onset heart failure, NYHA  Class IV heart failure, or worsening existing heart failure)?: No  - Significant arrhythmias such as high grade AV block, symptomatic ventricular arrhythmia, newly recognized ventricular tachycardia, supraventricular tachycardia with resting heart rate >100, or symptomatic bradycardia?: No  - Severe heart valve disease including aortic stenosis or symptomatic mitral stenosis?: No      Pre-operative Risk Factors:  Elevated-risk surgery: No    History of cerebrovascular disease: No    History of ischemic heart disease: No  Pre-operative treatment with insulin: No  Pre-operative creatinine >2 mg/dL: No    History of congestive heart failure: No    Duke Activity Status Index (DASI):   DASI Total Score: 28.7  METs: 6.3        ROS: No TIA's or unusual headaches, no dysphagia.  No prolonged cough. No dyspnea or chest pain on exertion.  No abdominal pain, change in bowel habits, black or bloody stools.  No urinary tract or BPH symptoms.  Positive reported pain in arthritic joint. Positive difficulty with gait. No skin rashes or issues.      Objective:    /80 (BP Location: Right arm, Patient Position: Sitting, Cuff Size: Standard)   Pulse 80   Ht 6' 3\" (1.905 m)   Wt 117 kg (259 lb)   SpO2 98%   BMI 32.37 kg/m²       General Appearance: no distress, conversive  HEENT: PERRLA, conjuctiva normal; oropharynx clear; mucous membranes moist;   Neck:  Supple, no lymphadenopathy or thyromegaly  Lungs: breath sounds normal, normal respiratory effort, no retractions, expiratory effort " normal  CV: normal heart sounds S1/S2, PMI normal   ABD: soft non tender, +BSx4  EXT: DP pulses intact, no lymphadenopathy, no edema  Skin: normal turgor, normal texture, no rash  Psych: affect normal, mood normal  Neuro: AAOx3        The following portions of the patient's history were reviewed and updated as appropriate: allergies, current medications, past family history, past medical history, past social history, past surgical history and problem list.     Past History:       Past Medical History:   Diagnosis Date    Allergic rhinitis     Anxiety disorder     Last assessed 2/7/2006     Arthritis     Cancer ([facility])     prostate    Carpal tunnel syndrome     Colon polyp     Diabetes mellitus (HCC)     Diabetes mellitus with neurological manifestation ([facility]) 04/06/2012    Last assessed 3/29/2016     Diabetes type 2, uncontrolled     Last assessed 4/6/2016     Disease of thyroid gland     DM II (diabetes mellitus, type II), controlled ([facility])     Last assessed 10/20/2014     Essential hypertension     Last assesssed 2/7/2006     Hyperlipidemia     Hypertension     Insomnia     Last assessed 1/13/2011     Numbness and tingling of foot     Resolved 3/29/2016     Obesity     Old disruption of knee ligament     Last assessed 3/26/2008     Tarsal tunnel syndrome     Urinary frequency     Resolved 3/29/2016     Visual impairment     Past Surgical History:   Procedure Laterality Date    COLONOSCOPY      FOOT SURGERY      MA AMPUTATION TOE INTERPHALANGEAL JOINT Right 11/23/2022    Procedure: AMPUTATION 3RD TOE;  Surgeon: Andrews Marquez DPM;  Location:  MAIN OR;  Service: Podiatry    PROSTATE BIOPSY      WISDOM TOOTH EXTRACTION            Social History     Tobacco Use    Smoking status: Never    Smokeless tobacco: Never   Vaping Use    Vaping status: Never Used   Substance Use Topics    Alcohol use: Yes     Alcohol/week: 9.0 standard drinks of alcohol     Types: 2 Cans of beer, 7 Standard drinks or equivalent per week    Drug  "use: Never     Family History   Problem Relation Age of Onset    Heart attack Mother 67    Valvular heart disease Mother     Hypotension Mother     Pancreatic cancer Father 75    Diabetes Father     Lung cancer Sister 56        not a smoker    Cancer Brother         bile duct     Colon cancer Maternal Grandmother 62    Diabetes Maternal Grandmother     Pancreatic cancer Paternal Aunt     Pancreatic cancer Paternal Uncle     Hypertension Neg Hx     Prostate cancer Neg Hx           Allergies:     Allergies   Allergen Reactions    Sudafed [Pseudoephedrine] Other (See Comments)     hyperactivity    Amoxicillin Rash    Penicillins Rash     Category: Allergy;         Current Medications:     Current Outpatient Medications   Medication Instructions    ascorbic acid (VITAMIN C) 500 mg, Oral, 2 times daily    aspirin 81 MG tablet 1 tablet, Daily    cyclobenzaprine (FLEXERIL) 10 mg, Oral, 3 times daily PRN    ferrous sulfate 324 mg, Oral, Every other day    folic acid (FOLVITE) 1,000 mcg, Oral, Daily    gabapentin (NEURONTIN) 200 mg, Oral, 3 times daily    glipiZIDE (GLUCOTROL XL) 5 mg, Oral, Daily    glucose blood test strip Test    Jardiance 25 mg, Oral, Daily    levothyroxine 50 mcg, Oral, Daily (early morning)    lisinopril (ZESTRIL) 20 mg, Oral, Daily    Misc Natural Products (Lutein 20) CAPS 20 capsules, Daily    Multiple Vitamin (Daily-Tony Multivitamin) TABS 1 tablet, Oral, Daily    mupirocin (BACTROBAN) 2 % ointment Topical, 3 times daily    NIFEdipine (PROCARDIA XL) 30 mg, Oral, Daily    rosuvastatin (CRESTOR) 5 mg, Oral, Daily    tadalafil (CIALIS) 20 mg, Oral, Daily PRN           PRE-OP WORKSHEET DATA    Assessment of Pre-Operative Risks     MLJ Quality Hard Stops:    BMI (<40) : Estimated body mass index is 32.37 kg/m² as calculated from the following:    Height as of this encounter: 6' 3\" (1.905 m).    Weight as of this encounter: 117 kg (259 lb).    Hgb ( >11):   Lab Results   Component Value Date    HGB 15.9 " 03/25/2025    HGB 14.3 06/27/2022    HGB 14.7 06/15/2021       HbA1c (<7.5) :   Lab Results   Component Value Date    HGBA1C 6.7 (H) 03/25/2025       GFR (>60) (Less then 45 = Nephrology consult):    Lab Results   Component Value Date    EGFR 69 03/25/2025    EGFR 62 01/15/2025    EGFR 67 07/22/2024            Pre-Op Data Reviewed:       Laboratory Results: I have personally reviewed the pertinent reports    EKG: I personally reviewed and interpreted available tracings in the electronic medical record    Sinus tachycardia  Right bundle branch block  Left anterior fascicular block   Bifascicular block   When compared with ECG of 23-May-2021 22:22,  Right bundle branch block is now Present  Confirmed by Phill Ramos ([id number]) on 3/25/2025      OLD RECORDS: reviewed old records in the chart review section if EHR on day of visit.    Previous cardiopulmonary studies within the past year:  Echocardiogram: no   Cardiac Catheterization: no  Stress Test: no      Time of visit including pre-visit chart review, visit and post-visit coordination of plan and care , review of pre-surgical lab work, preparation and time spent documenting note in electronic medical record, time spent face-to-face in physical examination answering patient questions by care team 35 minutes             [facility]

## 2025-04-07 NOTE — PROGRESS NOTES
Internal Medicine Pre-Operative Evaluation:     Reason for Visit: Pre-operative Evaluation for Risk Stratification and Optimization    Patient ID: Marco Richard is a 69 y.o. male.     Case: [id number] Date/Time: 04/16/25 0905   Procedure: ARTHROPLASTY KNEE UNICOMPARTMENTAL, RIGHT. SAME DAY (Right: Knee)   Anesthesia type: Spinal   Diagnosis: Primary osteoarthritis of right knee [M17.11]   Pre-op diagnosis: Primary osteoarthritis of right knee [M17.11]   Location: [facility] OR ROOM 04 / [facility]   Surgeons: Tye oRdriguez MD          Recommendations to Proceed withSurgery    Patient is considered to be Low risk for Medium risk procedure.     After evaluation and discussion with patient with emphasis that all surgery has some degree of inherent risk it is acknowledged by patient this risk is Acceptable.    Patient is optimized and may proceed with planned procedure.     Assessment    Pre-operative Medical Evaluation for planned surgery  Recommendations as listed in PLAN section below  Contact surgical nurse  navigator with any questions regarding preoperative plan or schedule.      Assessment & Plan  Preoperative clearance    Primary osteoarthritis of right knee  Failed outpatient conservative measures  Electing to undergo surgical procedure as stated above    Stage 3a chronic kidney disease (HCC)  Lab Results   Component Value Date    EGFR 69 03/25/2025    EGFR 62 01/15/2025    EGFR 67 07/22/2024    CREATININE 1.09 03/25/2025    CREATININE 1.20 01/15/2025    CREATININE 1.13 07/22/2024   Baseline creatinine as above  f/b nephrology Yes, and cleared  Cont to avoid NSAIDS/nephrotoxic medications  Avoid hypotension in the post operative setting  Hold all ACE/ARB/Diuretics as directed by PAT    Type 2 diabetes mellitus with stage 3a chronic kidney disease, without long-term current use of insulin ([facility])    Lab Results   Component Value Date    HGBA1C 6.7 (H) 03/25/2025   Hold  medications as instructed by PAT  Adhere to strict DM diet in the post operative phase   Continue ADA diet  Hold jardiance 4 days prior to surgery  Essential hypertension  Stable   Refer to PAT instructions regarding medication administration the morning of surgery             Plan:     1. Further preoperative workup as follows:   - none no further testing required may proceed with surgery    2. Preoperative Medication Management Review performed by PAT nursing  YES    3. Patient requires further consultation with:   No Consults Required    4. Discharge Planning / Barriers to Discharge  none identified - patients has post discharge therapy plan in place, transportation arranged for discharge day, adequate family support at home to assist with discharge to home.        Subjective:           History of Present Illness:     Marco Richard is a 69 y.o. male who presents to the office today for a preoperative consultation at the request of surgeon. The patient understands this is an elective procedure and not emergent. They are electing to undergo planned procedure with an understanding that all surgery has inherent risk. They have worked with their surgeon and failed conservative treatment measures. Today they present for preoperative risk assessment and recommendations for optimization in preparation for surgery.    Pt seen in center for perioperative medicine for upcoming proposed surgery. They have failed previous conservative measures and have elected surgical intervention.     Pt meets presurgical lab and BMI optimization goals, except Gfr <60, pt is f/b nephrology and was cleared. Their overall trend remains stable.  We will avoid NSAIDS as well as large shifts in BP in the post operative setting. Pt was encouraged to ensure adequate hydration.     Marco Richard has an IN HOSPITAL cardiac risk of RCI RISK CLASS I (0 risk factors, risk of major cardiac compl. appr. 0.5%) based on RCRI calculator    Cardiac  "Risk Estimation: per the Revised Cardiac Risk Index (Circ. 100:1043, 1999),         Pre-op Exam    Previous history of bleeding disorders or clots?: No  Previous Anesthesia reaction?: No  Prolonged steroid use in the last 6 months?: No    Assessment of Cardiac Risk:   - Unstable or severe angina or MI in the last 6 weeks or history of stent placement in the last year?: No   - Decompensated heart failure (e.g. New onset heart failure, NYHA  Class IV heart failure, or worsening existing heart failure)?: No  - Significant arrhythmias such as high grade AV block, symptomatic ventricular arrhythmia, newly recognized ventricular tachycardia, supraventricular tachycardia with resting heart rate >100, or symptomatic bradycardia?: No  - Severe heart valve disease including aortic stenosis or symptomatic mitral stenosis?: No      Pre-operative Risk Factors:  Elevated-risk surgery: No    History of cerebrovascular disease: No    History of ischemic heart disease: No  Pre-operative treatment with insulin: No  Pre-operative creatinine >2 mg/dL: No    History of congestive heart failure: No    Duke Activity Status Index (DASI):   DASI Total Score: 28.7  METs: 6.3        ROS: No TIA's or unusual headaches, no dysphagia.  No prolonged cough. No dyspnea or chest pain on exertion.  No abdominal pain, change in bowel habits, black or bloody stools.  No urinary tract or BPH symptoms.  Positive reported pain in arthritic joint. Positive difficulty with gait. No skin rashes or issues.      Objective:    /80 (BP Location: Right arm, Patient Position: Sitting, Cuff Size: Standard)   Pulse 80   Ht 6' 3\" (1.905 m)   Wt 117 kg (259 lb)   SpO2 98%   BMI 32.37 kg/m²       General Appearance: no distress, conversive  HEENT: PERRLA, conjuctiva normal; oropharynx clear; mucous membranes moist;   Neck:  Supple, no lymphadenopathy or thyromegaly  Lungs: breath sounds normal, normal respiratory effort, no retractions, expiratory effort " normal  CV: normal heart sounds S1/S2, PMI normal   ABD: soft non tender, +BSx4  EXT: DP pulses intact, no lymphadenopathy, no edema  Skin: normal turgor, normal texture, no rash  Psych: affect normal, mood normal  Neuro: AAOx3        The following portions of the patient's history were reviewed and updated as appropriate: allergies, current medications, past family history, past medical history, past social history, past surgical history and problem list.     Past History:       Past Medical History:   Diagnosis Date    Allergic rhinitis     Anxiety disorder     Last assessed 2/7/2006     Arthritis     Cancer ([facility])     prostate    Carpal tunnel syndrome     Colon polyp     Diabetes mellitus (HCC)     Diabetes mellitus with neurological manifestation ([facility]) 04/06/2012    Last assessed 3/29/2016     Diabetes type 2, uncontrolled     Last assessed 4/6/2016     Disease of thyroid gland     DM II (diabetes mellitus, type II), controlled ([facility])     Last assessed 10/20/2014     Essential hypertension     Last assesssed 2/7/2006     Hyperlipidemia     Hypertension     Insomnia     Last assessed 1/13/2011     Numbness and tingling of foot     Resolved 3/29/2016     Obesity     Old disruption of knee ligament     Last assessed 3/26/2008     Tarsal tunnel syndrome     Urinary frequency     Resolved 3/29/2016     Visual impairment     Past Surgical History:   Procedure Laterality Date    COLONOSCOPY      FOOT SURGERY      [place] AMPUTATION TOE INTERPHALANGEAL JOINT Right 11/23/2022    Procedure: AMPUTATION 3RD TOE;  Surgeon: Andrews Maruqez DPM;  Location:  MAIN OR;  Service: Podiatry    PROSTATE BIOPSY      WISDOM TOOTH EXTRACTION            Social History     Tobacco Use    Smoking status: Never    Smokeless tobacco: Never   Vaping Use    Vaping status: Never Used   Substance Use Topics    Alcohol use: Yes     Alcohol/week: 9.0 standard drinks of alcohol     Types: 2 Cans of beer, 7 Standard drinks or equivalent per week    Drug  "use: Never     Family History   Problem Relation Age of Onset    Heart attack Mother 67    Valvular heart disease Mother     Hypotension Mother     Pancreatic cancer Father 75    Diabetes Father     Lung cancer Sister 56        not a smoker    Cancer Brother         bile duct     Colon cancer Maternal Grandmother 62    Diabetes Maternal Grandmother     Pancreatic cancer Paternal Aunt     Pancreatic cancer Paternal Uncle     Hypertension Neg Hx     Prostate cancer Neg Hx           Allergies:     Allergies   Allergen Reactions    Sudafed [Pseudoephedrine] Other (See Comments)     hyperactivity    Amoxicillin Rash    Penicillins Rash     Category: Allergy;         Current Medications:     Current Outpatient Medications   Medication Instructions    ascorbic acid (VITAMIN C) 500 mg, Oral, 2 times daily    aspirin 81 MG tablet 1 tablet, Daily    cyclobenzaprine (FLEXERIL) 10 mg, Oral, 3 times daily PRN    ferrous sulfate 324 mg, Oral, Every other day    folic acid (FOLVITE) 1,000 mcg, Oral, Daily    gabapentin (NEURONTIN) 200 mg, Oral, 3 times daily    glipiZIDE (GLUCOTROL XL) 5 mg, Oral, Daily    glucose blood test strip Test    Jardiance 25 mg, Oral, Daily    levothyroxine 50 mcg, Oral, Daily (early morning)    lisinopril (ZESTRIL) 20 mg, Oral, Daily    Misc Natural Products (Lutein 20) CAPS 20 capsules, Daily    Multiple Vitamin (Daily-Tony Multivitamin) TABS 1 tablet, Oral, Daily    mupirocin (BACTROBAN) 2 % ointment Topical, 3 times daily    NIFEdipine (PROCARDIA XL) 30 mg, Oral, Daily    rosuvastatin (CRESTOR) 5 mg, Oral, Daily    tadalafil (CIALIS) 20 mg, Oral, Daily PRN           PRE-OP WORKSHEET DATA    Assessment of Pre-Operative Risks     MLJ Quality Hard Stops:    BMI (<40) : Estimated body mass index is 32.37 kg/m² as calculated from the following:    Height as of this encounter: 6' 3\" (1.905 m).    Weight as of this encounter: 117 kg (259 lb).    Hgb ( >11):   Lab Results   Component Value Date    HGB 15.9 " 03/25/2025    HGB 14.3 06/27/2022    HGB 14.7 06/15/2021       HbA1c (<7.5) :   Lab Results   Component Value Date    HGBA1C 6.7 (H) 03/25/2025       GFR (>60) (Less then 45 = Nephrology consult):    Lab Results   Component Value Date    EGFR 69 03/25/2025    EGFR 62 01/15/2025    EGFR 67 07/22/2024            Pre-Op Data Reviewed:       Laboratory Results: I have personally reviewed the pertinent reports    EKG: I personally reviewed and interpreted available tracings in the electronic medical record    Sinus tachycardia  Right bundle branch block  Left anterior fascicular block   Bifascicular block   When compared with ECG of 23-May-2021 22:22,  Right bundle branch block is now Present  Confirmed by Phill Ramos ([id number]) on 3/25/2025      OLD RECORDS: reviewed old records in the chart review section if EHR on day of visit.    Previous cardiopulmonary studies within the past year:  Echocardiogram: no   Cardiac Catheterization: no  Stress Test: no      Time of visit including pre-visit chart review, visit and post-visit coordination of plan and care , review of pre-surgical lab work, preparation and time spent documenting note in electronic medical record, time spent face-to-face in physical examination answering patient questions by care team 35 minutes             [facility]

## 2025-04-08 ENCOUNTER — TELEPHONE (OUTPATIENT)
Dept: OBGYN CLINIC | Facility: CLINIC | Age: 70
End: 2025-04-08

## 2025-04-08 ENCOUNTER — OFFICE VISIT (OUTPATIENT)
Age: 70
End: 2025-04-08
Payer: MEDICARE

## 2025-04-08 VITALS
OXYGEN SATURATION: 98 % | HEIGHT: 75 IN | SYSTOLIC BLOOD PRESSURE: 130 MMHG | WEIGHT: 259 LBS | BODY MASS INDEX: 32.2 KG/M2 | HEART RATE: 80 BPM | DIASTOLIC BLOOD PRESSURE: 80 MMHG

## 2025-04-08 DIAGNOSIS — N18.31 TYPE 2 DIABETES MELLITUS WITH STAGE 3A CHRONIC KIDNEY DISEASE, WITHOUT LONG-TERM CURRENT USE OF INSULIN (HCC): ICD-10-CM

## 2025-04-08 DIAGNOSIS — E11.22 TYPE 2 DIABETES MELLITUS WITH STAGE 3A CHRONIC KIDNEY DISEASE, WITHOUT LONG-TERM CURRENT USE OF INSULIN (HCC): ICD-10-CM

## 2025-04-08 DIAGNOSIS — I10 ESSENTIAL HYPERTENSION: ICD-10-CM

## 2025-04-08 DIAGNOSIS — N18.31 STAGE 3A CHRONIC KIDNEY DISEASE (HCC): ICD-10-CM

## 2025-04-08 DIAGNOSIS — M17.11 PRIMARY OSTEOARTHRITIS OF RIGHT KNEE: ICD-10-CM

## 2025-04-08 DIAGNOSIS — Z01.818 PREOPERATIVE CLEARANCE: Primary | ICD-10-CM

## 2025-04-08 PROCEDURE — 99213 OFFICE O/P EST LOW 20 MIN: CPT | Performed by: NURSE PRACTITIONER

## 2025-04-08 NOTE — TELEPHONE ENCOUNTER
Called and left detailed message for patient relaying Provider's response.   Provided my ph [phone] for any other questions.

## 2025-04-08 NOTE — TELEPHONE ENCOUNTER
Patient called to state PAT nurse, Amanda, instructed him to Stop taking the multivitamin .  I informed patient that the multi, and the other 3 vitamins, are part of the Total Joint protocol. Patient was still wary so I told him that I will get confirmation from Provider.    Dr. Rodriguez, please advise if patient should stop taking multivitamin.    Thanks!

## 2025-04-10 ENCOUNTER — TELEPHONE (OUTPATIENT)
Age: 70
End: 2025-04-10

## 2025-04-10 NOTE — TELEPHONE ENCOUNTER
LVM advising patient to complete outstanding Patient IQ tasks. I can speak to patient directly if there are any questions.

## 2025-04-11 ENCOUNTER — TELEPHONE (OUTPATIENT)
Dept: NEPHROLOGY | Facility: CLINIC | Age: 70
End: 2025-04-11

## 2025-04-11 NOTE — TELEPHONE ENCOUNTER
Left msg with patient to get 09/11 appointment with Dr Reyes re-scheduled due to a schedule change.  Informed to call to get scheduled with the Dr or an AP.

## 2025-04-16 ENCOUNTER — HOSPITAL ENCOUNTER (OUTPATIENT)
Age: 70
Setting detail: OUTPATIENT SURGERY
Discharge: HOME | End: 2025-04-16
Attending: STUDENT IN AN ORGANIZED HEALTH CARE EDUCATION/TRAINING PROGRAM | Admitting: STUDENT IN AN ORGANIZED HEALTH CARE EDUCATION/TRAINING PROGRAM
Payer: MEDICARE

## 2025-04-16 ENCOUNTER — APPOINTMENT (OUTPATIENT)
Age: 70
End: 2025-04-16
Payer: MEDICARE

## 2025-04-16 ENCOUNTER — ANESTHESIA (OUTPATIENT)
Age: 70
End: 2025-04-16
Payer: MEDICARE

## 2025-04-16 VITALS
OXYGEN SATURATION: 97 % | RESPIRATION RATE: 21 BRPM | WEIGHT: 255 LBS | DIASTOLIC BLOOD PRESSURE: 63 MMHG | TEMPERATURE: 97.4 F | BODY MASS INDEX: 31.71 KG/M2 | HEART RATE: 91 BPM | SYSTOLIC BLOOD PRESSURE: 133 MMHG | HEIGHT: 75 IN

## 2025-04-16 DIAGNOSIS — M17.11 PRIMARY OSTEOARTHRITIS OF RIGHT KNEE: Primary | ICD-10-CM

## 2025-04-16 LAB
GLUCOSE SERPL-MCNC: 256 MG/DL (ref 65–140)
GLUCOSE SERPL-MCNC: 280 MG/DL (ref 65–140)
GLUCOSE SERPL-MCNC: 287 MG/DL (ref 65–140)
GLUCOSE SERPL-MCNC: 310 MG/DL (ref 65–140)
GLUCOSE SERPL-MCNC: 321 MG/DL (ref 65–140)
GLUCOSE SERPL-MCNC: 376 MG/DL (ref 65–140)

## 2025-04-16 PROCEDURE — 27446 REVISION OF KNEE JOINT: CPT

## 2025-04-16 PROCEDURE — 27446 REVISION OF KNEE JOINT: CPT | Performed by: STUDENT IN AN ORGANIZED HEALTH CARE EDUCATION/TRAINING PROGRAM

## 2025-04-16 PROCEDURE — C1713 ANCHOR/SCREW BN/BN,TIS/BN: HCPCS | Performed by: STUDENT IN AN ORGANIZED HEALTH CARE EDUCATION/TRAINING PROGRAM

## 2025-04-16 PROCEDURE — C1776 JOINT DEVICE (IMPLANTABLE): HCPCS | Performed by: STUDENT IN AN ORGANIZED HEALTH CARE EDUCATION/TRAINING PROGRAM

## 2025-04-16 PROCEDURE — 73560 X-RAY EXAM OF KNEE 1 OR 2: CPT

## 2025-04-16 PROCEDURE — 97163 PT EVAL HIGH COMPLEX 45 MIN: CPT | Performed by: PHYSICAL THERAPIST

## 2025-04-16 PROCEDURE — 82948 REAGENT STRIP/BLOOD GLUCOSE: CPT

## 2025-04-16 PROCEDURE — 97167 OT EVAL HIGH COMPLEX 60 MIN: CPT

## 2025-04-16 DEVICE — SMARTSET HIGH PERFORMANCE MV MEDIUM VISCOSITY BONE CEMENT 40G
Type: IMPLANTABLE DEVICE | Site: KNEE | Status: FUNCTIONAL
Brand: SMARTSET

## 2025-04-16 DEVICE — ANATOMICAL FEMORAL COMPONENT CEMENTED S5 RM
Type: IMPLANTABLE DEVICE | Site: KNEE | Status: FUNCTIONAL
Brand: MOTO PARTIAL KNEE SYSTEM - MEDIAL

## 2025-04-16 DEVICE — E-CROSS TIBIAL INSERT FIX S4 RM - 8MM
Type: IMPLANTABLE DEVICE | Site: KNEE | Status: FUNCTIONAL
Brand: MOTO PARTIAL KNEE SYSTEM

## 2025-04-16 RX ORDER — CEFAZOLIN SODIUM 1 G/50ML
1000 SOLUTION INTRAVENOUS EVERY 8 HOURS
Status: DISCONTINUED | OUTPATIENT
Start: 2025-04-16 | End: 2025-04-16 | Stop reason: HOSPADM

## 2025-04-16 RX ORDER — TRANEXAMIC ACID 10 MG/ML
1000 INJECTION, SOLUTION INTRAVENOUS ONCE
Status: COMPLETED | OUTPATIENT
Start: 2025-04-16 | End: 2025-04-16

## 2025-04-16 RX ORDER — ONDANSETRON 2 MG/ML
INJECTION INTRAMUSCULAR; INTRAVENOUS AS NEEDED
Status: DISCONTINUED | OUTPATIENT
Start: 2025-04-16 | End: 2025-04-16

## 2025-04-16 RX ORDER — NIFEDIPINE 30 MG/1
30 TABLET, EXTENDED RELEASE ORAL DAILY
Status: DISCONTINUED | OUTPATIENT
Start: 2025-04-17 | End: 2025-04-16 | Stop reason: HOSPADM

## 2025-04-16 RX ORDER — ACETAMINOPHEN 500 MG
1000 TABLET ORAL EVERY 8 HOURS PRN
Qty: 84 TABLET | Refills: 0 | Status: SHIPPED | OUTPATIENT
Start: 2025-04-16 | End: 2025-04-30

## 2025-04-16 RX ORDER — MAGNESIUM HYDROXIDE/ALUMINUM HYDROXICE/SIMETHICONE 120; 1200; 1200 MG/30ML; MG/30ML; MG/30ML
30 SUSPENSION ORAL EVERY 6 HOURS PRN
Status: DISCONTINUED | OUTPATIENT
Start: 2025-04-16 | End: 2025-04-16 | Stop reason: HOSPADM

## 2025-04-16 RX ORDER — MIDAZOLAM HYDROCHLORIDE 2 MG/2ML
INJECTION, SOLUTION INTRAMUSCULAR; INTRAVENOUS
Status: COMPLETED | OUTPATIENT
Start: 2025-04-16 | End: 2025-04-16

## 2025-04-16 RX ORDER — HYDROMORPHONE HCL/PF 1 MG/ML
0.5 SYRINGE (ML) INJECTION
Status: DISCONTINUED | OUTPATIENT
Start: 2025-04-16 | End: 2025-04-16 | Stop reason: HOSPADM

## 2025-04-16 RX ORDER — CALCIUM CARBONATE 500 MG/1
1000 TABLET, CHEWABLE ORAL DAILY PRN
Status: DISCONTINUED | OUTPATIENT
Start: 2025-04-16 | End: 2025-04-16 | Stop reason: HOSPADM

## 2025-04-16 RX ORDER — BUPIVACAINE HYDROCHLORIDE 5 MG/ML
INJECTION, SOLUTION EPIDURAL; INTRACAUDAL; PERINEURAL
Status: COMPLETED | OUTPATIENT
Start: 2025-04-16 | End: 2025-04-16

## 2025-04-16 RX ORDER — CHLORHEXIDINE GLUCONATE 40 MG/ML
SOLUTION TOPICAL DAILY PRN
Status: DISCONTINUED | OUTPATIENT
Start: 2025-04-16 | End: 2025-04-16 | Stop reason: HOSPADM

## 2025-04-16 RX ORDER — SENNOSIDES 8.6 MG
1 TABLET ORAL DAILY
Status: DISCONTINUED | OUTPATIENT
Start: 2025-04-16 | End: 2025-04-16 | Stop reason: HOSPADM

## 2025-04-16 RX ORDER — DIPHENHYDRAMINE HYDROCHLORIDE 50 MG/ML
INJECTION, SOLUTION INTRAMUSCULAR; INTRAVENOUS AS NEEDED
Status: DISCONTINUED | OUTPATIENT
Start: 2025-04-16 | End: 2025-04-16

## 2025-04-16 RX ORDER — CHLORHEXIDINE GLUCONATE ORAL RINSE 1.2 MG/ML
15 SOLUTION DENTAL ONCE
Status: COMPLETED | OUTPATIENT
Start: 2025-04-16 | End: 2025-04-16

## 2025-04-16 RX ORDER — PROPOFOL 10 MG/ML
INJECTION, EMULSION INTRAVENOUS AS NEEDED
Status: DISCONTINUED | OUTPATIENT
Start: 2025-04-16 | End: 2025-04-16

## 2025-04-16 RX ORDER — ACETAMINOPHEN 325 MG/1
975 TABLET ORAL ONCE
Status: COMPLETED | OUTPATIENT
Start: 2025-04-16 | End: 2025-04-16

## 2025-04-16 RX ORDER — CYCLOBENZAPRINE HCL 5 MG
10 TABLET ORAL 3 TIMES DAILY PRN
Status: DISCONTINUED | OUTPATIENT
Start: 2025-04-16 | End: 2025-04-16 | Stop reason: HOSPADM

## 2025-04-16 RX ORDER — CELECOXIB 200 MG/1
200 CAPSULE ORAL 2 TIMES DAILY
Qty: 60 CAPSULE | Refills: 0 | Status: SHIPPED | OUTPATIENT
Start: 2025-04-16 | End: 2025-04-18

## 2025-04-16 RX ORDER — ACETAMINOPHEN 325 MG/1
650 TABLET ORAL EVERY 6 HOURS PRN
Status: DISCONTINUED | OUTPATIENT
Start: 2025-04-16 | End: 2025-04-16 | Stop reason: HOSPADM

## 2025-04-16 RX ORDER — ONDANSETRON 2 MG/ML
4 INJECTION INTRAMUSCULAR; INTRAVENOUS EVERY 6 HOURS PRN
Status: DISCONTINUED | OUTPATIENT
Start: 2025-04-16 | End: 2025-04-16 | Stop reason: HOSPADM

## 2025-04-16 RX ORDER — DOCUSATE SODIUM 100 MG/1
100 CAPSULE, LIQUID FILLED ORAL 2 TIMES DAILY
Status: DISCONTINUED | OUTPATIENT
Start: 2025-04-16 | End: 2025-04-16 | Stop reason: HOSPADM

## 2025-04-16 RX ORDER — METOCLOPRAMIDE HYDROCHLORIDE 5 MG/ML
10 INJECTION INTRAMUSCULAR; INTRAVENOUS ONCE AS NEEDED
Status: DISCONTINUED | OUTPATIENT
Start: 2025-04-16 | End: 2025-04-16 | Stop reason: HOSPADM

## 2025-04-16 RX ORDER — GLIPIZIDE 2.5 MG/1
5 TABLET, EXTENDED RELEASE ORAL
Status: DISCONTINUED | OUTPATIENT
Start: 2025-04-17 | End: 2025-04-16 | Stop reason: HOSPADM

## 2025-04-16 RX ORDER — PRAVASTATIN SODIUM 40 MG
40 TABLET ORAL
Status: DISCONTINUED | OUTPATIENT
Start: 2025-04-16 | End: 2025-04-16 | Stop reason: HOSPADM

## 2025-04-16 RX ORDER — SODIUM CHLORIDE, SODIUM LACTATE, POTASSIUM CHLORIDE, CALCIUM CHLORIDE 600; 310; 30; 20 MG/100ML; MG/100ML; MG/100ML; MG/100ML
125 INJECTION, SOLUTION INTRAVENOUS CONTINUOUS
Status: DISCONTINUED | OUTPATIENT
Start: 2025-04-16 | End: 2025-04-16 | Stop reason: HOSPADM

## 2025-04-16 RX ORDER — DEXAMETHASONE SODIUM PHOSPHATE 10 MG/ML
INJECTION, SOLUTION INTRAMUSCULAR; INTRAVENOUS AS NEEDED
Status: DISCONTINUED | OUTPATIENT
Start: 2025-04-16 | End: 2025-04-16

## 2025-04-16 RX ORDER — LIDOCAINE HYDROCHLORIDE 10 MG/ML
0.5 INJECTION, SOLUTION EPIDURAL; INFILTRATION; INTRACAUDAL; PERINEURAL ONCE AS NEEDED
Status: DISCONTINUED | OUTPATIENT
Start: 2025-04-16 | End: 2025-04-16 | Stop reason: HOSPADM

## 2025-04-16 RX ORDER — GABAPENTIN 300 MG/1
300 CAPSULE ORAL
Status: DISCONTINUED | OUTPATIENT
Start: 2025-04-16 | End: 2025-04-16 | Stop reason: HOSPADM

## 2025-04-16 RX ORDER — ONDANSETRON 2 MG/ML
4 INJECTION INTRAMUSCULAR; INTRAVENOUS ONCE AS NEEDED
Status: DISCONTINUED | OUTPATIENT
Start: 2025-04-16 | End: 2025-04-16 | Stop reason: HOSPADM

## 2025-04-16 RX ORDER — OXYCODONE HYDROCHLORIDE 10 MG/1
10 TABLET ORAL EVERY 4 HOURS PRN
Status: DISCONTINUED | OUTPATIENT
Start: 2025-04-16 | End: 2025-04-16 | Stop reason: HOSPADM

## 2025-04-16 RX ORDER — ASPIRIN 81 MG/1
81 TABLET, CHEWABLE ORAL 2 TIMES DAILY
Status: DISCONTINUED | OUTPATIENT
Start: 2025-04-17 | End: 2025-04-16 | Stop reason: HOSPADM

## 2025-04-16 RX ORDER — CEFAZOLIN SODIUM 2 G/50ML
2000 SOLUTION INTRAVENOUS ONCE
Status: COMPLETED | OUTPATIENT
Start: 2025-04-16 | End: 2025-04-16

## 2025-04-16 RX ORDER — LISINOPRIL 10 MG/1
20 TABLET ORAL
Status: DISCONTINUED | OUTPATIENT
Start: 2025-04-16 | End: 2025-04-16 | Stop reason: HOSPADM

## 2025-04-16 RX ORDER — OXYCODONE HYDROCHLORIDE 5 MG/1
5 TABLET ORAL EVERY 4 HOURS PRN
Status: DISCONTINUED | OUTPATIENT
Start: 2025-04-16 | End: 2025-04-16 | Stop reason: HOSPADM

## 2025-04-16 RX ORDER — ASPIRIN 81 MG/1
81 TABLET ORAL 2 TIMES DAILY
Qty: 84 TABLET | Refills: 0 | Status: SHIPPED | OUTPATIENT
Start: 2025-04-16 | End: 2025-05-28

## 2025-04-16 RX ORDER — SODIUM CHLORIDE, SODIUM LACTATE, POTASSIUM CHLORIDE, CALCIUM CHLORIDE 600; 310; 30; 20 MG/100ML; MG/100ML; MG/100ML; MG/100ML
1.5 INJECTION, SOLUTION INTRAVENOUS CONTINUOUS
Status: DISCONTINUED | OUTPATIENT
Start: 2025-04-16 | End: 2025-04-16 | Stop reason: HOSPADM

## 2025-04-16 RX ORDER — OXYCODONE HYDROCHLORIDE 5 MG/1
5 TABLET ORAL EVERY 4 HOURS PRN
Qty: 30 TABLET | Refills: 0 | Status: SHIPPED | OUTPATIENT
Start: 2025-04-16

## 2025-04-16 RX ORDER — HYDROMORPHONE HCL IN WATER/PF 6 MG/30 ML
0.2 PATIENT CONTROLLED ANALGESIA SYRINGE INTRAVENOUS
Status: DISCONTINUED | OUTPATIENT
Start: 2025-04-16 | End: 2025-04-16 | Stop reason: HOSPADM

## 2025-04-16 RX ORDER — FENTANYL CITRATE/PF 50 MCG/ML
50 SYRINGE (ML) INJECTION
Status: DISCONTINUED | OUTPATIENT
Start: 2025-04-16 | End: 2025-04-16 | Stop reason: HOSPADM

## 2025-04-16 RX ORDER — PROPOFOL 10 MG/ML
INJECTION, EMULSION INTRAVENOUS CONTINUOUS PRN
Status: DISCONTINUED | OUTPATIENT
Start: 2025-04-16 | End: 2025-04-16

## 2025-04-16 RX ORDER — DIPHENHYDRAMINE HYDROCHLORIDE 50 MG/ML
12.5 INJECTION, SOLUTION INTRAMUSCULAR; INTRAVENOUS ONCE AS NEEDED
Status: DISCONTINUED | OUTPATIENT
Start: 2025-04-16 | End: 2025-04-16 | Stop reason: HOSPADM

## 2025-04-16 RX ADMIN — TRANEXAMIC ACID 1000 MG: 10 INJECTION, SOLUTION INTRAVENOUS at 08:45

## 2025-04-16 RX ADMIN — MIDAZOLAM 2 MG: 1 INJECTION INTRAMUSCULAR; INTRAVENOUS at 08:25

## 2025-04-16 RX ADMIN — CEFAZOLIN SODIUM 2000 MG: 2 SOLUTION INTRAVENOUS at 08:39

## 2025-04-16 RX ADMIN — BUPIVACAINE HYDROCHLORIDE 10 ML: 5 INJECTION, SOLUTION EPIDURAL; INTRACAUDAL; PERINEURAL at 08:27

## 2025-04-16 RX ADMIN — PROPOFOL 100 MG: 10 INJECTION, EMULSION INTRAVENOUS at 08:44

## 2025-04-16 RX ADMIN — TRANEXAMIC ACID 1000 MG: 10 INJECTION, SOLUTION INTRAVENOUS at 09:47

## 2025-04-16 RX ADMIN — BUPIVACAINE 20 ML: 13.3 INJECTION, SUSPENSION, LIPOSOMAL INFILTRATION at 08:27

## 2025-04-16 RX ADMIN — PHENYLEPHRINE HYDROCHLORIDE 20 MCG/MIN: 10 INJECTION INTRAVENOUS at 08:55

## 2025-04-16 RX ADMIN — SODIUM CHLORIDE, SODIUM LACTATE, POTASSIUM CHLORIDE, AND CALCIUM CHLORIDE: .6; .31; .03; .02 INJECTION, SOLUTION INTRAVENOUS at 09:50

## 2025-04-16 RX ADMIN — MEPIVACAINE HYDROCHLORIDE 4 ML: 15 INJECTION, SOLUTION EPIDURAL; INFILTRATION at 08:41

## 2025-04-16 RX ADMIN — PROPOFOL 100 MCG/KG/MIN: 10 INJECTION, EMULSION INTRAVENOUS at 08:44

## 2025-04-16 RX ADMIN — DEXAMETHASONE SODIUM PHOSPHATE 10 MG: 10 INJECTION INTRAMUSCULAR; INTRAVENOUS at 09:05

## 2025-04-16 RX ADMIN — INSULIN HUMAN 10 UNITS: 100 INJECTION, SOLUTION PARENTERAL at 13:01

## 2025-04-16 RX ADMIN — DIPHENHYDRAMINE HYDROCHLORIDE 25 MG: 50 INJECTION INTRAMUSCULAR; INTRAVENOUS at 09:03

## 2025-04-16 RX ADMIN — INSULIN HUMAN 5 UNITS: 100 INJECTION, SOLUTION PARENTERAL at 14:08

## 2025-04-16 RX ADMIN — INSULIN HUMAN 5 UNITS: 100 INJECTION, SOLUTION PARENTERAL at 11:00

## 2025-04-16 RX ADMIN — ONDANSETRON 4 MG: 2 INJECTION INTRAMUSCULAR; INTRAVENOUS at 09:05

## 2025-04-16 RX ADMIN — SODIUM CHLORIDE, SODIUM LACTATE, POTASSIUM CHLORIDE, AND CALCIUM CHLORIDE 125 ML/HR: .6; .31; .03; .02 INJECTION, SOLUTION INTRAVENOUS at 07:44

## 2025-04-16 RX ADMIN — OXYCODONE HYDROCHLORIDE 5 MG: 5 TABLET ORAL at 12:20

## 2025-04-16 RX ADMIN — CHLORHEXIDINE GLUCONATE 15 ML: 1.2 SOLUTION ORAL at 07:44

## 2025-04-16 RX ADMIN — ACETAMINOPHEN 975 MG: 325 TABLET ORAL at 07:44

## 2025-04-16 NOTE — ANESTHESIA PREPROCEDURE EVALUATION
Procedure:  ARTHROPLASTY KNEE UNICOMPARTMENTAL, RIGHT. SAME DAY (Right: Knee)    Relevant Problems   ANESTHESIA (within normal limits)      CARDIO   (+) Essential hypertension   (+) Hyperlipidemia LDL goal <70      ENDO   (+) Hypothyroidism   (+) Type 2 diabetes mellitus with diabetic chronic kidney disease (HCC)   (+) Type 2 diabetes mellitus with microalbuminuria, without long-term current use of insulin (HCC)      GI/HEPATIC (within normal limits)      /RENAL   (+) Prostate cancer (HCC)   (+) Stage 3a chronic kidney disease (HCC)   (+) Type 2 diabetes mellitus with diabetic chronic kidney disease (HCC)      HEMATOLOGY (within normal limits)      MUSCULOSKELETAL   (+) Primary osteoarthritis of right knee      NEURO/PSYCH   (+) Diabetic polyneuropathy (HCC)      PULMONARY (within normal limits)      Neurology/Sleep   (+) Diabetes mellitus with neurological manifestation (HCC)   (+) Neuropathy of foot      Rheumatology   (+) Bilateral primary osteoarthritis of knee      Orthopedic/Musculoskeletal   (+) Enthesopathy of knee      Other   (+) Other acute osteomyelitis, right ankle and foot (HCC)        Physical Exam    Airway    Mallampati score: II  TM Distance: >3 FB  Neck ROM: full     Dental   No notable dental hx     Cardiovascular  Rhythm: regular, Rate: normal, Cardiovascular exam normal    Pulmonary  Pulmonary exam normal Breath sounds clear to auscultation    Other Findings        Anesthesia Plan  ASA Score- 3     Anesthesia Type- spinal with ASA Monitors.         Additional Monitors:     Airway Plan:     Comment: Right adductor canal block for postoperative pain control.       Plan Factors-Exercise tolerance (METS): >4 METS.    Chart reviewed. EKG reviewed. Imaging results reviewed. Existing labs reviewed. Patient summary reviewed.          Obstructive sleep apnea risk education given perioperatively.        Induction- intravenous.    Postoperative Plan- Plan for postoperative opioid use.     Perioperative  Resuscitation Plan - Level 1 - Full Code.       Informed Consent- Anesthetic plan and risks discussed with patient.  I personally reviewed this patient with the CRNA. Discussed and agreed on the Anesthesia Plan with the CRNA..      NPO Status:  No vitals data found for the desired time range.      Recent labs personally reviewed:  Lab Results   Component Value Date    WBC 6.55 03/25/2025    HGB 15.9 03/25/2025     03/25/2025     Lab Results   Component Value Date     03/15/2016    K 4.3 03/25/2025    BUN 26 (H) 03/25/2025    CREATININE 1.09 03/25/2025     Lab Results   Component Value Date    PTT 28 03/25/2025      Lab Results   Component Value Date    INR 1.04 03/25/2025       Blood type A    Lab Results   Component Value Date    HGBA1C 6.7 (H) 03/25/2025       I, Richard Arevalo MD, have personally seen and evaluated the patient prior to anesthetic care.  I have reviewed the pre-anesthetic record, and other medical records if appropriate to the anesthetic care.  If a CRNA is involved in the case, I have reviewed the CRNA assessment, if present, and agree. Risks/benefits and alternatives discussed with patient including possible PONV, sore throat, and possibility of rare anesthetic and surgical emergencies.

## 2025-04-16 NOTE — DISCHARGE INSTR - AVS FIRST PAGE
Tye Rodriguez MD  Adult Reconstruction Specialist   [facility]  Office Phone: [phone]    Discharge Instructions - Knee Replacement    What to Expect/Activity  You are weight bearing as tolerated to your operative leg with assistive devices.  Please use crutches/walker when ambulating as needed until your follow-up  Significant swelling and discomfort in the knee is normal for several days to weeks after surgery. You may use ice around the knee to help as desired. This can be used for 20-30 minutes every 1-2 hours  To optimally control swelling, your leg should be elevated above heart level as often as necessary. This can greatly improve post operative pain levels, due to uncontrolled swelling.   For the first several weeks after surgery, it is normal to experience redness, swelling, brusing and pain to the operative knee. This is generally not a sign of concern or an abnormal finding.    Post-operative Objectives  In effort to restore your knee range of motion, it is essential to immediately begin flexing your knee. Do so several times a day with the goal of achieving at or around 90 degrees by the time we see you 2 weeks post operatively.   There are no restrictions as to how often you can perform this knee motion. Your knee implant and incision are able to withstand these types of movement  You may utilize the physical therapy exercise packet provided to you by the physical therapy team. This should have been given to you during their initial assessment in the hospital.  If we feel it is appropriate, we will initiate formal physical therapy 2 weeks after surgery. Once we see you at the first post operative visit, we will decide if this is the best course of action and of benefit to you.   If you have any questions regarding the above objectives, please do not hesistate to call our office for clarification.        Dressing/Wound Care/Bathing  After surgery, your knee will be  wrapped in an ace wrap, toe to mid thigh, with a gray waterproof adhesive dressing underneath protecting the incision  You may remove your ace wrap 5 days after surgery and may be re-applied and/or tightened/loosened as necessary. I recommend to re-wrap your leg if swelling is an issue.   You may start showering 5 days after surgery. When drying off, please pat the dressing dry. If you notice the dressing appears saturated or is starting to come off, please over-wrap with dry dressing.  If you shower too early, there is a higher chance of infection and wound healing complications.  No baths, swimming or submerging until cleared by Dr. Rodriguez    Pain Management/Medications  You may resume your usual medications.  Please take the following medications:  Anti-coagulation (blood clot prevention) - Aspirin 81 mg, 1 tablet twice daily for 6 weeks  Antibiotics - none  Pain medication:  Narcotic Medication: Oxycodone 5 mg, 1 tablet every 4-6 hours as needed for severe pain  NSAID (Anti-inflammatory): Celebrex 200 mg, 1 tablet twice a day as needed for mild to moderate pain  Tylenol 1000mg up to three times daily as needed for mild to moderate pain. Do not exceed 3000mg daily   If you have questions or pain concerns, please contact the office. Pain medication cannot remove all post-operative pain    Follow up/Call if:  The findings of your surgery will be explained to you and your family immediately after surgery. However, in the post-operative period, during recovery from anesthesia you may not fully remember or fully understand what was said. This will be again gone over when you return for your post-op appointment.  Please contact Dr. Rodriguez's office if you experience the following:  Excessive bleeding (bleeding through your dressing)  Fever greater than 101 degrees F after 48 hours (low grade fevers the day or two after surgery are normal)  Persistent nausea or vomiting  Decreased sensation or discoloration of  the operative limb  Pain or swelling that is getting worse and not better with medication      Dr. Rodriguez's Office Contact: [phone]

## 2025-04-16 NOTE — PLAN OF CARE
Problem: PHYSICAL THERAPY ADULT  Goal: Performs mobility at highest level of function for planned discharge setting.  See evaluation for individualized goals.  Description: Treatment/Interventions: Functional transfer training, LE strengthening/ROM, Elevations, Therapeutic exercise, Endurance training, Patient/family training, Bed mobility, Gait training, Spoke to nursing, OT, Family  Equipment Recommended: Walker (pt owns)       See flowsheet documentation for full assessment, interventions and recommendations.  Note: Prognosis: Good  Problem List: Decreased strength, Decreased range of motion, Decreased endurance, Impaired balance, Decreased mobility, Orthopedic restrictions, Pain  Assessment: Pt is a 69 y.o. male who presented to [facility] on 4/16/2025 s/p R TKA done by Dr. Rodriguez. Precautions include WBAT. Pt  has a past medical history of Allergic rhinitis, Anxiety disorder, Arthritis, Cancer (HCC), Carpal tunnel syndrome, Colon polyp, Diabetes mellitus (HCC),Disease of thyroid gland, DM II (diabetes mellitus, type II), controlled (HCC), HTN. Pt greeted in recliner chair for PT evaluation on 04/16/25. Pt referred to PT for functional mobility evaluation & D/C planning w/ orders of activity beginning POD #0 and activity as tolerated. PTA, pt reports being I w/o AD and I w/ IADLs. Personal factors affecting pt at time of IE include: lives in 2 story house and stairs to enter home. Please find objective findings from PT assessment regarding body systems outlined above with impairments and limitations including weakness, decreased ROM, impaired balance, decreased endurance, gait deviations, pain, decreased activity tolerance, decreased functional mobility tolerance, fall risk, and orthopedic restrictions.  Please see flow sheet above for objective findings and level of assistance required for safe completion of functional tasks. Pt able to perform sit to stand with minAx2 and RW. Pt was hesitant to put full weight  on R LE, but improved as session went on. Pt then able to perform sit to stand with S and RW. Pt able to perform stand to sit with S and use of RW. Pt then ambulated 70'x2 with RW and S. Pt had minimal buckling in R LE, but improved as session went on. Cues needed cues for proper gait pattern and RW safety. Pt demonstrated dec endurance and tolerance to activity. Denies reports of dizziness or SOB t/o session. Patient was left in recliner chair  with call bell and all needs within reach. Pt was educated on fall precautions and reinforced w/ good understanding. Based on pt presentation and impaired function, pt would benefit from level III, (minimal resource intensity) at D/C. From PT/mobility standpoint, pt would benefit from OPPT to address deficits as defined above and maximize level of independence and return pt to PLOF. HEP given and reviewed with patient, no questions at this time. CM to follow. Nsg staff to continue to mobilized pt (OOB in chair for all meals & ambulate in room/unit) as tolerated to prevent further decline in function. Nsg notified. Co-eval performed with OT to complete this evaluation for the pts best interest given pts medical complexity and functional level.  Barriers to Discharge: None     Rehab Resource Intensity Level, PT: III (Minimum Resource Intensity)    See flowsheet documentation for full assessment.

## 2025-04-16 NOTE — PHYSICAL THERAPY NOTE
PT EVALUATION    Pt. Name: Marco Richard  Pt. Age: 69 y.o.  MRN: [id number]  LENGTH OF STAY: 0    Patient Active Problem List   Diagnosis    Diabetes mellitus with neurological manifestation (HCC)    Diabetic polyneuropathy (HCC)    Enthesopathy of knee    Essential hypertension    Neuropathy of foot    Obesity, unspecified    Type 2 diabetes mellitus with microalbuminuria, without long-term current use of insulin (HCC)    Albuminuria    Hyperlipidemia LDL goal <70    Skin ulcer of toe of right foot, limited to breakdown of skin (HCC)    Prostate cancer (HCC)    Other acute osteomyelitis, right ankle and foot (HCC)    Stage 3a chronic kidney disease (HCC)    Hyponatremia    Bilateral primary osteoarthritis of knee    Hypothyroidism    Type 2 diabetes mellitus with diabetic chronic kidney disease (HCC)    Primary osteoarthritis of right knee       Admitting Diagnoses:   Primary osteoarthritis of right knee [M17.11]    Past Medical History:   Diagnosis Date    Allergic rhinitis     Anxiety disorder     Last assessed 2/7/2006     Arthritis     Cancer (HCC)     prostate    Carpal tunnel syndrome     Colon polyp     Diabetes mellitus (HCC)     Diabetes mellitus with neurological manifestation (HCC) 04/06/2012    Last assessed 3/29/2016     Diabetes type 2, uncontrolled     Last assessed 4/6/2016     Disease of thyroid gland     DM II (diabetes mellitus, type II), controlled ([facility])     Last assessed 10/20/2014     Essential hypertension     Last assesssed 2/7/2006     Hyperlipidemia     Hypertension     Insomnia     Last assessed 1/13/2011     Numbness and tingling of foot     Resolved 3/29/2016     Obesity     Old disruption of knee ligament     Last assessed 3/26/2008     Tarsal tunnel syndrome     Urinary frequency     Resolved 3/29/2016     Visual impairment        Past Surgical History:   Procedure Laterality Date    COLONOSCOPY      FOOT SURGERY      [place] AMPUTATION TOE INTERPHALANGEAL JOINT Right 11/23/2022     Procedure: AMPUTATION 3RD TOE;  Surgeon: Andrews Marquez DPM;  Location:  MAIN OR;  Service: Podiatry    PROSTATE BIOPSY      WISDOM TOOTH EXTRACTION         Imaging Studies:  XR knee right 1 or 2 views    (Results Pending)        04/16/25 1417   PT Last Visit   PT Visit Date 04/16/25   Note Type   Note type Evaluation   Additional Comments greeted in recliner chair   Pain Assessment   Pain Assessment Tool 0-10   Pain Score No Pain   Pain Location/Orientation Orientation: Right;Location: Knee   Hospital Pain Intervention(s) Repositioned;Ambulation/increased activity;Elevated;Emotional support;Rest   Restrictions/Precautions   Weight Bearing Precautions Per Order Yes   RLE Weight Bearing Per Order WBAT   Other Precautions Fall Risk;Pain;WBS   Home Living   Type of Home House  (bilevel)   Home Layout Multi-level;Able to live on main level with bedroom/bathroom;Performs ADLs on one level  (0 LUCIAN)   Bathroom Shower/Tub Walk-in shower   Bathroom Toilet Standard   Bathroom Equipment Grab bars in shower;Shower chair   Bathroom Accessibility Accessible   Home Equipment Walker;Cane;Grab bars   Additional Comments Pt plans to stay on main level with 0 LUCIAN and walkin shower and bedroom   Prior Function   Level of [place] Independent with ADLs;Independent with functional mobility;Independent with IADLS   Lives With Spouse;Daughter   Receives Help From Family   IADLs Independent with driving;Independent with meal prep;Independent with medication management   Falls in the last 6 months 0   Vocational Volunteer work  (retired)   Comments use of cane PRN   General   Family/Caregiver Present Yes   Cognition   Overall Cognitive Status WFL   Arousal/Participation Alert   Orientation Level Oriented X4   Following Commands Follows all commands and directions without difficulty   RUE Assessment   RUE Assessment   (see OT note)   LUE Assessment   LUE Assessment   (see OT note)   RLE Assessment   RLE Assessment X  (did not assess  knee due to post op, able to flex hip and move ankle)   LLE Assessment   LLE Assessment WFL   Light Touch   RLE Light Touch Grossly intact   LLE Light Touch Grossly intact   Bed Mobility   Supine to Sit Unable to assess   Sit to Supine Unable to assess   Additional Comments greeted in recliner   Transfers   Sit to Stand 4  Minimal assistance  (progressing to S)   Additional items Assist x 2;Increased time required;Verbal cues   Stand to Sit 5  Supervision   Additional items Increased time required;Verbal cues   Additional Comments cues for hand placement and knee extension in standing   Ambulation/Elevation   Gait pattern Improper Weight shift;Antalgic;Decreased R stance;Step to;Excessively slow;R Knee Celena;Decreased toe off;Decreased heel strike   Gait Assistance 5  Supervision   Additional items Verbal cues   Assistive Device Rolling walker   Distance 70'x2   Stair Management Assistance Not tested   Ambulation/Elevation Additional Comments cues for gait pattern   Balance   Static Sitting Good   Dynamic Sitting Fair +   Static Standing Fair   Dynamic Standing Fair -   Ambulatory Fair -   Endurance Deficit   Endurance Deficit No   Activity Tolerance   Activity Tolerance Patient tolerated treatment well   Medical Staff Made Aware RN Ekta, HIREN Solano   Nurse Made Aware yes   Assessment   Prognosis Good   Problem List Decreased strength;Decreased range of motion;Decreased endurance;Impaired balance;Decreased mobility;Orthopedic restrictions;Pain   Assessment Pt is a 69 y.o. male who presented to [facility] 4/16/2025 s/p R Unicompartmental Arthroplasty done by Dr. Rodriguez. Precautions include WBAT. Pt  has a past medical history of Allergic rhinitis, Anxiety disorder, Arthritis, Cancer ([facility]), Carpal tunnel syndrome, Colon polyp, Diabetes mellitus ([facility]),Disease of thyroid gland, DM II (diabetes mellitus, type II), controlled ([facility]), HTN. Pt greeted in recliner chair for PT evaluation on 04/16/25. Pt referred to PT for  functional mobility evaluation & D/C planning w/ orders of activity beginning POD #0 and activity as tolerated. PTA, pt reports being I w/o AD and I w/ IADLs. Personal factors affecting pt at time of IE include: lives in 2 story house and stairs to enter home. Please find objective findings from PT assessment regarding body systems outlined above with impairments and limitations including weakness, decreased ROM, impaired balance, decreased endurance, gait deviations, pain, decreased activity tolerance, decreased functional mobility tolerance, fall risk, and orthopedic restrictions.  Please see flow sheet above for objective findings and level of assistance required for safe completion of functional tasks. Pt able to perform sit to stand with minAx2 and RW. Pt was hesitant to put full weight on R LE, but improved as session went on. Pt then able to perform sit to stand with S and RW. Pt able to perform stand to sit with S and use of RW. Pt then ambulated 70'x2 with RW and S. Pt had minimal buckling in R LE, but improved as session went on. Cues needed cues for proper gait pattern and RW safety. Pt demonstrated dec endurance and tolerance to activity. Denies reports of dizziness or SOB t/o session. Patient was left in recliner chair  with call bell and all needs within reach. Pt was educated on fall precautions and reinforced w/ good understanding. Based on pt presentation and impaired function, pt would benefit from level III, (minimal resource intensity) at D/C. From PT/mobility standpoint, pt would benefit from OPPT to address deficits as defined above and maximize level of independence and return pt to PLOF. HEP given and reviewed with patient, no questions at this time. CM to follow. Nsg staff to continue to mobilized pt (OOB in chair for all meals & ambulate in room/unit) as tolerated to prevent further decline in function. Nsg notified. Co-eval performed with OT to complete this evaluation for the pts best  interest given pts medical complexity and functional level.   Barriers to Discharge None   Goals   Patient Goals to go home   STG Expiration Date 04/23/25   Short Term Goal #1 1).  Pt will perform bed mobility with sierra demonstrating appropriate technique 100% of the time in order to improve function.2)  Perform all transfers with Sierra demonstrating safe and appropriate technique 100% of the time in order to improve ability to negotiate safely in home environment.3) Amb with least restrictive AD > 200'x1 with Sierra in order to demonstrate ability to negotiate in home environment.4)  Improve overall strength and balance 1/2 grade in order to optimize ability to perform functional tasks and reduce fall risk.5) Increase activity tolerance to 45 minutes in order to improve endurance to functional tasks.6)  Negotiate stairs using most appropriate technique and Sierra in order to be able to negotiate safely in home environment. 7) PT for ongoing patient and family/caregiver education, DME needs and d/c planning in order to promote highest level of function in least restrictive environment.   Plan   Treatment/Interventions Functional transfer training;LE strengthening/ROM;Elevations;Therapeutic exercise;Endurance training;Patient/family training;Bed mobility;Gait training;Spoke to nursing;OT;Family   PT Frequency Twice a day  (prn)   Discharge Recommendation   Rehab Resource Intensity Level, PT III (Minimum Resource Intensity)   Equipment Recommended Walker  (pt owns)   Additional Comments The patient's AM-PAC Basic Mobility Inpatient Short Form Raw Score is 18. A Raw score of greater than 16 suggests the patient may benefit from discharge to home. Please also refer to the recommendation of the Physical Therapist for safe discharge planning.   AM-PAC Basic Mobility Inpatient   Turning in Flat Bed Without Bedrails 3   Lying on Back to Sitting on Edge of Flat Bed Without Bedrails 3   Moving Bed to Chair 3   Standing Up From  Chair Using Arms 3   Walk in Room 3   Climb 3-5 Stairs With Railing 3   Basic Mobility Inpatient Raw Score 18   Basic Mobility Standardized Score 41.05   [facility] Highest Level Of Mobility   -HLM Goal 6: Walk 10 steps or more   -HLM Achieved 7: Walk 25 feet or more   End of Consult   Patient Position at End of Consult All needs within reach;Bedside chair   End of Consult Comments pt stable, left in recliner chair at end of session. RN updated     Hx/personal factors: co-morbidities, pain, WB restrictions, and fall risk  Examination: dec mobility, dec balance, dec endurance, dec amb, risk for falls, pain, assessed body system, balance, endurance, amb, D/C disposition & fall risk, WB restrictions, impairements in locomotion, musculoskeletal, balance, endurance, posture, coordination  Clinical: unpredictable (ongoing medical status, risk for falls, POD #0, and pain mgt)  Complexity: high  Kalyn Horvath, PT

## 2025-04-16 NOTE — ANESTHESIA POSTPROCEDURE EVALUATION
Post-Op Assessment Note    CV Status:  Stable  Pain Score: 0    Pain management: adequate       Mental Status:  Alert and awake   Hydration Status:  Euvolemic   PONV Controlled:  Controlled   Airway Patency:  Patent  Two or more mitigation strategies used for obstructive sleep apnea   Post Op Vitals Reviewed: Yes    No anethesia notable event occurred.    Staff: Anesthesiologist, with CRNAs           Last Filed PACU Vitals:  Vitals Value Taken Time   Temp 97.1 °F (36.2 °C) 04/16/25 1100   Pulse 79 04/16/25 1130   /59 04/16/25 1130   Resp 14 04/16/25 1130   SpO2 98 % 04/16/25 1130       Modified Aldo:     Vitals Value Taken Time   Activity 2 04/16/25 1130   Respiration 2 04/16/25 1130   Circulation 2 04/16/25 1130   Consciousness 2 04/16/25 1130   Oxygen Saturation 2 04/16/25 1130     Modified Aldo Score: 10

## 2025-04-16 NOTE — PLAN OF CARE
Problem: OCCUPATIONAL THERAPY ADULT  Goal: Performs self-care activities at highest level of function for planned discharge setting.  See evaluation for individualized goals.  Description: Treatment Interventions: ADL retraining, Functional transfer training, Endurance training, Patient/family training, Equipment evaluation/education, Compensatory technique education, Continued evaluation, Energy conservation, Activityengagement          See flowsheet documentation for full assessment, interventions and recommendations.   Note: Limitation: Decreased ADL status, Decreased endurance, Decreased self-care trans, Decreased high-level ADLs  Prognosis: Good  Assessment: Pt is a 69 y.o. male seen for OT evaluation s/p adm to [facility] on 4/16/2025 w/ Primary osteoarthritis of right knee . Comorbidities affecting pt’s functional performance include a significant PMH of arthritis, prostate cancer, DM, HTN, hyperlipidemia. Pt with active OT orders and activity orders for Activity beginning POD #0. Pt lives w/ spouse and daughter in a multilevel house with 0 LUCIAN. Pt has walkin shower and standard toilets. At baseline, pt was independent with all ADLs/IADLs. Pt completed don of underwear and pants with S, Leandra for sit to stand due to instability with RLE knee buckling at times. Pt able to pull over waist with S and verbal cues for hand placement. Pt completed don of shirt with S seated. Pt then completed sit to stand with minAx2, pt with RLE knee buckling inconsistently, pt requesting a break and would not like to walk at this time. Will reassess at a later time for functional mobility. Pt educated on all ADLs/IADLs, transfers, and LE management. Upon evaluation, pt currently requires S for UB ADLs, S for LB ADLs, S for toileting, unable to assess for bed mobility, unable to assess for functional mobility, and Leandra for transfers 2* the following deficits impacting occupational performance: weakness, decreased strength ,  decreased balance, decreased activity tolerance, increased pain, and orthopedic restrictions. These impairments, as well at pt’s personal factors of: steps within home environment, difficulty performing ADLs, difficulty performing IADLs, difficulty performing transfers/mobility, WBS, fall risk , and new use of AD for functional transfers/mobility limit pt’s ability to safely engage in all baseline areas of occupation. Based on the aforementioned OT evaluation, functional performance deficits, and assessments, pt has been identified as a high complexity evaluation. Pt to continue to benefit from continued acute OT services during hospital stay to address defined deficits and to maximize level of functional independence in the following Occupational Performance areas: grooming, bathing/shower, toilet hygiene, dressing, health maintenance, functional mobility, community mobility, clothing management, cleaning, household maintenance, and job performance/volunteering. From OT standpoint, recommend No post-acute rehabilitation needs upon D/C. OT will continue to follow pt 3-5x/wk.     Rehab Resource Intensity Level, OT: No post-acute rehabilitation needs

## 2025-04-16 NOTE — ANESTHESIA PROCEDURE NOTES
Peripheral Block    Patient location during procedure: holding area  Start time: 4/16/2025 8:27 AM  Reason for block: at surgeon's request and post-op pain management  Staffing  Performed by: Richard Arevalo MD  Authorized by: Richard Arevalo MD    Preanesthetic Checklist  Completed: patient identified, IV checked, site marked, risks and benefits discussed, surgical consent, monitors and equipment checked, pre-op evaluation and timeout performed  Peripheral Block  Patient position: supine  Prep: ChloraPrep  Patient monitoring: frequent blood pressure checks, continuous pulse oximetry and heart rate  Block type: Adductor Canal  Laterality: right  Injection technique: single-shot  Procedures: ultrasound guided, Ultrasound guidance required for the procedure to increase accuracy and safety of medication placement and decrease risk of complications.  Ultrasound permanent image saved  bupivacaine (PF) (MARCAINE) 0.5 % injection 20 mL - Perineural   10 mL - 4/16/2025 8:27:00 AM  bupivacaine liposomal (EXPAREL) 1.3 % injection 20 mL - Perineural   20 mL - 4/16/2025 8:27:00 AM  midazolam (VERSED) injection 0.5 mg - Intravenous   2 mg - 4/16/2025 8:25:00 AM  Needle  Needle type: Stimuplex   Needle localization: anatomical landmarks and ultrasound guidance  Assessment  Injection assessment: incremental injection, frequent aspiration, injected with ease, negative aspiration, negative for heart rate change, no paresthesia on injection, no symptoms of intraneural/intravenous injection and needle tip visualized at all times  Paresthesia pain: none  Post-procedure:  site cleaned  patient tolerated the procedure well with no immediate complications

## 2025-04-16 NOTE — ANESTHESIA POSTPROCEDURE EVALUATION
Post-Op Assessment Note    CV Status:  Stable  Pain Score: 0    Pain management: adequate       Mental Status:  Alert and awake   Hydration Status:  Euvolemic   PONV Controlled:  Controlled   Airway Patency:  Patent  Two or more mitigation strategies used for obstructive sleep apnea   Post Op Vitals Reviewed: Yes    No anethesia notable event occurred.    Staff: Anesthesiologist, CRNA           Last Filed PACU Vitals:  Vitals Value Taken Time   Temp 97.5 °F (36.4 °C) 04/16/25 1021   Pulse 85 04/16/25 1030   /75 04/16/25 1030   Resp 19 04/16/25 1030   SpO2 94 % 04/16/25 1030   Vitals shown include unfiled device data.    Modified Aldo:     Vitals Value Taken Time   Activity 1 04/16/25 1021   Respiration 2 04/16/25 1021   Circulation 2 04/16/25 1021   Consciousness 2 04/16/25 1021   Oxygen Saturation 2 04/16/25 1021     Modified Aldo Score: 9

## 2025-04-16 NOTE — OCCUPATIONAL THERAPY NOTE
Occupational Therapy Evaluation     Patient Name: Marco Richard  Today's Date: 4/16/2025  Problem List  Principal Problem:    Primary osteoarthritis of right knee    Past Medical History  Past Medical History:   Diagnosis Date    Allergic rhinitis     Anxiety disorder     Last assessed 2/7/2006     Arthritis     Cancer (HCC)     prostate    Carpal tunnel syndrome     Colon polyp     Diabetes mellitus (HCC)     Diabetes mellitus with neurological manifestation (HCC) 04/06/2012    Last assessed 3/29/2016     Diabetes type 2, uncontrolled     Last assessed 4/6/2016     Disease of thyroid gland     DM II (diabetes mellitus, type II), controlled (HCC)     Last assessed 10/20/2014     Essential hypertension     Last assesssed 2/7/2006     Hyperlipidemia     Hypertension     Insomnia     Last assessed 1/13/2011     Numbness and tingling of foot     Resolved 3/29/2016     Obesity     Old disruption of knee ligament     Last assessed 3/26/2008     Tarsal tunnel syndrome     Urinary frequency     Resolved 3/29/2016     Visual impairment      Past Surgical History  Past Surgical History:   Procedure Laterality Date    COLONOSCOPY      FOOT SURGERY      NV AMPUTATION TOE INTERPHALANGEAL JOINT Right 11/23/2022    Procedure: AMPUTATION 3RD TOE;  Surgeon: Andrews Marquez DPM;  Location:  MAIN OR;  Service: Podiatry    PROSTATE BIOPSY      WISDOM TOOTH EXTRACTION          04/16/25 1416   OT Last Visit   OT Visit Date 04/16/25   Note Type   Note type Evaluation   Additional Comments pt greeted OOB in recliner, agreeable to OT evaluation   Pain Assessment   Pain Assessment Tool 0-10   Pain Score No Pain   Pain Location/Orientation Orientation: Right;Location: [facility] Pain Intervention(s) Repositioned;Ambulation/increased activity;Emotional support;Rest   Restrictions/Precautions   Weight Bearing Precautions Per Order Yes   RLE Weight Bearing Per Order WBAT   Other Precautions WBS;Multiple lines;Fall Risk;Pain   Home  Living   Type of Home House  (bilevel)   Home Layout Multi-level;Performs ADLs on one level;Able to live on main level with bedroom/bathroom  (0 LUCIAN)   Bathroom Shower/Tub Walk-in shower   Bathroom Toilet Standard   Bathroom Equipment Grab bars in shower;Shower chair   Bathroom Accessibility Accessible   Home Equipment Walker;Cane;Grab bars   Additional Comments Pt plans to stay on main level with 0 LUCIAN and walkin shower and bedroom   Prior Function   Level of Edgar Independent with ADLs;Independent with functional mobility;Independent with IADLS   Lives With Spouse;Daughter   Receives Help From Family   IADLs Independent with driving;Independent with meal prep;Independent with medication management   Falls in the last 6 months 0   Vocational Volunteer work  (retired)   Comments use of cane PRN   Lifestyle   Autonomy Independent with all ADLs/IADLs, use of cane prn   Reciprocal Relationships Family   Service to Others Retired   Intrinsic Gratification [employer] volunteer work   General   Family/Caregiver Present Yes   ADL   Where Assessed Chair   Eating Assistance 5  Supervision/Setup   Grooming Assistance 5  Supervision/Setup   UB Bathing Assistance 5  Supervision/Setup   LB Bathing Assistance 5  Supervision/Setup   UB Dressing Assistance 5  Supervision/Setup   LB Dressing Assistance 5  Supervision/Setup   Toileting Assistance  5  Supervision/Setup   Bed Mobility   Supine to Sit Unable to assess   Sit to Supine Unable to assess   Additional Comments Pt greeted in recliner   Transfers   Sit to Stand 4  Minimal assistance   Additional items Assist x 2;Increased time required;Verbal cues;Armrests  (RW)   Stand to Sit 5  Supervision   Additional items Armrests;Increased time required;Verbal cues  (RW)   Additional Comments verbal cues for hand placement and knee extension in standing with use of RW   Functional Mobility   Functional Mobility   (unable to assess at this time due to safety)   Balance   Static Sitting  Fair +   Dynamic Sitting Fair   Static Standing Poor +   Activity Tolerance   Activity Tolerance Patient tolerated treatment well;Other (Comment)  (knee buckling)   Medical Staff Made Aware PT Ali, Pt seen for co-evaluation/treatment with skilled Physical Therapy due to pt's medical complexity, decreased endurance, overall functional level, overall safety, and post surgical day #0.   Nurse Made Aware CARTER HORNER Assessment   RUE Assessment WFL   LUE Assessment   LUE Assessment WFL   Hand Function   Gross Motor Coordination Functional   Fine Motor Coordination Functional   Cognition   Overall Cognitive Status WFL   Arousal/Participation Alert;Cooperative   Attention Within functional limits   Orientation Level Oriented X4   Memory Within functional limits   Following Commands Follows all commands and directions without difficulty   Comments Cooperative and pleasant   Assessment   Limitation Decreased ADL status;Decreased endurance;Decreased self-care trans;Decreased high-level ADLs   Prognosis Good   Assessment Pt is a 69 y.o. male seen for OT evaluation s/p adm to [facility] on 4/16/2025 w/ Primary osteoarthritis of right knee . Comorbidities affecting pt’s functional performance include a significant PMH of arthritis, prostate cancer, DM, HTN, hyperlipidemia. Pt with active OT orders and activity orders for Activity beginning POD #0. Pt lives w/ spouse and daughter in a multilevel house with 0 LUCIAN. Pt has walkin shower and standard toilets. At baseline, pt was independent with all ADLs/IADLs. Pt completed don of underwear and pants with S, Leandra for sit to stand due to instability with RLE knee buckling at times. Pt able to pull over waist with S and verbal cues for hand placement. Pt completed don of shirt with S seated. Pt then completed sit to stand with minAx2, pt with RLE knee buckling inconsistently, pt requesting a break and would not like to walk at this time. Will reassess at a later time for  functional mobility. Pt educated on all ADLs/IADLs, transfers, and LE management. Upon evaluation, pt currently requires S for UB ADLs, S for LB ADLs, S for toileting, unable to assess for bed mobility, unable to assess for functional mobility, and Leandra for transfers 2* the following deficits impacting occupational performance: weakness, decreased strength , decreased balance, decreased activity tolerance, increased pain, and orthopedic restrictions. These impairments, as well at pt’s personal factors of: steps within home environment, difficulty performing ADLs, difficulty performing IADLs, difficulty performing transfers/mobility, WBS, fall risk , and new use of AD for functional transfers/mobility limit pt’s ability to safely engage in all baseline areas of occupation. Based on the aforementioned OT evaluation, functional performance deficits, and assessments, pt has been identified as a high complexity evaluation. Pt to continue to benefit from continued acute OT services during hospital stay to address defined deficits and to maximize level of functional independence in the following Occupational Performance areas: grooming, bathing/shower, toilet hygiene, dressing, health maintenance, functional mobility, community mobility, clothing management, cleaning, household maintenance, and job performance/volunteering. From OT standpoint, recommend No post-acute rehabilitation needs upon D/C. OT will continue to follow pt 3-5x/wk.   Goals   Patient Goals to go home   STG Time Frame 1-3   Short Term Goal #1 Pt will improve activity tolerance to G for min 30 min treatment sessions for increase engagement in functional tasks   Short Term Goal #2 Pt will complete bed mobility at a mod I level w/ G balance/safety demonstrated to decrease caregiver assistance required   Short Term Goal  Pt will complete LB dressing/self care w/ mod I using adaptive device and DME as needed   LTG Time Frame 3-7   Long Term Goal #1 Pt will  complete toileting w/ mod I w/ G hygiene/thoroughness using DME as needed   Long Term Goal #2 Pt will improve functional transfers to mod I on/off all surfaces using DME as needed w/ G balance/safety   Long Term Goal Pt will improve functional mobility during ADL/IADL/leisure tasks to mod I using DME as needed w/ G balance/safety   Plan   Treatment Interventions ADL retraining;Functional transfer training;Endurance training;Patient/family training;Equipment evaluation/education;Compensatory technique education;Continued evaluation;Energy conservation;Activityengagement   Goal Expiration Date 04/23/25   OT Treatment Day 0   OT Frequency 3-5x/wk   Discharge Recommendation   Rehab Resource Intensity Level, OT No post-acute rehabilitation needs   Additional Comments  The patient's raw score on the AM-PAC Daily Activity Inpatient Short Form is 21  . A raw score of greater than or equal to 19 suggests the patient may benefit from discharge to home. Please refer to the recommendation of the Occupational Therapist for safe discharge planning.   AM-PAC Daily Activity Inpatient   Lower Body Dressing 3   Bathing 3   Toileting 3   Upper Body Dressing 4   Grooming 4   Eating 4   Daily Activity Raw Score 21   Daily Activity Standardized Score (Calc for Raw Score >=11) 44.27   AM-PAC Applied Cognition Inpatient   Following a Speech/Presentation 4   Understanding Ordinary Conversation 4   Taking Medications 4   Remembering Where Things Are Placed or Put Away 4   Remembering List of 4-5 Errands 4   Taking Care of Complicated Tasks 4   Applied Cognition Raw Score 24   Applied Cognition Standardized Score 62.21   End of Consult   Education Provided Yes;Family or social support of family present for education by provider   Patient Position at End of Consult Bedside chair;All needs within reach   Nurse Communication Nurse aware of consult   End of Consult Comments Pt seated OOB in chair at end of session. Call bell and phone within  reach. All needs met and pt reports no further questions for OT at this time.   Xiomara Correia, OT

## 2025-04-16 NOTE — ANESTHESIA PROCEDURE NOTES
Spinal Block    Patient location during procedure: OR  Start time: 4/16/2025 8:41 AM  Reason for block: procedure for pain and at surgeon's request  Staffing  Performed by: Manny Peguero CRNA  Authorized by: Richard Arevalo MD    Preanesthetic Checklist  Completed: patient identified, IV checked, site marked, risks and benefits discussed, surgical consent, monitors and equipment checked, pre-op evaluation and timeout performed  Spinal Block  Patient position: sitting  Prep: ChloraPrep and site prepped and draped  Patient monitoring: frequent blood pressure checks, continuous pulse ox and heart rate  Approach: midline  Location: L3-4  Needle  Needle type: Pencan   Needle gauge: 24 G  Needle length: 4 in  Assessment  Sensory level: T4  Injection Assessment:  negative aspiration for heme, no paresthesia on injection and positive aspiration for clear CSF.  Post-procedure:  site cleaned

## 2025-04-17 ENCOUNTER — TELEPHONE (OUTPATIENT)
Dept: OBGYN CLINIC | Facility: HOSPITAL | Age: 70
End: 2025-04-17

## 2025-04-17 NOTE — TELEPHONE ENCOUNTER
"Patient contacted for a postoperative follow up assessment. Patient reports doing  \"ok\" . Patient states current pain level of a  1/10  when sitting and 2/10 when walking with RW. Patient denies increase in swelling and dressing is clean, dry and intact. Patient is icing the site regularly.     We reviewed patients AVS medication list. Patient is taking Tylenol 1000mg every 8 hours, Oxycodone 5mg PRN (has not needed or used as of yet), ASA 81mg BID. Patient has not yet had a BM but is passing gas.  Suggested a stool softener such as Colace 100mg BID, increasing fluid/fiber intake- adding prune juice to regimen.     Patient denies nausea, vomiting, abdominal pain, chest pain, shortness of breath, fever, dizziness and calf pain. Patient confirmed post-op appointment with surgeon on 05/01 .Patient does not have any other questions or concerns at this time. Pt was encouraged to call with any questions, concerns or issues.    "

## 2025-04-17 NOTE — OP NOTE
OPERATIVE REPORT  PATIENT NAME: Marco Richard  : 1955  MRN: [id number]  Pt Location:  [facility] OR ROOM 04    Surgery Date: 2025    Surgeons and Role:     * Tye Rodriguez MD - Primary     * Juan Carlos Hernández PA-C - Assisting     Preop Diagnosis:  Primary osteoarthritis of right knee [M17.11]    Post-Op Diagnosis Codes:     * Primary osteoarthritis of right knee [M17.11]    Procedure(s):  Right - ARTHROPLASTY KNEE UNICOMPARTMENTAL. RIGHT. SAME DAY    Specimens:  * No specimens in log *    Estimated Blood Loss:   Minimal    Drains:  * No LDAs found *    Anesthesia Type:   Spinal     Operative Indications:  Primary osteoarthritis of right knee [M17.11]    See clinic note for complete list of indications    Operative Findings:  Full-thickness chondral wear to the medial compartment  Well-preserved lateral patellofemoral compartments    Complications:   None      Knee Approach: Sub-vastus    Chronic Narcotic Use:  No      Procedure and Technique:  Implants:   Medacta Motomedial distal femur, size 5  Medacta Motomedial proximal tibia, size 4  Medacta Zuleima polyethylene liner, 8mm      After appropriate anesthesia was induced, the patient was placed on the operating room table and all bony prominences were padded.  An upper thigh tourniquet was placed and the right lower extremity was prepped and draped in the usual sterile fashion.  A timeout was performed to confirm patient, laterality, procedure and implants.  All were in agreement the procedure began.     The limb was elevated for exsanguination and the tourniquet was inflated to 225 mmHg. A #10 scalpel was used to make a midline incision sharply to the anterior aspect of the knee down to the level of extensor mechanism.  Metzenbaum michael were then utilized to create a medial and small lateral full-thickness skin flap.  Metzenbaum michael were again used to enter the crural fascia of the vastus medialis muscle compartment.  The muscle was bluntly dissected off  of the intermuscular septum and retracted off of the underlying capsule with a double angled Hohmann.  The knee was then brought to mid flexion and a new #10 scalpel was utilized to perform a subvastus approach.      The lateral and patellofemoral compartments of the knee were visualized and found to have no wear.  The decision was made to proceed with medial unicompartmental knee arthroplasty.  An extramedullary tibial cutting guide was placed in the appropriate coronal, sagittal and depth planes.  Reciprocating an oscillating saw was then utilized to create the proximal tibial cut being sure to protect the patellar tendon and MCL.  The excised proximal tibial bone was then removed.  A spacer block was used to confirm our gap and then the distal femur cutting guide was inserted with the knee in extension.  Once pinned in place, an oscillating saw was used to perform the distal femur cut.  The knee was then brought into flexion and the 2-in-1 cutting block was then applied to the distal femur.  The posterior condyles and posterior chamfer cuts were then made with an oscillating saw and the lug holes were drilled.     The tibia was sized and the medial meniscus was excised with electrocautery.  A tibial baseplate was impacted in place followed by a distal femur trial and polyethylene liner.  The knee was brought through range of motion and found to have excellent stability and tracking.  The trials were removed, local anesthesia was administered to the pericapsular tissues and the knee was thoroughly irrigated with sterile saline.  Once the cup bony surfaces were thoroughly dried, cement was pressurized into the proximal tibia and the proximal tibial baseplate was impacted into place.  The process was repeated with the distal femur component.  A polyethylene liner was then impacted into place and excess cement was removed.     The knee was then irrigated with sterile saline and we began the process of closure.  The  joint capsule was approximated with a #1 Vicryl suture in interrupted fashion followed by a #1 strata fix suture.  Subdermal tissues were closed with 2-0 Vicryl in interrupted fashion and the skin was closed with a 3-0 Monocryl in a running fashion.  A glue and mesh dressing was applied to the skin followed by 4 x 4 gauze, ABD pad, Webril and Ace bandage.  Anesthesia was discontinued and the patient was taken to the PACU in stable condition.     No competent resident was available to assist in the case. Nahum Hernández PA-c was necessary for safe positioning, retraction and closure.  I was present for the entirety of the case         SIGNATURE: Tye Rodriguez MD  DATE: April 17, 2025  TIME: 7:43 AM

## 2025-04-18 ENCOUNTER — NURSE TRIAGE (OUTPATIENT)
Age: 70
End: 2025-04-18

## 2025-04-18 ENCOUNTER — OFFICE VISIT (OUTPATIENT)
Dept: FAMILY MEDICINE CLINIC | Facility: CLINIC | Age: 70
End: 2025-04-18
Payer: MEDICARE

## 2025-04-18 ENCOUNTER — TELEPHONE (OUTPATIENT)
Age: 70
End: 2025-04-18

## 2025-04-18 ENCOUNTER — HOSPITAL ENCOUNTER (EMERGENCY)
Facility: HOSPITAL | Age: 70
Discharge: HOME | End: 2025-04-18
Attending: EMERGENCY MEDICINE
Payer: MEDICARE

## 2025-04-18 VITALS
HEIGHT: 75 IN | SYSTOLIC BLOOD PRESSURE: 127 MMHG | TEMPERATURE: 98.3 F | DIASTOLIC BLOOD PRESSURE: 65 MMHG | WEIGHT: 262.35 LBS | RESPIRATION RATE: 18 BRPM | BODY MASS INDEX: 32.62 KG/M2 | OXYGEN SATURATION: 98 % | HEART RATE: 99 BPM

## 2025-04-18 VITALS
HEIGHT: 75 IN | BODY MASS INDEX: 31.87 KG/M2 | DIASTOLIC BLOOD PRESSURE: 84 MMHG | OXYGEN SATURATION: 99 % | SYSTOLIC BLOOD PRESSURE: 126 MMHG | TEMPERATURE: 97.2 F | RESPIRATION RATE: 16 BRPM | HEART RATE: 90 BPM

## 2025-04-18 DIAGNOSIS — L50.9 URTICARIAL RASH: Primary | ICD-10-CM

## 2025-04-18 DIAGNOSIS — E11.40 TYPE 2 DIABETES MELLITUS WITH DIABETIC NEUROPATHY, WITHOUT LONG-TERM CURRENT USE OF INSULIN (HCC): ICD-10-CM

## 2025-04-18 DIAGNOSIS — L50.9 URTICARIA: ICD-10-CM

## 2025-04-18 DIAGNOSIS — T78.40XA ACUTE ALLERGIC REACTION, INITIAL ENCOUNTER: Primary | ICD-10-CM

## 2025-04-18 PROCEDURE — 99284 EMERGENCY DEPT VISIT MOD MDM: CPT | Performed by: EMERGENCY MEDICINE

## 2025-04-18 PROCEDURE — 99283 EMERGENCY DEPT VISIT LOW MDM: CPT

## 2025-04-18 PROCEDURE — 99214 OFFICE O/P EST MOD 30 MIN: CPT | Performed by: FAMILY MEDICINE

## 2025-04-18 PROCEDURE — 96374 THER/PROPH/DIAG INJ IV PUSH: CPT

## 2025-04-18 PROCEDURE — 96375 TX/PRO/DX INJ NEW DRUG ADDON: CPT

## 2025-04-18 PROCEDURE — 96361 HYDRATE IV INFUSION ADD-ON: CPT

## 2025-04-18 PROCEDURE — G2211 COMPLEX E/M VISIT ADD ON: HCPCS | Performed by: FAMILY MEDICINE

## 2025-04-18 RX ORDER — METHYLPREDNISOLONE SODIUM SUCCINATE 125 MG/2ML
125 INJECTION, POWDER, LYOPHILIZED, FOR SOLUTION INTRAMUSCULAR; INTRAVENOUS ONCE
Status: COMPLETED | OUTPATIENT
Start: 2025-04-18 | End: 2025-04-18

## 2025-04-18 RX ORDER — FAMOTIDINE 10 MG/ML
20 INJECTION, SOLUTION INTRAVENOUS ONCE
Status: COMPLETED | OUTPATIENT
Start: 2025-04-18 | End: 2025-04-18

## 2025-04-18 RX ORDER — EPINEPHRINE 0.3 MG/.3ML
0.3 INJECTION SUBCUTANEOUS ONCE
Qty: 0.6 ML | Refills: 0 | Status: SHIPPED | OUTPATIENT
Start: 2025-04-18 | End: 2025-04-18

## 2025-04-18 RX ORDER — PREDNISONE 20 MG/1
40 TABLET ORAL DAILY
Qty: 6 TABLET | Refills: 0 | Status: SHIPPED | OUTPATIENT
Start: 2025-04-18

## 2025-04-18 RX ADMIN — SODIUM CHLORIDE 1000 ML: 0.9 INJECTION, SOLUTION INTRAVENOUS at 19:00

## 2025-04-18 RX ADMIN — METHYLPREDNISOLONE SODIUM SUCCINATE 125 MG: 125 INJECTION, POWDER, FOR SOLUTION INTRAMUSCULAR; INTRAVENOUS at 19:00

## 2025-04-18 RX ADMIN — FAMOTIDINE 20 MG: 10 INJECTION, SOLUTION INTRAVENOUS at 19:02

## 2025-04-18 NOTE — PROGRESS NOTES
Subjective:      Patient ID: Marco Richard is a 69 y.o. male.    69-year-old male presents with his wife for evaluation of diffuse intensely pruritic rash across his entire body including arms, legs, torso.  No difficulty breathing, swallowing or lip/tongue swelling.  Patient is 2 days postop partial knee replacement.  He was given prescription for Celebrex 200 mg twice daily which he has never taken before.  Patient took 1 dose yesterday morning due to pain without issue and then took a second dose at night before bed and then woke up in the middle the night with intensely pruritic rash and hives on bilateral forearms and torso.  He did contact orthopedic surgeons office and was told to be seen by PCPs office.  He has not taken any additional doses of Celebrex.  Did not take anything such as Benadryl or any other antihistamine.  Does note that the hives do seem to be abating        Past Medical History:   Diagnosis Date   • Allergic rhinitis    • Anxiety disorder     Last assessed 2/7/2006    • Arthritis    • Cancer (HCC)     prostate   • Carpal tunnel syndrome    • Colon polyp    • Diabetes mellitus (HCC)    • Diabetes mellitus with neurological manifestation (HCC) 04/06/2012    Last assessed 3/29/2016    • Diabetes type 2, uncontrolled     Last assessed 4/6/2016    • Disease of thyroid gland    • DM II (diabetes mellitus, type II), controlled ([facility] [place])     Last assessed 10/20/2014    • Essential hypertension     Last assesssed 2/7/2006    • Hyperlipidemia    • Hypertension    • Insomnia     Last assessed 1/13/2011    • Numbness and tingling of foot     Resolved 3/29/2016    • Obesity    • Old disruption of knee ligament     Last assessed 3/26/2008    • Tarsal tunnel syndrome    • Urinary frequency     Resolved 3/29/2016    • Visual impairment        Family History   Problem Relation Age of Onset   • Heart attack Mother 67   • Valvular heart disease Mother    • Hypotension Mother    • Pancreatic cancer Father 75    • Diabetes Father    • Lung cancer Sister 56        not a smoker   • Cancer Brother         bile duct    • Colon cancer Maternal Grandmother 62   • Diabetes Maternal Grandmother    • Pancreatic cancer Paternal Aunt    • Pancreatic cancer Paternal Uncle    • Hypertension Neg Hx    • Prostate cancer Neg Hx        Past Surgical History:   Procedure Laterality Date   • COLONOSCOPY     • FOOT SURGERY     • NC AMPUTATION TOE INTERPHALANGEAL JOINT Right 11/23/2022    Procedure: AMPUTATION 3RD TOE;  Surgeon: Andrews Marquez DPM;  Location:  MAIN OR;  Service: Podiatry   • NC ARTHRP KNEE CONDYLE&PLATEAU MEDIAL/LAT CMPRT Right 4/16/2025    Procedure: ARTHROPLASTY KNEE UNICOMPARTMENTAL, RIGHT. SAME DAY;  Surgeon: Tye Rodriguez MD;  Location:  MAIN OR;  Service: Orthopedics   • PROSTATE BIOPSY     • WISDOM TOOTH EXTRACTION          reports that he has never smoked. He has never used smokeless tobacco. He reports current alcohol use of about 9.0 standard drinks of alcohol per week. He reports that he does not use drugs.      Current Outpatient Medications:   •  acetaminophen (TYLENOL) 500 mg tablet, Take 2 tablets (1,000 mg total) by mouth every 8 (eight) hours as needed for mild pain or moderate pain for up to 14 days, Disp: 84 tablet, Rfl: 0  •  ascorbic acid (VITAMIN C) 500 MG tablet, TAKE 1 TABLET BY MOUTH TWICE A DAY, Disp: 180 tablet, Rfl: 1  •  aspirin (ECOTRIN LOW STRENGTH) 81 mg EC tablet, Take 1 tablet (81 mg total) by mouth 2 (two) times a day, Disp: 84 tablet, Rfl: 0  •  cyclobenzaprine (FLEXERIL) 10 mg tablet, Take 1 tablet (10 mg total) by mouth 3 (three) times a day as needed for muscle spasms, Disp: 12 tablet, Rfl: 0  •  folic acid (FOLVITE) 1 mg tablet, TAKE 1 TABLET BY MOUTH EVERY DAY, Disp: 90 tablet, Rfl: 1  •  gabapentin (NEURONTIN) 100 mg capsule, Take 2 capsules (200 mg total) by mouth 3 (three) times a day, Disp: 540 capsule, Rfl: 1  •  glipiZIDE (GLUCOTROL XL) 5 mg 24 hr tablet, TAKE 1 TABLET (5  MG TOTAL) BY MOUTH DAILY., Disp: 90 tablet, Rfl: 1  •  Jardiance 25 MG TABS, TAKE 1 TABLET BY MOUTH EVERY DAY, Disp: 90 tablet, Rfl: 1  •  levothyroxine 50 mcg tablet, TAKE 1 TABLET (50 MCG TOTAL) BY MOUTH DAILY IN THE EARLY MORNING, Disp: 90 tablet, Rfl: 1  •  lisinopril (ZESTRIL) 20 mg tablet, TAKE 1 TABLET BY MOUTH EVERY DAY (Patient taking differently: Take 20 mg by mouth daily at bedtime), Disp: 90 tablet, Rfl: 1  •  Misc Natural Products (Lutein 20) CAPS, Take 20 capsules by mouth daily Eye health, Disp: , Rfl:   •  Multiple Vitamin (Daily-Tony Multivitamin) TABS, TAKE 1 TABLET BY MOUTH EVERY DAY, Disp: 30 tablet, Rfl: 2  •  NIFEdipine (PROCARDIA XL) 30 mg 24 hr tablet, TAKE 1 TABLET BY MOUTH EVERY DAY, Disp: 90 tablet, Rfl: 1  •  oxyCODONE (Roxicodone) 5 immediate release tablet, Take 1 tablet (5 mg total) by mouth every 4 (four) hours as needed for severe pain for up to 30 doses Max Daily Amount: 30 mg, Disp: 30 tablet, Rfl: 0  •  predniSONE 20 mg tablet, Take 2 tablets (40 mg total) by mouth daily, Disp: 6 tablet, Rfl: 0  •  rosuvastatin (CRESTOR) 5 mg tablet, TAKE 1 TABLET (5 MG TOTAL) BY MOUTH DAILY. (Patient taking differently: Take 5 mg by mouth daily after dinner), Disp: 90 tablet, Rfl: 1  •  ferrous sulfate 324 (65 Fe) mg, TAKE 1 TABLET (324 MG TOTAL) BY MOUTH EVERY OTHER DAY, Disp: 45 tablet, Rfl: 1  •  glucose blood test strip, Test (Patient not taking: Reported on 7/25/2024), Disp: , Rfl:   •  tadalafil (CIALIS) 20 MG tablet, Take 1 tablet (20 mg total) by mouth daily as needed for erectile dysfunction (Patient not taking: Reported on 11/1/2024), Disp: 10 tablet, Rfl: 1    The following portions of the patient's history were reviewed and updated as appropriate: allergies, current medications, past family history, past medical history, past social history, past surgical history and problem list.    Review of Systems   Constitutional: Negative.  Negative for chills and fever.   HENT:  Negative for  "trouble swallowing.    Respiratory:  Negative for chest tightness and shortness of breath.    Musculoskeletal:  Positive for arthralgias (Right knee pain postoperatively and ambulating with walker).   Skin:  Positive for rash.           Objective:    /84   Pulse 90   Temp (!) 97.2 °F (36.2 °C) (Tympanic)   Resp 16   Ht 6' 3\" (1.905 m)   SpO2 99%   BMI 31.87 kg/m²      Physical Exam  Vitals and nursing note reviewed.   Constitutional:       General: He is not in acute distress.     Appearance: Normal appearance. He is obese. He is not ill-appearing, toxic-appearing or diaphoretic.   HENT:      Mouth/Throat:      Mouth: Mucous membranes are moist.      Pharynx: Oropharynx is clear.      Comments: No lip or tongue swelling  Cardiovascular:      Rate and Rhythm: Normal rate and regular rhythm.   Pulmonary:      Effort: Pulmonary effort is normal.      Breath sounds: Normal breath sounds. No stridor. No wheezing.   Skin:     Findings: Rash (Diffuse urticarial rash on all extremities and torso) present.   Neurological:      Mental Status: He is alert.      Gait: Gait abnormal (Antalgic gait, ambulating with the assistance of a walker).           Recent Results (from the past 6 weeks)   Shower Chair with Arms [$]    Collection Time: 03/10/25  5:11 PM   Result Value Ref Range    Supplier Name [employer]     Supplier Phone Number [phone]     Order Status Delivery Successful     Delivery Note      Delivery Request Date 03/10/2025     Date Delivered  03/11/2025     Item Description Shower Chair with Arms 300LBS Weight Capacity.    EKG 12 lead    Collection Time: 03/25/25  6:24 AM   Result Value Ref Range    Ventricular Rate 101 BPM    Atrial Rate 101 BPM    NV Interval 168 ms    QRSD Interval 108 ms    QT Interval 334 ms    QTC Interval 433 ms    P Axis 62 degrees    QRS Axis -57 degrees    T Wave Axis 72 degrees   Comprehensive metabolic panel    Collection Time: 03/25/25  6:26 AM "   Result Value Ref Range    Sodium 135 135 - 147 mmol/L    Potassium 4.3 3.5 - 5.3 mmol/L    Chloride 100 96 - 108 mmol/L    CO2 21 21 - 32 mmol/L    ANION GAP 14 (H) 4 - 13 mmol/L    BUN 26 (H) 5 - 25 mg/dL    Creatinine 1.09 0.60 - 1.30 mg/dL    Glucose, Fasting 178 (H) 65 - 99 mg/dL    Calcium 9.2 8.4 - 10.2 mg/dL    AST 38 13 - 39 U/L    ALT 27 7 - 52 U/L    Alkaline Phosphatase 52 34 - 104 U/L    Total Protein 7.4 6.4 - 8.4 g/dL    Albumin 4.5 3.5 - 5.0 g/dL    Total Bilirubin 0.94 0.20 - 1.00 mg/dL    eGFR 69 ml/min/1.73sq m   CBC and differential    Collection Time: 03/25/25  6:26 AM   Result Value Ref Range    WBC 6.55 4.31 - 10.16 Thousand/uL    RBC 4.70 3.88 - 5.62 Million/uL    Hemoglobin 15.9 12.0 - 17.0 g/dL    Hematocrit 45.9 36.5 - 49.3 %    MCV 98 82 - 98 fL    MCH 33.8 26.8 - 34.3 pg    MCHC 34.6 31.4 - 37.4 g/dL    RDW 11.8 11.6 - 15.1 %    MPV 10.0 8.9 - 12.7 fL    Platelets 192 149 - 390 Thousands/uL    nRBC 0 /100 WBCs    Segmented % 60 43 - 75 %    Immature Grans % 1 0 - 2 %    Lymphocytes % 26 14 - 44 %    Monocytes % 10 4 - 12 %    Eosinophils Relative 2 0 - 6 %    Basophils Relative 1 0 - 1 %    Absolute Neutrophils 3.98 1.85 - 7.62 Thousands/µL    Absolute Immature Grans 0.05 0.00 - 0.20 Thousand/uL    Absolute Lymphocytes 1.67 0.60 - 4.47 Thousands/µL    Absolute Monocytes 0.65 0.17 - 1.22 Thousand/µL    Eosinophils Absolute 0.14 0.00 - 0.61 Thousand/µL    Basophils Absolute 0.06 0.00 - 0.10 Thousands/µL   Protime-INR    Collection Time: 03/25/25  6:26 AM   Result Value Ref Range    Protime 14.4 12.3 - 15.0 seconds    INR 1.04 0.85 - 1.19   APTT    Collection Time: 03/25/25  6:26 AM   Result Value Ref Range    PTT 28 23 - 34 seconds   MRSA culture    Collection Time: 03/25/25  6:26 AM    Specimen: Nares   Result Value Ref Range    MRSA Culture Only       No Methicillin Resistant Staphlyococcus aureus (MRSA) isolated   Hemoglobin A1C    Collection Time: 03/25/25  6:26 AM   Result Value Ref  Range    Hemoglobin A1C 6.7 (H) Normal 4.0-5.6%; PreDiabetic 5.7-6.4%; Diabetic >=6.5%; Glycemic control for adults with diabetes <7.0% %     mg/dl   Type and screen    Collection Time: 03/25/25  6:29 AM   Result Value Ref Range    ABO Grouping A     Rh Factor Positive     Antibody Screen Negative     Specimen Expiration Date 20250422    Fingerstick Glucose (POCT)    Collection Time: 04/16/25  7:14 AM   Result Value Ref Range    POC Glucose 287 (H) 65 - 140 mg/dl   Fingerstick Glucose (POCT)    Collection Time: 04/16/25 10:25 AM   Result Value Ref Range    POC Glucose 256 (H) 65 - 140 mg/dl   Fingerstick Glucose (POCT)    Collection Time: 04/16/25 11:28 AM   Result Value Ref Range    POC Glucose 280 (H) 65 - 140 mg/dl   Fingerstick Glucose (POCT)    Collection Time: 04/16/25 12:46 PM   Result Value Ref Range    POC Glucose 321 (H) 65 - 140 mg/dl   Fingerstick Glucose (POCT)    Collection Time: 04/16/25  1:47 PM   Result Value Ref Range    POC Glucose 376 (H) 65 - 140 mg/dl   Fingerstick Glucose (POCT)    Collection Time: 04/16/25  2:51 PM   Result Value Ref Range    POC Glucose 310 (H) 65 - 140 mg/dl        Media Information      Document Information    Clinical Image - Mobile Device   Urticarial rash torso   04/18/2025 10:05 AM   Attached To:   Office Visit on 4/18/25 with Timi Bowers DO   Source Information    Timi Bowers DO  [facility] [place]   Document History       Media Information      Document Information    Clinical Image - Mobile Device   Urticarial rash   04/18/2025 10:05 AM   Attached To:   Office Visit on 4/18/25 with Timi Bowers DO   Source Information    Timi Bowers DO  [facility] [place]   Document History       Media Information      Document Information    Clinical Image - Mobile Device   Rash neck   04/18/2025 10:04 AM   Attached To:   Office Visit on 4/18/25 with Timi Bowers DO   Source Information    Timi Bowers DO  [facility] [place]   Document History      Assessment/Plan:    Diabetes  mellitus with neurological manifestation (HCC)    Lab Results   Component Value Date    HGBA1C 6.7 (H) 03/25/2025     Patient does follow-up with endocrinology.  He is on Jardiance 25 mg once daily and Glucotrol XL 5 mg once daily.  I am placing him on a short pulse course of prednisone 40 mg daily for 3 days.  I did advise the patient that this will affect his blood glucose readings.  That will be transient while he is on the pulse course steroids    Urticarial rash  The only thing that he has done differently is taking Celebrex which she has never had before.  I would definitely say this is related to Celebrex allergy.  He does not have a history of sulfa allergy    Advised patient to discontinue Celebrex and we will left his orthopedic surgeon no    - Patiently placed on prednisone 40 mg daily for 3 days and also advised to take Benadryl 25 mg every 8 hours as needed for itching    - Follow-up if no improvement or resolution of rash          Problem List Items Addressed This Visit        Endocrine    Diabetes mellitus with neurological manifestation (HCC)      Lab Results   Component Value Date    HGBA1C 6.7 (H) 03/25/2025     Patient does follow-up with endocrinology.  He is on Jardiance 25 mg once daily and Glucotrol XL 5 mg once daily.  I am placing him on a short pulse course of prednisone 40 mg daily for 3 days.  I did advise the patient that this will affect his blood glucose readings.  That will be transient while he is on the pulse course steroids            Musculoskeletal and Integument    Urticarial rash - Primary    The only thing that he has done differently is taking Celebrex which she has never had before.  I would definitely say this is related to Celebrex allergy.  He does not have a history of sulfa allergy    Advised patient to discontinue Celebrex and we will left his orthopedic surgeon no    - Patiently placed on prednisone 40 mg daily for 3 days and also advised to take Benadryl 25 mg every  8 hours as needed for itching    - Follow-up if no improvement or resolution of rash         Relevant Medications    predniSONE 20 mg tablet

## 2025-04-18 NOTE — ASSESSMENT & PLAN NOTE
Lab Results   Component Value Date    HGBA1C 6.7 (H) 03/25/2025     Patient does follow-up with endocrinology.  He is on Jardiance 25 mg once daily and Glucotrol XL 5 mg once daily.  I am placing him on a short pulse course of prednisone 40 mg daily for 3 days.  I did advise the patient that this will affect his blood glucose readings.  That will be transient while he is on the pulse course steroids

## 2025-04-18 NOTE — TELEPHONE ENCOUNTER
"Reason for Disposition  • Taking new prescription medicine  (Exceptions: finished taking new prescription antibiotic OR questions about flushing from niacin)    Additional Information  • [1] Drug rash suspected AND [2] started taking new medicine within last 2 weeks(Exception: Antihistamine, eye drops, ear drops, decongestant or other OTC cough/cold medicines.)    Answer Assessment - Initial Assessment Questions  1. APPEARANCE: \"What does the rash look like?\"       Rased red bumps   2. LOCATION: \"Where is the rash located?\"       Arms, chest     5. ONSET: \"When did the hives begin?\" (e.g., hours or days ago)       Yesterday   6. ITCHING: \"Does it itch?\" If Yes, ask: \"How bad is the itch?\"  (e.g., none, mild, moderate, severe)      Mild   7. RECURRENT PROBLEM: \"Have you had hives before?\" If Yes, ask: \"When was the last time?\" and \"What happened that time?\"       First time   8. TRIGGERS: \"Were you exposed to any new food, plant, cosmetic product or animal just before the hives began?\"      Patient took Benadryl 1,5 hr ago, bumps became worse   9. OTHER SYMPTOMS: \"Do you have any other symptoms?\" (e.g., fever, tongue swelling, difficulty breathing, abdomen pain)      No    Protocols used: Hives-Adult-AH, Rash - Widespread On Drugs-Adult-AH    "

## 2025-04-18 NOTE — ED PROVIDER NOTES
Time reflects when diagnosis was documented in both MDM as applicable and the Disposition within this note       Time User Action Codes Description Comment    4/18/2025  8:00 PM Willie Rapp Add [T78.40XA] Acute allergic reaction, initial encounter           ED Disposition       ED Disposition   Discharge    Condition   Stable    Date/Time   Fri Apr 18, 2025  8:00 PM    Comment   Marco Richard discharge to home/self care.                   Assessment & Plan       Medical Decision Making  69-year-old male presents with allergic reaction.  Only cutaneous symptoms, hemodynamically stable and afebrile, no GI or respiratory complaints.  Currently had Benadryl.  Had oral dose of prednisone, will increase steroid dose to 125 Solu-Medrol.  Pepcid for itching.  Fluid bolus.  Monitor in the ED due to elevated heart rate.    Rash resolving.  Itching improving.  Continues to be hemodynamically stable without GI or respiratory complaints.  Referral to allergy.  Wrote for EpiPen.  Discussed how to use EpiPen.  Discharged home to self-care with strict return precautions for anaphylaxis, rebound reaction, continued or worsening symptoms.  Patient understanding agreement plan.    Risk  Prescription drug management.             Medications   methylPREDNISolone sodium succinate (Solu-MEDROL) injection 125 mg (125 mg Intravenous Given 4/18/25 1900)   Famotidine (PF) (PEPCID) injection 20 mg (20 mg Intravenous Given 4/18/25 1902)   sodium chloride 0.9 % bolus 1,000 mL (0 mL Intravenous Stopped 4/18/25 2005)       ED Risk Strat Scores                    No data recorded                            History of Present Illness       Chief Complaint   Patient presents with    Allergic Reaction     Patient to ED with c/o allergic reaction. Patient had a partial knee replacement on Wednesday at [facility] and was prescribed celebrex postop and woke up this morning covered in hives and itching. Patient reports going to PCP and was prescribed  prednisone and otc benadryl and reports relief temporarily and then recurrence of symptoms.        Past Medical History:   Diagnosis Date    Allergic rhinitis     Anxiety disorder     Last assessed 2/7/2006     Arthritis     Cancer (HCC)     prostate    Carpal tunnel syndrome     Colon polyp     Diabetes mellitus (HCC)     Diabetes mellitus with neurological manifestation (HCC) 04/06/2012    Last assessed 3/29/2016     Diabetes type 2, uncontrolled     Last assessed 4/6/2016     Disease of thyroid gland     DM II (diabetes mellitus, type II), controlled ([facility])     Last assessed 10/20/2014     Essential hypertension     Last assesssed 2/7/2006     Hyperlipidemia     Hypertension     Insomnia     Last assessed 1/13/2011     Numbness and tingling of foot     Resolved 3/29/2016     Obesity     Old disruption of knee ligament     Last assessed 3/26/2008     Tarsal tunnel syndrome     Urinary frequency     Resolved 3/29/2016     Visual impairment       Past Surgical History:   Procedure Laterality Date    COLONOSCOPY      FOOT SURGERY      CA AMPUTATION TOE INTERPHALANGEAL JOINT Right 11/23/2022    Procedure: AMPUTATION 3RD TOE;  Surgeon: Andrews Marquez DPM;  Location:  MAIN OR;  Service: Podiatry    CA ARTHRP KNEE CONDYLE&PLATEAU MEDIAL/LAT CMPRT Right 4/16/2025    Procedure: ARTHROPLASTY KNEE UNICOMPARTMENTAL, RIGHT. SAME DAY;  Surgeon: Tye Rodriguez MD;  Location:  MAIN OR;  Service: Orthopedics    PROSTATE BIOPSY      WISDOM TOOTH EXTRACTION        Family History   Problem Relation Age of Onset    Heart attack Mother 67    Valvular heart disease Mother     Hypotension Mother     Pancreatic cancer Father 75    Diabetes Father     Lung cancer Sister 56        not a smoker    Cancer Brother         bile duct     Colon cancer Maternal Grandmother 62    Diabetes Maternal Grandmother     Pancreatic cancer Paternal Aunt     Pancreatic cancer Paternal Uncle     Hypertension Neg Hx     Prostate cancer Neg Hx        Social History     Tobacco Use    Smoking status: Never    Smokeless tobacco: Never   Vaping Use    Vaping status: Never Used   Substance Use Topics    Alcohol use: Yes     Alcohol/week: 9.0 standard drinks of alcohol     Types: 2 Cans of beer, 7 Standard drinks or equivalent per week     Comment: occasional    Drug use: Never      E-Cigarette/Vaping    E-Cigarette Use Never User       E-Cigarette/Vaping Substances    Nicotine No     THC No     CBD No     Flavoring No     Other No     Unknown No       I have reviewed and agree with the history as documented.     69-year-old male presents with allergic reaction.  States recent knee surgery and started on Celebrex.  Noted last night itching sensation upon waking noted diffuse erythematous itchy rash over the body.  Went to PCP started on prednisone, taken 40 mg today.  Took 50 mg of Benadryl at approximately 1600 hrs.  No no other new exposures or medications.  No previous allergies to Celebrex.  Denies fevers, chills, nausea, vomiting, abdominal pains, diarrhea, coughing, wheezing, stridor, choking, drooling, change in phonation, swelling of the oropharynx, joint pains.      Allergic Reaction      Review of Systems   All other systems reviewed and are negative.          Objective       ED Triage Vitals [04/18/25 1842]   Temperature Pulse Blood Pressure Respirations SpO2 Patient Position - Orthostatic VS   98.3 °F (36.8 °C) (!) 115 133/68 20 99 % Lying      Temp Source Heart Rate Source BP Location FiO2 (%) Pain Score    Oral Monitor Right arm -- No Pain      Vitals      Date and Time Temp Pulse SpO2 Resp BP Pain Score FACES Pain Rating User   04/18/25 2010 -- -- -- -- -- No Pain -- [facility]   04/18/25 1930 -- 99 98 % 18 127/65 No Pain -- [facility]   04/18/25 1900 -- 104 99 % 18 136/71 No Pain -- [facility]   04/18/25 1842 98.3 °F (36.8 °C) 115 99 % 20 133/68 No Pain -- [facility]            Physical Exam  Vitals and nursing note reviewed.   Constitutional:       General: He is not in acute  distress.     Appearance: Normal appearance. He is normal weight. He is not ill-appearing, toxic-appearing or diaphoretic.   HENT:      Head: Normocephalic and atraumatic.      Right Ear: External ear normal.      Left Ear: External ear normal.      Nose: Nose normal.      Mouth/Throat:      Mouth: Mucous membranes are moist.      Pharynx: Oropharynx is clear.      Comments: Normal phonation.  Handling secretions.  Uvula midline.  No swelling of the oropharynx.  Eyes:      General: No scleral icterus.        Right eye: No discharge.         Left eye: No discharge.      Extraocular Movements: Extraocular movements intact.   Cardiovascular:      Rate and Rhythm: Normal rate and regular rhythm.      Pulses: Normal pulses.      Heart sounds: Normal heart sounds. No murmur heard.  Pulmonary:      Effort: Pulmonary effort is normal. No respiratory distress.      Breath sounds: Normal breath sounds. No stridor. No wheezing, rhonchi or rales.   Chest:      Chest wall: No tenderness.   Abdominal:      General: There is no distension.      Palpations: Abdomen is soft. There is no mass.      Tenderness: There is no abdominal tenderness. There is no guarding or rebound.      Hernia: No hernia is present.   Musculoskeletal:         General: No swelling, tenderness, deformity or signs of injury.      Cervical back: Neck supple. No tenderness.   Skin:     General: Skin is warm and dry.      Capillary Refill: Capillary refill takes less than 2 seconds.      Coloration: Skin is not cyanotic, jaundiced or pale.      Findings: Rash (Diffuse urticarial rash over the body.) present. No bruising, erythema, lesion or petechiae.   Neurological:      General: No focal deficit present.      Mental Status: He is alert and oriented to person, place, and time. Mental status is at baseline.   Psychiatric:         Mood and Affect: Mood normal.         Behavior: Behavior normal.         Results Reviewed       None            No orders to display        Procedures    ED Medication and Procedure Management   Prior to Admission Medications   Prescriptions Last Dose Informant Patient Reported? Taking?   Jardiance 25 MG TABS  Self No No   Sig: TAKE 1 TABLET BY MOUTH EVERY DAY   Misc Natural Products (Lutein 20) CAPS  Self Yes No   Sig: Take 20 capsules by mouth daily Eye health   Multiple Vitamin (Daily-Tony Multivitamin) TABS   No No   Sig: TAKE 1 TABLET BY MOUTH EVERY DAY   NIFEdipine (PROCARDIA XL) 30 mg 24 hr tablet  Self No No   Sig: TAKE 1 TABLET BY MOUTH EVERY DAY   acetaminophen (TYLENOL) 500 mg tablet   No No   Sig: Take 2 tablets (1,000 mg total) by mouth every 8 (eight) hours as needed for mild pain or moderate pain for up to 14 days   ascorbic acid (VITAMIN C) 500 MG tablet   No No   Sig: TAKE 1 TABLET BY MOUTH TWICE A DAY   aspirin (ECOTRIN LOW STRENGTH) 81 mg EC tablet   No No   Sig: Take 1 tablet (81 mg total) by mouth 2 (two) times a day   cyclobenzaprine (FLEXERIL) 10 mg tablet   No No   Sig: Take 1 tablet (10 mg total) by mouth 3 (three) times a day as needed for muscle spasms   ferrous sulfate 324 (65 Fe) mg   No No   Sig: TAKE 1 TABLET (324 MG TOTAL) BY MOUTH EVERY OTHER DAY   folic acid (FOLVITE) 1 mg tablet   No No   Sig: TAKE 1 TABLET BY MOUTH EVERY DAY   gabapentin (NEURONTIN) 100 mg capsule  Self No No   Sig: Take 2 capsules (200 mg total) by mouth 3 (three) times a day   glipiZIDE (GLUCOTROL XL) 5 mg 24 hr tablet   No No   Sig: TAKE 1 TABLET (5 MG TOTAL) BY MOUTH DAILY.   glucose blood test strip  Self Yes No   Sig: Test   Patient not taking: Reported on 7/25/2024   levothyroxine 50 mcg tablet  Self No No   Sig: TAKE 1 TABLET (50 MCG TOTAL) BY MOUTH DAILY IN THE EARLY MORNING   lisinopril (ZESTRIL) 20 mg tablet  Self No No   Sig: TAKE 1 TABLET BY MOUTH EVERY DAY   Patient taking differently: Take 20 mg by mouth daily at bedtime   oxyCODONE (Roxicodone) 5 immediate release tablet   No No   Sig: Take 1 tablet (5 mg total) by mouth every 4  (four) hours as needed for severe pain for up to 30 doses Max Daily Amount: 30 mg   predniSONE 20 mg tablet   No No   Sig: Take 2 tablets (40 mg total) by mouth daily   rosuvastatin (CRESTOR) 5 mg tablet  Self No No   Sig: TAKE 1 TABLET (5 MG TOTAL) BY MOUTH DAILY.   Patient taking differently: Take 5 mg by mouth daily after dinner   tadalafil (CIALIS) 20 MG tablet  Self No No   Sig: Take 1 tablet (20 mg total) by mouth daily as needed for erectile dysfunction   Patient not taking: Reported on 11/1/2024      Facility-Administered Medications: None     Patient's Medications   Discharge Prescriptions    EPINEPHRINE (EPIPEN) 0.3 MG/0.3 ML SOAJ    Inject 0.3 mL (0.3 mg total) into a muscle once for 1 dose       Start Date: 4/18/2025 End Date: 4/18/2025       Order Dose: 0.3 mg       Quantity: 0.6 mL    Refills: 0       ED SEPSIS DOCUMENTATION   Time reflects when diagnosis was documented in both MDM as applicable and the Disposition within this note       Time User Action Codes Description Comment    4/18/2025  8:00 PM Willie Rapp Add [T78.40XA] Acute allergic reaction, initial encounter                  Willie Rapp MD  04/18/25 2022

## 2025-04-18 NOTE — TELEPHONE ENCOUNTER
Caller: Patient     Call back#: [phone]    Best call back time am/pm/all day: all day    Doctor: Dr Rodriguez    Surgery: yes    Date of Surgery: 4/16/25    Reason for call: Patient woke up with a rash on his right arm       Urgent matters - Please Teams message Nurse Navigator Team Chat & send phone note to Orthopedic Nurse Navigator Pool as high priority before transferring call.   Non-Urgent matters - Please send a phone note to Orthopedic Nurse Navigator Pool only. Nurse Navigators will call patients back asap.

## 2025-04-18 NOTE — TELEPHONE ENCOUNTER
Called and relayed information from RAVEN Rangel at this time. Patient states he will take benadryl and call PCP

## 2025-04-18 NOTE — TELEPHONE ENCOUNTER
FOLLOW UP: ASAP    REASON FOR CONVERSATION: Rash and Itching    SYMPTOMS: hives. Patient took a second dose of Prednisone today 20 minutes ago.     OTHER: Hives became worse after patient took Benadryl. Patient agreeable to go to [facility] for evaluation if no improvement after taking Prednisone.      DISPOSITION: See PCP Within 24 Hours

## 2025-04-18 NOTE — ASSESSMENT & PLAN NOTE
The only thing that he has done differently is taking Celebrex which she has never had before.  I would definitely say this is related to Celebrex allergy.  He does not have a history of sulfa allergy    Advised patient to discontinue Celebrex and we will left his orthopedic surgeon no    - Patiently placed on prednisone 40 mg daily for 3 days and also advised to take Benadryl 25 mg every 8 hours as needed for itching    - Follow-up if no improvement or resolution of rash

## 2025-04-19 NOTE — ED ATTENDING ATTESTATION
4/18/2025  Javi CORCORAN, , saw and evaluated the patient. I have discussed the patient with the resident/non-physician practitioner and agree with the resident's/non-physician practitioner's findings, Plan of Care, and MDM as documented in the resident's/non-physician practitioner's note, except where noted. All available labs and Radiology studies were reviewed.  I was present for key portions of any procedure(s) performed by the resident/non-physician practitioner and I was immediately available to provide assistance.       At this point I agree with the current assessment done in the Emergency Department.  I have conducted an independent evaluation of this patient a history and physical is as follows:          1. Acute allergic reaction, initial encounter    2. Urticaria            MDM  Number of Diagnoses or Management Options  Acute allergic reaction, initial encounter  Urticaria  Diagnosis management comments:       Initial ED assessment:     69-year-old male, head to toe urticaria, after starting Celebrex.    Pathology at risk for includes but is not limited to:    Head to toe urticaria.  No signs of anaphylaxis    Initial ED plan:     Urticaria, discussed epinephrine.  Will hold off on epinephrine at this time no signs of anaphylaxis.  Will give IV Solu-Medrol, IV Pepcid        Final ED summary/disposition:   After evaluation and workup in the emergency department, patient with large amount of improvement, feels greatly improved, patient discharged               Time reflects when diagnosis was documented in both MDM as applicable and the Disposition within this note       Time User Action Codes Description Comment    4/18/2025  8:00 PM Willie Rapp Add [T78.40XA] Acute allergic reaction, initial encounter     4/18/2025 10:18 PM Javi Julian Add [L51.9] Erythema multiforme     4/18/2025 10:18 PM Javi Julian Remove [L51.9] Erythema multiforme     4/18/2025 10:18 PM Javi Julian Add  "[L50.9] Urticaria           ED Disposition       ED Disposition   Discharge    Condition   Stable    Date/Time   Fri Apr 18, 2025  8:00 PM    Comment   Marco Richard discharge to home/self care.                   Follow-up Information       Follow up With Specialties Details Why Contact Info Additional Information    Amira Vee,  Family Medicine Go to  As needed [address] [facility] [address]  [phone]        [facility] Emergency Medicine Go to  If symptoms worsen [address] [facility] [id number] [phone] [facility], [address], [place], [address]                          Chief Complaint   Patient presents with    Allergic Reaction     Patient to ED with c/o allergic reaction. Patient had a partial knee replacement on Wednesday at [facility] and was prescribed celebrex postop and woke up this morning covered in hives and itching. Patient reports going to PCP and was prescribed prednisone and otc benadryl and reports relief temporarily and then recurrence of symptoms.              69-year-old male presents with head to toe urticaria.,  Took Celebrex last night for the first time, woke up this morning with urticaria, went to his PCP was prescribed prednisone and Benadryl.,  When he was at the PCP the hives significantly decreased, has taken Benadryl and prednisone, tonight they returned head to toe hives.,  States it is pruritic but very tolerable, no difficulty breathing no vomiting no diarrhea no sensation of throat closing.      Allergic Reaction                Visit Vitals  /65 (BP Location: Right arm)   Pulse 99   Temp 98.3 °F (36.8 °C) (Oral)   Resp 18   Ht 6' 3\" (1.905 m)   Wt 119 kg (262 lb 5.6 oz)   SpO2 98%   BMI 32.79 kg/m²   Smoking Status Never   BSA 2.46 m²        Physical Exam  Vitals reviewed.   Constitutional:       General: He is not in acute " distress.     Appearance: He is well-developed. He is not diaphoretic.   HENT:      Head: Normocephalic and atraumatic.      Right Ear: External ear normal.      Left Ear: External ear normal.      Nose: Nose normal.   Eyes:      General:         Right eye: No discharge.         Left eye: No discharge.      Pupils: Pupils are equal, round, and reactive to light.   Neck:      Trachea: No tracheal deviation.   Cardiovascular:      Rate and Rhythm: Normal rate and regular rhythm.      Heart sounds: Normal heart sounds. No murmur heard.  Pulmonary:      Effort: Pulmonary effort is normal. No respiratory distress.      Breath sounds: Normal breath sounds. No stridor.   Abdominal:      General: There is no distension.      Palpations: Abdomen is soft.      Tenderness: There is no abdominal tenderness. There is no guarding or rebound.   Musculoskeletal:         General: Normal range of motion.      Cervical back: Normal range of motion and neck supple.   Skin:     General: Skin is warm and dry.      Coloration: Skin is not pale.      Findings: Rash (Head to toe urticaria) present. No erythema.   Neurological:      General: No focal deficit present.      Mental Status: He is alert and oriented to person, place, and time.                       Medications   methylPREDNISolone sodium succinate (Solu-MEDROL) injection 125 mg (125 mg Intravenous Given 4/18/25 1900)   Famotidine (PF) (PEPCID) injection 20 mg (20 mg Intravenous Given 4/18/25 1902)   sodium chloride 0.9 % bolus 1,000 mL (0 mL Intravenous Stopped 4/18/25 2005)             Labs Reviewed - No data to display      No orders to display                  Procedures

## 2025-04-19 NOTE — DISCHARGE INSTRUCTIONS
- Referral to allergy.  - Scription to EpiPen.  If you have repeat allergic reaction with trouble breathing, shortness of breath, wheezing, nausea, vomiting, diarrhea please take EpiPen.  If you use EpiPen you must come to the emergency department for evaluation.  -Please return to the emergency department if you begin to develop this of breath, wheezing, trouble breathing, nausea, vomiting, diarrhea, trouble breathing, continued or worsening symptoms.

## 2025-04-19 NOTE — ED NOTES
Discharge instructions reviewed with patient by ED Resident . Pt refused wc, pt ambulated to waiting room with walker-steady gait noted     Roma Watt RN  04/18/25 2016

## 2025-04-19 NOTE — ED NOTES
Pt sitting upright on stretcher with HOB elevated in negative distress. Decreased generalized hives noted. Continues to deny throat/oral swelling/difficulty swallowing. +patent airway   denies any complaints at present time.     Roma Watt RN  04/18/25 2007

## 2025-04-21 ENCOUNTER — TELEPHONE (OUTPATIENT)
Age: 70
End: 2025-04-21

## 2025-04-21 NOTE — TELEPHONE ENCOUNTER
Caller: patient     Doctor: Dr. Rodriguez     Reason for call: patient concerned about bruising of r leg after sx 4/16/25 , asking if this is normal.      Patient had allergic reaction to Celebrex,  blisters all over his body and went to ED.  He now has Prednisone and Benadryl and is doing better.      Call back#: [phone]

## 2025-04-21 NOTE — TELEPHONE ENCOUNTER
Patient states to have increased pain and bruising. I did tell patient that bruising is normal post operatively and swelling and pain can wax and wane post operatively. I suggested patient to ice and elevate and to call NN if it worsens and does not improve over the next couple days. Patient agrees with plan at this time.

## 2025-05-01 ENCOUNTER — OFFICE VISIT (OUTPATIENT)
Dept: OBGYN CLINIC | Facility: CLINIC | Age: 70
End: 2025-05-01

## 2025-05-01 VITALS — HEIGHT: 75 IN | BODY MASS INDEX: 32.33 KG/M2 | WEIGHT: 260 LBS

## 2025-05-01 DIAGNOSIS — Z96.651 S/P RIGHT UNICOMPARTMENTAL KNEE REPLACEMENT: Primary | ICD-10-CM

## 2025-05-01 PROCEDURE — 99024 POSTOP FOLLOW-UP VISIT: CPT | Performed by: STUDENT IN AN ORGANIZED HEALTH CARE EDUCATION/TRAINING PROGRAM

## 2025-05-01 NOTE — ASSESSMENT & PLAN NOTE
Patient is 2 weeks s/p right unicompartmental knee arthroplasty.   - WBAT RLE - wean off assistive devices as tolerated   - Tylenol and Celebrex for pain control prn  - Begin formal physical therapy for purposes of restoration of ROM and gait training  - ASA 81mg BID for dvt ppx   - May shower, do not soak or submerge incision  - RTC in 4 weeks. right knee xrays needed

## 2025-05-01 NOTE — PROGRESS NOTES
Date: 25  Marco Richard   MRN# [id number]  : 1955      Chief Complaint: Post op right unicompartmental knee arthroplasty    Assessment & Plan  S/P right unicompartmental knee replacement  Patient is 2 weeks s/p right unicompartmental knee arthroplasty.   - WBAT RLE - wean off assistive devices as tolerated   - Tylenol and Celebrex for pain control prn  - Begin formal physical therapy for purposes of restoration of ROM and gait training  - ASA 81mg BID for dvt ppx   - May shower, do not soak or submerge incision  - RTC in 4 weeks. right knee xrays needed      Subjective:   Patient is 2 weeks s/p right unicompartmental knee arthroplasty    Date of procedure: 25  Doing well, no new problems  Pain progressively improving  Improved swelling  Ambulating with walker    Allergy:  Allergies   Allergen Reactions    Sudafed [Pseudoephedrine] Other (See Comments)     hyperactivity    Cefazolin Hives    Celebrex [Celecoxib] Hives    Amoxicillin Rash    Penicillins Rash     Category: Allergy;      Medications:  all current active meds have been reviewed    Past Medical History:  Past Medical History:   Diagnosis Date    Allergic rhinitis     Anxiety disorder     Last assessed 2006     Arthritis     Cancer (HCC)     prostate    Carpal tunnel syndrome     Colon polyp     Diabetes mellitus (HCC)     Diabetes mellitus with neurological manifestation (HCC) 2012    Last assessed 3/29/2016     Diabetes type 2, uncontrolled     Last assessed 2016     Disease of thyroid gland     DM II (diabetes mellitus, type II), controlled (HCC)     Last assessed 10/20/2014     Essential hypertension     Last assesssed 2006     Hyperlipidemia     Hypertension     Insomnia     Last assessed 2011     Numbness and tingling of foot     Resolved 3/29/2016     Obesity     Old disruption of knee ligament     Last assessed 3/26/2008     Tarsal tunnel syndrome     Urinary frequency     Resolved 3/29/2016      Visual impairment      Past Surgical History:  Past Surgical History:   Procedure Laterality Date    COLONOSCOPY      FOOT SURGERY      SD AMPUTATION TOE INTERPHALANGEAL JOINT Right 11/23/2022    Procedure: AMPUTATION 3RD TOE;  Surgeon: Andrews Marquez DPM;  Location:  MAIN OR;  Service: Podiatry    SD ARTHRP KNEE CONDYLE&PLATEAU MEDIAL/LAT CMPRT Right 4/16/2025    Procedure: ARTHROPLASTY KNEE UNICOMPARTMENTAL, RIGHT. SAME DAY;  Surgeon: Tye Rodriguez MD;  Location:  MAIN OR;  Service: Orthopedics    PROSTATE BIOPSY      WISDOM TOOTH EXTRACTION       Family History:  Family History   Problem Relation Age of Onset    Heart attack Mother 67    Valvular heart disease Mother     Hypotension Mother     Pancreatic cancer Father 75    Diabetes Father     Lung cancer Sister 56        not a smoker    Cancer Brother         bile duct     Colon cancer Maternal Grandmother 62    Diabetes Maternal Grandmother     Pancreatic cancer Paternal Aunt     Pancreatic cancer Paternal Uncle     Hypertension Neg Hx     Prostate cancer Neg Hx      Social History:  Social History     Substance and Sexual Activity   Alcohol Use Yes    Alcohol/week: 9.0 standard drinks of alcohol    Types: 2 Cans of beer, 7 Standard drinks or equivalent per week    Comment: occasional     Social History     Substance and Sexual Activity   Drug Use Never     Social History     Tobacco Use   Smoking Status Never   Smokeless Tobacco Never           Review of Systems:  General- denies fever/chills  HEENT- denies hearing loss or sore throat  Eyes- denies eye pain or visual disturbances, denies red eyes  Respiratory- denies cough or SOB  Cardio- denies chest pain or palpitations  GI- denies abdominal pain  Endocrine- denies urinary frequency  Urinary- denies pain with urination  Musculoskeletal- Negative except noted above  Skin- denies rashes or wounds  Neurological- denies dizziness or headache  Psychiatric- denies anxiety or difficulty  "concentrating      Objective:   BP Readings from Last 1 Encounters:   04/18/25 127/65      Wt Readings from Last 1 Encounters:   05/01/25 118 kg (260 lb)      Pulse Readings from Last 1 Encounters:   04/18/25 99        BMI: Estimated body mass index is 32.5 kg/m² as calculated from the following:    Height as of this encounter: 6' 3\" (1.905 m).    Weight as of this encounter: 118 kg (260 lb).      Physical Exam  Ht 6' 3\" (1.905 m)   Wt 118 kg (260 lb)   BMI 32.50 kg/m²   General/Constitutional: No apparent distress: well-nourished and well developed.  Eyes: normal ocular motion  Cardio: RRR, Normal S1S2, No m/r/g.   Lymphatic: No appreciable lymphadenopathy  Respiratory: Non-labored breathing, CTA b/l no w/c/r  Vascular: No edema, swelling or tenderness, except as noted in detailed exam. Extremities well perfused. No LE edema  Integumentary: No impressive skin lesions present, except as noted in detailed exam.  Neuro: No ataxia or tremors noted  Psych: Normal mood and affect, oriented to person, place and time. Appropriate affect.  Musculoskeletal: Normal, except as noted in detailed exam and in HPI.    Gait and Station:   antalgic    right Knee Examination  Incision: clean, dry, and intact  Effusion: moderate  Alignment: normal  AP Stability: stable  Coronal Stability  Extension:stable  Mid-flexion: stable  90 degrees flexion: stable  Range of motion  Active: 20 to 85  Extensor lag: Absent  Motor: 5/5 EHL/FHL/TA/GS/QD/HS  Neurovascular exam is intact.   No evidence of calf tightness or posterior cords    Images:  No new imaging        Scribe Attestation      I,:  Juan Carlos Hernández PA-C am acting as a scribe while in the presence of the attending physician.:       I,:  Tye Rodriguez MD personally performed the services described in this documentation    as scribed in my presence.:               Tye Rodriguez MD  Adult Reconstruction Specialist   [facility]  "

## 2025-05-02 ENCOUNTER — TELEPHONE (OUTPATIENT)
Age: 70
End: 2025-05-02

## 2025-05-02 NOTE — TELEPHONE ENCOUNTER
Caller: Patient    Call back#: [phone]    Best call back time am/pm/all day: all day    Doctor: Dr Rodriguez    Surgery: yes    Date of Surgery: 4/16/2025    Reason for call: Patient is calling to find up when he would be able to go up steps/stairs

## 2025-05-02 NOTE — TELEPHONE ENCOUNTER
Called and spoke with patient. I notified that there are not restrictions in steps, it is patients discretion if he feels he is capable/comfortable navigating stairs at this stage post operatively. He is attending PT. I informed him to also have PT evaluate/advise on stairs. Patient agreed with plan at this time.

## 2025-05-05 ENCOUNTER — OFFICE VISIT (OUTPATIENT)
Dept: PHYSICAL THERAPY | Facility: CLINIC | Age: 70
End: 2025-05-05
Payer: MEDICARE

## 2025-05-05 ENCOUNTER — TELEPHONE (OUTPATIENT)
Dept: OBGYN CLINIC | Facility: HOSPITAL | Age: 70
End: 2025-05-05

## 2025-05-05 DIAGNOSIS — Z96.651 STATUS POST RIGHT PARTIAL KNEE REPLACEMENT: Primary | ICD-10-CM

## 2025-05-05 PROCEDURE — 97162 PT EVAL MOD COMPLEX 30 MIN: CPT

## 2025-05-05 RX ORDER — OXYCODONE HYDROCHLORIDE 5 MG/1
5 TABLET ORAL EVERY 6 HOURS PRN
Qty: 7 TABLET | Refills: 0 | Status: SHIPPED | OUTPATIENT
Start: 2025-05-05

## 2025-05-05 NOTE — TELEPHONE ENCOUNTER
Thank you. Called the patient and relayed your message at this time. He states he had a severe reaction to Celebrex, but he will continue the tylenol and plans to only take oxycodone for HS.

## 2025-05-05 NOTE — PROGRESS NOTES
PT Evaluation     Today's date: 2025  Patient name: Marco Richard  : 1955  MRN: [id number]  Referring provider: Tye Rodriguez MD  Dx:   Encounter Diagnosis     ICD-10-CM    1. Status post right partial knee replacement  Z96.651                      Assessment  Impairments: abnormal gait, abnormal muscle firing, abnormal muscle tone, abnormal or restricted ROM, activity intolerance, impaired balance, impaired physical strength, lacks appropriate home exercise program, pain with function, weight-bearing intolerance and poor body mechanics  Symptom irritability: moderate    Assessment details: Marco Richard is a pleasant 69 y.o. male who presents with R partial knee arthroplasty on  presenting 2 weeks after surgery.  The patient's greatest concerns are worry over not knowing what's wrong, fear of not being able to keep active, and future ill health (and wanting to prevent it).      No further referral appears necessary at this time based upon examination results.    Primary movement impairment diagnosis of decreased ROM resulting in pathoanatomical symptoms of decreased strength and limiting his ability to care for self, carry, dress independently, exercise or recreation, get off the toilet, get out of a chair, lift, perform household chores, perform yard work, shop, sit, sleep, socialize with friends, squat to  objects from the floor, stand, and walk.    Primary Impairments:  1) decreased ROM  2) decreased strength    Etiologic factors include poor lower extremity strength.    Understanding of Dx/Px/POC: good     Prognosis: good  Prognosis details: Positive prognostic indicators include positive attitude toward recovery and good understanding of diagnosis and treatment plan options.  Negative prognostic indicators include chronicity of symptoms, high symptom irritability, and multiple concurrent orthopedic problems.      Goals  Impairment Goals 4-6 weeks  - Decrease pain to 2/10  -  Improve knee AROM to equal to the unaffected lower extremity  - Increase knee strength to 5/5 throughout  - Increase hip strength to 5/5 throughout    Functional Goals 6-8 weeks  - Return to Prior Level of Function  - Patient will be independent with HEP  - Patient will be able to squat without increased pain/compensation/difficulty  - Patient will be able to perform sit to stand without increased pain/compensation/difficulty   - Patient will be able to ascend and descend stairs without increased pain/compensation/difficulty   - Patient will be able to lift >30 pounds with proper mechanics       Plan  Patient would benefit from: skilled physical therapy  Planned modality interventions: Modalities PRN.    Planned therapy interventions: activity modification, manual therapy, neuromuscular re-education, patient education, therapeutic activities, therapeutic exercise, graded activity, home exercise program, behavior modification, self care, IASTM and joint mobilization    Frequency: 2x week  Duration in weeks: 16  Treatment plan discussed with: patient        Subjective Evaluation    History of Present Illness  Mechanism of injury: WORK/SCHOOL: Retired,   HOME LIFE: Lives with others, 13 steps (landing within)  HOBBIES/EXERCISE: living history organization, senior [place] of senior [place], walking  PLOF:  B knee pain in past  HISTORY OF CURRENT INJURY:  Partial knee replacement R.  Pt had taken some celebrex for pain after and had an allergic reaction and had to go to the ER.  He is now taking other medication.    PAIN LOCATION/DESCRIPTORS: hip to calf,   AGGRAVATING FACTORS:  sleeping, sitting periods of time (20 min), standing periods of time (10 min),   EASES: ice, rest, elevating   DAY PATTERN: night pain (throbbing)  IMAGING:  no  Marco denies a new onset of Bladder incontinence, Bowel dysfunction, Dizziness, Dysphagia, Dysarthria, Drop attacks, Diplopia, Nausea, Ataxia, Recent unexplained weight loss, Clumsy or  unsteadiness, Constant night pain, History of cancer, Tingling, Numbness, and Saddle anesthesia .  PATIENT GOALS: return to activities    Patient Goals  Patient goals for therapy: decreased edema, decreased pain, improved balance, increased motion, return to sport/leisure activities, independence with ADLs/IADLs and increased strength    Pain  Current pain rating: 3  At best pain rating: 3  At worst pain rating: 10  Quality: tight, throbbing, discomfort and dull ache  Relieving factors: ice, change in position, rest, support and medications  Aggravating factors: lifting, running, stair climbing, sitting, standing and walking          Objective     Active Range of Motion     Right Knee   Flexion: 70 degrees with pain  Extension: with pain    Additional Active Range of Motion Details  Lacking 9 degrees form full extension    Passive Range of Motion     Additional Passive Range of Motion Details  Pt guarded throughout PROM, equal to AROM    Mobility   Patellar Mobility:     Right Knee   Hypomobile: medial, lateral, superior and inferior     Strength/Myotome Testing     Left Knee   Flexion: 4  Extension: 4    Right Knee   Flexion: 3+  Extension: 3+    General Comments:      Knee Comments  TU sec,  UE assist to stand  30 Sec Sit to Stand: 1 UE assist               POC Expires    Auth Status    Unit limit    Expiration date    PT/OT Limit        Diagnosis:  L knee partal arthroplasty    Precautions:     Comparable signs    Primary Impairments:    Patient Goals    Date - IE       Used        Remaining        Manual Therapy           PROM           Patellar mobs           Calf stretch                                 Exercise Diary              Therapeutic Exercise             Heel slides           HS stretch           Calf stretch                                             Bike             Neuromuscular Re-education             SLR           SAQ           LAQ           Yogi Posthellena Angela  squats           Leg press                            Re-Eval             Therapeutic Activities             Education                          Side stepping              Hurdles                                           Modalities

## 2025-05-05 NOTE — TELEPHONE ENCOUNTER
Caller: Pt     Doctor: Dr. Rodriguez    Reason for call: reuesting pain medication to be sent to pharm on file. States he was originally offered a script in the office but declined. Since, he has been unable to sleep for more than 4-5 hrs due to pain. Taking tylenol- no relief.   Mentioned he is allergic to celebrex    Call back#: Phone:   [phone]

## 2025-05-08 ENCOUNTER — APPOINTMENT (OUTPATIENT)
Dept: PHYSICAL THERAPY | Facility: CLINIC | Age: 70
End: 2025-05-08
Payer: MEDICARE

## 2025-05-09 ENCOUNTER — OFFICE VISIT (OUTPATIENT)
Dept: PHYSICAL THERAPY | Facility: CLINIC | Age: 70
End: 2025-05-09
Attending: STUDENT IN AN ORGANIZED HEALTH CARE EDUCATION/TRAINING PROGRAM
Payer: MEDICARE

## 2025-05-09 DIAGNOSIS — Z96.651 STATUS POST RIGHT PARTIAL KNEE REPLACEMENT: Primary | ICD-10-CM

## 2025-05-09 PROCEDURE — 97530 THERAPEUTIC ACTIVITIES: CPT

## 2025-05-09 PROCEDURE — 97110 THERAPEUTIC EXERCISES: CPT

## 2025-05-09 NOTE — PROGRESS NOTES
"Daily Note     Today's date: 2025  Patient name: Marco Richard  : 1955  MRN: [id number]  Referring provider: Tye Rodriguez MD  Dx:   Encounter Diagnosis     ICD-10-CM    1. Status post right partial knee replacement  Z96.651                      Subjective: Pt reports that he is feeling very stiff today coming into treatment session.        Objective: See treatment diary below      Assessment: Tolerated treatment well. Began today on nustep with pt having stiffness moving into flexion.  Continued with AAROM activities including heel slides and DKTC with yoga ball getting to 80 deg flexion.  He continues with most challenge with improving extension though was able to get to 9 deg from full extension as compared to 10 deg at IE. Patient demonstrated fatigue post treatment, exhibited good technique with therapeutic exercises, and would benefit from continued PT      Plan: Continue per plan of care.        POC Expires    Auth Status    Unit limit    Expiration date    PT/OT Limit        Diagnosis:  L knee partal arthroplasty    Precautions:     Comparable signs    Primary Impairments:    Patient Goals    Date - IE       Used        Remaining        Manual Therapy           PROM           Patellar mobs           Calf stretch                                 Exercise Diary              Therapeutic Exercise             Heel slides  2x10         HS stretch  10x10\"         Calf stretch  10x10\"         DKTC  x20                                Bike/Nustep    5'         Neuromuscular Re-education             SLR           SAQ           LAQ           Bridges           Clamshell           Mini squats           Leg press                            Re-Eval             Therapeutic Activities             Education                          Side stepping              Hurdles                                           Modalities                                   "

## 2025-05-10 DIAGNOSIS — I10 ESSENTIAL HYPERTENSION: ICD-10-CM

## 2025-05-11 RX ORDER — NIFEDIPINE 30 MG/1
30 TABLET, EXTENDED RELEASE ORAL DAILY
Qty: 90 TABLET | Refills: 1 | Status: SHIPPED | OUTPATIENT
Start: 2025-05-11

## 2025-05-12 ENCOUNTER — OFFICE VISIT (OUTPATIENT)
Dept: PHYSICAL THERAPY | Facility: CLINIC | Age: 70
End: 2025-05-12
Payer: MEDICARE

## 2025-05-12 DIAGNOSIS — M17.11 UNILATERAL PRIMARY OSTEOARTHRITIS, RIGHT KNEE: ICD-10-CM

## 2025-05-12 DIAGNOSIS — Z96.651 STATUS POST RIGHT PARTIAL KNEE REPLACEMENT: Primary | ICD-10-CM

## 2025-05-12 PROCEDURE — 97140 MANUAL THERAPY 1/> REGIONS: CPT

## 2025-05-12 PROCEDURE — 97110 THERAPEUTIC EXERCISES: CPT

## 2025-05-12 NOTE — PROGRESS NOTES
"Daily Note     Today's date: 2025  Patient name: Marco Richard  : 1955  MRN: [id number]  Referring provider: Tye Rodriguez MD  Dx:   Encounter Diagnosis     ICD-10-CM    1. Status post right partial knee replacement  Z96.651                      Subjective: Pt reports that he is feeling very sore in the back of the leg today as compared to the front .      Objective: See treatment diary below      Assessment: Tolerated treatment well. Began today with nustep with pt having increased tightness and discomfort today.  Worked into knee flexion with stiffness noted.  He was able to make it to 84 deg knee flexion today.  Also added some posterior knee stretching, with pt able to get to 2 deg from full extension today with over pressure.  Patient demonstrated fatigue post treatment, exhibited good technique with therapeutic exercises, and would benefit from continued PT      Plan: Continue per plan of care.        POC Expires    Auth Status    Unit limit    Expiration date    PT/OT Limit        Diagnosis:  L knee partal arthroplasty    Precautions:     Comparable signs    Primary Impairments:    Patient Goals    Date - IE      Used        Remaining        Manual Therapy  80 deg  84 deg  ext. 2 deg from       PROM    AK       Patellar mobs           Calf stretch                                 Exercise Diary              Therapeutic Exercise             Heel slides  2x10  2x10       HS stretch  10x10\"  [id number]\"       Calf stretch  10x10\"  [id number]\"       DKTC  x20  x20                              Bike/Nustep    5'  5'       Neuromuscular Re-education             SLR           SAQ           LAQ           Bridges           Clamshell           Mini squats           Leg press                            Re-Eval             Therapeutic Activities             Education                          Side stepping              Hurdles                                           Modalities                "

## 2025-05-13 RX ORDER — MULTIVITAMIN WITH FOLIC ACID 400 MCG
1 TABLET ORAL DAILY
Qty: 30 TABLET | Refills: 2 | Status: SHIPPED | OUTPATIENT
Start: 2025-05-13

## 2025-05-15 ENCOUNTER — OFFICE VISIT (OUTPATIENT)
Dept: PHYSICAL THERAPY | Facility: CLINIC | Age: 70
End: 2025-05-15
Attending: STUDENT IN AN ORGANIZED HEALTH CARE EDUCATION/TRAINING PROGRAM
Payer: MEDICARE

## 2025-05-15 DIAGNOSIS — Z96.651 STATUS POST RIGHT PARTIAL KNEE REPLACEMENT: Primary | ICD-10-CM

## 2025-05-15 PROCEDURE — 97110 THERAPEUTIC EXERCISES: CPT

## 2025-05-15 PROCEDURE — 97140 MANUAL THERAPY 1/> REGIONS: CPT

## 2025-05-15 NOTE — PROGRESS NOTES
"Daily Note     Today's date: 5/15/2025  Patient name: Marco Richard  : 1955  MRN: [id number]  Referring provider: Tye Rodriguez MD  Dx:   Encounter Diagnosis     ICD-10-CM    1. Status post right partial knee replacement  Z96.651                      Subjective: Pt reports that he has been more sensitive to his ice and having soreness.       Objective: See treatment diary below      Assessment: Tolerated treatment well. Began today with warm up on nustep, working into full flexion and extension.  PT was able to work into heel slides as well as DKTC getting to 90 deg knee flexion.  Worked into some extension as well with tightness noted throughout.  Some improvement noted though primarily worked into felxion.  Pt had good quad activation today.  Patient demonstrated fatigue post treatment, exhibited good technique with therapeutic exercises, and would benefit from continued PT      Plan: Continue per plan of care.        POC Expires    Auth Status    Unit limit    Expiration date    PT/OT Limit        Diagnosis:  L knee partal arthroplasty    Precautions:     Comparable signs    Primary Impairments:    Patient Goals    Date - IE 5/9 5/12 5/15    Used        Remaining        Manual Therapy  80 deg  84 deg  ext. 2 deg from  90 deg      PROM    AK  AK     Patellar mobs           Calf stretch           Scar mob      light AK                Exercise Diary              Therapeutic Exercise             Heel slides  2x10  2x10       HS stretch  10x10\"  [id number]\"  10x10\"     Calf stretch  10x10\"  [id number]\"  10x10\"     DKTC  x20  x20 2x10                            Bike/Nustep    5'  5'  5'     Neuromuscular Re-education             Quad sets    10x5\"    SLR           SAQ           LAQ           Bridges           Clamshell           Mini squats           Leg press                            Re-Eval             Therapeutic Activities             Education                          Side stepping              " Hurdles                                           Modalities

## 2025-05-18 DIAGNOSIS — E11.42 TYPE 2 DIABETES MELLITUS WITH DIABETIC POLYNEUROPATHY, WITHOUT LONG-TERM CURRENT USE OF INSULIN (HCC): ICD-10-CM

## 2025-05-18 RX ORDER — EMPAGLIFLOZIN 25 MG/1
25 TABLET, FILM COATED ORAL DAILY
Qty: 90 TABLET | Refills: 1 | Status: SHIPPED | OUTPATIENT
Start: 2025-05-18

## 2025-05-19 ENCOUNTER — OFFICE VISIT (OUTPATIENT)
Dept: PHYSICAL THERAPY | Facility: CLINIC | Age: 70
End: 2025-05-19
Payer: MEDICARE

## 2025-05-19 DIAGNOSIS — Z96.651 STATUS POST RIGHT PARTIAL KNEE REPLACEMENT: Primary | ICD-10-CM

## 2025-05-19 PROCEDURE — 97140 MANUAL THERAPY 1/> REGIONS: CPT

## 2025-05-19 PROCEDURE — 97110 THERAPEUTIC EXERCISES: CPT

## 2025-05-19 NOTE — PROGRESS NOTES
"Daily Note     Today's date: 2025  Patient name: Marco Richard  : 1955  MRN: [id number]  Referring provider: Tye Rodriguez MD  Dx:   Encounter Diagnosis     ICD-10-CM    1. Status post right partial knee replacement  Z96.651                      Subjective: Pt reports that he has been working on his movement at home. He reports the most discomfort while sleeping.       Objective: See treatment diary below      Assessment: Tolerated treatment well. Began today with warm up on nustep with pt working into further flexion due to having seat closer. Spent time working into both flexion and extension, having more challenge with extension still.  He was able to get to 98 deg knee flexion, and sits in a more bent position throughout the session. . Patient demonstrated fatigue post treatment, exhibited good technique with therapeutic exercises, and would benefit from continued PT      Plan: Continue per plan of care.        POC Expires    Auth Status    Unit limit    Expiration date    PT/OT Limit        Diagnosis:  L knee partal arthroplasty    Precautions:     Comparable signs    Primary Impairments:    Patient Goals    Date - IE 5/9 5/12 5/15 5/19   Used        Remaining        Manual Therapy  80 deg  84 deg  ext. 2 deg from  90 deg   98 deg   PROM    AK  AK     Patellar mobs           Calf stretch           Scar mob      light AK                Exercise Diary              Therapeutic Exercise             Heel slides  2x10  2x10    2x10   HS stretch  10x10\"  [id number]\"  10x10\"  [id number]\"   Calf stretch  10x10\"  [id number]\"  10x10\"  [id number]\"   DKTC  x20  x20 2x10  2x10   Richard stretch        10x5\"               Bike/Nustep    5'  5'  5'  5'   Neuromuscular Re-education             Quad sets    10x5\"    SLR        x10   SAQ           LAQ           Bridges           Clamshell           Mini squats           Leg press                            Re-Eval             Therapeutic Activities           "   Education                          Side stepping              Hurdles                                           Modalities

## 2025-05-20 DIAGNOSIS — I10 PRIMARY HYPERTENSION: ICD-10-CM

## 2025-05-20 RX ORDER — LISINOPRIL 20 MG/1
20 TABLET ORAL DAILY
Qty: 90 TABLET | Refills: 1 | Status: SHIPPED | OUTPATIENT
Start: 2025-05-20

## 2025-05-22 ENCOUNTER — OFFICE VISIT (OUTPATIENT)
Dept: PHYSICAL THERAPY | Facility: CLINIC | Age: 70
End: 2025-05-22
Payer: MEDICARE

## 2025-05-22 DIAGNOSIS — Z96.651 STATUS POST RIGHT PARTIAL KNEE REPLACEMENT: Primary | ICD-10-CM

## 2025-05-22 PROCEDURE — 97110 THERAPEUTIC EXERCISES: CPT

## 2025-05-22 PROCEDURE — 97112 NEUROMUSCULAR REEDUCATION: CPT

## 2025-05-22 NOTE — PROGRESS NOTES
"Daily Note     Today's date: 2025  Patient name: Marco Richard  : 1955  MRN: [id number]  Referring provider: Tye Rodriguez MD  Dx:   Encounter Diagnosis     ICD-10-CM    1. Status post right partial knee replacement  Z96.651                      Subjective: Patient reports that he is slowly feeling better as he continues to stretch.       Objective: See treatment diary below      Assessment: Tolerated treatment well. Began today with nustep working into knee flexion stretching.  Moved into stretching and ROM activities.  Pt is improving into extension with continued stretching, able to tolerate getting closer to full extension. Patient demonstrated fatigue post treatment, exhibited good technique with therapeutic exercises, and would benefit from continued PT      Plan: Continue per plan of care.        POC Expires    Auth Status    Unit limit    Expiration date    PT/OT Limit        Diagnosis:  L knee partal arthroplasty    Precautions:     Comparable signs    Primary Impairments:    Patient Goals    Date 5/22 5/9 5/12 5/15 5/19   Used        Remaining        Manual Therapy 97 deg 80 deg  84 deg  ext. 2 deg from  90 deg   98 deg   PROM    AK  AK     Patellar mobs           Calf stretch           Scar mob      light AK                Exercise Diary              Therapeutic Exercise             Heel slides 2x10 2x10  2x10    2x10   HS stretch [id number]\" 10x10\"  [id number]\"  10x10\"  [id number]\"   Calf stretch [id number]\" 10x10\"  [id number]\"  10x10\"  [id number]\"   DKTC x20 x20  x20 2x10  2x10   Richard stretch        10x5\"               Bike/Nustep  5'  5'  5'  5'  5'   Neuromuscular Re-education             Quad sets    10x5\"    SLR        x10   SAQ           LAQ x20          Bridges           Clamshell           Mini squats           Leg press                            Re-Eval             Therapeutic Activities             Education                          Side stepping              Hurdles                  "                          Modalities

## 2025-05-27 ENCOUNTER — OFFICE VISIT (OUTPATIENT)
Dept: PHYSICAL THERAPY | Facility: CLINIC | Age: 70
End: 2025-05-27
Payer: MEDICARE

## 2025-05-27 DIAGNOSIS — Z96.651 STATUS POST RIGHT PARTIAL KNEE REPLACEMENT: Primary | ICD-10-CM

## 2025-05-27 PROCEDURE — 97110 THERAPEUTIC EXERCISES: CPT

## 2025-05-27 PROCEDURE — 97140 MANUAL THERAPY 1/> REGIONS: CPT

## 2025-05-27 NOTE — PROGRESS NOTES
"Daily Note     Today's date: 2025  Patient name: Marco Richard  : 1955  MRN: [id number]  Referring provider: Tye Rodriguez MD  Dx:   Encounter Diagnosis     ICD-10-CM    1. Status post right partial knee replacement  Z96.651                      Subjective: Patient reports that he has been feeling tightness and discomfort into medial knee.       Objective: See treatment diary below      Assessment: Tolerated treatment well. Began today with nustep warm up working into knee flexion. He was able to get to 95 deg knee flexion today after heel slides.  Pt had some tightness in medial knee which decreased somewhat though increased again with stretching. Added more to posterior knee for stretching with good tolerance, though discomfort noted.   Patient demonstrated fatigue post treatment, exhibited good technique with therapeutic exercises, and would benefit from continued PT      Plan: Continue per plan of care.        POC Expires    Auth Status    Unit limit    Expiration date    PT/OT Limit        Diagnosis:  L knee partal arthroplasty    Precautions:     Comparable signs    Primary Impairments:    Patient Goals    Date 5/22 5/27 5/12 5/15 5/19   Used        Remaining        Manual Therapy 97 deg 95   84 deg  ext. 2 deg from  90 deg   98 deg   PROM    AK  AK     Patellar mobs           Calf stretch           Scar mob      light AK                Exercise Diary              Therapeutic Exercise             Heel slides 2x10 2x10  2x10    2x10   HS stretch 8c75y10\" 10x10\"  6y71j40\"  10x10\"  1n31w77\"   Calf stretch 8l83k18\" 10x10\"  7t24y83\"  10x10\"  2n13v46\"   DKTC x20 x20  x20 2x10  2x10   Richard stretch  10x10\"      10x5\"               Bike/Nustep  5'  5'  5'  5'  5'   Neuromuscular Re-education             Quad sets    10x5\"    SLR        x10   SAQ           LAQ x20          Bridges           Clamshell           Mini squats           Leg press                            Re-Eval           "   Therapeutic Activities             Education                          Side stepping              Hurdles                                           Modalities

## 2025-05-29 ENCOUNTER — OFFICE VISIT (OUTPATIENT)
Dept: PHYSICAL THERAPY | Facility: CLINIC | Age: 70
End: 2025-05-29
Payer: MEDICARE

## 2025-05-29 DIAGNOSIS — Z96.651 STATUS POST RIGHT PARTIAL KNEE REPLACEMENT: Primary | ICD-10-CM

## 2025-05-29 PROCEDURE — 97110 THERAPEUTIC EXERCISES: CPT

## 2025-05-29 PROCEDURE — 97140 MANUAL THERAPY 1/> REGIONS: CPT

## 2025-05-29 NOTE — PROGRESS NOTES
"Daily Note     Today's date: 2025  Patient name: Marco Richard  : 1955  MRN: [id number]  Referring provider: Tye Rodriguez MD  Dx:   Encounter Diagnosis     ICD-10-CM    1. Status post right partial knee replacement  Z96.651                      Subjective: Pt reports that he felt good after last treatment session, having decreased tightness afterward, though throbbing came on when he went to bed that night.       Objective: See treatment diary below      Assessment: Tolerated treatment well. Began today with nustep warm up prior to stretching and heel slides. Continued with mobility flexibility activities working into full ROM. Added richard stretch today to work on improving quad flexibility.  Had more trouble today with extension activities more today. Patient demonstrated fatigue post treatment, exhibited good technique with therapeutic exercises, and would benefit from continued PT      Plan: Continue per plan of care.        POC Expires    Auth Status    Unit limit    Expiration date    PT/OT Limit        Diagnosis:  L knee partal arthroplasty    Precautions:     Comparable signs    Primary Impairments:    Patient Goals    Date 5/22 5/27 5/29 5/15 5/19   Used        Remaining        Manual Therapy 97 deg 95     90 deg   98 deg   PROM     AK     Patellar mobs           Calf stretch           Scar mob      light AK                Exercise Diary              Therapeutic Exercise             Heel slides 2x10 2x10  2x10    2x10   HS stretch 2e26o65\" 10x10\"  2k94q52\"  10x10\"  7g99q75\"   Calf stretch 0r86n01\" 10x10\"  6o53y06\"  10x10\"  0a06z24\"   DKTC x20 x20  x20 2x10  2x10   Richard stretch  10x10\"  4z85p78\"    10x5\"               Bike/Nustep  5'  5'  5'  5'  5'   Neuromuscular Re-education             Quad sets    10x5\"    SLR        x10   SAQ           LAQ x20          Bridges           Clamshell           Mini squats           Leg press                            Re-Eval           "   Therapeutic Activities             Education                          Side stepping              Hurdles                                           Modalities

## 2025-05-30 ENCOUNTER — APPOINTMENT (OUTPATIENT)
Dept: RADIOLOGY | Age: 70
End: 2025-05-30
Payer: MEDICARE

## 2025-05-30 ENCOUNTER — APPOINTMENT (OUTPATIENT)
Dept: RADIOLOGY | Age: 70
End: 2025-05-30
Attending: STUDENT IN AN ORGANIZED HEALTH CARE EDUCATION/TRAINING PROGRAM
Payer: MEDICARE

## 2025-05-30 ENCOUNTER — OFFICE VISIT (OUTPATIENT)
Dept: OBGYN CLINIC | Facility: CLINIC | Age: 70
End: 2025-05-30

## 2025-05-30 VITALS — WEIGHT: 260 LBS | HEIGHT: 75 IN | BODY MASS INDEX: 32.33 KG/M2

## 2025-05-30 DIAGNOSIS — E03.9 HYPOTHYROIDISM, UNSPECIFIED TYPE: ICD-10-CM

## 2025-05-30 DIAGNOSIS — Z96.651 S/P RIGHT UNICOMPARTMENTAL KNEE REPLACEMENT: ICD-10-CM

## 2025-05-30 DIAGNOSIS — Z96.651 S/P RIGHT UNICOMPARTMENTAL KNEE REPLACEMENT: Primary | ICD-10-CM

## 2025-05-30 PROCEDURE — 73562 X-RAY EXAM OF KNEE 3: CPT

## 2025-05-30 PROCEDURE — 99024 POSTOP FOLLOW-UP VISIT: CPT | Performed by: STUDENT IN AN ORGANIZED HEALTH CARE EDUCATION/TRAINING PROGRAM

## 2025-05-30 RX ORDER — LEVOTHYROXINE SODIUM 50 UG/1
50 TABLET ORAL
Qty: 90 TABLET | Refills: 1 | Status: SHIPPED | OUTPATIENT
Start: 2025-05-30

## 2025-05-30 NOTE — PATIENT INSTRUCTIONS
Tye Rodriguez MD  Adult Reconstruction Specialist   [facility]  Office Phone: [phone]      Surgery: right unicompartmental knee arthroplasty  Date of Surgery: 4/16/25    Weight-bearing as tolerated. Wean off of assistive devices as tolerated   Utilize tylenol for pain control as needed  Continue PT/HEP as directed   May discontinue aspirin for blood clot prevention   May shower and soak/submerge incision in pool/tub/ocean  Begin scar massage. Take a pea sized amount of lotion, any lotion you have around the house and massage into scar for 5 minutes each day. This will minimize appears, mobilize the skin to improve sensitivity and break apart scar tissue  Compression stocking (Thigh high) to be worn at all times while awake. This will assist with swelling control, if it is an issue for you  No antibiotic dental prophylaxis is necessary for any dental procedure. Please contact our office if you require a letter for your dentist  We have no restrictions from our standpoint. Let pain be a guide  Return to clinic in 6 weeks for your 3 month post operative visit

## 2025-05-30 NOTE — H&P (VIEW-ONLY)
Date: 25  Marco Richard   MRN# [id number]  : 1955      Chief Complaint: Post op right unicompartmental knee arthroplasty    Assessment & Plan  S/P right unicompartmental knee replacement  Patient is 6 weeks s/p right unicompartmental knee arthroplasty  - Patient ROM today in office is 5-95. We discussed that he should continue to work with PT and establish a baseline ROM of the contralateral leg. In the office this is about 110 degrees. If there is still a large discrepancy in 2 weeks, between the 2 limbs, he will call our office and we will schedule him for an SATHYA.  - WBAT RLE   - Tylenol and Celebrex for pain control prn  - Continue PT/HEP as directed   - May DC ASA for dvt ppx   - May shower and soak/submerge incision  - Begin scar massage   - Compression sock for swelling control prn  - Advised use of sunscreen over incision while in prolonged sunlight  - No antibiotic dental prophylaxis is necessary  - No restrictions from our standpoint. Let pain be a guide  - RTC in 6 weeks       Subjective:   Patient is 6 weeks s/p right unicompartmental knee arthroplasty    Date of procedure: 25  Doing well, no new problems  Pain progressively improving  Improved swelling  Ambulating with cane    Allergy:  Allergies[1]  Medications:  all current active meds have been reviewed    Past Medical History:  Past Medical History[2]  Past Surgical History:  Past Surgical History[3]  Family History:  Family History[4]  Social History:  Social History     Substance and Sexual Activity   Alcohol Use Yes    Alcohol/week: 9.0 standard drinks of alcohol    Types: 2 Cans of beer, 7 Standard drinks or equivalent per week    Comment: occasional     Social History     Substance and Sexual Activity   Drug Use Never     Tobacco Use History[5]        Review of Systems:  General- denies fever/chills  HEENT- denies hearing loss or sore throat  Eyes- denies eye pain or visual disturbances, denies red eyes  Respiratory-  "denies cough or SOB  Cardio- denies chest pain or palpitations  GI- denies abdominal pain  Endocrine- denies urinary frequency  Urinary- denies pain with urination  Musculoskeletal- Negative except noted above  Skin- denies rashes or wounds  Neurological- denies dizziness or headache  Psychiatric- denies anxiety or difficulty concentrating      Objective:   BP Readings from Last 1 Encounters:   04/18/25 127/65      Wt Readings from Last 1 Encounters:   05/30/25 118 kg (260 lb)      Pulse Readings from Last 1 Encounters:   04/18/25 99        BMI: Estimated body mass index is 32.5 kg/m² as calculated from the following:    Height as of this encounter: 6' 3\" (1.905 m).    Weight as of this encounter: 118 kg (260 lb).      Physical Exam  Ht 6' 3\" (1.905 m)   Wt 118 kg (260 lb)   BMI 32.50 kg/m²   General/Constitutional: No apparent distress: well-nourished and well developed.  Eyes: normal ocular motion  Cardio: RRR, Normal S1S2, No m/r/g.   Lymphatic: No appreciable lymphadenopathy  Respiratory: Non-labored breathing, CTA b/l no w/c/r  Vascular: No edema, swelling or tenderness, except as noted in detailed exam. Extremities well perfused. No LE edema  Integumentary: No impressive skin lesions present, except as noted in detailed exam.  Neuro: No ataxia or tremors noted  Psych: Normal mood and affect, oriented to person, place and time. Appropriate affect.  Musculoskeletal: Normal, except as noted in detailed exam and in HPI.    Gait and Station:   Normal and antalgic    right Knee Examination  Incision: clean, dry, and intact  Effusion: moderate  Alignment: normal  AP Stability: stable  Coronal Stability  Extension:stable  Mid-flexion: stable  90 degrees flexion: stable  Range of motion  Active: 5 to 90-95  Extensor lag: Absent  Motor: 5/5 EHL/FHL/TA/GS/QD/HS  Neurovascular exam is intact.   No evidence of calf tightness or posterior cords    Images:  I personally reviewed relevant images in the PACS system and my " interpretation is as follows:  X-rays of the right knee reveal a unicompartmental knee arthroplasty in appropriate alignment without evidence of migration, wear or loosening.        Scribe Attestation      I,:  Juan Carlos Hernández PA-C am acting as a scribe while in the presence of the attending physician.:       I,:  Tye Rodriguez MD personally performed the services described in this documentation    as scribed in my presence.:               Tye Rodriguze MD  Adult Reconstruction Specialist   [facility]         [1]   Allergies  Allergen Reactions    Sudafed [Pseudoephedrine] Other (See Comments)     hyperactivity    Cefazolin Hives    Celebrex [Celecoxib] Hives    Amoxicillin Rash    Penicillins Rash     Category: Allergy;    [2]   Past Medical History:  Diagnosis Date    Allergic rhinitis     Anxiety disorder     Last assessed 2/7/2006     Arthritis     Cancer (HCC)     prostate    Carpal tunnel syndrome     Colon polyp     Diabetes mellitus (HCC)     Diabetes mellitus with neurological manifestation (HCC) 04/06/2012    Last assessed 3/29/2016     Diabetes type 2, uncontrolled     Last assessed 4/6/2016     Disease of thyroid gland     DM II (diabetes mellitus, type II), controlled (HCC)     Last assessed 10/20/2014     Essential hypertension     Last assesssed 2/7/2006     Hyperlipidemia     Hypertension     Insomnia     Last assessed 1/13/2011     Numbness and tingling of foot     Resolved 3/29/2016     Obesity     Old disruption of knee ligament     Last assessed 3/26/2008     Tarsal tunnel syndrome     Urinary frequency     Resolved 3/29/2016     Visual impairment    [3]   Past Surgical History:  Procedure Laterality Date    COLONOSCOPY      FOOT SURGERY      [place] AMPUTATION TOE INTERPHALANGEAL JOINT Right 11/23/2022    Procedure: AMPUTATION 3RD TOE;  Surgeon: Andrews Marquez DPM;  Location:  MAIN OR;  Service: Podiatry    MN ARTHRP KNEE CONDYLE&PLATEAU MEDIAL/LAT CMPRT Right  4/16/2025    Procedure: ARTHROPLASTY KNEE UNICOMPARTMENTAL, RIGHT. SAME DAY;  Surgeon: Tye Rodriguez MD;  [facility];  Service: Orthopedics    PROSTATE BIOPSY      WISDOM TOOTH EXTRACTION     [4]   Family History  Problem Relation Name Age of Onset    Heart attack Mother  67    Valvular heart disease Mother      Hypotension Mother      Pancreatic cancer Father  75    Diabetes Father      Lung cancer Sister  56        not a smoker    Cancer Brother          bile duct     Colon cancer Maternal Grandmother  62    Diabetes Maternal Grandmother      Pancreatic cancer Paternal Aunt      Pancreatic cancer Paternal Uncle      Hypertension Neg Hx      Prostate cancer Neg Hx     [5]   Social History  Tobacco Use   Smoking Status Never   Smokeless Tobacco Never

## 2025-05-30 NOTE — ASSESSMENT & PLAN NOTE
Patient is 6 weeks s/p right unicompartmental knee arthroplasty  - Patient ROM today in office is 5-95. We discussed that he should continue to work with PT and establish a baseline ROM of the contralateral leg. In the office this is about 110 degrees. If there is still a large discrepancy in 2 weeks, between the 2 limbs, he will call our office and we will schedule him for an SATHYA.  - WBAT RLE   - Tylenol and Celebrex for pain control prn  - Continue PT/HEP as directed   - May DC ASA for dvt ppx   - May shower and soak/submerge incision  - Begin scar massage   - Compression sock for swelling control prn  - Advised use of sunscreen over incision while in prolonged sunlight  - No antibiotic dental prophylaxis is necessary  - No restrictions from our standpoint. Let pain be a guide  - RTC in 6 weeks

## 2025-05-30 NOTE — PROGRESS NOTES
Date: 25  Marco Richard   MRN# [id number]  : 1955      Chief Complaint: Post op right unicompartmental knee arthroplasty    Assessment & Plan  S/P right unicompartmental knee replacement  Patient is 6 weeks s/p right unicompartmental knee arthroplasty  - Patient ROM today in office is 5-95. We discussed that he should continue to work with PT and establish a baseline ROM of the contralateral leg. In the office this is about 110 degrees. If there is still a large discrepancy in 2 weeks, between the 2 limbs, he will call our office and we will schedule him for an SATHYA.  - WBAT RLE   - Tylenol and Celebrex for pain control prn  - Continue PT/HEP as directed   - May DC ASA for dvt ppx   - May shower and soak/submerge incision  - Begin scar massage   - Compression sock for swelling control prn  - Advised use of sunscreen over incision while in prolonged sunlight  - No antibiotic dental prophylaxis is necessary  - No restrictions from our standpoint. Let pain be a guide  - RTC in 6 weeks       Subjective:   Patient is 6 weeks s/p right unicompartmental knee arthroplasty    Date of procedure: 25  Doing well, no new problems  Pain progressively improving  Improved swelling  Ambulating with cane    Allergy:  Allergies[1]  Medications:  all current active meds have been reviewed    Past Medical History:  Past Medical History[2]  Past Surgical History:  Past Surgical History[3]  Family History:  Family History[4]  Social History:  Social History     Substance and Sexual Activity   Alcohol Use Yes    Alcohol/week: 9.0 standard drinks of alcohol    Types: 2 Cans of beer, 7 Standard drinks or equivalent per week    Comment: occasional     Social History     Substance and Sexual Activity   Drug Use Never     Tobacco Use History[5]        Review of Systems:  General- denies fever/chills  HEENT- denies hearing loss or sore throat  Eyes- denies eye pain or visual disturbances, denies red eyes  Respiratory-  "denies cough or SOB  Cardio- denies chest pain or palpitations  GI- denies abdominal pain  Endocrine- denies urinary frequency  Urinary- denies pain with urination  Musculoskeletal- Negative except noted above  Skin- denies rashes or wounds  Neurological- denies dizziness or headache  Psychiatric- denies anxiety or difficulty concentrating      Objective:   BP Readings from Last 1 Encounters:   04/18/25 127/65      Wt Readings from Last 1 Encounters:   05/30/25 118 kg (260 lb)      Pulse Readings from Last 1 Encounters:   04/18/25 99        BMI: Estimated body mass index is 32.5 kg/m² as calculated from the following:    Height as of this encounter: 6' 3\" (1.905 m).    Weight as of this encounter: 118 kg (260 lb).      Physical Exam  Ht 6' 3\" (1.905 m)   Wt 118 kg (260 lb)   BMI 32.50 kg/m²   General/Constitutional: No apparent distress: well-nourished and well developed.  Eyes: normal ocular motion  Cardio: RRR, Normal S1S2, No m/r/g.   Lymphatic: No appreciable lymphadenopathy  Respiratory: Non-labored breathing, CTA b/l no w/c/r  Vascular: No edema, swelling or tenderness, except as noted in detailed exam. Extremities well perfused. No LE edema  Integumentary: No impressive skin lesions present, except as noted in detailed exam.  Neuro: No ataxia or tremors noted  Psych: Normal mood and affect, oriented to person, place and time. Appropriate affect.  Musculoskeletal: Normal, except as noted in detailed exam and in HPI.    Gait and Station:   Normal and antalgic    right Knee Examination  Incision: clean, dry, and intact  Effusion: moderate  Alignment: normal  AP Stability: stable  Coronal Stability  Extension:stable  Mid-flexion: stable  90 degrees flexion: stable  Range of motion  Active: 5 to 90-95  Extensor lag: Absent  Motor: 5/5 EHL/FHL/TA/GS/QD/HS  Neurovascular exam is intact.   No evidence of calf tightness or posterior cords    Images:  I personally reviewed relevant images in the PACS system and my " interpretation is as follows:  X-rays of the right knee reveal a unicompartmental knee arthroplasty in appropriate alignment without evidence of migration, wear or loosening.        Scribe Attestation      I,:  Juan Carlos Hernández PA-C am acting as a scribe while in the presence of the attending physician.:       I,:  Tye Rodriguez MD personally performed the services described in this documentation    as scribed in my presence.:               Tye Rodriguez MD  Adult Reconstruction Specialist   [facility]         [1]   Allergies  Allergen Reactions    Sudafed [Pseudoephedrine] Other (See Comments)     hyperactivity    Cefazolin Hives    Celebrex [Celecoxib] Hives    Amoxicillin Rash    Penicillins Rash     Category: Allergy;    [2]   Past Medical History:  Diagnosis Date    Allergic rhinitis     Anxiety disorder     Last assessed 2/7/2006     Arthritis     Cancer (HCC)     prostate    Carpal tunnel syndrome     Colon polyp     Diabetes mellitus (HCC)     Diabetes mellitus with neurological manifestation (HCC) 04/06/2012    Last assessed 3/29/2016     Diabetes type 2, uncontrolled     Last assessed 4/6/2016     Disease of thyroid gland     DM II (diabetes mellitus, type II), controlled (HCC)     Last assessed 10/20/2014     Essential hypertension     Last assesssed 2/7/2006     Hyperlipidemia     Hypertension     Insomnia     Last assessed 1/13/2011     Numbness and tingling of foot     Resolved 3/29/2016     Obesity     Old disruption of knee ligament     Last assessed 3/26/2008     Tarsal tunnel syndrome     Urinary frequency     Resolved 3/29/2016     Visual impairment    [3]   Past Surgical History:  Procedure Laterality Date    COLONOSCOPY      FOOT SURGERY      [place] AMPUTATION TOE INTERPHALANGEAL JOINT Right 11/23/2022    Procedure: AMPUTATION 3RD TOE;  Surgeon: Andrews Marquez DPM;  Location:  MAIN OR;  Service: Podiatry    WI ARTHRP KNEE CONDYLE&PLATEAU MEDIAL/LAT CMPRT Right  4/16/2025    Procedure: ARTHROPLASTY KNEE UNICOMPARTMENTAL, RIGHT. SAME DAY;  Surgeon: Tye Rodriguez MD;  [facility];  Service: Orthopedics    PROSTATE BIOPSY      WISDOM TOOTH EXTRACTION     [4]   Family History  Problem Relation Name Age of Onset    Heart attack Mother  67    Valvular heart disease Mother      Hypotension Mother      Pancreatic cancer Father  75    Diabetes Father      Lung cancer Sister  56        not a smoker    Cancer Brother          bile duct     Colon cancer Maternal Grandmother  62    Diabetes Maternal Grandmother      Pancreatic cancer Paternal Aunt      Pancreatic cancer Paternal Uncle      Hypertension Neg Hx      Prostate cancer Neg Hx     [5]   Social History  Tobacco Use   Smoking Status Never   Smokeless Tobacco Never

## 2025-06-02 ENCOUNTER — OFFICE VISIT (OUTPATIENT)
Dept: PHYSICAL THERAPY | Facility: CLINIC | Age: 70
End: 2025-06-02
Attending: STUDENT IN AN ORGANIZED HEALTH CARE EDUCATION/TRAINING PROGRAM
Payer: MEDICARE

## 2025-06-02 DIAGNOSIS — E11.42 TYPE 2 DIABETES MELLITUS WITH DIABETIC POLYNEUROPATHY, WITHOUT LONG-TERM CURRENT USE OF INSULIN (HCC): ICD-10-CM

## 2025-06-02 DIAGNOSIS — Z96.651 STATUS POST RIGHT PARTIAL KNEE REPLACEMENT: Primary | ICD-10-CM

## 2025-06-02 PROCEDURE — 97140 MANUAL THERAPY 1/> REGIONS: CPT

## 2025-06-02 PROCEDURE — 97110 THERAPEUTIC EXERCISES: CPT

## 2025-06-02 NOTE — PROGRESS NOTES
"Daily Note     Today's date: 2025  Patient name: Marco Richard  : 1955  MRN: [id number]  Referring provider: Tye Rodriguez MD  Dx:   Encounter Diagnosis     ICD-10-CM    1. Status post right partial knee replacement  Z96.651                      Subjective: Pt reports that he went to his surgeon reporting that he needs to work on his ROM more otherwise he will need a manipulation.       Objective: See treatment diary below      Assessment: Tolerated treatment well. Began today with warm up on nustep working into full ROM,  Moved into measurement of B knee, with L side only able to get to 115 deg while 95 deg at R after warm up.  After heel slides pt was able to get to 100 deg knee flexion.  Encouraged pt to hold positions for longer periods of time into end range flexion as well as extension.  Pt reported good understanding of need to work into full motion.    Patient demonstrated fatigue post treatment, exhibited good technique with therapeutic exercises, and would benefit from continued PT      Plan: Continue per plan of care.        POC Expires    Auth Status    Unit limit    Expiration date    PT/OT Limit        Diagnosis:  L knee partal arthroplasty    Precautions:     Comparable signs    Primary Impairments:    Patient Goals    Date    Used        Remaining        Manual Therapy 97 deg 95     R 100  L 115  98 deg   PROM     AK     Patellar mobs           Calf stretch           Scar mob      light AK     IASTM    AK                       Exercise Diary              Therapeutic Exercise             Heel slides 2x10 2x10  2x10  2x10x3\"x3\"  2x10   HS stretch 6o33l86\" 10x10\"  4m94y97\"   2h77e97\"   Calf stretch 1o07o21\" 10x10\"  7w81u34\"   1g26y58\"   DKTC x20 x20  x20 2x10  2x10   Richard stretch  10x10\"  9a51i59\"    10x5\"               Bike/Nustep  5'  5'  5'  5'  5'   Neuromuscular Re-education             Quad sets        SLR        x10   SAQ           LAQ x20        "   Yogi Mcdonald           Mini squats           Leg press    Seat 4, 5 sec at bottom, 30#                        Re-Eval             Therapeutic Activities             Education                          Side stepping              Hurdles                                           Modalities

## 2025-06-02 NOTE — TELEPHONE ENCOUNTER
Reason for call:   [x] Refill   [] Prior Auth  [] Other:     Office:   [x] PCP/Provider -   [] Specialty/Provider -     Medication: Gabapentin     Dose/Frequency: 100 mg capsule taken by 3x daily     Quantity: 540    Pharmacy: [facility]/pharmacy #[id number] - [place] - [address]. [phone]     Local Pharmacy   Does the patient have enough for 3 days?   [x] Yes   [] No - Send as HP to POD    Mail Away Pharmacy   Does the patient have enough for 10 days?   [] Yes   [] No - Send as HP to POD

## 2025-06-03 RX ORDER — GABAPENTIN 100 MG/1
200 CAPSULE ORAL 3 TIMES DAILY
Qty: 540 CAPSULE | Refills: 1 | Status: SHIPPED | OUTPATIENT
Start: 2025-06-03

## 2025-06-05 ENCOUNTER — TELEPHONE (OUTPATIENT)
Age: 70
End: 2025-06-05

## 2025-06-05 ENCOUNTER — OFFICE VISIT (OUTPATIENT)
Dept: PHYSICAL THERAPY | Facility: CLINIC | Age: 70
End: 2025-06-05
Attending: STUDENT IN AN ORGANIZED HEALTH CARE EDUCATION/TRAINING PROGRAM
Payer: MEDICARE

## 2025-06-05 DIAGNOSIS — Z96.651 STATUS POST RIGHT PARTIAL KNEE REPLACEMENT: Primary | ICD-10-CM

## 2025-06-05 PROCEDURE — 97140 MANUAL THERAPY 1/> REGIONS: CPT

## 2025-06-05 PROCEDURE — 97110 THERAPEUTIC EXERCISES: CPT

## 2025-06-05 NOTE — PROGRESS NOTES
"Daily Note     Today's date: 2025  Patient name: Marco Richard  : 1955  MRN: [id number]  Referring provider: Tye Rodriguez MD  Dx: No diagnosis found.               Subjective: Pt reports that he tried to really try to stretch at home last week but feels like it got worse.        Objective: See treatment diary below      Assessment: Tolerated treatment well. Began today with nustep working into full motions. Pt was able to move to 100 deg knee flexion today. Educated pt on only pulling into tolerated motions, long duration with decreased force in order to maintain motion for longer periods of time. Pt had good understanding of changes to approach to HEP.  Patient exhibited good technique with therapeutic exercises and would benefit from continued PT      Plan: Continue per plan of care.  Progress treatment as tolerated.         POC Expires    Auth Status    Unit limit    Expiration date    PT/OT Limit        Diagnosis:  L knee partal arthroplasty    Precautions:     Comparable signs    Primary Impairments:    Patient Goals    Date    Used        Remaining        Manual Therapy 97 deg 95     R 100  L 115    PROM     AK    Patellar mobs          Calf stretch          Scar mob      light AK    IASTM    AK                      Exercise Diary             Therapeutic Exercise            Heel slides 2x10 2x10  2x10  2x10x3\"x3\"    HS stretch 1b99e32\" 10x10\"  8p92b73\"     Calf stretch 8y50a88\" 10x10\"  4l23l86\"     DKTC x20 x20  x20 2x10    Richard stretch  10x10\"  4m39s89\"                 Bike/Nustep  5'  5'  5'  5' 5'   Neuromuscular Re-education            Quad sets        SLR          SAQ          LAQ x20         Bridges          Clamshell          Mini squats          Leg press    Seat 4, 5 sec at bottom, 30#                       Re-Eval            Therapeutic Activities            Education                        Side stepping             Hurdles                             "             Modalities

## 2025-06-05 NOTE — TELEPHONE ENCOUNTER
Caller: Marco    Doctor: Dr. Rodriguez    Reason for call: Checking to see how long he had to wait to have cleaning and if abx are required.  Checked with PA no abx required and 4 weeks past sx for Michael    Call back#: [phone]

## 2025-06-09 ENCOUNTER — TELEPHONE (OUTPATIENT)
Dept: OBGYN CLINIC | Facility: HOSPITAL | Age: 70
End: 2025-06-09

## 2025-06-09 ENCOUNTER — OFFICE VISIT (OUTPATIENT)
Dept: PHYSICAL THERAPY | Facility: CLINIC | Age: 70
End: 2025-06-09
Attending: STUDENT IN AN ORGANIZED HEALTH CARE EDUCATION/TRAINING PROGRAM
Payer: MEDICARE

## 2025-06-09 DIAGNOSIS — Z96.651 STATUS POST RIGHT PARTIAL KNEE REPLACEMENT: Primary | ICD-10-CM

## 2025-06-09 PROCEDURE — 97140 MANUAL THERAPY 1/> REGIONS: CPT

## 2025-06-09 PROCEDURE — 97110 THERAPEUTIC EXERCISES: CPT

## 2025-06-09 NOTE — PROGRESS NOTES
"Daily Note     Today's date: 2025  Patient name: Marco Richard  : 1955  MRN: [id number]  Referring provider: Tye Rodriguez MD  Dx:   Encounter Diagnosis     ICD-10-CM    1. Status post right partial knee replacement  Z96.651                      Subjective: Pt reports that he had a misstep this morning, reporting that he hurt his knee when first getting out of bed this morning so his movement is very challenging this morning.       Objective: See treatment diary below      Assessment: Tolerated treatment well. Began with nustep today with good tolerance though challenge getting into full knee flexion.  Able to move into some manual interventions working into tightness in knee.  Able to get into some heel slides next with discomfort at end range today.  After DKTC as well as heel slides and stretching, he was able to make it to 100 deg knee flexion today.  Pt had increased discomfort with it today.  Continued some stretching with stiffness continuing as well.  Patient demonstrated fatigue post treatment, exhibited good technique with therapeutic exercises, and would benefit from continued PT      Plan: Continue per plan of care.        POC Expires    Auth Status    Unit limit    Expiration date    PT/OT Limit        Diagnosis:  L knee partal arthroplasty    Precautions:     Comparable signs    Primary Impairments:    Patient Goals    Date    Used        Remaining        Manual Therapy 100 deg 95     R 100  L 115    PROM     AK    Patellar mobs          Calf stretch 10x10\"         Scar mob      light AK    IASTM 10x10\"   AK                      Exercise Diary             Therapeutic Exercise            Heel slides 2x10 2x10  2x10  2x10x3\"x3\"    HS stretch 3q52t54\" 10x10\"  5o43m92\"     Calf stretch 5e38a66\" 10x10\"  7z46z11\"     DKTC x20 x20  x20 2x10    Richard stretch 4n14v10\" 10x10\"  6f13f70\"                 Bike/Nustep  5'  5'  5'  5' 5'   Neuromuscular Re-education       "      Quad sets        SLR          SAQ          LAQ          Bridges          Clamshell          Mini squats          Leg press    Seat 4, 5 sec at bottom, 30#                       Re-Eval            Therapeutic Activities            Education                        Side stepping             Hurdles                                         Modalities

## 2025-06-09 NOTE — TELEPHONE ENCOUNTER
Caller: Patient     Doctor: Dr. Rodriguez     Reason for call: Physical therapy update : non-surgical  knee is at 118 degrees and surgical is at 100 degrees please contact patient to discuss     Call back#: [phone]

## 2025-06-11 NOTE — TELEPHONE ENCOUNTER
Caller: Patient    Doctor: Dr. Rodriguez    Reason for call: Asking for guidance re: next steps. Should he continue w/PT? Please contact the patient to discuss    Call back#: [phone]

## 2025-06-12 ENCOUNTER — OFFICE VISIT (OUTPATIENT)
Dept: PHYSICAL THERAPY | Facility: CLINIC | Age: 70
End: 2025-06-12
Attending: STUDENT IN AN ORGANIZED HEALTH CARE EDUCATION/TRAINING PROGRAM
Payer: MEDICARE

## 2025-06-12 DIAGNOSIS — Z96.651 S/P RIGHT UNICOMPARTMENTAL KNEE REPLACEMENT: ICD-10-CM

## 2025-06-12 DIAGNOSIS — Z96.651 STATUS POST RIGHT PARTIAL KNEE REPLACEMENT: Primary | ICD-10-CM

## 2025-06-12 DIAGNOSIS — M24.661 ARTHROFIBROSIS OF KNEE JOINT, RIGHT: Primary | ICD-10-CM

## 2025-06-12 PROCEDURE — 97530 THERAPEUTIC ACTIVITIES: CPT

## 2025-06-12 PROCEDURE — 97110 THERAPEUTIC EXERCISES: CPT

## 2025-06-12 NOTE — PROGRESS NOTES
"Daily Note     Today's date: 2025  Patient name: Marco Richard  : 1955  MRN: [id number]  Referring provider: Tye Rodriguez MD  Dx:   Encounter Diagnosis     ICD-10-CM    1. Status post right partial knee replacement  Z96.651                      Subjective: Patient reports that he is feeling better as compared to coming in last treatment session.        Objective: See treatment diary below      Assessment: Tolerated treatment well. Began today with nustep working into knee flexion with seat slightly closer.  Able to get into leg press afterward, utilizing bottom of exercise with focus on flexion, adding a hold.  Continued into flexion activities including addition of stretching with knee on step. Added heel slides with pt able to get to 109 deg knee flexion today.  Patient demonstrated fatigue post treatment, exhibited good technique with therapeutic exercises, and would benefit from continued PT      Plan: Continue per plan of care.        POC Expires    Auth Status    Unit limit    Expiration date    PT/OT Limit        Diagnosis:  L knee partal arthroplasty    Precautions:     Comparable signs    Primary Impairments:    Patient Goals    Date    Used        Remaining        Manual Therapy 100 deg 109 deg    R 100  L 115    PROM     AK    Patellar mobs          Calf stretch 10x10\"         Scar mob      light AK    IASTM 10x10\"   AK                      Exercise Diary             Therapeutic Exercise            Heel slides 2x10 2x10  2x10  2x10x3\"x3\"    HS stretch 8f67d48\" 10x10\"  0j44n01\"     Calf stretch 3v56i11\" 10x10\"  8g54t22\"     DKTC x20 x20  x20 2x10    Richard stretch 3c84g50\" 10x10\"  0v77h57\"      Stair knee flexion  2x10x5\" 2nd step                                  Bike/Nustep  5'  5'  5'  5' 5'   Neuromuscular Re-education            Quad sets        SLR          SAQ          LAQ          Bridges          Clamshell          Mini squats          Leg press " "2x10x3\" 30#  flexion focus   Seat 4, 5 sec at bottom, 30#                       Re-Eval            Therapeutic Activities            Education                        Side stepping             Hurdles                                         Modalities                                                     "

## 2025-06-13 ENCOUNTER — ANESTHESIA EVENT (OUTPATIENT)
Age: 70
End: 2025-06-13
Payer: MEDICARE

## 2025-06-16 ENCOUNTER — OFFICE VISIT (OUTPATIENT)
Dept: PHYSICAL THERAPY | Facility: CLINIC | Age: 70
End: 2025-06-16
Attending: STUDENT IN AN ORGANIZED HEALTH CARE EDUCATION/TRAINING PROGRAM
Payer: MEDICARE

## 2025-06-16 DIAGNOSIS — Z96.651 STATUS POST RIGHT PARTIAL KNEE REPLACEMENT: Primary | ICD-10-CM

## 2025-06-16 PROCEDURE — 97140 MANUAL THERAPY 1/> REGIONS: CPT

## 2025-06-16 PROCEDURE — 97110 THERAPEUTIC EXERCISES: CPT

## 2025-06-16 NOTE — PRE-PROCEDURE INSTRUCTIONS
Pre-Surgery Instructions:   Medication Instructions    aspirin (ECOTRIN LOW STRENGTH) 81 mg EC tablet Take day of surgery. With sip of water     gabapentin (NEURONTIN) 100 mg capsule Take day of surgery. With sip of water     glipiZIDE (GLUCOTROL XL) 5 mg 24 hr tablet Hold day of surgery.    Jardiance 25 MG TABS Stop taking 4 days prior to surgery. Last dose 6/13 per pt     levothyroxine 50 mcg tablet Take day of surgery. With sip of water     lisinopril (ZESTRIL) 20 mg tablet Hold day of surgery.    Misc Natural Products (Lutein 20) CAPS Stop taking 7 days prior to surgery. Last dose 6/16/25     NIFEdipine (PROCARDIA XL) 30 mg 24 hr tablet Take day of surgery. With sip of water     rosuvastatin (CRESTOR) 5 mg tablet Take day of surgery. With sip of water    Medication instructions for day of surgery reviewed. Please take all instructed medications with only a sip of water. Please do not take any over the counter (non-prescribed) vitamins or supplements for one week prior to date of surgery.      You will receive a call one business day prior to surgery with an arrival time and hospital directions. If your surgery is scheduled on a Monday, the hospital will be calling you on the Friday prior to your surgery. If you have not heard from anyone by 8pm, please call the hospital supervisor through the hospital  at [phone]. ([place] [phone] or [place] [phone]).    Do not eat or drink anything after midnight the night before your surgery, including candy, mints, lifesavers, or chewing gum. Do not drink alcohol 24hrs before your surgery. Try not to smoke at least 24hrs before your surgery.       Follow the pre surgery showering instructions as listed in the “My Surgical Experience Booklet” or otherwise provided by your surgeon's office. Do not use a blade to shave the surgical area 1 week before surgery. It is okay to use a clean electric clippers up to 24 hours before surgery. Do not apply any  lotions, creams, including makeup, cologne, deodorant, or perfumes after showering on the day of your surgery. Do not use dry shampoo, hair spray, hair gel, or any type of hair products.     No contact lenses, eye make-up, or artificial eyelashes. Remove nail polish, including gel polish, and any artificial, gel, or acrylic nails if possible. Remove all jewelry including rings and body piercing jewelry.     Wear causal clothing that is easy to take on and off. Consider your type of surgery.    Keep any valuables, jewelry, piercings at home. Please bring any specially ordered equipment (sling, braces) if indicated.    Arrange for a responsible person to drive you to and from the hospital on the day of your surgery. Please confirm the visitor policy for the day of your procedure when you receive your phone call with an arrival time.     Call the surgeon's office with any new illnesses, exposures, or additional questions prior to surgery.    Please reference your “My Surgical Experience Booklet” for additional information to prepare for your upcoming surgery.

## 2025-06-16 NOTE — PROGRESS NOTES
"Daily Note     Today's date: 2025  Patient name: Marco Richard  : 1955  MRN: [id number]  Referring provider: Tye Rodriguez MD  Dx:   Encounter Diagnosis     ICD-10-CM    1. Status post right partial knee replacement  Z96.651                      Subjective: Pt reports that he will be getting a manipulation on Wednesday this week.  He is unsure if he will be able to come to physical therapy right afterward or not.       Objective: See treatment diary below      Assessment: Tolerated treatment well. Focused today working into flexion after pt reported having manipulation coming this week. Moved into stretching with focus on flexion.  He was able to get into knee flexion up to 98 deg today.  He had some improvements after manual interventions including patellar mobs as well as IASTM to anterior and posterior knee.   Patient demonstrated fatigue post treatment, exhibited good technique with therapeutic exercises, and would benefit from continued PT      Plan: Continue per plan of care.        POC Expires    Auth Status    Unit limit    Expiration date    PT/OT Limit        Diagnosis:  L knee partal arthroplasty    Precautions:     Comparable signs    Primary Impairments:    Patient Goals    Date    Used        Remaining        Manual Therapy 100 deg 109 deg  98 deg  R 100  L 115    PROM   AK  AK    Patellar mobs    AK      Calf stretch 10x10\"         Scar mob    AK  light AK    IASTM 10x10\"   AK                      Exercise Diary             Therapeutic Exercise            Heel slides 2x10 2x10       HS stretch 0y12h87\" 10x10\"      Calf stretch 2z61j20\" 10x10\"      DKTC x20 x20  x20 2x10    Richard stretch 8x24m99\" 10x10\"  0d71b54\"  8n42m89\"    Stair knee flexion  2x10x5\" 2nd step                                  Bike/Nustep  5'  5'  5'  5' 5'   Neuromuscular Re-education            Quad sets        SLR          SAQ          LAQ          Bridges          Clamshell        " "  Mini squats          Leg press 2x10x3\" 30#  flexion focus  2x10x5\" 30#  flexion focus Seat 4, 5 sec at bottom, 30#                       Re-Eval            Therapeutic Activities            Education                        Side stepping             Hurdles                                         Modalities                                                       "

## 2025-06-17 NOTE — ANESTHESIA PREPROCEDURE EVALUATION
Procedure:  MANIPULATION JOINT RIGHT KNEE (Right: Knee)    Relevant Problems   CARDIO   (+) Essential hypertension   (+) Hyperlipidemia LDL goal <70      ENDO   (+) Hypothyroidism   (+) Type 2 diabetes mellitus with diabetic chronic kidney disease (HCC)   (+) Type 2 diabetes mellitus with microalbuminuria, without long-term current use of insulin (HCC)      /RENAL   (+) Prostate cancer (HCC)   (+) Stage 3a chronic kidney disease (HCC)   (+) Type 2 diabetes mellitus with diabetic chronic kidney disease (HCC)      MUSCULOSKELETAL   (+) Primary osteoarthritis of right knee      NEURO/PSYCH   (+) Diabetic polyneuropathy (HCC)      Other   (+) Other acute osteomyelitis, right ankle and foot ([facility])      Lab Results   Component Value Date    WBC 6.55 03/25/2025    HGB 15.9 03/25/2025    HCT 45.9 03/25/2025    MCV 98 03/25/2025     03/25/2025     Lab Results   Component Value Date     03/15/2016    SODIUM 135 03/25/2025    K 4.3 03/25/2025     03/25/2025    CO2 21 03/25/2025    AGAP 14 (H) 03/25/2025    BUN 26 (H) 03/25/2025    CREATININE 1.09 03/25/2025    GLUC 110 (H) 03/11/2024    GLUF 178 (H) 03/25/2025    CALCIUM 9.2 03/25/2025    AST 38 03/25/2025    ALT 27 03/25/2025    ALKPHOS 52 03/25/2025    PROT 6.8 03/15/2016    TP 7.4 03/25/2025    BILITOT 1.0 03/15/2016    TBILI 0.94 03/25/2025    EGFR 69 03/25/2025     Lab Results   Component Value Date    PTT 28 03/25/2025     Lab Results   Component Value Date    INR 1.04 03/25/2025    INR 1.08 05/23/2021    PROTIME 14.4 03/25/2025    PROTIME 14.1 05/23/2021       Physical Exam    Airway     Mallampati score: II  TM Distance: >3 FB  Neck ROM: full      Cardiovascular  Rhythm: regular, Rate: normal    Dental       Pulmonary   Breath sounds clear to auscultation    Neurological      Other Findings        Anesthesia Plan  ASA Score- 2     Anesthesia Type- IV sedation with anesthesia with ASA Monitors.         Additional Monitors:     Airway Plan:      Comment: IV sedation with adductor canal nerve block with Exparel.       Plan Factors-Exercise tolerance (METS): >4 METS.    Chart reviewed. EKG reviewed.  Existing labs reviewed. Patient summary reviewed.          Obstructive sleep apnea risk education given perioperatively.        Induction- intravenous.    Postoperative Plan- Plan for postoperative opioid use.   Monitoring Plan - Monitoring plan - standard ASA monitoring  Post Operative Pain Plan - non-opiod analgesics, plan for postoperative opioid use, peripheral nerve block and multimodal analgesia        Informed Consent- Anesthetic plan and risks discussed with patient.  I personally reviewed this patient with the CRNA. Discussed and agreed on the Anesthesia Plan with the CRNA..      NPO Status:  No vitals data found for the desired time range.

## 2025-06-18 ENCOUNTER — ANESTHESIA (OUTPATIENT)
Age: 70
End: 2025-06-18
Payer: MEDICARE

## 2025-06-18 ENCOUNTER — HOSPITAL ENCOUNTER (OUTPATIENT)
Age: 70
Setting detail: OUTPATIENT SURGERY
Discharge: HOME | End: 2025-06-18
Attending: STUDENT IN AN ORGANIZED HEALTH CARE EDUCATION/TRAINING PROGRAM | Admitting: STUDENT IN AN ORGANIZED HEALTH CARE EDUCATION/TRAINING PROGRAM
Payer: MEDICARE

## 2025-06-18 VITALS
BODY MASS INDEX: 29.97 KG/M2 | WEIGHT: 241 LBS | HEIGHT: 75 IN | OXYGEN SATURATION: 96 % | HEART RATE: 93 BPM | SYSTOLIC BLOOD PRESSURE: 149 MMHG | TEMPERATURE: 98.1 F | RESPIRATION RATE: 18 BRPM | DIASTOLIC BLOOD PRESSURE: 81 MMHG

## 2025-06-18 DIAGNOSIS — M24.661 ARTHROFIBROSIS OF KNEE JOINT, RIGHT: Primary | ICD-10-CM

## 2025-06-18 LAB
GLUCOSE SERPL-MCNC: 188 MG/DL (ref 65–140)
GLUCOSE SERPL-MCNC: 199 MG/DL (ref 65–140)

## 2025-06-18 PROCEDURE — 27570 FIXATION OF KNEE JOINT: CPT | Performed by: STUDENT IN AN ORGANIZED HEALTH CARE EDUCATION/TRAINING PROGRAM

## 2025-06-18 PROCEDURE — 82948 REAGENT STRIP/BLOOD GLUCOSE: CPT

## 2025-06-18 PROCEDURE — 27570 FIXATION OF KNEE JOINT: CPT

## 2025-06-18 RX ORDER — OXYCODONE HYDROCHLORIDE 5 MG/1
5 TABLET ORAL EVERY 4 HOURS PRN
Qty: 7 TABLET | Refills: 0 | Status: SHIPPED | OUTPATIENT
Start: 2025-06-18

## 2025-06-18 RX ORDER — SODIUM CHLORIDE, SODIUM LACTATE, POTASSIUM CHLORIDE, CALCIUM CHLORIDE 600; 310; 30; 20 MG/100ML; MG/100ML; MG/100ML; MG/100ML
125 INJECTION, SOLUTION INTRAVENOUS CONTINUOUS
Status: DISCONTINUED | OUTPATIENT
Start: 2025-06-18 | End: 2025-06-18 | Stop reason: HOSPADM

## 2025-06-18 RX ORDER — MIDAZOLAM HYDROCHLORIDE 2 MG/2ML
2 INJECTION, SOLUTION INTRAMUSCULAR; INTRAVENOUS ONCE
Status: COMPLETED | OUTPATIENT
Start: 2025-06-18 | End: 2025-06-18

## 2025-06-18 RX ORDER — PROPOFOL 10 MG/ML
INJECTION, EMULSION INTRAVENOUS AS NEEDED
Status: DISCONTINUED | OUTPATIENT
Start: 2025-06-18 | End: 2025-06-18

## 2025-06-18 RX ORDER — ACETAMINOPHEN 500 MG
1000 TABLET ORAL EVERY 8 HOURS PRN
Qty: 84 TABLET | Refills: 0 | Status: SHIPPED | OUTPATIENT
Start: 2025-06-18 | End: 2025-07-02

## 2025-06-18 RX ORDER — LIDOCAINE HYDROCHLORIDE 10 MG/ML
INJECTION, SOLUTION EPIDURAL; INFILTRATION; INTRACAUDAL; PERINEURAL AS NEEDED
Status: DISCONTINUED | OUTPATIENT
Start: 2025-06-18 | End: 2025-06-18

## 2025-06-18 RX ORDER — FENTANYL CITRATE/PF 50 MCG/ML
50 SYRINGE (ML) INJECTION
Status: COMPLETED | OUTPATIENT
Start: 2025-06-18 | End: 2025-06-18

## 2025-06-18 RX ORDER — BUPIVACAINE HYDROCHLORIDE 5 MG/ML
INJECTION, SOLUTION EPIDURAL; INTRACAUDAL; PERINEURAL
Status: COMPLETED | OUTPATIENT
Start: 2025-06-18 | End: 2025-06-18

## 2025-06-18 RX ORDER — ACETAMINOPHEN 325 MG/1
650 TABLET ORAL EVERY 6 HOURS PRN
Status: DISCONTINUED | OUTPATIENT
Start: 2025-06-18 | End: 2025-06-18 | Stop reason: HOSPADM

## 2025-06-18 RX ORDER — ONDANSETRON 2 MG/ML
4 INJECTION INTRAMUSCULAR; INTRAVENOUS EVERY 6 HOURS PRN
Status: DISCONTINUED | OUTPATIENT
Start: 2025-06-18 | End: 2025-06-18 | Stop reason: HOSPADM

## 2025-06-18 RX ADMIN — MIDAZOLAM 2 MG: 1 INJECTION INTRAMUSCULAR; INTRAVENOUS at 07:10

## 2025-06-18 RX ADMIN — FENTANYL CITRATE 50 MCG: 50 INJECTION INTRAMUSCULAR; INTRAVENOUS at 08:01

## 2025-06-18 RX ADMIN — SODIUM CHLORIDE, SODIUM LACTATE, POTASSIUM CHLORIDE, AND CALCIUM CHLORIDE 125 ML/HR: .6; .31; .03; .02 INJECTION, SOLUTION INTRAVENOUS at 06:37

## 2025-06-18 RX ADMIN — PROPOFOL 150 MG: 10 INJECTION, EMULSION INTRAVENOUS at 07:34

## 2025-06-18 RX ADMIN — LIDOCAINE HYDROCHLORIDE 50 MG: 10 INJECTION, SOLUTION EPIDURAL; INFILTRATION; INTRACAUDAL; PERINEURAL at 07:34

## 2025-06-18 RX ADMIN — BUPIVACAINE HYDROCHLORIDE 10 ML: 5 INJECTION, SOLUTION EPIDURAL; INTRACAUDAL; PERINEURAL at 07:10

## 2025-06-18 RX ADMIN — BUPIVACAINE 20 ML: 13.3 INJECTION, SUSPENSION, LIPOSOMAL INFILTRATION at 07:10

## 2025-06-18 RX ADMIN — FENTANYL CITRATE 50 MCG: 50 INJECTION INTRAMUSCULAR; INTRAVENOUS at 08:07

## 2025-06-18 NOTE — OP NOTE
OPERATIVE REPORT  PATIENT NAME: Marco Richard    :  1955  MRN: [id number]  Pt Location: [facility] OR ROOM 04    SURGERY DATE: 2025    Surgeons and Role:     * Tye Rodriguez MD - Primary     * Juan Carlos Hernández PA-C - Assisting    Preop Diagnosis:  S/P right unicompartmental knee replacement [Z96.651]  Arthrofibrosis of knee joint, right [M24.661]    Post-Op Diagnosis Codes:     * S/P right unicompartmental knee replacement [Z96.651]     * Arthrofibrosis of knee joint, right [M24.661]    Procedure(s):  Right - MANIPULATION JOINT RIGHT KNEE    Specimen(s):  * No specimens in log *    Estimated Blood Loss:   None    Drains:  * No LDAs found *    Anesthesia Type:   Choice    Operative Indications:  S/P right unicompartmental knee replacement [Z96.651]  Arthrofibrosis of knee joint, right [M24.661]    See clinical note for complete list of indications    Operative Findings:  Premanipulation range of motion: 0-90 degrees  Postmanipulation range of motion: 0-120 degrees  See clinical images under the media tab       Complications:   None    Procedure and Technique:    On the day of the procedure, patient was taken to the operating theater where anesthesia was administered.  A timeout was performed to confirm patient, laterality and procedure.  All were in agreement and the procedure began.    The knee was taken through a premanipulation range of motion and clinical photographs were taken to document.  A gentle flexion maneuver was then placed along the entire length of the tibia to reduce the risk of possible fracture.  There is an audible and palpable breaking up of scar tissue and the patient was able to achieve a good functional range of motion.  A second set of clinical images was obtained to document the patient's flexion.  At this point, anesthesia was discontinued and the patient was taken the PACU in stable condition.    No qualified resident was available to assist during the case.  Nahum Hernández PA-C was  necessary for safe positioning of the patient as well as documenting the range of motion with clinical images.       SIGNATURE: Tye Rodriguez MD  DATE: June 18, 2025  TIME: 7:52 AM

## 2025-06-18 NOTE — ANESTHESIA POSTPROCEDURE EVALUATION
Post-Op Assessment Note    CV Status:  Stable    Pain management: adequate    Multimodal analgesia used between 6 hours prior to anesthesia start to PACU discharge    Mental Status:  Alert   Hydration Status:  Stable   PONV Controlled:  None   Airway Patency:  Patent     Post Op Vitals Reviewed: Yes    No anethesia notable event occurred.    Staff: Anesthesiologist           Last Filed PACU Vitals:  Vitals Value Taken Time   Temp 98.1 °F (36.7 °C) 06/18/25 08:10   Pulse 89 06/18/25 08:10   /79 06/18/25 08:10   Resp 20 06/18/25 08:10   SpO2 96 % 06/18/25 08:10       Modified Aldo:     Vitals Value Taken Time   Activity 2 06/18/25 08:10   Respiration 2 06/18/25 08:10   Circulation 2 06/18/25 08:10   Consciousness 2 06/18/25 08:10   Oxygen Saturation 2 06/18/25 08:10     Modified Aldo Score: 10

## 2025-06-18 NOTE — INTERVAL H&P NOTE
H&P reviewed. After examining the patient I find no changes in the patients condition since the H&P had been written.    Vitals:    06/18/25 0704   BP: 156/77   Pulse: 92   Resp: 16   Temp:    SpO2: 99%

## 2025-06-18 NOTE — ANESTHESIA POSTPROCEDURE EVALUATION
Post-Op Assessment Note    CV Status:  Stable    Pain management: adequate       Mental Status:  Alert and awake   Hydration Status:  Euvolemic   PONV Controlled:  Controlled   Airway Patency:  Patent     Post Op Vitals Reviewed: Yes    No anethesia notable event occurred.    Staff: CRNA           Last Filed PACU Vitals:  Vitals Value Taken Time   Temp 98 °F (36.7 °C) 06/18/25 07:49   Pulse 90 06/18/25 07:49   /69 06/18/25 07:49   Resp 18 06/18/25 07:49   SpO2 99 % 06/18/25 07:49

## 2025-06-18 NOTE — INTERVAL H&P NOTE
H&P reviewed. After examining the patient I find no changes in the patients condition since the H&P had been written.    Vitals:    06/18/25 0704   BP: 156/77   Pulse: 92   Resp: 16   Temp:    SpO2: 99%     Plan for right knee manipulation under anesthesia

## 2025-06-18 NOTE — DISCHARGE INSTR - AVS FIRST PAGE
Discharge Instructions - Orthopedics  Marco Richard 69 y.o. male MRN: [id number]  Unit/Bed#: [facility] 19    Weight Bearing Status:                                           Weightbearing as tolerated to operative extremity    DVT prophylaxis  Not necessary    Pain:  Continue analgesics as directed    Dressing Instructions:   Please keep clean, dry and intact until follow up     Showering Instructions:   No restrictions    Appt Instructions:   Follow up with Dr. Rodriguez in 2 weeks  If you do not have your appointment, please call the clinic at [phone].  Otherwise followup as scheduled     Contact the office sooner if you experience any increased numbness/tingling in the extremities.    Miscellaneous:  Ice and elevation for pain and swelling control    You must begin physical therapy immediately after surgery. Call the office if you need an additional physical therapy script

## 2025-06-18 NOTE — ANESTHESIA PROCEDURE NOTES
Peripheral Block    Patient location during procedure: holding area  Start time: 6/18/2025 7:10 AM  Reason for block: at surgeon's request and post-op pain management  Staffing  Performed by: Lewis Andersen MD  Authorized by: Lewis Andersen MD    Preanesthetic Checklist  Completed: patient identified, IV checked, site marked, risks and benefits discussed, surgical consent, monitors and equipment checked, pre-op evaluation and timeout performed  Peripheral Block  Patient position: supine  Prep: ChloraPrep  Patient monitoring: frequent blood pressure checks, continuous pulse oximetry and heart rate  Block type: Adductor Canal  Laterality: right  Injection technique: single-shot  Procedures: ultrasound guided, Ultrasound guidance required for the procedure to increase accuracy and safety of medication placement and decrease risk of complications.  Ultrasound permanent image saved  bupivacaine (PF) (MARCAINE) 0.5 % injection 20 mL - Perineural   10 mL - 6/18/2025 7:10:00 AM  bupivacaine liposomal (EXPAREL) 1.3 % injection 20 mL - Perineural   20 mL - 6/18/2025 7:10:00 AM  Needle  Needle type: Stimuplex   Needle gauge: 20 G  Needle length: 4 in  Needle localization: anatomical landmarks and ultrasound guidance  Assessment  Injection assessment: incremental injection, frequent aspiration, injected with ease, negative aspiration, negative for heart rate change, no paresthesia on injection, no symptoms of intraneural/intravenous injection and needle tip visualized at all times  Paresthesia pain: none  Post-procedure:  site cleaned  patient tolerated the procedure well with no immediate complications

## 2025-06-19 ENCOUNTER — OFFICE VISIT (OUTPATIENT)
Dept: PHYSICAL THERAPY | Facility: CLINIC | Age: 70
End: 2025-06-19
Attending: STUDENT IN AN ORGANIZED HEALTH CARE EDUCATION/TRAINING PROGRAM
Payer: MEDICARE

## 2025-06-19 DIAGNOSIS — Z96.651 STATUS POST RIGHT PARTIAL KNEE REPLACEMENT: Primary | ICD-10-CM

## 2025-06-19 PROCEDURE — 97110 THERAPEUTIC EXERCISES: CPT

## 2025-06-19 PROCEDURE — 97112 NEUROMUSCULAR REEDUCATION: CPT

## 2025-06-19 NOTE — PROGRESS NOTES
PT Re-Evaluation     Today's date: 2025  Patient name: Marco Richard  : 1955  MRN: [id number]  Referring provider: Tye Rodriguez MD  Dx:   Encounter Diagnosis     ICD-10-CM    1. Status post right partial knee replacement  Z96.651           Start Time: 08  Stop Time: 0915  Total time in clinic (min): 45 minutes    Assessment  Impairments: abnormal gait, abnormal muscle firing, abnormal muscle tone, abnormal or restricted ROM, activity intolerance, impaired balance, impaired physical strength, lacks appropriate home exercise program, pain with function, weight-bearing intolerance and poor body mechanics  Symptom irritability: moderate    Assessment details: Marco Richard has been compliant with attending PT and home exercise program since initial eval.  Marco  has made improvements in objective data since initial eval but continues to have limitations compared to prior level of function. He presents to physical therapy today s/p manipulation where he was able to get to 120 deg knee flexion.  He has had improvements in his ROM, able to get to 109 deg knee flexion, while beginning at IE at 70 deg.  Marco continues to have deficits in the above listed impairments and would benefit from additional skilled PT to address these deficits to return to prior level of function.      Understanding of Dx/Px/POC: good     Prognosis: good  Prognosis details: Positive prognostic indicators include positive attitude toward recovery and good understanding of diagnosis and treatment plan options.  Negative prognostic indicators include chronicity of symptoms, high symptom irritability, and multiple concurrent orthopedic problems.      Goals  Impairment Goals 4-6 weeks  - Decrease pain to 2/10  - Improve knee AROM to equal to the unaffected lower extremity  - Increase knee strength to 5/5 throughout  - Increase hip strength to 5/5 throughout    Functional Goals 6-8 weeks  - Return to Prior Level of  Function  - Patient will be independent with HEP  - Patient will be able to squat without increased pain/compensation/difficulty  - Patient will be able to perform sit to stand without increased pain/compensation/difficulty   - Patient will be able to ascend and descend stairs without increased pain/compensation/difficulty   - Patient will be able to lift >30 pounds with proper mechanics       Plan  Patient would benefit from: skilled physical therapy  Planned modality interventions: Modalities PRN.    Planned therapy interventions: activity modification, manual therapy, neuromuscular re-education, patient education, therapeutic activities, therapeutic exercise, graded activity, home exercise program, behavior modification, self care, IASTM and joint mobilization    Frequency: 2x week  Duration in weeks: 16  Treatment plan discussed with: patient        Subjective Evaluation    History of Present Illness  Mechanism of injury: Re-Eval 6/19 s/p manipulation: Pt reports that he is feeling very sore coming into treatment session.  He reports that he was able to get to 120 deg knee flexion during manipulation and his surgeon is requesting to have him attend 5x/ week.  He reports soreness though working into his bending and motion. He reports his pain has increased since the manipulation, but, reports it as tolerable.        WORK/SCHOOL: Retired,   HOME LIFE: Lives with others, [employer] [place]landing within)  HOBBIES/EXERCISE: living history organization, senior [employer] [facility], walking  PLOF:  B knee pain in past  HISTORY OF CURRENT INJURY:  Partial knee replacement R.  Pt had taken some celebrex for pain after and had an allergic reaction and had to go to the ER.  He is now taking other medication.    PAIN LOCATION/DESCRIPTORS: hip to calf,   AGGRAVATING FACTORS:  sleeping, sitting periods of time (20 min), standing periods of time (10 min),   EASES: ice, rest, elevating   DAY PATTERN: night pain  "(throbbing)  IMAGING:  kermit Lara denies a new onset of Bladder incontinence, Bowel dysfunction, Dizziness, Dysphagia, Dysarthria, Drop attacks, Diplopia, Nausea, Ataxia, Recent unexplained weight loss, Clumsy or unsteadiness, Constant night pain, History of cancer, Tingling, Numbness, and Saddle anesthesia .  PATIENT GOALS: return to activities    Patient Goals  Patient goals for therapy: decreased edema, decreased pain, improved balance, increased motion, return to sport/leisure activities, independence with ADLs/IADLs and increased strength    Pain  Current pain ratin  At best pain rating: 3  At worst pain rating: 10  Quality: tight, throbbing, discomfort and dull ache  Relieving factors: ice, change in position, rest, support and medications  Aggravating factors: lifting, running, stair climbing, sitting, standing and walking          Objective     Active Range of Motion     Right Knee   Flexion: 102 degrees with pain  Extension: with pain    Additional Active Range of Motion Details  Lacking 6 degrees form full extension    Passive Range of Motion     Additional Passive Range of Motion Details  Pt guarded throughout PROM, equal to AROM    Mobility   Patellar Mobility:     Right Knee   Hypomobile: medial, lateral, superior and inferior     Strength/Myotome Testing     Left Knee   Flexion: 4  Extension: 4    Right Knee   Flexion: 3+  Extension: 3+    General Comments:      Knee Comments  TU sec,  UE assist to stand  30 Sec Sit to Stand: 4 UE assist               POC Expires    Auth Status    Unit limit    Expiration date    PT/OT Limit      Diagnosis:  L knee partal arthroplasty    Precautions:     Comparable signs    Primary Impairments:    Patient Goals    Date    Used        Remaining        Manual Therapy 100 deg 109 deg  98 deg  R 102    PROM   AK  AK    Patellar mobs    AK  AK    Calf stretch 10x10\"         Scar mob    AK      IASTM 10x10\"                         Exercise " "Diary             Therapeutic Exercise            Heel slides 2x10 2x10   2x10x5\"    HS stretch 4m40i56\" 10x10\"      Calf stretch 6f77f95\" 10x10\"      DKTC x20 x20  x20 2x10    Richard stretch 2u50e82\" 10x10\"  2v84g63\"  0b39o53\"    Stair knee flexion  2x10x5\" 2nd step   2x10x5\" 2nd step                                Bike/Nustep  5'  5'  5'  5' 5'   Neuromuscular Re-education            Quad sets        SLR          SAQ          LAQ          Bridges          Clamshell          Mini squats          Leg press 2x10x3\" 30#  flexion focus  2x10x5\" 30#  flexion focus Seat 8, 5 sec at bottom 5 sec at top, 15#                       Re-Eval        AK    Therapeutic Activities            Education                        Side stepping             Hurdles                                         Modalities                                 "

## 2025-06-20 ENCOUNTER — OFFICE VISIT (OUTPATIENT)
Dept: PHYSICAL THERAPY | Facility: CLINIC | Age: 70
End: 2025-06-20
Attending: STUDENT IN AN ORGANIZED HEALTH CARE EDUCATION/TRAINING PROGRAM
Payer: MEDICARE

## 2025-06-20 DIAGNOSIS — Z96.651 STATUS POST RIGHT PARTIAL KNEE REPLACEMENT: Primary | ICD-10-CM

## 2025-06-20 PROCEDURE — 97110 THERAPEUTIC EXERCISES: CPT

## 2025-06-20 PROCEDURE — 97140 MANUAL THERAPY 1/> REGIONS: CPT

## 2025-06-20 NOTE — PROGRESS NOTES
"Daily Note     Today's date: 2025  Patient name: Marco Richard  : 1955  MRN: [id number]  Referring provider: Tye Rodriguez MD  Dx:   Encounter Diagnosis     ICD-10-CM    1. Status post right partial knee replacement  Z96.651                      Subjective: Pt reports that he is in pain today, though is ready to keep working on his ROM.       Objective: See treatment diary below      Assessment: Tolerated treatment well. Began today with heel slides.  Able to get into 102 deg knee flexion in beginning of treatment session.  Continued with stretching as well with pt able to get further into quality motion. He continues with some of that trouble into extension, though focus today on flexion.    Patient demonstrated fatigue post treatment, exhibited good technique with therapeutic exercises, and would benefit from continued PT      Plan: Continue per plan of care.        POC Expires    Auth Status    Unit limit    Expiration date    PT/OT Limit      Diagnosis:  L knee partal arthroplasty    Precautions:     Comparable signs    Primary Impairments:    Patient Goals    Date    Used        Remaining        Manual Therapy 100 deg 109 deg  98 deg  R 102 R 102   PROM   AK  AK AK   Patellar mobs    AK  AK AK   Calf stretch 10x10\"         Scar mob    AK      IASTM 10x10\"                         Exercise Diary             Therapeutic Exercise            Heel slides 2x10 2x10   2x10x5\" 2x10x5\"   HS stretch 1i99o22\" 10x10\"      Calf stretch 8k83v82\" 10x10\"      DKTC x20 x20  x20 2x10 2x10x5\"   Richard stretch 5k77x97\" 10x10\"  2x43z92\"  3k32g19\"    Stair knee flexion  2x10x5\" 2nd step   2x10x5\" 2nd step  2x10x5\" 2nd step                               Bike/Nustep  5'  5'  5'  5' 5'   Neuromuscular Re-education            Quad sets        SLR          SAQ          LAQ          Bridges          Clamshell          Mini squats          Leg press 2x10x3\" 30#  flexion focus  2x10x5\" 30#  " flexion focus Seat 8, 5 sec at bottom 5 sec at top, 15# Seat 7 5 sec at bottom 5 sec at top, 15#                      Re-Eval        AK    Therapeutic Activities            Education                        Side stepping             Hurdles                                         Modalities

## 2025-06-23 ENCOUNTER — OFFICE VISIT (OUTPATIENT)
Dept: PHYSICAL THERAPY | Facility: CLINIC | Age: 70
End: 2025-06-23
Attending: STUDENT IN AN ORGANIZED HEALTH CARE EDUCATION/TRAINING PROGRAM
Payer: MEDICARE

## 2025-06-23 DIAGNOSIS — Z96.651 STATUS POST RIGHT PARTIAL KNEE REPLACEMENT: Primary | ICD-10-CM

## 2025-06-23 PROCEDURE — 97140 MANUAL THERAPY 1/> REGIONS: CPT

## 2025-06-23 PROCEDURE — 97110 THERAPEUTIC EXERCISES: CPT

## 2025-06-23 NOTE — PROGRESS NOTES
"Daily Note     Today's date: 2025  Patient name: Marco Richard  : 1955  MRN: [id number]  Referring provider: Tye Rodriguez MD  Dx:   Encounter Diagnosis     ICD-10-CM    1. Status post right partial knee replacement  Z96.651               2       Subjective: Pt reports that he did some of his stretching at home over the weekend, though reports he didn't want to overdo it.       Objective: See treatment diary below      Assessment: Tolerated treatment well. Began today with warm up on nustep, encouraging pt to move seat forward as he is able to tolerate knee flexion.  Added heel slides with pt beginning at 90 deg knee flexion today getting to 101 deg flexion after heel slides.  Added some graston as well as stretching into standing and supine.  Pt able to get to 104 deg with overpressure today. Patient demonstrated fatigue post treatment, exhibited good technique with therapeutic exercises, and would benefit from continued PT      Plan: Continue per plan of care.        POC Expires    Auth Status    Unit limit    Expiration date    PT/OT Limit      Diagnosis:  L knee partal arthroplasty    Precautions:     Comparable signs    Primary Impairments:    Patient Goals    Date    Used        Remaining        Manual Therapy 104 deg 109 deg  98 deg  R 102 R 102   PROM   AK  AK AK   Patellar mobs    AK  AK AK   Calf stretch 10x10\"         Scar mob    AK      IASTM 10x10\"                         Exercise Diary             Therapeutic Exercise            Heel slides 2x10 2x10   2x10x5\" 2x10x5\"   HS stretch 4m23q22\" 10x10\"      Calf stretch 5s09n52\" 10x10\"      DKTC x20 x20  x20 2x10 2x10x5\"   Richard stretch 4m25a16\" 10x10\"  9s81a35\"  9j05i98\"    Stair knee flexion  2x10x5\" 2nd step   2x10x5\" 2nd step  2x10x5\" 2nd step                               Bike/Nustep  5'  5'  5'  5' 5'   Neuromuscular Re-education            Quad sets        SLR          SAQ          LAQ        " "  Bridges          Clamshell          Mini squats          Leg press 2x10x3\" 30#  flexion focus  2x10x5\" 30#  flexion focus Seat 8, 5 sec at bottom 5 sec at top, 15# Seat 7 5 sec at bottom 5 sec at top, 15#                      Re-Eval        AK    Therapeutic Activities            Education                        Side stepping             Hurdles                                         Modalities                                   "

## 2025-06-24 ENCOUNTER — OFFICE VISIT (OUTPATIENT)
Dept: PHYSICAL THERAPY | Facility: CLINIC | Age: 70
End: 2025-06-24
Attending: STUDENT IN AN ORGANIZED HEALTH CARE EDUCATION/TRAINING PROGRAM
Payer: MEDICARE

## 2025-06-24 DIAGNOSIS — Z96.651 STATUS POST RIGHT PARTIAL KNEE REPLACEMENT: Primary | ICD-10-CM

## 2025-06-24 PROCEDURE — 97140 MANUAL THERAPY 1/> REGIONS: CPT

## 2025-06-24 PROCEDURE — 97110 THERAPEUTIC EXERCISES: CPT

## 2025-06-24 NOTE — PROGRESS NOTES
"Daily Note     Today's date: 2025  Patient name: Marco Richard  : 1955  MRN: [id number]  Referring provider: Tye Rodriguez MD  Dx:   Encounter Diagnosis     ICD-10-CM    1. Status post right partial knee replacement  Z96.651                      Subjective: Pt states that he is sore but knows that is expected.       Objective: See treatment diary below      Assessment: Tolerated treatment well.  Continued with focus on knee flexion stretching to improve pt's knee flexion.  Quad stretch in prone attempted this visit, pt had difficulty getting into this position however he did feel a quad stretch vs knee pain.  Knee flexion stretch at EOT with pt having improved tolerance and able to flex to 105 deg after.  IASTM to R distal quad prior to measurement and stretching.  Education provided on importance of pushing into pain for knee flexion in order to improve ROM and decrease stiffness after manipulation last week. Pt reports understanding.  Will progress as able.      Plan: Continue per plan of care.        POC Expires    Auth Status    Unit limit    Expiration date    PT/OT Limit      Diagnosis:  L knee partal arthroplasty    Precautions:     Comparable signs    Primary Impairments:    Patient Goals    Date    Used        Remaining        Manual Therapy 104 deg 105 deg  98 deg  R 102 R 102   PROM  TH AK  AK AK   Patellar mobs    AK  AK AK   Calf stretch 10x10\"         Scar mob    AK      IASTM 10x10\" TH                        Exercise Diary            Therapeutic Exercise           Heel slides 2x10    2x10x5\" 2x10x5\"   HS stretch 4m20u65\"       Calf stretch 7i29h83\"       DKTC x20 20x w/ ball  x20 2x10 2x10x5\"   Richard stretch 0r59t72\"   9v87z11\"  3v32i25\"    Stair knee flexion  2x10x5\" on chair  2x10x5\" 2nd step  2x10x5\" 2nd step    Knee flexion EOT  Opposite LE stretch 10x10\"      Prone quad stretch  10x10\"                 Bike/Nustep  5'  5'  5'  5' 5' " "  Neuromuscular Re-education            Quad sets        SLR          SAQ          LAQ          Bridges          Clamshell          Mini squats          Leg press 2x10x3\" 30#  flexion focus 2x10x3\" 30# flexion focus 2x10x5\" 30#  flexion focus Seat 8, 5 sec at bottom 5 sec at top, 15# Seat 7 5 sec at bottom 5 sec at top, 15#                      Re-Eval        AK    Therapeutic Activities            Education                        Side stepping             Hurdles                                         Modalities                                     "

## 2025-06-25 ENCOUNTER — OFFICE VISIT (OUTPATIENT)
Dept: PHYSICAL THERAPY | Facility: CLINIC | Age: 70
End: 2025-06-25
Attending: STUDENT IN AN ORGANIZED HEALTH CARE EDUCATION/TRAINING PROGRAM
Payer: MEDICARE

## 2025-06-25 DIAGNOSIS — Z96.651 STATUS POST RIGHT PARTIAL KNEE REPLACEMENT: Primary | ICD-10-CM

## 2025-06-25 PROCEDURE — 97110 THERAPEUTIC EXERCISES: CPT

## 2025-06-25 PROCEDURE — 97140 MANUAL THERAPY 1/> REGIONS: CPT

## 2025-06-25 PROCEDURE — 97112 NEUROMUSCULAR REEDUCATION: CPT

## 2025-06-25 NOTE — PROGRESS NOTES
"Daily Note     Today's date: 2025  Patient name: Marco Richard  : 1955  MRN: [id number]  Referring provider: Tye Rodriguez MD  Dx:   Encounter Diagnosis     ICD-10-CM    1. Status post right partial knee replacement  Z96.651                      Subjective: Pt states that he is \"pushing through it, a little pain but not horrible\"       Objective: See treatment diary below      Assessment: Tolerated treatment well.   Patient notes limited mobility with both flexion and knee extension. He responded well to mobility on step with increased reps and hold times. He noted improved mobility after sustained stretching holds and patella mobilization. He notes limitation with superior patella glide but improvement after PT and had reduction in symptoms after PT.  Will progress as able.      Plan: Continue per plan of care.        POC Expires    Auth Status    Unit limit    Expiration date    PT/OT Limit      Diagnosis:  L knee partal arthroplasty    Precautions:     Comparable signs    Primary Impairments:    Patient Goals    Date    Used        Remaining        Manual Therapy 104 deg 105 deg 112   R 102 R 102   PROM  TH SP  AK AK   Patellar mobs   SP flexion superior   AK AK   Calf stretch 10x10\"        Scar mob         IASTM 10x10\" TH                       Exercise Diary           Therapeutic Exercise          Heel slides 2x10  5\" 2x15  2x10x5\" 2x10x5\"   HS stretch 4d09f80\"       Calf stretch 2c41n95\"       DKTC x20 20x w/ ball  2x10 2x10x5\"   Richard stretch 4l21e19\"    6z97f65\"    Stair knee flexion  2x10x5\" on chair 2x15 5\"  2x10x5\" 2nd step  2x10x5\" 2nd step    Knee flexion EOT  Opposite LE stretch 10x10\" Opposite 5x15\"     Prone quad stretch  10x10\"                Bike/Nustep  5'  5' 5'   5' 5'   Neuromuscular Re-education           Quad sets        SLR         SAQ         LAQ         Bridges         Clamshell         Mini squats         Leg press 2x10x3\" 30#  flexion " "focus 2x10x3\" 30# flexion focus 2x10 x3 50#   Flx focus seat 4  Seat 8, 5 sec at bottom 5 sec at top, 15# Seat 7 5 sec at bottom 5 sec at top, 15#                     Re-Eval       AK    Therapeutic Activities           Education                      Side stepping             Hurdles                                         Modalities                                     "

## 2025-06-26 ENCOUNTER — TELEPHONE (OUTPATIENT)
Age: 70
End: 2025-06-26

## 2025-06-26 ENCOUNTER — OFFICE VISIT (OUTPATIENT)
Dept: PHYSICAL THERAPY | Facility: CLINIC | Age: 70
End: 2025-06-26
Attending: STUDENT IN AN ORGANIZED HEALTH CARE EDUCATION/TRAINING PROGRAM
Payer: MEDICARE

## 2025-06-26 DIAGNOSIS — Z96.651 STATUS POST RIGHT PARTIAL KNEE REPLACEMENT: Primary | ICD-10-CM

## 2025-06-26 DIAGNOSIS — E78.5 HYPERLIPIDEMIA LDL GOAL <70: ICD-10-CM

## 2025-06-26 PROCEDURE — 97112 NEUROMUSCULAR REEDUCATION: CPT

## 2025-06-26 PROCEDURE — 97110 THERAPEUTIC EXERCISES: CPT

## 2025-06-26 PROCEDURE — 97140 MANUAL THERAPY 1/> REGIONS: CPT

## 2025-06-26 NOTE — TELEPHONE ENCOUNTER
Caller: Patient    Doctor: Dr. Rodriguez / Agustin VIZCARRA    Reason for call: Patient is calling to ask how he should be proceeding with PT after 7/3 since he is not being see again by the DR in office until 7/10. Please advise.    Call back#: [phone]

## 2025-06-26 NOTE — PROGRESS NOTES
"Daily Note     Today's date: 2025  Patient name: Marco Richard  : 1955  MRN: [id number]  Referring provider: Tye Rodriguez MD  Dx:   Encounter Diagnosis     ICD-10-CM    1. Status post right partial knee replacement  Z96.651                      Subjective: Pt reports he was very sore last night after treatment.       Objective: See treatment diary below      Assessment: Tolerated treatment well. Began today with war up on bike, getting into knee flexion.  Moved into heel slides working more into flexion.  Added graston as well with pt in knee flexion.  He was able to get to 112 deg knee flexion.  He had good tolerance to addition of stretching on edge of table.  After leg press and more prone stretching, pt was able to get into 113 deg knee flexion. Patient demonstrated fatigue post treatment, exhibited good technique with therapeutic exercises, and would benefit from continued PT      Plan: Continue per plan of care.        POC Expires    Auth Status    Unit limit    Expiration date    PT/OT Limit      Diagnosis:  L knee partal arthroplasty    Precautions:     Comparable signs    Primary Impairments:    Patient Goals    Date    Used        Remaining        Manual Therapy 104 deg 105 deg 112   R 113 R 102   PROM  TH SP  AK AK   Patellar mobs   SP flexion superior   AK AK   Calf stretch 10x10\"        Scar mob         IASTM 10x10\" TH                       Exercise Diary           Therapeutic Exercise          Heel slides 2x10  5\" 2x15  2x10x5\" 2x10x5\"   HS stretch 9g82p25\"       Calf stretch 5d45y64\"       DKTC x20 20x w/ ball   2x10x5\"   Richard stretch 6l90x71\"    2h38f95\"    Stair knee flexion  2x10x5\" on chair 2x15 5\"  2x10x5\" 2nd step  2x10x5\" 2nd step    Knee flexion EOT  Opposite LE stretch 10x10\" Opposite 5x15\" Opposite LE stretch 10x10\"    Prone quad stretch  10x10\"                Bike/Nustep  5'  5' 5'   5' 5'   Neuromuscular Re-education           Quad sets  " "      SLR         SAQ         LAQ         Bridges         Clamshell         Mini squats         Leg press 2x10x3\" 30#  flexion focus 2x10x3\" 30# flexion focus 2x10 x3 50#   Flx focus seat 4  Seat 8, 5 sec at bottom 5 sec at top, 15# Seat 7 5 sec at bottom 5 sec at top, 15#                     Re-Eval       AK    Therapeutic Activities           Education                      Side stepping             Hurdles                                         Modalities                                       "

## 2025-06-27 ENCOUNTER — OFFICE VISIT (OUTPATIENT)
Dept: PHYSICAL THERAPY | Facility: CLINIC | Age: 70
End: 2025-06-27
Attending: STUDENT IN AN ORGANIZED HEALTH CARE EDUCATION/TRAINING PROGRAM
Payer: MEDICARE

## 2025-06-27 DIAGNOSIS — Z96.651 S/P RIGHT UNICOMPARTMENTAL KNEE REPLACEMENT: ICD-10-CM

## 2025-06-27 DIAGNOSIS — M24.661 ARTHROFIBROSIS OF KNEE JOINT, RIGHT: Primary | ICD-10-CM

## 2025-06-27 DIAGNOSIS — Z96.651 STATUS POST RIGHT PARTIAL KNEE REPLACEMENT: Primary | ICD-10-CM

## 2025-06-27 PROCEDURE — 97110 THERAPEUTIC EXERCISES: CPT | Performed by: PHYSICAL THERAPIST

## 2025-06-27 PROCEDURE — 97140 MANUAL THERAPY 1/> REGIONS: CPT | Performed by: PHYSICAL THERAPIST

## 2025-06-27 PROCEDURE — 97530 THERAPEUTIC ACTIVITIES: CPT | Performed by: PHYSICAL THERAPIST

## 2025-06-27 RX ORDER — ROSUVASTATIN CALCIUM 5 MG/1
5 TABLET, COATED ORAL DAILY
Qty: 30 TABLET | Refills: 0 | Status: SHIPPED | OUTPATIENT
Start: 2025-06-27

## 2025-06-27 NOTE — PROGRESS NOTES
"Daily Note     Today's date: 2025  Patient name: Marco Richard  : 1955  MRN: [id number]  Referring provider: Tye Rodriguez MD  Dx:   Encounter Diagnosis     ICD-10-CM    1. Status post right partial knee replacement  Z96.651           Start Time: 0700  Stop Time: 0745  Total time in clinic (min): 45 minutes    Subjective: Patient reports no new changes on arrival. He reports being sore from doing PT 5x a week.      Objective: See treatment diary below      Assessment: Tolerated treatment well. Focused on knee flexion ROM in weight-bearing and related stretches. Noted tightness with ROM assessment today to 105 deg. Reviewed exercises to perform at home over the weekend such as self stretches on step, seated EOT, and in prone. Patient exhibited good technique with therapeutic exercises and would benefit from continued PT      Plan: Continue per plan of care.  Progress treatment as tolerated.         POC Expires    Auth Status    Unit limit    Expiration date    PT/OT Limit      Diagnosis:  L knee partal arthroplasty    Precautions:     Comparable signs    Primary Impairments:    Patient Goals    Date    Used        Remaining        Manual Therapy 104 deg 105 deg 112   R 113 105   PROM  TH SP  AK DK   Patellar mobs   SP flexion superior   AK DK   Calf stretch 10x10\"        Scar mob         IASTM 10x10\" TH                       Exercise Diary           Therapeutic Exercise          Heel slides 2x10  5\" 2x15  2x10x5\" 2x10x5\"   HS stretch 3g10m27\"       Calf stretch 7l60l94\"       DKTC x20 20x w/ ball      Richard stretch 8j94d68\"    1u60k82\"    Stair knee flexion  2x10x5\" on chair 2x15 5\"  2x10x5\" 2nd step  2x10x5\" 2nd step    Knee flexion EOT  Opposite LE stretch 10x10\" Opposite 5x15\" Opposite LE stretch 10x10\" Opposite LE stretch 10x10\"   Prone quad stretch  10x10\"   10\"x10             Bike/Nustep  5'  5' 5'   5' 5'   Neuromuscular Re-education           Quad sets      " "  SLR         SAQ         LAQ         Bridges         Clamshell         Mini squats         Leg press 2x10x3\" 30#  flexion focus 2x10x3\" 30# flexion focus 2x10 x3 50#   Flx focus seat 4  Seat 8, 5 sec at bottom 5 sec at top, 15# Seat 8, 5 sec at bottom 5 sec at top, 50# 2x10, seat 7 x10                     Re-Eval       AK    Therapeutic Activities           Education                      Side stepping             Hurdles                                         Modalities                                         "

## 2025-06-30 ENCOUNTER — OFFICE VISIT (OUTPATIENT)
Dept: PHYSICAL THERAPY | Facility: CLINIC | Age: 70
End: 2025-06-30
Attending: STUDENT IN AN ORGANIZED HEALTH CARE EDUCATION/TRAINING PROGRAM
Payer: MEDICARE

## 2025-06-30 DIAGNOSIS — Z96.651 STATUS POST RIGHT PARTIAL KNEE REPLACEMENT: Primary | ICD-10-CM

## 2025-06-30 PROCEDURE — 97140 MANUAL THERAPY 1/> REGIONS: CPT | Performed by: PHYSICAL THERAPIST

## 2025-06-30 PROCEDURE — 97110 THERAPEUTIC EXERCISES: CPT | Performed by: PHYSICAL THERAPIST

## 2025-06-30 NOTE — PROGRESS NOTES
"Daily Note     Today's date: 2025  Patient name: Marco Richard  : 1955  MRN: [id number]  Referring provider: Tye Rodriguez MD  Dx:   Encounter Diagnosis     ICD-10-CM    1. Status post right partial knee replacement  Z96.651           Start Time: 0745  Stop Time: 0830  Total time in clinic (min): 45 minutes    Subjective: Patient reports he did a lot of sitting yesterday and is sore.      Objective: See treatment diary below      Assessment: Tolerated treatment well. Worked on weight-bearing flexion with leg press with slightly improved tolerance, cuing for control. Added use of strap with DKTC to improve ROM and self-stretch tolerance. Heel slides with PROM to 110 deg today due to painful ERP as per patient. Patient exhibited good technique with therapeutic exercises and would benefit from continued PT      Plan: Continue per plan of care.  Progress treatment as tolerated.         POC Expires    Auth Status    Unit limit    Expiration date    PT/OT Limit      Diagnosis:  L knee partal arthroplasty    Precautions:     Comparable signs    Primary Impairments:    Patient Goals    Date    Used        Remaining        Manual Therapy R 110 deg 105 deg 112   R 113 105   PROM DK TH SP  AK DK   Patellar mobs   SP flexion superior   AK DK   Calf stretch         Scar mob         IASTM  TH                       Exercise Diary          Therapeutic Exercise         Heel slides 2x10x5\"  5\" 2x15  2x10x5\" 2x10x5\"   HS stretch        Calf stretch        DKTC 20x w/ ball w/ strap 20x w/ ball      Richard stretch     8d33w17\"    Stair knee flexion 10\" x20 2nd step  2x10x5\" on chair 2x15 5\"  2x10x5\" 2nd step  2x10x5\" 2nd step    Knee flexion EOT Opposite LE + strap stretch 10x10\" Opposite LE stretch 10x10\" Opposite 5x15\" Opposite LE stretch 10x10\" Opposite LE stretch 10x10\"   Prone quad stretch 10\" 2x10 10x10\"   10\"x10             Bike/Nustep 5'  5' 5'   5' 5'   Neuromuscular " "Re-education          Quad sets        SLR         SAQ         LAQ         Bridges         Clamshell         Mini squats         Leg press Seat 8, 5 sec at bottom 5 sec at top, 50# 2x10, seat 7 x10 2x10x3\" 30# flexion focus 2x10 x3 50#   Flx focus seat 4  Seat 8, 5 sec at bottom 5 sec at top, 15# Seat 8, 5 sec at bottom 5 sec at top, 50# 2x10, seat 7 x10                     Re-Eval      AK    Therapeutic Activities          Education                     Side stepping            Hurdles                                       Modalities                                         "

## 2025-07-01 ENCOUNTER — OFFICE VISIT (OUTPATIENT)
Dept: PHYSICAL THERAPY | Facility: CLINIC | Age: 70
End: 2025-07-01
Attending: STUDENT IN AN ORGANIZED HEALTH CARE EDUCATION/TRAINING PROGRAM
Payer: MEDICARE

## 2025-07-01 DIAGNOSIS — Z96.651 STATUS POST RIGHT PARTIAL KNEE REPLACEMENT: Primary | ICD-10-CM

## 2025-07-01 PROCEDURE — 97140 MANUAL THERAPY 1/> REGIONS: CPT

## 2025-07-01 PROCEDURE — 97110 THERAPEUTIC EXERCISES: CPT

## 2025-07-01 NOTE — PROGRESS NOTES
"Daily Note     Today's date: 2025  Patient name: Marco Richard  : 1955  MRN: [id number]  Referring provider: Tye Rodriguez MD  Dx:   Encounter Diagnosis     ICD-10-CM    1. Status post right partial knee replacement  Z96.651                      Subjective: Patient reports that he is feeling pretty okay.       Objective: See treatment diary below      Assessment: Tolerated treatment well. IASTM on quad to improve flexion. Given TKE to work into end range extension motor control and improvement end range. He was cued for end range extension on leg press with increase weight to improve flexion with end range holds to this date. Better tolerance to weighted flexion to this date. Patient exhibited good technique with therapeutic exercises and would benefit from continued PT      Plan: Continue per plan of care.  Progress treatment as tolerated.         POC Expires    Auth Status    Unit limit    Expiration date    PT/OT Limit      Diagnosis:  L knee partal arthroplasty    Precautions:     Comparable signs    Primary Impairments:    Patient Goals    Date    Used        Remaining        Manual Therapy R 110 deg  112   R 113 105   PROM DK SP SP  AK DK   Patellar mobs   SP flexion superior   AK DK   Calf stretch         Scar mob         IASTM  SP quad and scar                        Exercise Diary          Therapeutic Exercise         Heel slides 2x10x5\" 10\"x10 5\" 2x15  2x10x5\" 2x10x5\"   HS stretch        Calf stretch        DKTC 20x w/ ball w/ strap       Richard stretch     [id number]\"    Stair knee flexion 10\" x20 2nd step  10x10\" 2x15 5\"  2x10x5\" 2nd step  2x10x5\" 2nd step    Knee flexion EOT Opposite LE + strap stretch 10x10\"  Opposite 5x15\" Opposite LE stretch 10x10\" Opposite LE stretch 10x10\"   Prone quad stretch 10\" 2x10    10\"x10             Bike/Nustep 5'  5'   5' 5'   Neuromuscular Re-education         Quad sets        TKE  2x10 5\"       SAQ         LAQ         Bridges  "        Clamshell         Mini squats         Leg press Seat 8, 5 sec at bottom 5 sec at top, 50# 2x10, seat 7 x10 Seat 5 55# 2x10  SL ecc  2x10 x3 50#   Flx focus seat 4  Seat 8, 5 sec at bottom 5 sec at top, 15# Seat 8, 5 sec at bottom 5 sec at top, 50# 2x10, seat 7 x10                     Re-Eval      AK    Therapeutic Activities          Education                     Side stepping            Hurdles                                       Modalities

## 2025-07-02 ENCOUNTER — OFFICE VISIT (OUTPATIENT)
Dept: PHYSICAL THERAPY | Facility: CLINIC | Age: 70
End: 2025-07-02
Attending: STUDENT IN AN ORGANIZED HEALTH CARE EDUCATION/TRAINING PROGRAM
Payer: MEDICARE

## 2025-07-02 DIAGNOSIS — Z96.651 STATUS POST RIGHT PARTIAL KNEE REPLACEMENT: Primary | ICD-10-CM

## 2025-07-02 PROCEDURE — 97112 NEUROMUSCULAR REEDUCATION: CPT | Performed by: PHYSICAL THERAPIST

## 2025-07-02 PROCEDURE — 97110 THERAPEUTIC EXERCISES: CPT | Performed by: PHYSICAL THERAPIST

## 2025-07-02 PROCEDURE — 97140 MANUAL THERAPY 1/> REGIONS: CPT | Performed by: PHYSICAL THERAPIST

## 2025-07-02 NOTE — PROGRESS NOTES
"Daily Note     Today's date: 2025  Patient name: Marco Richard  : 1955  MRN: [id number]  Referring provider: Tye Rodriguez MD  Dx:   Encounter Diagnosis     ICD-10-CM    1. Status post right partial knee replacement  Z96.651           Start Time: 0800  Stop Time: 0845  Total time in clinic (min): 45 minutes    Subjective: As expected, knee is very sore this week.       Objective: See treatment diary below      Assessment: Due to sx report, manuals/exercise modified to limit inflammatory response. He tolerated this treatment well and flexion R1 was measured at 108 degrees with no pain reported. Patient was instructed to increase frequency/decrease intensity of stretches so that he can maximize time under tension while avoiding excessive inflammation. He was given prompt that knee should not feel worse after performing stretches.     Following manuals, patient was put on leg press for 6 minutes to actively reinforce passive gains. Continue to address ROM deficits, as able.       Plan: Continue per plan of care.        POC Expires    Auth Status    Unit limit    Expiration date    PT/OT Limit      Diagnosis:  L knee partal arthroplasty    Precautions:     Comparable signs    Primary Impairments:    Patient Goals    Date    Used        Remaining        Manual Therapy R 110 deg    R 113 105   PROM DK SP JAB seated and supine  AK DK   Patellar mobs   JAB inferior  AK DK   Calf stretch         Scar mob         IASTM  SP quad and scar       Short arc distraction c and co tib/fib mobs   JAB     Richard stretch   JAB      Exercise Diary          Therapeutic Exercise         Heel slides 2x10x5\" 10\"x10   2x10x5\" 2x10x5\"   HS stretch        Calf stretch        DKTC 20x w/ ball w/ strap       Richard stretch     [id number]\"    Stair knee flexion 10\" x20 2nd step  10x10\" 10x10\" 2nd step 2x10x5\" 2nd step  2x10x5\" 2nd step    Knee flexion EOT Opposite LE + strap stretch 10x10\"   Opposite LE " "stretch 10x10\" Opposite LE stretch 10x10\"   Prone quad stretch 10\" 2x10    10\"x10             Bike/Nustep 5'  5'   5' 5'   Neuromuscular Re-education         Quad sets        TKE  2x10 5\"       SAQ         LAQ         Bridges         Clamshell         Mini squats         Leg press Seat 8, 5 sec at bottom 5 sec at top, 50# 2x10, seat 7 x10 Seat 5 55# 2x10  SL ecc  50# double leg  3' at seat 4  3' at seat 3  Seat 8, 5 sec at bottom 5 sec at top, 15# Seat 8, 5 sec at bottom 5 sec at top, 50# 2x10, seat 7 x10                     Re-Eval      AK    Therapeutic Activities          Education                     Side stepping            Hurdles                                       Modalities                                           "

## 2025-07-03 ENCOUNTER — OFFICE VISIT (OUTPATIENT)
Dept: PHYSICAL THERAPY | Facility: CLINIC | Age: 70
End: 2025-07-03
Attending: STUDENT IN AN ORGANIZED HEALTH CARE EDUCATION/TRAINING PROGRAM
Payer: MEDICARE

## 2025-07-03 DIAGNOSIS — Z96.651 STATUS POST RIGHT PARTIAL KNEE REPLACEMENT: Primary | ICD-10-CM

## 2025-07-03 PROCEDURE — 97112 NEUROMUSCULAR REEDUCATION: CPT

## 2025-07-03 PROCEDURE — 97110 THERAPEUTIC EXERCISES: CPT

## 2025-07-03 NOTE — PROGRESS NOTES
"Daily Note     Today's date: 7/3/2025  Patient name: Marco Richard  : 1955  MRN: [id number]  Referring provider: Tye Rodriguez MD  Dx:   Encounter Diagnosis     ICD-10-CM    1. Status post right partial knee replacement  Z96.651                      Subjective: Pt states that he feels that his knee bending is still going slow.      Objective: See treatment diary below      Assessment: Tolerated treatment well.  Continued with outlined program to improve pt's knee flexion ROM.  Pt has improving tolerance to progressions despite having pain.  Hip flexor stretch added this visit with pt limited due to tightness.  Pt was able to flex to 110 deg after stretching.  During heel slide he was able to achieve 115 deg.  Discussed HEP and importance with pt reporting understanding.  Will continue to focus on improving knee flexion ROM.      Plan: Continue per plan of care.        POC Expires    Auth Status    Unit limit    Expiration date    PT/OT Limit      Diagnosis:  L knee partal arthroplasty    Precautions:     Comparable signs    Primary Impairments:    Patient Goals    Date 6/30 7/1 7/2 7/3 6/27   Used        Remaining        Manual Therapy R 110 deg    105   PROM DK SP JAB seated and supine  DK   Patellar mobs   JAB inferior  DK   Calf stretch        Scar mob         IASTM  SP quad and scar       Short arc distraction c and co tib/fib mobs   JAB     Knee flex ROM    115 w/ heel slide  110 AROM    Richard stretch   JAB      Exercise Diary          Therapeutic Exercise         Heel slides 2x10x5\" 10\"x10   2x10x5\"   HS stretch        Calf stretch        DKTC 20x w/ ball w/ strap       Richard stretch        Stair knee flexion 10\" x20 2nd step  10x10\" 10x10\" 2nd step 10x10\" 2x10x5\" 2nd step    Knee flexion EOT Opposite LE + strap stretch 10x10\"   Opposite LE stretch 10x10\" Opposite LE stretch 10x10\"   Prone quad stretch 10\" 2x10    10\"x10   Hip flexor stretch    EOT 10x10\"     Bike/Nustep 5'  5'   5' 5' " "  Neuromuscular Re-education         Quad sets        TKE  2x10 5\"       SAQ         LAQ         Bridges         Clamshell         Mini squats         Leg press Seat 8, 5 sec at bottom 5 sec at top, 50# 2x10, seat 7 x10 Seat 5 55# 2x10  SL ecc  50# double leg  3' at seat 4  3' at seat 3  Seat 8, 5 sec at bottom 5 sec at top, 70# 2x10  Seat 6 2x10 Seat 8, 5 sec at bottom 5 sec at top, 50# 2x10, seat 7 x10                     Re-Eval         Therapeutic Activities          Education                     Side stepping            Hurdles                                       Modalities                                             "

## 2025-07-07 ENCOUNTER — OFFICE VISIT (OUTPATIENT)
Dept: PHYSICAL THERAPY | Facility: CLINIC | Age: 70
End: 2025-07-07
Attending: STUDENT IN AN ORGANIZED HEALTH CARE EDUCATION/TRAINING PROGRAM
Payer: MEDICARE

## 2025-07-07 DIAGNOSIS — Z96.651 STATUS POST RIGHT PARTIAL KNEE REPLACEMENT: Primary | ICD-10-CM

## 2025-07-07 PROCEDURE — 97112 NEUROMUSCULAR REEDUCATION: CPT | Performed by: PHYSICAL THERAPIST

## 2025-07-07 PROCEDURE — 97140 MANUAL THERAPY 1/> REGIONS: CPT | Performed by: PHYSICAL THERAPIST

## 2025-07-07 PROCEDURE — 97110 THERAPEUTIC EXERCISES: CPT | Performed by: PHYSICAL THERAPIST

## 2025-07-07 NOTE — PROGRESS NOTES
"Daily Note     Today's date: 2025  Patient name: Marco Richard  : 1955  MRN: [id number]  Referring provider: Tye Rodriguez MD  Dx:   Encounter Diagnosis     ICD-10-CM    1. Status post right partial knee replacement  Z96.651           Start Time: 745  Stop Time: 08  Total time in clinic (min): 45 minutes    Subjective: Patient reports he has not had much pain while sleeping at night. He reports when he stretches he is not feeling as much pain as he was before.      Objective: See treatment diary below      Assessment: Tolerated treatment well. Added TRX deep squats to tolerance and available ROM to improve functional strength and mobility/ROM. IASTM to distal quad and distal adductor to reduce soft tissue tightness and restrictions. ROM to about 109 deg AROM with tight and slight end-range pain. Patient exhibited good technique with therapeutic exercises and would benefit from continued PT      Plan: Continue per plan of care.  Progress treatment as tolerated.         POC Expires    Auth Status    Unit limit    Expiration date    PT/OT Limit      Diagnosis:  L knee partal arthroplasty    Precautions:     Comparable signs    Primary Impairments:    Patient Goals    Date 6/30 7/1 7/2 7/3 7/7   Used        Remaining        Manual Therapy R 110 deg       PROM DK SP JAB seated and supine  DK   Patellar mobs   JAB inferior     Calf stretch        Scar mob         IASTM  SP quad and scar    DK, quad + Hip adductor   Short arc distraction c and co tib/fib mobs   JAB     Knee flex ROM    115 w/ heel slide  110 AROM 109 AROM   Richard stretch   JAB      Exercise Diary          Therapeutic Exercise         Heel slides 2x10x5\" 10\"x10   10\"x10   HS stretch        Calf stretch        DKTC 20x w/ ball w/ strap       Richard stretch        Stair knee flexion 10\" x20 2nd step  10x10\" 10x10\" 2nd step 10x10\" 10\"x10   Knee flexion EOT Opposite LE + strap stretch 10x10\"   Opposite LE stretch 10x10\" Opposite LE " "+ strap stretch 10x10\"   Prone quad stretch 10\" 2x10       Hip flexor stretch    EOT 10x10\"     Bike/Nustep 5'  5'   5' Upright for ROM 5min   Neuromuscular Re-education         Quad sets        TKE  2x10 5\"       SAQ         LAQ         Bridges         Clamshell         Mini squats      TRX deep squats to chair 5\" x10   Leg press Seat 8, 5 sec at bottom 5 sec at top, 50# 2x10, seat 7 x10 Seat 5 55# 2x10  SL ecc  50# double leg  3' at seat 4  3' at seat 3  Seat 8, 5 sec at bottom 5 sec at top, 70# 2x10  Seat 6 2x10 Seat 6 60# 2x10, 5sec at bottom                     Re-Eval         Therapeutic Activities          Education                     Side stepping            Hurdles                                       Modalities                                               "

## 2025-07-08 ENCOUNTER — OFFICE VISIT (OUTPATIENT)
Dept: PHYSICAL THERAPY | Facility: CLINIC | Age: 70
End: 2025-07-08
Attending: STUDENT IN AN ORGANIZED HEALTH CARE EDUCATION/TRAINING PROGRAM
Payer: MEDICARE

## 2025-07-08 DIAGNOSIS — Z96.651 STATUS POST RIGHT PARTIAL KNEE REPLACEMENT: Primary | ICD-10-CM

## 2025-07-08 PROCEDURE — 97110 THERAPEUTIC EXERCISES: CPT

## 2025-07-08 PROCEDURE — 97140 MANUAL THERAPY 1/> REGIONS: CPT

## 2025-07-08 PROCEDURE — 97112 NEUROMUSCULAR REEDUCATION: CPT

## 2025-07-08 NOTE — PROGRESS NOTES
"Daily Note     Today's date: 2025  Patient name: Marco Richard  : 1955  MRN: [id number]  Referring provider: Tye Rodriguez MD  Dx:   Encounter Diagnosis     ICD-10-CM    1. Status post right partial knee replacement  Z96.651                      Subjective: Pt states that he is sore and stiff but expected.       Objective: See treatment diary below      Assessment: Tolerated treatment well.  Continued with focus on improving pt's knee flexion ROM.  Increased height on upright bike so that pt would be able to make more attempts at full revolutions.  Pt fearful of pain and was challenged however was able to go around several times.  After seated stretch at EOT, pt was able to flex his knee to 110 deg AROM.  With heel slides and PROM muscle guarding was noted and he was limited to 105 deg.  Will progress nv as able.      Plan: Continue per plan of care.        POC Expires    Auth Status    Unit limit    Expiration date    PT/OT Limit      Diagnosis:  L knee partal arthroplasty    Precautions:     Comparable signs    Primary Impairments:    Patient Goals    Date 7/8 7/1 7/2 7/3 7/7   Used        Remaining        Manual Therapy        PROM TH SP JAB seated and supine  DK   Patellar mobs   JAB inferior     Calf stretch        Scar mob         IASTM R quad+hip adductor SP quad and scar    DK, quad + Hip adductor   Short arc distraction c and co tib/fib mobs   JAB     Knee flex ROM    115 w/ heel slide  110 AROM 109 AROM   Richard stretch   JAB      Exercise Diary          Therapeutic Exercise         Heel slides 10\"x7 10\"x10   10\"x10   HS stretch        Calf stretch        DKTC        Richard stretch        Stair knee flexion 10\"x10 10x10\" 10x10\" 2nd step 10x10\" 10\"x10   Knee flexion EOT Opposite LE  Stretch 10x10\"   Opposite LE stretch 10x10\" Opposite LE + strap stretch 10x10\"   Prone quad stretch        Hip flexor stretch    EOT 10x10\"     Bike/Nustep 5' seat 10 rocking/revolution  5'   5' Upright " "for ROM 5min   Neuromuscular Re-education         Quad sets        TKE  2x10 5\"       SAQ         LAQ         Bridges         Clamshell         Mini squats      TRX deep squats to chair 5\" x10   Leg press Seat 6 60# 2x10 5 sec at bottom Seat 5 55# 2x10  SL ecc  50# double leg  3' at seat 4  3' at seat 3  Seat 8, 5 sec at bottom 5 sec at top, 70# 2x10  Seat 6 2x10 Seat 6 60# 2x10, 5sec at bottom                     Re-Eval         Therapeutic Activities          Education HEP importance                    Side stepping            Hurdles                                       Modalities            HP NV?                                    "

## 2025-07-09 ENCOUNTER — OFFICE VISIT (OUTPATIENT)
Dept: PHYSICAL THERAPY | Facility: CLINIC | Age: 70
End: 2025-07-09
Attending: STUDENT IN AN ORGANIZED HEALTH CARE EDUCATION/TRAINING PROGRAM
Payer: MEDICARE

## 2025-07-09 DIAGNOSIS — Z96.651 STATUS POST RIGHT PARTIAL KNEE REPLACEMENT: Primary | ICD-10-CM

## 2025-07-09 PROCEDURE — 97110 THERAPEUTIC EXERCISES: CPT | Performed by: PHYSICAL THERAPIST

## 2025-07-09 PROCEDURE — 97112 NEUROMUSCULAR REEDUCATION: CPT | Performed by: PHYSICAL THERAPIST

## 2025-07-09 PROCEDURE — 97140 MANUAL THERAPY 1/> REGIONS: CPT | Performed by: PHYSICAL THERAPIST

## 2025-07-09 NOTE — PROGRESS NOTES
"Daily Note     Today's date: 2025  Patient name: Marco Richard  : 1955  MRN: [id number]  Referring provider: Tye Rodriguez MD  Dx:   Encounter Diagnosis     ICD-10-CM    1. Status post right partial knee replacement  Z96.651           Start Time: 745  Stop Time: 0830  Total time in clinic (min): 45 minutes    Subjective: Patient reports he tried hot pack at home and has helped with his muscle spasms.       Objective: See treatment diary below      Assessment: Tolerated treatment well. Initiated session with upright bike focus on ROM and attempting to complete full revolutions. Added step ups to 4inch step to promote functional weight-bearing and improve strength. Cuing to avoid circumduction of LE for functional knee flexion and elevation onto step. Performed IASTM to distal quad, followed by doing it while patient self-stretched of table. Also added moistheat pack to Right knee with supine heel slides with improving mobility to about 112 deg flexion today. Patient exhibited good technique with therapeutic exercises and would benefit from continued PT      Plan: Continue per plan of care.  Progress treatment as tolerated.         POC Expires    Auth Status    Unit limit    Expiration date    PT/OT Limit      Diagnosis:  L knee partal arthroplasty    Precautions:     Comparable signs    Primary Impairments:    Patient Goals    Date 7/8 7/9 7/2 7/3 7/7   Used        Remaining        Manual Therapy        PROM TH  JAB seated and supine  DK   Patellar mobs   JAB inferior     Calf stretch        Scar mob         IASTM R quad+hip adductor DK, R quad + hip adductor   DK, quad + Hip adductor   Short arc distraction c and co tib/fib mobs   JAB     Knee flex ROM  112 w/ heel slide  115 w/ heel slide  110 AROM 109 AROM   Richard stretch   JAB      Exercise Diary          Therapeutic Exercise         Heel slides 10\"x7 10\"x8 w/ moist heat   10\"x10   HS stretch        Calf stretch        DKTC      " "  Richard stretch        Stair knee flexion 10\"x10 10\"x10 10x10\" 2nd step 10x10\" 10\"x10   Knee flexion EOT Opposite LE  Stretch 10x10\" W/ strap stretch + IASTM to distal quad 10x10\"  Opposite LE stretch 10x10\" Opposite LE + strap stretch 10x10\"   Prone quad stretch        Hip flexor stretch    EOT 10x10\"     Bike/Nustep 5' seat 10 rocking/revolution 5' seat 10 rocking/revolution 5'   5' Upright for ROM 5min   Neuromuscular Re-education         Quad sets        TKE         SAQ         LAQ         Bridges         Clamshell         Mini squats  TRX deep squats to chair 5\" x10    TRX deep squats to chair 5\" x10   Leg press Seat 6 60# 2x10 5 sec at bottom Seat 6 60# 2x10 5 sec at bottom 50# double leg  3' at seat 4  3' at seat 3  Seat 8, 5 sec at bottom 5 sec at top, 70# 2x10  Seat 6 2x10 Seat 6 60# 2x10, 5sec at bottom   Step Ups  4\" Right x10                Re-Eval        Therapeutic Activities         Education HEP importance                  Side stepping           Hurdles                                     Modalities           HP NV? W/ self-stretch                                     "

## 2025-07-10 ENCOUNTER — APPOINTMENT (OUTPATIENT)
Dept: PHYSICAL THERAPY | Facility: CLINIC | Age: 70
End: 2025-07-10
Attending: STUDENT IN AN ORGANIZED HEALTH CARE EDUCATION/TRAINING PROGRAM
Payer: MEDICARE

## 2025-07-10 ENCOUNTER — OFFICE VISIT (OUTPATIENT)
Dept: OBGYN CLINIC | Facility: CLINIC | Age: 70
End: 2025-07-10

## 2025-07-10 VITALS — BODY MASS INDEX: 29.97 KG/M2 | HEIGHT: 75 IN | WEIGHT: 241 LBS

## 2025-07-10 DIAGNOSIS — Z96.651 S/P RIGHT UNICOMPARTMENTAL KNEE REPLACEMENT: Primary | ICD-10-CM

## 2025-07-10 PROCEDURE — 99024 POSTOP FOLLOW-UP VISIT: CPT

## 2025-07-10 NOTE — PROGRESS NOTES
Date: 07/10/25  Marco Richard   MRN# [id number]  : 1955      Chief Complaint: Post op right partial knee arthroplasty    Assessment & Plan  S/P right unicompartmental knee replacement  Patient is 3 weeks s/p SATHYA. 3 months s/p right unicompartmental knee arthroplasty  - WBAT RLE   - Tylenol and Celebrex for pain control prn  - Continue PT/HEP as directed - Flexion is 112-114 when working with PT. Contralateral knee flexion is 118  - May shower and soak/submerge incision  - No antibiotic dental prophylaxis is necessary  - No restrictions from our standpoint. Let pain be a guide  - RTC in 3 months.       Subjective:   Patient is 3 weeks s/p right unicompartmental knee arthroplasty    Date of procedure: 25, 25  Doing well, no new problems  Pain progressively improving  Improved swelling  Ambulating with cane    Allergy:  Allergies[1]  Medications:  all current active meds have been reviewed    Past Medical History:  Past Medical History[2]  Past Surgical History:  Past Surgical History[3]  Family History:  Family History[4]  Social History:  Social History     Substance and Sexual Activity   Alcohol Use Yes    Alcohol/week: 9.0 standard drinks of alcohol    Types: 2 Cans of beer, 7 Standard drinks or equivalent per week    Comment: occasional-weekly use ( 2x/week)     Social History     Substance and Sexual Activity   Drug Use Never    Comment: Denies any drug use per pt     Tobacco Use History[5]        Review of Systems:  General- denies fever/chills  HEENT- denies hearing loss or sore throat  Eyes- denies eye pain or visual disturbances, denies red eyes  Respiratory- denies cough or SOB  Cardio- denies chest pain or palpitations  GI- denies abdominal pain  Endocrine- denies urinary frequency  Urinary- denies pain with urination  Musculoskeletal- Negative except noted above  Skin- denies rashes or wounds  Neurological- denies dizziness or headache  Psychiatric- denies anxiety or difficulty  "concentrating      Objective:   BP Readings from Last 1 Encounters:   06/18/25 149/81      Wt Readings from Last 1 Encounters:   07/10/25 109 kg (241 lb)      Pulse Readings from Last 1 Encounters:   06/18/25 93        BMI: Estimated body mass index is 30.12 kg/m² as calculated from the following:    Height as of this encounter: 6' 3\" (1.905 m).    Weight as of this encounter: 109 kg (241 lb).      Physical Exam  Ht 6' 3\" (1.905 m)   Wt 109 kg (241 lb)   BMI 30.12 kg/m²   General/Constitutional: No apparent distress: well-nourished and well developed.  Eyes: normal ocular motion  Cardio: RRR, Normal S1S2, No m/r/g.   Lymphatic: No appreciable lymphadenopathy  Respiratory: Non-labored breathing, CTA b/l no w/c/r  Vascular: No edema, swelling or tenderness, except as noted in detailed exam. Extremities well perfused. No LE edema  Integumentary: No impressive skin lesions present, except as noted in detailed exam.  Neuro: No ataxia or tremors noted  Psych: Normal mood and affect, oriented to person, place and time. Appropriate affect.  Musculoskeletal: Normal, except as noted in detailed exam and in HPI.    Gait and Station:   normal    right Knee Examination  Incision: clean, dry, and intact  Effusion: moderate  Alignment: normal  AP Stability: stable  Coronal Stability  Extension:stable  Mid-flexion: stable  90 degrees flexion: stable  Range of motion  Active: 0-112  Extensor lag: Absent  Motor: 5/5 EHL/FHL/TA/GS/QD/HS  Neurovascular exam is intact.   No evidence of calf tightness or posterior cords    Images:  No new imaging      I personally performed the service,     Juan Carlos Hernández PA-C         [1]   Allergies  Allergen Reactions    Celebrex [Celecoxib] Hives    Cefazolin Hives    Sudafed [Pseudoephedrine] Other (See Comments)     hyperactivity    Amoxicillin Rash    Penicillins Rash     Category: Allergy;    [2]   Past Medical History:  Diagnosis Date    Allergic rhinitis     Anxiety disorder     Last assessed " 2/7/2006     Arthritis     Cancer (HCC)     prostate    Carpal tunnel syndrome     Chronic kidney disease     Colon polyp     Diabetes mellitus (HCC)     Diabetes mellitus with neurological manifestation (HCC) 04/06/2012    Last assessed 3/29/2016     Diabetes type 2, uncontrolled     Last assessed 4/6/2016     Disease of thyroid gland     DM II (diabetes mellitus, type II), controlled (HCC)     Last assessed 10/20/2014     Essential hypertension     Last assesssed 2/7/2006     Hyperlipidemia     Hypertension     Insomnia     Last assessed 1/13/2011     Knee pain, right     Numbness and tingling of foot     Resolved 3/29/2016     Obesity     Old disruption of knee ligament     Last assessed 3/26/2008     Tarsal tunnel syndrome     Urinary frequency     Resolved 3/29/2016     Visual impairment     glasses   [3]   Past Surgical History:  Procedure Laterality Date    COLONOSCOPY      FOOT SURGERY      WI AMPUTATION TOE INTERPHALANGEAL JOINT Right 11/23/2022    Procedure: AMPUTATION 3RD TOE;  Surgeon: Andrews Marquez DPM;  Location:  MAIN OR;  Service: Podiatry    WI ARTHRP KNEE CONDYLE&PLATEAU MEDIAL/LAT CMPRT Right 4/16/2025    Procedure: ARTHROPLASTY KNEE UNICOMPARTMENTAL, RIGHT. SAME DAY;  Surgeon: Tye Rodriguez MD;  Location:  MAIN OR;  Service: Orthopedics    WI MANIPULATION KNEE JOINT UNDER GENERAL ANESTHESIA Right 6/18/2025    Procedure: MANIPULATION JOINT RIGHT KNEE;  Surgeon: Tye Rodriguez MD;  Location:  MAIN OR;  Service: Orthopedics    PROSTATE BIOPSY      WISDOM TOOTH EXTRACTION     [4]   Family History  Problem Relation Name Age of Onset    Heart attack Mother  67    Valvular heart disease Mother      Hypotension Mother      Pancreatic cancer Father  75    Diabetes Father      Lung cancer Sister  56        not a smoker    Cancer Brother          bile duct     Colon cancer Maternal Grandmother  62    Diabetes Maternal Grandmother      Pancreatic cancer Paternal Aunt      Pancreatic cancer  Paternal Uncle      Hypertension Neg Hx      Prostate cancer Neg Hx      Anesthesia problems Neg Hx     [5]   Social History  Tobacco Use   Smoking Status Never   Smokeless Tobacco Never   Tobacco Comments    Never a smoker or use of any tobacco products if ever needed

## 2025-07-10 NOTE — ASSESSMENT & PLAN NOTE
Patient is 3 weeks s/p SATHYA. 3 months s/p right unicompartmental knee arthroplasty  - WBAT RLE   - Tylenol and Celebrex for pain control prn  - Continue PT/HEP as directed - Flexion is 112-114 when working with PT. Contralateral knee flexion is 118  - May shower and soak/submerge incision  - No antibiotic dental prophylaxis is necessary  - No restrictions from our standpoint. Let pain be a guide  - RTC in 3 months.

## 2025-07-11 ENCOUNTER — OFFICE VISIT (OUTPATIENT)
Dept: PHYSICAL THERAPY | Facility: CLINIC | Age: 70
End: 2025-07-11
Attending: STUDENT IN AN ORGANIZED HEALTH CARE EDUCATION/TRAINING PROGRAM
Payer: MEDICARE

## 2025-07-11 DIAGNOSIS — Z96.651 STATUS POST RIGHT PARTIAL KNEE REPLACEMENT: Primary | ICD-10-CM

## 2025-07-11 DIAGNOSIS — E11.42 TYPE 2 DIABETES MELLITUS WITH DIABETIC POLYNEUROPATHY, WITHOUT LONG-TERM CURRENT USE OF INSULIN (HCC): ICD-10-CM

## 2025-07-11 PROCEDURE — 97112 NEUROMUSCULAR REEDUCATION: CPT | Performed by: PHYSICAL THERAPIST

## 2025-07-11 PROCEDURE — 97140 MANUAL THERAPY 1/> REGIONS: CPT | Performed by: PHYSICAL THERAPIST

## 2025-07-11 PROCEDURE — 97110 THERAPEUTIC EXERCISES: CPT | Performed by: PHYSICAL THERAPIST

## 2025-07-11 RX ORDER — GLIPIZIDE 5 MG/1
5 TABLET, FILM COATED, EXTENDED RELEASE ORAL DAILY
Qty: 90 TABLET | Refills: 1 | Status: SHIPPED | OUTPATIENT
Start: 2025-07-11

## 2025-07-11 NOTE — PROGRESS NOTES
"Daily Note     Today's date: 2025  Patient name: Marco Richard  : 1955  MRN: [id number]  Referring provider: Tye Rodriguez MD  Dx:   Encounter Diagnosis     ICD-10-CM    1. Status post right partial knee replacement  Z96.651           Start Time: 07  Stop Time: 0830  Total time in clinic (min): 45 minutes    Subjective: Patient reports he saw surgeon yesterday who was pleased with x-rays before and after SATHYA.       Objective: See treatment diary below      Assessment: Tolerated treatment well. Initiated session with self stretches, which aided in improved ability performing full revolutions on upright bike. Added stepping over hurdles to work on foot clearance and proper knee flexion in swing phase for foot clearance and LE advancement. Improved knee mobility noted today with use of hot pack achieving 115 deg with heel slides.  Patient exhibited good technique with therapeutic exercises and would benefit from continued PT      Plan: Continue per plan of care.  Progress treatment as tolerated.         POC Expires    Auth Status    Unit limit    Expiration date    PT/OT Limit      Diagnosis:  L knee partal arthroplasty    Precautions:     Comparable signs    Primary Impairments:    Patient Goals    Date 7/8 7/9 7/11 7/3 7/7   Used        Remaining        Manual Therapy        PROM TH    DK   Patellar mobs        Calf stretch        Scar mob         IASTM R quad+hip adductor DK, R quad + hip adductor DK, R quad + hip adductor  DK, quad + Hip adductor   Short arc distraction c and co tib/fib mobs        Knee flex ROM  112 w/ heel slide 115 w/ heel slide 115 w/ heel slide  110 AROM 109 AROM   Richard stretch         Exercise Diary          Therapeutic Exercise         Heel slides 10\"x7 10\"x8 w/ moist heat 10\"x8 w/ moist heat  10\"x10   HS stretch        Calf stretch        DKTC        Richard stretch        Stair knee flexion 10\"x10 10\"x10 10\"x10 10x10\" 10\"x10   Knee flexion EOT Opposite " "LE  Stretch 10x10\" W/ strap stretch + IASTM to distal quad 10x10\" W/ strap stretch + IASTM to distal quad 10x10\" Opposite LE stretch 10x10\" Opposite LE + strap stretch 10x10\"   Prone quad stretch        Hip flexor stretch    EOT 10x10\"     Bike/Nustep 5' seat 10 rocking/revolution 5' seat 10 rocking/revolution 5' seat 10 rocking/revolution  5' Upright for ROM 5min   Neuromuscular Re-education         Quad sets        TKE         SAQ         LAQ         Bridges         Clamshell         Mini squats  TRX deep squats to chair 5\" x10 TRX deep squats to chair 5\" x10   TRX deep squats to chair 5\" x10   Leg press Seat 6 60# 2x10 5 sec at bottom Seat 6 60# 2x10 5 sec at bottom Seat 6 60# 2x10 5 sec at bottom Seat 8, 5 sec at bottom 5 sec at top, 70# 2x10  Seat 6 2x10 Seat 6 60# 2x10, 5sec at bottom   Step Ups  4\" Right x10                Re-Eval        Therapeutic Activities         Education HEP importance                  Side stepping          Hurdles   FWD, LAT x3 laps ea @ mirror                                Modalities          HP NV? W/ self-stretch W/ self-stretch                                     "

## 2025-07-14 ENCOUNTER — OFFICE VISIT (OUTPATIENT)
Dept: PHYSICAL THERAPY | Facility: CLINIC | Age: 70
End: 2025-07-14
Attending: STUDENT IN AN ORGANIZED HEALTH CARE EDUCATION/TRAINING PROGRAM
Payer: MEDICARE

## 2025-07-14 DIAGNOSIS — Z96.651 STATUS POST RIGHT PARTIAL KNEE REPLACEMENT: Primary | ICD-10-CM

## 2025-07-14 PROCEDURE — 97140 MANUAL THERAPY 1/> REGIONS: CPT

## 2025-07-14 PROCEDURE — 97110 THERAPEUTIC EXERCISES: CPT

## 2025-07-14 PROCEDURE — 97112 NEUROMUSCULAR REEDUCATION: CPT

## 2025-07-14 NOTE — PROGRESS NOTES
"Daily Note     Today's date: 2025  Patient name: Marco Richard  : 1955  MRN: [id number]  Referring provider: Tye Rodriguez MD  Dx:   Encounter Diagnosis     ICD-10-CM    1. Status post right partial knee replacement  Z96.651                      Subjective: Pt states that he is doing ok, knee still feeling stiff.      Objective: See treatment diary below      Assessment: Tolerated treatment well.  Continued with outlined program to improve/maintain overall knee flexion ROM.  Pt able to perform full revolutions on bike with seat lowered to 9.  Pt challenged due to stiffness and pain however has improved motivation to improve his flexion.  Improved tolerance to PROM.  Knee flexion ROM measured at 112 deg with heel slides.  Pt will benefit from continued therapy.  Will progress as able.        Plan: Continue per plan of care.        POC Expires    Auth Status    Unit limit    Expiration date    PT/OT Limit      Diagnosis:  L knee partal arthroplasty    Precautions:     Comparable signs    Primary Impairments:    Patient Goals    Date    Used        Remaining        Manual Therapy        PROM TH   TH DK   Patellar mobs        Calf stretch        Scar mob         IASTM R quad+hip adductor DK, R quad + hip adductor DK, R quad + hip adductor TH, R quad + hip adductor DK, quad + Hip adductor   Short arc distraction c and co tib/fib mobs        Knee flex ROM  112 w/ heel slide 115 w/ heel slide 112 w/ heel slide 109 AROM   Richrad stretch         Exercise Diary          Therapeutic Exercise         Heel slides 10\"x7 10\"x8 w/ moist heat 10\"x8 w/ moist heat 10\"x8 w/ moist heat 10\"x10   HS stretch        Calf stretch        DKTC        Richard stretch        Stair knee flexion 10\"x10 10\"x10 10\"x10 10\"x10 10\"x10   Knee flexion EOT Opposite LE  Stretch 10x10\" W/ strap stretch + IASTM to distal quad 10x10\" W/ strap stretch + IASTM to distal quad 10x10\" Opposite LE stretch 10x10\" " "Opposite LE + strap stretch 10x10\"   Prone quad stretch        Hip flexor stretch         Bike/Nustep 5' seat 10 rocking/revolution 5' seat 10 rocking/revolution 5' seat 10 rocking/revolution  5' seat 9 rocking/revolution Upright for ROM 5min   Neuromuscular Re-education         Quad sets        TKE         SAQ         LAQ         Bridges         Clamshell         Mini squats  TRX deep squats to chair 5\" x10 TRX deep squats to chair 5\" x10  TRX deep squats to chair 5\"x10 TRX deep squats to chair 5\" x10   Leg press Seat 6 60# 2x10 5 sec at bottom Seat 6 60# 2x10 5 sec at bottom Seat 6 60# 2x10 5 sec at bottom Seat 6, 5 sec at bottom 70# 2x10 Seat 6 60# 2x10, 5sec at bottom   Step Ups  4\" Right x10                Re-Eval        Therapeutic Activities         Education HEP importance                  Side stepping          Hurdles   FWD, LAT x3 laps ea @ mirror                                Modalities          HP NV? W/ self-stretch W/ self-stretch  w/ self-stretch                                     "

## 2025-07-16 ENCOUNTER — EVALUATION (OUTPATIENT)
Dept: PHYSICAL THERAPY | Facility: CLINIC | Age: 70
End: 2025-07-16
Attending: STUDENT IN AN ORGANIZED HEALTH CARE EDUCATION/TRAINING PROGRAM
Payer: MEDICARE

## 2025-07-16 DIAGNOSIS — Z96.651 STATUS POST RIGHT PARTIAL KNEE REPLACEMENT: Primary | ICD-10-CM

## 2025-07-16 PROCEDURE — 97110 THERAPEUTIC EXERCISES: CPT | Performed by: PHYSICAL THERAPIST

## 2025-07-16 PROCEDURE — 97112 NEUROMUSCULAR REEDUCATION: CPT | Performed by: PHYSICAL THERAPIST

## 2025-07-16 PROCEDURE — 97140 MANUAL THERAPY 1/> REGIONS: CPT | Performed by: PHYSICAL THERAPIST

## 2025-07-16 NOTE — PROGRESS NOTES
PT Re-Evaluation     Today's date: 2025  Patient name: Marco Richard  : 1955  MRN: [id number]  Referring provider: Tye Rodriguez MD  Dx:   Encounter Diagnosis     ICD-10-CM    1. Status post right partial knee replacement  Z96.651             Start Time: 745  Stop Time: 0830  Total time in clinic (min): 45 minutes    Assessment  Impairments: abnormal gait, abnormal muscle firing, abnormal muscle tone, abnormal or restricted ROM, activity intolerance, impaired balance, impaired physical strength, lacks appropriate home exercise program, pain with function, weight-bearing intolerance, poor body mechanics, participation limitations and activity limitations  Symptom irritability: moderate    Assessment details: Marco Richard has been compliant with attending PT and home exercise program since initial eval.  Marco  has made improvements in objective data since initial eval but continues to have limitations compared to prior level of function. He presents to physical therapy for re-evaluation today after attending PT for 1 month s/p manipulation. He has achieved up to 114 deg of AROM, where as he was able to get to 120 deg knee flexion prior to surgery.  He has had improvements in his ROM while beginning at IE at 70 deg. Improvements in strength noted as well, however some quad lag noted with limitations in extension and with LAQs indicating quad weakness. This affects mechanics and stability with weight-bearing tasks such as prolonged walking, sit-stand transfers, and stair negotiation. He continues to be inconsistent with reciprocal to non-reciprocal stair navigation. Marco continues to have deficits in the above listed impairments and would benefit from additional skilled PT to address these deficits to return to prior level of function.      Understanding of Dx/Px/POC: good     Prognosis: good  Prognosis details: Positive prognostic indicators include positive attitude toward recovery and  good understanding of diagnosis and treatment plan options.  Negative prognostic indicators include chronicity of symptoms, high symptom irritability, and multiple concurrent orthopedic problems.      Goals  Impairment Goals 4-6 weeks  - Decrease pain to 2/10. Improved  - Improve knee AROM to equal to the unaffected lower extremity. Improved, 114 deg  - Increase knee strength to 5/5 throughout. Improved  - Increase hip strength to 5/5 throughout. Improved     Functional Goals 6-8 weeks  - Return to Prior Level of Function. Improved  - Patient will be independent with HEP. Improved  - Patient will be able to squat without increased pain/compensation/difficulty. Improved  - Patient will be able to perform sit to stand without increased pain/compensation/difficulty . Improved  - Patient will be able to ascend and descend stairs without increased pain/compensation/difficulty Slightly Improved  - Patient will be able to lift >30 pounds with proper mechanics . Ongoing      Plan  Patient would benefit from: skilled physical therapy  Planned modality interventions: Modalities PRN.    Planned therapy interventions: activity modification, manual therapy, neuromuscular re-education, patient education, therapeutic activities, therapeutic exercise, graded activity, home exercise program, behavior modification, self care, IASTM and joint mobilization    Frequency: 2x week  Duration in weeks: 16  Treatment plan discussed with: patient        Subjective Evaluation    History of Present Illness  Mechanism of injury: Re-Eval 6/19 s/p manipulation: Pt reports that he is feeling very sore coming into treatment session.  He reports that he was able to get to 120 deg knee flexion during manipulation and his surgeon is requesting to have him attend 5x/ week.  He reports soreness though working into his bending and motion. He reports his pain has increased since the manipulation, but, reports it as tolerable.        WORK/SCHOOL: Retired,    HOME LIFE: Lives with others, [employer] steps (landing within)  HOBBIES/EXERCISE: living history organization, [place], walking  PLOF:  B knee pain in past  HISTORY OF CURRENT INJURY:  Partial knee replacement R.  Pt had taken some celebrex for pain after and had an allergic reaction and had to go to the ER.  He is now taking other medication.    PAIN LOCATION/DESCRIPTORS: hip to calf,   AGGRAVATING FACTORS:  sleeping, sitting periods of time (20 min), standing periods of time (10 min),   EASES: ice, rest, elevating   DAY PATTERN: night pain (throbbing)  IMAGING:  kermit Hendrickster denies a new onset of Bladder incontinence, Bowel dysfunction, Dizziness, Dysphagia, Dysarthria, Drop attacks, Diplopia, Nausea, Ataxia, Recent unexplained weight loss, Clumsy or unsteadiness, Constant night pain, History of cancer, Tingling, Numbness, and Saddle anesthesia .  PATIENT GOALS: return to activities    Patient Goals  Patient goals for therapy: decreased edema, decreased pain, improved balance, increased motion, return to sport/leisure activities, independence with ADLs/IADLs and increased strength    Pain  Current pain ratin  At best pain rating: 3  At worst pain rating: 10  Quality: tight, throbbing, discomfort and dull ache  Relieving factors: ice, change in position, rest, support and medications  Aggravating factors: lifting, running, stair climbing, sitting, standing and walking          Objective     Active Range of Motion     Right Knee   Flexion: 114 degrees with pain  Extension: -6 degrees with pain  Extensor la degrees     Additional Active Range of Motion Details  Lacking 6 degrees form full extension    Passive Range of Motion     Right Knee   Flexion: 115 degrees   Extension: -3 degrees     Mobility   Patellar Mobility:     Right Knee   Hypomobile: medial, lateral, superior and inferior     Strength/Myotome Testing     Left Knee   Flexion: 4  Extension: 4    Right Knee   Flexion:  "4-  Extension: 3+    General Comments:      Knee Comments  TU sec,  UE assist to stand  30 Sec Sit to Stand: 4 UE assist             POC Expires    Auth Status    Unit limit    Expiration date    PT/OT Limit      Diagnosis:  L knee partal arthroplasty    Precautions:     Comparable signs    Primary Impairments:    Patient Goals    Date    Used        Remaining        Manual Therapy        PROM TH   TH    Patellar mobs        Calf stretch        Scar mob         IASTM R quad+hip adductor DK, R quad + hip adductor DK, R quad + hip adductor TH, R quad + hip adductor    Short arc distraction c and co tib/fib mobs        Knee flex ROM  112 w/ heel slide 115 w/ heel slide 112 w/ heel slide 114 w/ heel slide   Richard stretch         Exercise Diary          Therapeutic Exercise         Heel slides 10\"x7 10\"x8 w/ moist heat 10\"x8 w/ moist heat 10\"x8 w/ moist heat 10\"x5 without heat   HS stretch        Calf stretch        DKTC        Richard stretch        Stair knee flexion 10\"x10 10\"x10 10\"x10 10\"x10    Knee flexion EOT Opposite LE  Stretch 10x10\" W/ strap stretch + IASTM to distal quad 10x10\" W/ strap stretch + IASTM to distal quad 10x10\" Opposite LE stretch 10x10\"    Prone quad stretch        Hip flexor stretch         Bike/Nustep 5' seat 10 rocking/revolution 5' seat 10 rocking/revolution 5' seat 10 rocking/revolution  5' seat 9 rocking/revolution 5' seat 9 rocking/revolution   Neuromuscular Re-education         Quad sets        TKE         SAQ         LAQ         Bridges         Clamshell         Mini squats  TRX deep squats to chair 5\" x10 TRX deep squats to chair 5\" x10  TRX deep squats to chair 5\"x10 TRX deep squats 5\"x10   Leg press Seat 6 60# 2x10 5 sec at bottom Seat 6 60# 2x10 5 sec at bottom Seat 6 60# 2x10 5 sec at bottom Seat 6, 5 sec at bottom 70# 2x10 Seat 6, 5 sec at bottom 70# 2x10   Step Ups  4\" Right x10                Re-Eval     DK   Therapeutic Activities       "   Education HEP importance                  Side stepping          Hurdles   FWD, LAT x3 laps ea @ mirror                                Modalities          HP NV? W/ self-stretch W/ self-stretch  w/ self-stretch

## 2025-07-18 ENCOUNTER — APPOINTMENT (OUTPATIENT)
Dept: LAB | Facility: CLINIC | Age: 70
End: 2025-07-18
Payer: MEDICARE

## 2025-07-18 ENCOUNTER — OFFICE VISIT (OUTPATIENT)
Dept: PHYSICAL THERAPY | Facility: CLINIC | Age: 70
End: 2025-07-18
Attending: STUDENT IN AN ORGANIZED HEALTH CARE EDUCATION/TRAINING PROGRAM
Payer: MEDICARE

## 2025-07-18 DIAGNOSIS — Z96.651 STATUS POST RIGHT PARTIAL KNEE REPLACEMENT: Primary | ICD-10-CM

## 2025-07-18 DIAGNOSIS — E03.9 HYPOTHYROIDISM, UNSPECIFIED TYPE: ICD-10-CM

## 2025-07-18 DIAGNOSIS — C61 PROSTATE CANCER (HCC): ICD-10-CM

## 2025-07-18 DIAGNOSIS — I10 PRIMARY HYPERTENSION: ICD-10-CM

## 2025-07-18 DIAGNOSIS — E78.5 HYPERLIPIDEMIA LDL GOAL <70: ICD-10-CM

## 2025-07-18 DIAGNOSIS — I10 ESSENTIAL HYPERTENSION: ICD-10-CM

## 2025-07-18 DIAGNOSIS — E11.42 TYPE 2 DIABETES MELLITUS WITH DIABETIC POLYNEUROPATHY, WITHOUT LONG-TERM CURRENT USE OF INSULIN (HCC): ICD-10-CM

## 2025-07-18 LAB
ALBUMIN SERPL BCG-MCNC: 4.2 G/DL (ref 3.5–5)
ALP SERPL-CCNC: 48 U/L (ref 34–104)
ALT SERPL W P-5'-P-CCNC: 12 U/L (ref 7–52)
ANION GAP SERPL CALCULATED.3IONS-SCNC: 13 MMOL/L (ref 4–13)
AST SERPL W P-5'-P-CCNC: 23 U/L (ref 13–39)
BILIRUB SERPL-MCNC: 0.73 MG/DL (ref 0.2–1)
BUN SERPL-MCNC: 32 MG/DL (ref 5–25)
CALCIUM SERPL-MCNC: 9.3 MG/DL (ref 8.4–10.2)
CHLORIDE SERPL-SCNC: 100 MMOL/L (ref 96–108)
CHOLEST SERPL-MCNC: 130 MG/DL (ref ?–200)
CO2 SERPL-SCNC: 22 MMOL/L (ref 21–32)
CREAT SERPL-MCNC: 1.09 MG/DL (ref 0.6–1.3)
CREAT UR-MCNC: 110.5 MG/DL
EST. AVERAGE GLUCOSE BLD GHB EST-MCNC: 94 MG/DL
GFR SERPL CREATININE-BSD FRML MDRD: 69 ML/MIN/1.73SQ M
GLUCOSE P FAST SERPL-MCNC: 151 MG/DL (ref 65–99)
HBA1C MFR BLD: 4.9 %
HDLC SERPL-MCNC: 66 MG/DL
LDLC SERPL CALC-MCNC: 51 MG/DL (ref 0–100)
MICROALBUMIN UR-MCNC: 220.6 MG/L
MICROALBUMIN/CREAT 24H UR: 200 MG/G CREATININE (ref 0–30)
NONHDLC SERPL-MCNC: 64 MG/DL
PHOSPHATE SERPL-MCNC: 3.8 MG/DL (ref 2.3–4.1)
POTASSIUM SERPL-SCNC: 4.7 MMOL/L (ref 3.5–5.3)
PROT SERPL-MCNC: 7.3 G/DL (ref 6.4–8.4)
PSA SERPL-MCNC: 12.54 NG/ML (ref 0–4)
PTH-INTACT SERPL-MCNC: 41.7 PG/ML (ref 12–88)
SODIUM SERPL-SCNC: 135 MMOL/L (ref 135–147)
TRIGL SERPL-MCNC: 63 MG/DL (ref ?–150)
TSH SERPL DL<=0.05 MIU/L-ACNC: 3.5 UIU/ML (ref 0.45–4.5)

## 2025-07-18 PROCEDURE — 84100 ASSAY OF PHOSPHORUS: CPT

## 2025-07-18 PROCEDURE — 83036 HEMOGLOBIN GLYCOSYLATED A1C: CPT

## 2025-07-18 PROCEDURE — 82043 UR ALBUMIN QUANTITATIVE: CPT

## 2025-07-18 PROCEDURE — 97110 THERAPEUTIC EXERCISES: CPT | Performed by: PHYSICAL THERAPIST

## 2025-07-18 PROCEDURE — 97140 MANUAL THERAPY 1/> REGIONS: CPT | Performed by: PHYSICAL THERAPIST

## 2025-07-18 PROCEDURE — 97112 NEUROMUSCULAR REEDUCATION: CPT | Performed by: PHYSICAL THERAPIST

## 2025-07-18 PROCEDURE — 36415 COLL VENOUS BLD VENIPUNCTURE: CPT

## 2025-07-18 PROCEDURE — 82570 ASSAY OF URINE CREATININE: CPT

## 2025-07-18 PROCEDURE — 83970 ASSAY OF PARATHORMONE: CPT

## 2025-07-18 PROCEDURE — 80053 COMPREHEN METABOLIC PANEL: CPT

## 2025-07-18 PROCEDURE — 84153 ASSAY OF PSA TOTAL: CPT

## 2025-07-18 PROCEDURE — 80061 LIPID PANEL: CPT

## 2025-07-18 PROCEDURE — 84443 ASSAY THYROID STIM HORMONE: CPT

## 2025-07-18 NOTE — PROGRESS NOTES
"Daily Note     Today's date: 2025  Patient name: Marco Richard  : 1955  MRN: [id number]  Referring provider: Tye Rodriguez MD  Dx:   Encounter Diagnosis     ICD-10-CM    1. Status post right partial knee replacement  Z96.651           Start Time: 0700  Stop Time: 0745  Total time in clinic (min): 45 minutes    Subjective: Patient reports tightness today with no known cause.      Objective: See treatment diary below      Assessment: Tolerated treatment well. Attempted step ups and downs with 4inch step today, however with significant pain and stiffness noted with step downs. Tolerated self-stretches well, especially with use of moist heat to Right knee. Reviewed frequency of exercises at home and performing supine or prone self-stretch with strap atleast 2-3 times a week in conjunction with other self-stretches such as stair stretch, etc. Patient exhibited good technique with therapeutic exercises and would benefit from continued PT      Plan: Continue per plan of care.  Progress treatment as tolerated.         POC Expires    Auth Status    Unit limit    Expiration date    PT/OT Limit      Diagnosis:  L knee partal arthroplasty    Precautions:     Comparable signs    Primary Impairments:    Patient Goals    Date    Used        Remaining        Manual Therapy        PROM    TH    Patellar mobs        Calf stretch        Scar mob         IASTM  DK, R quad + hip adductor DK, R quad + hip adductor TH, R quad + hip adductor    Short arc distraction c and co tib/fib mobs        Knee flex  w/ heel slide 112 w/ heel slide 115 w/ heel slide 112 w/ heel slide 114 w/ heel slide   Richard stretch         Exercise Diary          Therapeutic Exercise         Heel slides 10\"x8 w/ moist heat 10\"x8 w/ moist heat 10\"x8 w/ moist heat 10\"x8 w/ moist heat 10\"x5 without heat   HS stretch        DKTC        Richard stretch        Stair knee flexion 10\"x10 10\"x10 10\"x10 10\"x10    Knee " "flexion EOT  W/ strap stretch + IASTM to distal quad 10x10\" W/ strap stretch + IASTM to distal quad 10x10\" Opposite LE stretch 10x10\"    Prone quad stretch        Hip flexor stretch         Bike/Nustep 5' recumb for ROM/rocking 5' seat 10 rocking/revolution 5' seat 10 rocking/revolution  5' seat 9 rocking/revolution 5' seat 9 rocking/revolution   Neuromuscular Re-education         Quad sets        TKE         SAQ         LAQ         Bridges         Clamshell         Mini squats TRX deep squats to chair 5\" x10 TRX deep squats to chair 5\" x10 TRX deep squats to chair 5\" x10  TRX deep squats to chair 5\"x10 TRX deep squats 5\"x10   Leg press Seat 6, 5 sec at bottom 70# 2x10 Seat 6 60# 2x10 5 sec at bottom Seat 6 60# 2x10 5 sec at bottom Seat 6, 5 sec at bottom 70# 2x10 Seat 6, 5 sec at bottom 70# 2x10   Step Ups 4\" Right x10 4\" Right x10                Re-Eval     DK   Therapeutic Activities         Education HEP                  Side stepping          Hurdles FWD, LAT x3 laps ea @ mirror  FWD, LAT x3 laps ea @ mirror                                Modalities          HP w/ self-stretch W/ self-stretch W/ self-stretch  w/ self-stretch             "

## 2025-07-19 DIAGNOSIS — E78.5 HYPERLIPIDEMIA LDL GOAL <70: ICD-10-CM

## 2025-07-21 ENCOUNTER — OFFICE VISIT (OUTPATIENT)
Dept: PHYSICAL THERAPY | Facility: CLINIC | Age: 70
End: 2025-07-21
Attending: STUDENT IN AN ORGANIZED HEALTH CARE EDUCATION/TRAINING PROGRAM
Payer: MEDICARE

## 2025-07-21 DIAGNOSIS — Z96.651 STATUS POST RIGHT PARTIAL KNEE REPLACEMENT: Primary | ICD-10-CM

## 2025-07-21 PROCEDURE — 97140 MANUAL THERAPY 1/> REGIONS: CPT

## 2025-07-21 PROCEDURE — 97110 THERAPEUTIC EXERCISES: CPT

## 2025-07-21 PROCEDURE — 97112 NEUROMUSCULAR REEDUCATION: CPT

## 2025-07-21 RX ORDER — ROSUVASTATIN CALCIUM 5 MG/1
5 TABLET, COATED ORAL DAILY
Qty: 90 TABLET | Refills: 1 | Status: SHIPPED | OUTPATIENT
Start: 2025-07-21

## 2025-07-21 NOTE — PROGRESS NOTES
"Daily Note     Today's date: 2025  Patient name: Marco Richard  : 1955  MRN: [id number]  Referring provider: Tye Rodriguez MD  Dx:   Encounter Diagnosis     ICD-10-CM    1. Status post right partial knee replacement  Z96.651                      Subjective: Pt states that he is doing well, but he did notice that he has low tolerance to standing and walking for more than 5 minutes.  He did do a couple of stretches over the weekend.        Objective: See treatment diary below      Assessment: Tolerated treatment well.  Progressed pt with strengthening and stretching as able.  Pt requires cues for 4\" step up to avoid circumduction with fair carryover noted.  Standing hamstring curls in AROM to improve functional motion.  Pt challenged but able to move quicker after a few reps.  Muscle fatigue noted with SLS.  Hamstring stretch added to address pt's continued lack of knee extension, along with TKE to improve extension and quad strength.  Pt will benefit from continued therapy. Will progress as able.      Plan: Continue per plan of care.        POC Expires    Auth Status    Unit limit    Expiration date    PT/OT Limit      Diagnosis:  L knee partal arthroplasty    Precautions:     Comparable signs    Primary Impairments:    Patient Goals    Date    Used        Remaining        Manual Therapy        PROM  TH  TH    Patellar mobs        Calf stretch        Scar mob         IASTM  TH, R quad+ hip adductor DK, R quad + hip adductor TH, R quad + hip adductor    Short arc distraction c and co tib/fib mobs        Knee flex  w/ heel slide 112 w/PROM 115 w/ heel slide 112 w/ heel slide 114 w/ heel slide   Richard stretch         Exercise Diary          Therapeutic Exercise         Heel slides 10\"x8 w/ moist heat  10\"x8 w/ moist heat 10\"x8 w/ moist heat 10\"x5 without heat   HS stretch  10x10\" seated      DKTC        Richard stretch        Stair knee flexion 10\"x10 10\"x10 " "10\"x10 10\"x10    Knee flexion EOT  Opposite LE stretch 10x10\" W/ strap stretch + IASTM to distal quad 10x10\" Opposite LE stretch 10x10\"    Prone quad stretch        Hip flexor stretch         Bike/Nustep 5' recumb for ROM/rocking 5' seat 10 rocking/revolution 5' seat 10 rocking/revolution  5' seat 9 rocking/revolution 5' seat 9 rocking/revolution   Neuromuscular Re-education         Quad sets        TKE  Purple ball 15x3\"       Stand hams curls  AROM 10x on R       LAQ         Bridges         Clamshell         Mini squats TRX deep squats to chair 5\" x10 TRX deep squats to chair 5'x10 TRX deep squats to chair 5\" x10  TRX deep squats to chair 5\"x10 TRX deep squats 5\"x10   Leg press Seat 6, 5 sec at bottom 70# 2x10 Seat 6 5 sec at bottom 70# 2x10 Seat 6 60# 2x10 5 sec at bottom Seat 6, 5 sec at bottom 70# 2x10 Seat 6, 5 sec at bottom 70# 2x10   Step Ups 4\" Right x10 4\" Right 15x      SLS  20\"x2 ea        Re-Eval     DK   Therapeutic Activities         Education HEP                  Side stepping          Hurdles FWD, LAT x3 laps ea @ mirror  FWD, LAT x3 laps ea @ mirror                                Modalities          HP w/ self-stretch  W/ self-stretch  w/ self-stretch               "

## 2025-07-24 ENCOUNTER — OFFICE VISIT (OUTPATIENT)
Dept: PHYSICAL THERAPY | Facility: CLINIC | Age: 70
End: 2025-07-24
Attending: STUDENT IN AN ORGANIZED HEALTH CARE EDUCATION/TRAINING PROGRAM
Payer: MEDICARE

## 2025-07-24 DIAGNOSIS — Z96.651 STATUS POST RIGHT PARTIAL KNEE REPLACEMENT: Primary | ICD-10-CM

## 2025-07-24 PROCEDURE — 97112 NEUROMUSCULAR REEDUCATION: CPT | Performed by: PHYSICAL THERAPIST

## 2025-07-24 PROCEDURE — 97110 THERAPEUTIC EXERCISES: CPT | Performed by: PHYSICAL THERAPIST

## 2025-07-24 NOTE — PROGRESS NOTES
"Daily Note     Today's date: 2025  Patient name: Marco Richard  : 1955  MRN: [id number]  Referring provider: Tye Rodriguez MD  Dx:   Encounter Diagnosis     ICD-10-CM    1. Status post right partial knee replacement  Z96.651           Start Time: 0840  Stop Time: 09  Total time in clinic (min): 45 minutes    Subjective: Patient reports his knee feels stiff this morning.       Objective: See treatment diary below      Assessment: Tolerated treatment well. Noted increased soreness in Left knee today that affected his ability to perform full revolutions on upright bike. Added wobble board to work on stability and balance with some difficulty maintaining balance without UE support. Limited knee mobility continued to be noted with decreased knee flexion while walking. Performed heel slides with moist heat to Right knee and was able to achieve about 115 deg of knee flexion. Patient exhibited good technique with therapeutic exercises and would benefit from continued PT      Plan: Continue per plan of care.  Progress treatment as tolerated.       POC Expires    Auth Status    Unit limit    Expiration date    PT/OT Limit      Diagnosis:  R knee partal arthroplasty    Precautions:     Comparable signs    Primary Impairments:    Patient Goals    Date    Used        Remaining        Manual Therapy        PROM  TH  TH    Patellar mobs        Calf stretch        Scar mob         IASTM  TH, R quad+ hip adductor  TH, R quad + hip adductor    Short arc distraction c and co tib/fib mobs        Knee flex  w/ heel slide 112 w/PROM 115 w/ heel slide 112 w/ heel slide 114 w/ heel slide   Richard stretch         Exercise Diary          Therapeutic Exercise         Heel slides 10\"x8 w/ moist heat  10\"x8 w/ moist heat 10\"x8 w/ moist heat 10\"x5 without heat   HS stretch  10x10\" seated      DKTC        Richard stretch        Stair knee flexion 10\"x10 10\"x10 10\"x10 10\"x10    Knee flexion " "EOT  Opposite LE stretch 10x10\" Seated w/ strap 10\"x10 Opposite LE stretch 10x10\"    Prone quad stretch        Hip flexor stretch        Extensionator prolonged ext stretch   Supine x2min      Bike/Nustep 5' recumb for ROM/rocking 5' seat 10 rocking/revolution 5' seat 9 rocking  5' seat 9 rocking/revolution 5' seat 9 rocking/revolution   Neuromuscular Re-education         Quad sets        TKE  Purple ball 15x3\" Purple ball 20x3\"      Stand hams curls  AROM 10x on R AROM 15x on R      LAQ         Bridges         Clamshell         Wobble board   1 min ea     Mini squats TRX deep squats to chair 5\" x10 TRX deep squats to chair 5'x10   TRX deep squats to chair 5\"x10 TRX deep squats 5\"x10   Leg press Seat 6, 5 sec at bottom 70# 2x10 Seat 6 5 sec at bottom 70# 2x10 Seat 6 5 sec at bottom 70# 2x10 Seat 6, 5 sec at bottom 70# 2x10 Seat 6, 5 sec at bottom 70# 2x10   Step Ups 4\" Right x10 4\" Right 15x      SLS  20\"x2 ea        Re-Eval     DK   Therapeutic Activities         Education HEP                  Side stepping          Hurdles FWD, LAT x3 laps ea @ mirror                                  Modalities          HP w/ self-stretch  W/ self stretch  w/ self-stretch           "

## 2025-07-28 ENCOUNTER — OFFICE VISIT (OUTPATIENT)
Dept: FAMILY MEDICINE CLINIC | Facility: CLINIC | Age: 70
End: 2025-07-28
Payer: MEDICARE

## 2025-07-28 ENCOUNTER — OFFICE VISIT (OUTPATIENT)
Dept: PHYSICAL THERAPY | Facility: CLINIC | Age: 70
End: 2025-07-28
Attending: STUDENT IN AN ORGANIZED HEALTH CARE EDUCATION/TRAINING PROGRAM
Payer: MEDICARE

## 2025-07-28 VITALS
HEART RATE: 100 BPM | BODY MASS INDEX: 30.59 KG/M2 | DIASTOLIC BLOOD PRESSURE: 78 MMHG | OXYGEN SATURATION: 98 % | SYSTOLIC BLOOD PRESSURE: 135 MMHG | WEIGHT: 246 LBS | HEIGHT: 75 IN

## 2025-07-28 DIAGNOSIS — Z96.651 STATUS POST RIGHT PARTIAL KNEE REPLACEMENT: Primary | ICD-10-CM

## 2025-07-28 DIAGNOSIS — I10 PRIMARY HYPERTENSION: ICD-10-CM

## 2025-07-28 DIAGNOSIS — E03.9 HYPOTHYROIDISM, UNSPECIFIED TYPE: ICD-10-CM

## 2025-07-28 DIAGNOSIS — E78.5 HYPERLIPIDEMIA LDL GOAL <70: ICD-10-CM

## 2025-07-28 DIAGNOSIS — E11.42 TYPE 2 DIABETES MELLITUS WITH DIABETIC POLYNEUROPATHY, WITHOUT LONG-TERM CURRENT USE OF INSULIN (HCC): Primary | ICD-10-CM

## 2025-07-28 DIAGNOSIS — Z28.21 PNEUMOCOCCAL VACCINATION DECLINED BY PATIENT: ICD-10-CM

## 2025-07-28 PROCEDURE — 97112 NEUROMUSCULAR REEDUCATION: CPT

## 2025-07-28 PROCEDURE — G2211 COMPLEX E/M VISIT ADD ON: HCPCS | Performed by: FAMILY MEDICINE

## 2025-07-28 PROCEDURE — 97110 THERAPEUTIC EXERCISES: CPT

## 2025-07-28 PROCEDURE — 99214 OFFICE O/P EST MOD 30 MIN: CPT | Performed by: FAMILY MEDICINE

## 2025-07-29 ENCOUNTER — OFFICE VISIT (OUTPATIENT)
Dept: UROLOGY | Facility: CLINIC | Age: 70
End: 2025-07-29
Payer: MEDICARE

## 2025-07-29 VITALS
WEIGHT: 246 LBS | HEART RATE: 88 BPM | BODY MASS INDEX: 30.59 KG/M2 | OXYGEN SATURATION: 98 % | SYSTOLIC BLOOD PRESSURE: 128 MMHG | DIASTOLIC BLOOD PRESSURE: 70 MMHG | HEIGHT: 75 IN

## 2025-07-29 DIAGNOSIS — C61 PROSTATE CANCER (HCC): Primary | ICD-10-CM

## 2025-07-29 PROCEDURE — 99213 OFFICE O/P EST LOW 20 MIN: CPT | Performed by: PHYSICIAN ASSISTANT

## 2025-07-31 ENCOUNTER — OFFICE VISIT (OUTPATIENT)
Dept: PHYSICAL THERAPY | Facility: CLINIC | Age: 70
End: 2025-07-31
Attending: STUDENT IN AN ORGANIZED HEALTH CARE EDUCATION/TRAINING PROGRAM
Payer: MEDICARE

## 2025-07-31 DIAGNOSIS — Z96.651 STATUS POST RIGHT PARTIAL KNEE REPLACEMENT: Primary | ICD-10-CM

## 2025-07-31 PROCEDURE — 97140 MANUAL THERAPY 1/> REGIONS: CPT

## 2025-07-31 PROCEDURE — 97112 NEUROMUSCULAR REEDUCATION: CPT

## 2025-07-31 PROCEDURE — 97110 THERAPEUTIC EXERCISES: CPT

## 2025-08-05 ENCOUNTER — OFFICE VISIT (OUTPATIENT)
Dept: PHYSICAL THERAPY | Facility: CLINIC | Age: 70
End: 2025-08-05
Attending: STUDENT IN AN ORGANIZED HEALTH CARE EDUCATION/TRAINING PROGRAM
Payer: MEDICARE

## 2025-08-05 DIAGNOSIS — Z96.651 STATUS POST RIGHT PARTIAL KNEE REPLACEMENT: Primary | ICD-10-CM

## 2025-08-05 PROCEDURE — 97110 THERAPEUTIC EXERCISES: CPT | Performed by: PHYSICAL THERAPIST

## 2025-08-05 PROCEDURE — 97140 MANUAL THERAPY 1/> REGIONS: CPT | Performed by: PHYSICAL THERAPIST

## 2025-08-05 PROCEDURE — 97112 NEUROMUSCULAR REEDUCATION: CPT | Performed by: PHYSICAL THERAPIST

## 2025-08-07 ENCOUNTER — OFFICE VISIT (OUTPATIENT)
Dept: PHYSICAL THERAPY | Facility: CLINIC | Age: 70
End: 2025-08-07
Attending: STUDENT IN AN ORGANIZED HEALTH CARE EDUCATION/TRAINING PROGRAM
Payer: MEDICARE

## 2025-08-07 DIAGNOSIS — Z96.651 STATUS POST RIGHT PARTIAL KNEE REPLACEMENT: Primary | ICD-10-CM

## 2025-08-07 PROCEDURE — 97112 NEUROMUSCULAR REEDUCATION: CPT | Performed by: PHYSICAL THERAPIST

## 2025-08-07 PROCEDURE — 97110 THERAPEUTIC EXERCISES: CPT | Performed by: PHYSICAL THERAPIST

## 2025-08-07 PROCEDURE — 97530 THERAPEUTIC ACTIVITIES: CPT | Performed by: PHYSICAL THERAPIST

## 2025-08-12 ENCOUNTER — OFFICE VISIT (OUTPATIENT)
Dept: PHYSICAL THERAPY | Facility: CLINIC | Age: 70
End: 2025-08-12
Attending: STUDENT IN AN ORGANIZED HEALTH CARE EDUCATION/TRAINING PROGRAM
Payer: MEDICARE

## 2025-08-14 ENCOUNTER — EVALUATION (OUTPATIENT)
Dept: PHYSICAL THERAPY | Facility: CLINIC | Age: 70
End: 2025-08-14
Attending: STUDENT IN AN ORGANIZED HEALTH CARE EDUCATION/TRAINING PROGRAM
Payer: MEDICARE

## 2025-08-19 ENCOUNTER — OFFICE VISIT (OUTPATIENT)
Dept: PHYSICAL THERAPY | Facility: CLINIC | Age: 70
End: 2025-08-19
Attending: STUDENT IN AN ORGANIZED HEALTH CARE EDUCATION/TRAINING PROGRAM
Payer: MEDICARE

## 2025-08-19 ENCOUNTER — HOSPITAL ENCOUNTER (OUTPATIENT)
Dept: RADIOLOGY | Age: 70
Discharge: HOME | End: 2025-08-19
Attending: PHYSICIAN ASSISTANT
Payer: MEDICARE

## 2025-08-19 DIAGNOSIS — C61 PROSTATE CANCER (HCC): ICD-10-CM

## 2025-08-19 DIAGNOSIS — Z96.651 STATUS POST RIGHT PARTIAL KNEE REPLACEMENT: Primary | ICD-10-CM

## 2025-08-19 PROCEDURE — A9585 GADOBUTROL INJECTION: HCPCS | Performed by: PHYSICIAN ASSISTANT

## 2025-08-19 PROCEDURE — 72197 MRI PELVIS W/O & W/DYE: CPT

## 2025-08-19 PROCEDURE — 97112 NEUROMUSCULAR REEDUCATION: CPT

## 2025-08-19 PROCEDURE — 76377 3D RENDER W/INTRP POSTPROCES: CPT

## 2025-08-19 PROCEDURE — 97110 THERAPEUTIC EXERCISES: CPT

## 2025-08-19 RX ORDER — GADOBUTROL 604.72 MG/ML
11 INJECTION INTRAVENOUS
Status: COMPLETED | OUTPATIENT
Start: 2025-08-19 | End: 2025-08-19

## 2025-08-19 RX ADMIN — GADOBUTROL 11 ML: 604.72 INJECTION INTRAVENOUS at 09:11

## 2025-08-21 ENCOUNTER — OFFICE VISIT (OUTPATIENT)
Dept: PHYSICAL THERAPY | Facility: CLINIC | Age: 70
End: 2025-08-21
Attending: STUDENT IN AN ORGANIZED HEALTH CARE EDUCATION/TRAINING PROGRAM
Payer: MEDICARE

## 2025-08-21 DIAGNOSIS — Z96.651 STATUS POST RIGHT PARTIAL KNEE REPLACEMENT: Primary | ICD-10-CM

## 2025-08-21 PROCEDURE — 97112 NEUROMUSCULAR REEDUCATION: CPT

## 2025-08-21 PROCEDURE — 97110 THERAPEUTIC EXERCISES: CPT

## (undated) DEVICE — DRESSING MEPILEX AG BORDER POST-OP 4 X 10 IN

## (undated) DEVICE — SCREW L15 PACK: Brand: GMK EFFICIENCY

## (undated) DEVICE — HOOD: Brand: T7PLUS

## (undated) DEVICE — INTENDED FOR TISSUE SEPARATION, AND OTHER PROCEDURES THAT REQUIRE A SHARP SURGICAL BLADE TO PUNCTURE OR CUT.: Brand: BARD-PARKER ® SAFETYLOCK CARBON RIB-BACK BLADES

## (undated) DEVICE — INTENDED FOR TISSUE SEPARATION, AND OTHER PROCEDURES THAT REQUIRE A SHARP SURGICAL BLADE TO PUNCTURE OR CUT.: Brand: BARD-PARKER SAFETY BLADES SIZE 15, STERILE

## (undated) DEVICE — SUT STRATAFIX SPIRAL PLUS 3-0 PS-2 30 X 30 CM SXMP2B408

## (undated) DEVICE — WEBRIL 6 IN UNSTERILE

## (undated) DEVICE — 450 ML BOTTLE OF 0.05% CHLORHEXIDINE GLUCONATE IN 99.95% STERILE WATER FOR IRRIGATION, USP AND APPLICATOR.: Brand: IRRISEPT ANTIMICROBIAL WOUND LAVAGE

## (undated) DEVICE — ACE WRAP 6 IN UNSTERILE

## (undated) DEVICE — HOOD WITH PEEL AWAY FACE SHIELD: Brand: T7PLUS

## (undated) DEVICE — BETHLEHEM UNIV TOTAL KNEE, KIT: Brand: CARDINAL HEALTH

## (undated) DEVICE — NEPTUNE E-SEP SMOKE EVACUATION PENCIL, COATED, 70MM BLADE, PUSH BUTTON SWITCH: Brand: NEPTUNE E-SEP

## (undated) DEVICE — GROUNDING PAD RFL

## (undated) DEVICE — VEST SURGEON DISPOSABLE

## (undated) DEVICE — ADHESIVE SKIN CLOSURE SYS EXOFIN FUSION 22CM

## (undated) DEVICE — BLADE OSCILLATING SAW 1/2

## (undated) DEVICE — CURITY NON-ADHERENT STRIPS: Brand: CURITY

## (undated) DEVICE — CEMENT MIXING SYSTEM WITH FEMORAL BREAKWAY NOZZLE: Brand: REVOLUTION

## (undated) DEVICE — SCREW L 20 PACK: Brand: GMK EFFICIENCY

## (undated) DEVICE — GLOVE SRG BIOGEL 7.5

## (undated) DEVICE — IMPERVIOUS STOCKINETTE: Brand: DEROYAL

## (undated) DEVICE — SAW BLADE RECIPROCATING SINGLE SIDED 258 ORTHO

## (undated) DEVICE — HANDPIECE SET WITH RETRACTABLE COAXIAL FAN SPRAY TIP AND SUCTION TUBE: Brand: INTERPULSE

## (undated) DEVICE — SMOKE EVAC FLUID SUCTION HEPA FILTER

## (undated) DEVICE — DRAPE EQUIPMENT RF WAND

## (undated) DEVICE — KERLIX BANDAGE ROLL: Brand: KERLIX

## (undated) DEVICE — POV-IOD SOLUTION 4OZ BT

## (undated) DEVICE — BETHLEHEM UNIVERSAL  MIONR EXT: Brand: CARDINAL HEALTH

## (undated) DEVICE — SUT VICRYL 3-0 SH 27 IN J416H

## (undated) DEVICE — SYRINGE 10ML LL

## (undated) DEVICE — SPECIMEN CONTAINER STERILE PEEL PACK

## (undated) DEVICE — PREP SURGICAL PURPREP 26ML

## (undated) DEVICE — SUT VICRYL 2-0 CT-1 36 IN J945H

## (undated) DEVICE — SUT STRATAFIX SPIRAL PDS PLUS 1 CTX 18 IN SXPP1A400

## (undated) DEVICE — SAW BLADE OSCILLATING BRAZOL 167

## (undated) DEVICE — NEEDLE BLUNT 18 G X 1 1/2IN

## (undated) DEVICE — SUT PROLENE 4-0 PS-2 18 IN 8682G

## (undated) DEVICE — SUT VICRYL PLUS 1 CTB-1 36 IN VCPB947H

## (undated) DEVICE — SHORT THREADED PINS PACK: Brand: KNEE INSTRUMENTS

## (undated) DEVICE — CUFF TOURNIQUET 30 X 4 IN QUICK CONNECT DISP 1BLA

## (undated) DEVICE — STOCKINETTE 2P PREROLLD 6X60

## (undated) DEVICE — COBAN 4 IN STERILE

## (undated) DEVICE — BONE WAX WHITE: Brand: BONE WAX WHITE

## (undated) DEVICE — DISPOSABLE OR TOWEL: Brand: CARDINAL HEALTH

## (undated) DEVICE — SYRINGE 30ML LL

## (undated) DEVICE — NEEDLE 25G X 1 1/2

## (undated) DEVICE — GLOVE INDICATOR PI UNDERGLOVE SZ 8 BLUE

## (undated) DEVICE — STERILE POLYISOPRENE POWDER-FREE SURGICAL GLOVES WITH EMOLLIENT COATING: Brand: PROTEXIS

## (undated) DEVICE — GAUZE SPONGES,16 PLY: Brand: CURITY

## (undated) DEVICE — NEEDLE SPINAL18G X 3.5 IN QUINCKE

## (undated) DEVICE — GLOVE SRG LF STRL BGL SKNSNS 7 PF

## (undated) DEVICE — DECANTER: Brand: UNBRANDED

## (undated) DEVICE — DRILL PINS L85 PACK: Brand: GMK EFFICIENCY